# Patient Record
Sex: MALE | Race: WHITE | Employment: OTHER | URBAN - METROPOLITAN AREA
[De-identification: names, ages, dates, MRNs, and addresses within clinical notes are randomized per-mention and may not be internally consistent; named-entity substitution may affect disease eponyms.]

---

## 2017-01-25 ENCOUNTER — NURSE ONLY (OUTPATIENT)
Dept: CARDIOLOGY CLINIC | Age: 72
End: 2017-01-25

## 2017-01-25 DIAGNOSIS — Z95.810 CARDIAC DEFIBRILLATOR IN PLACE: ICD-10-CM

## 2017-01-25 DIAGNOSIS — Z95.810 AICD (AUTOMATIC CARDIOVERTER/DEFIBRILLATOR) PRESENT: ICD-10-CM

## 2017-01-25 DIAGNOSIS — I25.5 ISCHEMIC CARDIOMYOPATHY: ICD-10-CM

## 2017-01-25 PROCEDURE — 93295 DEV INTERROG REMOTE 1/2/MLT: CPT | Performed by: INTERNAL MEDICINE

## 2017-01-25 PROCEDURE — 93296 REM INTERROG EVL PM/IDS: CPT | Performed by: INTERNAL MEDICINE

## 2017-03-13 ENCOUNTER — OFFICE VISIT (OUTPATIENT)
Dept: CARDIOLOGY CLINIC | Age: 72
End: 2017-03-13

## 2017-03-13 VITALS
DIASTOLIC BLOOD PRESSURE: 60 MMHG | SYSTOLIC BLOOD PRESSURE: 136 MMHG | WEIGHT: 225 LBS | HEART RATE: 52 BPM | HEIGHT: 70 IN | BODY MASS INDEX: 32.21 KG/M2

## 2017-03-13 DIAGNOSIS — I25.10 ATHEROSCLEROSIS OF NATIVE CORONARY ARTERY OF NATIVE HEART WITHOUT ANGINA PECTORIS: ICD-10-CM

## 2017-03-13 DIAGNOSIS — I42.9 CARDIOMYOPATHY (HCC): ICD-10-CM

## 2017-03-13 DIAGNOSIS — Z95.810 AICD (AUTOMATIC CARDIOVERTER/DEFIBRILLATOR) PRESENT: Primary | ICD-10-CM

## 2017-03-13 PROCEDURE — 1123F ACP DISCUSS/DSCN MKR DOCD: CPT | Performed by: INTERNAL MEDICINE

## 2017-03-13 PROCEDURE — 1036F TOBACCO NON-USER: CPT | Performed by: INTERNAL MEDICINE

## 2017-03-13 PROCEDURE — 99213 OFFICE O/P EST LOW 20 MIN: CPT | Performed by: INTERNAL MEDICINE

## 2017-03-13 PROCEDURE — G8417 CALC BMI ABV UP PARAM F/U: HCPCS | Performed by: INTERNAL MEDICINE

## 2017-03-13 PROCEDURE — 4040F PNEUMOC VAC/ADMIN/RCVD: CPT | Performed by: INTERNAL MEDICINE

## 2017-03-13 PROCEDURE — G8484 FLU IMMUNIZE NO ADMIN: HCPCS | Performed by: INTERNAL MEDICINE

## 2017-03-13 PROCEDURE — G8427 DOCREV CUR MEDS BY ELIG CLIN: HCPCS | Performed by: INTERNAL MEDICINE

## 2017-03-13 PROCEDURE — 3017F COLORECTAL CA SCREEN DOC REV: CPT | Performed by: INTERNAL MEDICINE

## 2017-03-13 PROCEDURE — G8598 ASA/ANTIPLAT THER USED: HCPCS | Performed by: INTERNAL MEDICINE

## 2017-03-13 RX ORDER — LOPERAMIDE HYDROCHLORIDE 2 MG/1
2 CAPSULE ORAL 4 TIMES DAILY PRN
COMMUNITY

## 2017-03-13 RX ORDER — NITROGLYCERIN 0.4 MG/1
0.4 TABLET SUBLINGUAL EVERY 5 MIN PRN
COMMUNITY

## 2017-03-13 RX ORDER — PRAZOSIN HYDROCHLORIDE 2 MG/1
2 CAPSULE ORAL NIGHTLY
COMMUNITY
End: 2018-09-24 | Stop reason: ALTCHOICE

## 2017-03-13 RX ORDER — RANITIDINE 300 MG/1
300 TABLET ORAL NIGHTLY
COMMUNITY
End: 2019-03-21

## 2017-03-13 RX ORDER — ROPINIROLE 0.25 MG/1
0.25 TABLET, FILM COATED ORAL 3 TIMES DAILY
COMMUNITY
End: 2019-03-21

## 2017-05-03 ENCOUNTER — NURSE ONLY (OUTPATIENT)
Dept: CARDIOLOGY CLINIC | Age: 72
End: 2017-05-03

## 2017-05-03 DIAGNOSIS — Z95.810 CARDIAC DEFIBRILLATOR IN PLACE: ICD-10-CM

## 2017-05-03 DIAGNOSIS — Z95.810 AICD (AUTOMATIC CARDIOVERTER/DEFIBRILLATOR) PRESENT: Chronic | ICD-10-CM

## 2017-05-03 DIAGNOSIS — I25.5 ISCHEMIC CARDIOMYOPATHY: ICD-10-CM

## 2017-05-03 PROCEDURE — 93296 REM INTERROG EVL PM/IDS: CPT | Performed by: INTERNAL MEDICINE

## 2017-05-03 PROCEDURE — 93295 DEV INTERROG REMOTE 1/2/MLT: CPT | Performed by: INTERNAL MEDICINE

## 2017-08-08 PROCEDURE — 93296 REM INTERROG EVL PM/IDS: CPT | Performed by: INTERNAL MEDICINE

## 2017-08-08 PROCEDURE — 93295 DEV INTERROG REMOTE 1/2/MLT: CPT | Performed by: INTERNAL MEDICINE

## 2017-08-09 ENCOUNTER — NURSE ONLY (OUTPATIENT)
Dept: CARDIOLOGY CLINIC | Age: 72
End: 2017-08-09

## 2017-08-09 DIAGNOSIS — Z95.810 CARDIAC DEFIBRILLATOR IN PLACE: ICD-10-CM

## 2017-08-09 DIAGNOSIS — I25.5 ISCHEMIC CARDIOMYOPATHY: ICD-10-CM

## 2017-08-09 DIAGNOSIS — Z95.810 AICD (AUTOMATIC CARDIOVERTER/DEFIBRILLATOR) PRESENT: Chronic | ICD-10-CM

## 2017-08-23 ENCOUNTER — OFFICE VISIT (OUTPATIENT)
Dept: CARDIOLOGY CLINIC | Age: 72
End: 2017-08-23

## 2017-08-23 VITALS
OXYGEN SATURATION: 95 % | WEIGHT: 227 LBS | BODY MASS INDEX: 33.62 KG/M2 | HEART RATE: 64 BPM | DIASTOLIC BLOOD PRESSURE: 62 MMHG | SYSTOLIC BLOOD PRESSURE: 140 MMHG | HEIGHT: 69 IN

## 2017-08-23 DIAGNOSIS — I10 ESSENTIAL HYPERTENSION, BENIGN: Primary | Chronic | ICD-10-CM

## 2017-08-23 DIAGNOSIS — I42.9 PRIMARY CARDIOMYOPATHY (HCC): ICD-10-CM

## 2017-08-23 DIAGNOSIS — E78.5 OTHER AND UNSPECIFIED HYPERLIPIDEMIA: ICD-10-CM

## 2017-08-23 DIAGNOSIS — I25.10 ATHEROSCLEROSIS OF NATIVE CORONARY ARTERY OF NATIVE HEART WITHOUT ANGINA PECTORIS: Chronic | ICD-10-CM

## 2017-08-23 PROCEDURE — 1036F TOBACCO NON-USER: CPT | Performed by: NURSE PRACTITIONER

## 2017-08-23 PROCEDURE — 99214 OFFICE O/P EST MOD 30 MIN: CPT | Performed by: NURSE PRACTITIONER

## 2017-08-23 PROCEDURE — 4040F PNEUMOC VAC/ADMIN/RCVD: CPT | Performed by: NURSE PRACTITIONER

## 2017-08-23 PROCEDURE — 3017F COLORECTAL CA SCREEN DOC REV: CPT | Performed by: NURSE PRACTITIONER

## 2017-08-23 PROCEDURE — 1123F ACP DISCUSS/DSCN MKR DOCD: CPT | Performed by: NURSE PRACTITIONER

## 2017-08-23 PROCEDURE — G8417 CALC BMI ABV UP PARAM F/U: HCPCS | Performed by: NURSE PRACTITIONER

## 2017-08-23 PROCEDURE — G8598 ASA/ANTIPLAT THER USED: HCPCS | Performed by: NURSE PRACTITIONER

## 2017-08-23 PROCEDURE — G8427 DOCREV CUR MEDS BY ELIG CLIN: HCPCS | Performed by: NURSE PRACTITIONER

## 2017-11-29 ENCOUNTER — TELEPHONE (OUTPATIENT)
Dept: CARDIOLOGY CLINIC | Age: 72
End: 2017-11-29

## 2017-11-29 ENCOUNTER — NURSE ONLY (OUTPATIENT)
Dept: CARDIOLOGY CLINIC | Age: 72
End: 2017-11-29

## 2017-11-29 DIAGNOSIS — Z95.810 CARDIAC DEFIBRILLATOR IN PLACE: ICD-10-CM

## 2017-11-29 DIAGNOSIS — I25.5 ISCHEMIC CARDIOMYOPATHY: ICD-10-CM

## 2017-11-29 PROCEDURE — 93295 DEV INTERROG REMOTE 1/2/MLT: CPT | Performed by: INTERNAL MEDICINE

## 2017-11-29 PROCEDURE — 93296 REM INTERROG EVL PM/IDS: CPT | Performed by: INTERNAL MEDICINE

## 2017-12-04 ENCOUNTER — TELEPHONE (OUTPATIENT)
Dept: CARDIOLOGY CLINIC | Age: 72
End: 2017-12-04

## 2017-12-04 NOTE — TELEPHONE ENCOUNTER
Per message from Dr. Kerline Vargas > One episode of tachycardia on 13-Aug-2017, at 11:30, for 24 seconds, with average rate of 188 likely SVT. Therapies withheld due to wavelet suggesting SVT. Schedule follow up with EP when available. Marilyn Vincent that scheduling dept will call patient for appt with M in January 2018 to discuss tachycardia noted on ICD interrogation. Instructed patient to call office back with any questions regarding message.

## 2018-01-08 ENCOUNTER — OFFICE VISIT (OUTPATIENT)
Dept: CARDIOLOGY CLINIC | Age: 73
End: 2018-01-08

## 2018-01-08 ENCOUNTER — PROCEDURE VISIT (OUTPATIENT)
Dept: CARDIOLOGY CLINIC | Age: 73
End: 2018-01-08

## 2018-01-08 VITALS
OXYGEN SATURATION: 96 % | HEART RATE: 70 BPM | SYSTOLIC BLOOD PRESSURE: 130 MMHG | HEIGHT: 69 IN | WEIGHT: 231.12 LBS | DIASTOLIC BLOOD PRESSURE: 78 MMHG | BODY MASS INDEX: 34.23 KG/M2

## 2018-01-08 DIAGNOSIS — E78.2 MIXED HYPERLIPIDEMIA: ICD-10-CM

## 2018-01-08 DIAGNOSIS — I25.5 ISCHEMIC CARDIOMYOPATHY: ICD-10-CM

## 2018-01-08 DIAGNOSIS — I25.10 CORONARY ARTERY DISEASE INVOLVING NATIVE HEART WITHOUT ANGINA PECTORIS, UNSPECIFIED VESSEL OR LESION TYPE: Chronic | ICD-10-CM

## 2018-01-08 DIAGNOSIS — I25.5 ISCHEMIC CARDIOMYOPATHY: Primary | ICD-10-CM

## 2018-01-08 DIAGNOSIS — Z95.810 ICD (IMPLANTABLE CARDIOVERTER-DEFIBRILLATOR) IN PLACE: Primary | ICD-10-CM

## 2018-01-08 DIAGNOSIS — I10 ESSENTIAL HYPERTENSION, BENIGN: Chronic | ICD-10-CM

## 2018-01-08 DIAGNOSIS — E66.9 CLASS 1 OBESITY WITH BODY MASS INDEX (BMI) OF 34.0 TO 34.9 IN ADULT, UNSPECIFIED OBESITY TYPE, UNSPECIFIED WHETHER SERIOUS COMORBIDITY PRESENT: ICD-10-CM

## 2018-01-08 DIAGNOSIS — R00.0 TACHYCARDIA: ICD-10-CM

## 2018-01-08 DIAGNOSIS — Z95.810 AICD (AUTOMATIC CARDIOVERTER/DEFIBRILLATOR) PRESENT: Chronic | ICD-10-CM

## 2018-01-08 PROCEDURE — G8427 DOCREV CUR MEDS BY ELIG CLIN: HCPCS | Performed by: INTERNAL MEDICINE

## 2018-01-08 PROCEDURE — G8484 FLU IMMUNIZE NO ADMIN: HCPCS | Performed by: INTERNAL MEDICINE

## 2018-01-08 PROCEDURE — 3017F COLORECTAL CA SCREEN DOC REV: CPT | Performed by: INTERNAL MEDICINE

## 2018-01-08 PROCEDURE — 1036F TOBACCO NON-USER: CPT | Performed by: INTERNAL MEDICINE

## 2018-01-08 PROCEDURE — 4040F PNEUMOC VAC/ADMIN/RCVD: CPT | Performed by: INTERNAL MEDICINE

## 2018-01-08 PROCEDURE — 93282 PRGRMG EVAL IMPLANTABLE DFB: CPT | Performed by: INTERNAL MEDICINE

## 2018-01-08 PROCEDURE — 1123F ACP DISCUSS/DSCN MKR DOCD: CPT | Performed by: INTERNAL MEDICINE

## 2018-01-08 PROCEDURE — 99214 OFFICE O/P EST MOD 30 MIN: CPT | Performed by: INTERNAL MEDICINE

## 2018-01-08 PROCEDURE — G8417 CALC BMI ABV UP PARAM F/U: HCPCS | Performed by: INTERNAL MEDICINE

## 2018-01-08 PROCEDURE — G8598 ASA/ANTIPLAT THER USED: HCPCS | Performed by: INTERNAL MEDICINE

## 2018-01-08 RX ORDER — CARVEDILOL 25 MG/1
25 TABLET ORAL 2 TIMES DAILY WITH MEALS
Qty: 30 TABLET | Refills: 5 | Status: SHIPPED | OUTPATIENT
Start: 2018-01-08 | End: 2018-01-08 | Stop reason: SDUPTHER

## 2018-01-08 RX ORDER — CARVEDILOL 25 MG/1
25 TABLET ORAL 2 TIMES DAILY WITH MEALS
Qty: 30 TABLET | Refills: 5 | Status: ON HOLD | OUTPATIENT
Start: 2018-01-08 | End: 2020-03-03 | Stop reason: HOSPADM

## 2018-01-08 NOTE — PROGRESS NOTES
1000 UNITS TABS tablet Take 1,000 Units by mouth daily      loperamide (IMODIUM) 2 MG capsule Take 2 mg by mouth 4 times daily as needed for Diarrhea      nitroGLYCERIN (NITROSTAT) 0.4 MG SL tablet Place 0.4 mg under the tongue every 5 minutes as needed for Chest pain up to max of 3 total doses. If no relief after 1 dose, call 911.  ranitidine (ZANTAC) 300 MG tablet Take 300 mg by mouth nightly      rOPINIRole (REQUIP) 0.25 MG tablet Take 0.25 mg by mouth 3 times daily      prazosin (MINIPRESS) 2 MG capsule Take 2 mg by mouth nightly      amLODIPine (NORVASC) 5 MG tablet Take 1 tablet by mouth daily (Patient taking differently: Take 10 mg by mouth daily ) 30 tablet 3    MAGNESIUM PO Take 450 mg by mouth 2 times daily      Calcium Carb-Cholecalciferol (CALCIUM 1000 + D PO) Take 1,000 Units by mouth 2 times daily      hydrochlorothiazide (HYDRODIURIL) 25 MG tablet Take 0.5 tablets by mouth daily 30 tablet 3    omeprazole (PRILOSEC) 20 MG capsule Take 1 capsule by mouth 2 times daily 30 capsule 3    glipiZIDE (GLUCOTROL) 5 MG tablet Take 5 mg by mouth 2 times daily (before meals)      carvedilol (COREG) 12.5 MG tablet Take 12.5 mg by mouth 2 times daily (with meals).  cyclobenzaprine (FLEXERIL) 10 MG tablet Take 10 mg by mouth every 6 hours as needed for Muscle spasms.  atorvastatin (LIPITOR) 80 MG tablet Take 80 mg by mouth daily.  clopidogrel (PLAVIX) 75 MG tablet Take 75 mg by mouth daily. No current facility-administered medications for this visit. Social History:   reports that he quit smoking about 42 years ago. He has never used smokeless tobacco. He reports that he drinks alcohol. He reports that he does not use drugs. Family History:  family history includes Heart Disease in his father and mother.      Review of System:  · General: negative for fever, chills   · Ophthalmic ROS: negative for - eye pain or loss of vision  · ENT ROS: negative for - headaches, sore Essential hypertension, benign 08/02/2011     Priority: High    Coronary atherosclerosis of native coronary artery 08/02/2011     Priority: High    Primary cardiomyopathy ([facility] 08/02/2011     Priority: High    Ventricular tachycardia ([facility] 06/23/2015    AICD (automatic cardioverter/defibrillator) present 06/23/2015    Ischemic cardiomyopathy     Syncope     Coronary artery disease     Lightheaded 03/31/2015    Chest pain 08/23/2012    Diabetes mellitus ([facility] 08/02/2011        Plan:    - Tachycardia:    New problem. Brief episodes of tachycardia on August 27 for 10 seconds. Patient appears to be asymptomatic. Patient also has had history of brief episodes of nonsustained ventricular tachycardia   Increase carvedilol to 25 mg twice a day   Continue aggressive medical therapy and risk factor modification including weight loss and treatment of sleep apnea    -Cardiomyopathy   - Prior EF of 40-45%. - EF 35% per nuclear stress test 3/31/15   - EF 40% per MUGA 4/10/15.     - He is taking Coreg and is on Losartan. - ICD interrogated today by me and checked. Normal function. Arrhythmia noted as above. -Increased coreg to 25mg bid     - CAD   - Cath: 8/28/12 patent stents in LAD, CX and Marginal. RCA occulusion with collaterals, EF 40%. - 3/31/15 stress: small sized apical primarily fixed defect consistent with fibrosis. Diaphragm attenuation reduces the specificity of this finding. No ischemia. Dilated left ventricle with global hypokinesis and EF 35%. Frequent PVCs and slow non-sustained polymorphic VT    -HTN- Stable. -HLD- On Statin    HOSSEIN: Not treated. [facility] Encouraged to follow-up with sleep study and using his CPAP    - Recommend stop drinking alcohol.     - Obesity: Body mass index is 34.13 kg/m². Weight loss recommended. States he has gained 9-10lbs.   - The patient is counseled to follow a low salt diet to assure blood pressure remains controlled for

## 2018-01-08 NOTE — PROGRESS NOTES
Patient comes in for programming evaluation for his defibrillator. All sensing and pacing parameters are within normal range. No changes need to be made at this time. Please see interrogation for more detail. Patient will follow up in 3 months in office or remotely.

## 2018-04-10 ENCOUNTER — NURSE ONLY (OUTPATIENT)
Dept: CARDIOLOGY CLINIC | Age: 73
End: 2018-04-10

## 2018-04-10 DIAGNOSIS — Z95.810 CARDIAC DEFIBRILLATOR IN PLACE: ICD-10-CM

## 2018-04-10 DIAGNOSIS — I42.9 PRIMARY CARDIOMYOPATHY (HCC): ICD-10-CM

## 2018-04-11 PROCEDURE — 93295 DEV INTERROG REMOTE 1/2/MLT: CPT | Performed by: INTERNAL MEDICINE

## 2018-04-11 PROCEDURE — 93296 REM INTERROG EVL PM/IDS: CPT | Performed by: INTERNAL MEDICINE

## 2018-05-03 ENCOUNTER — OFFICE VISIT (OUTPATIENT)
Dept: CARDIOLOGY CLINIC | Age: 73
End: 2018-05-03

## 2018-05-03 VITALS
SYSTOLIC BLOOD PRESSURE: 112 MMHG | BODY MASS INDEX: 35 KG/M2 | OXYGEN SATURATION: 92 % | WEIGHT: 237 LBS | DIASTOLIC BLOOD PRESSURE: 72 MMHG | HEART RATE: 60 BPM

## 2018-05-03 DIAGNOSIS — I42.9 PRIMARY CARDIOMYOPATHY (HCC): Primary | ICD-10-CM

## 2018-05-03 DIAGNOSIS — I25.10 ATHEROSCLEROSIS OF NATIVE CORONARY ARTERY OF NATIVE HEART WITHOUT ANGINA PECTORIS: Chronic | ICD-10-CM

## 2018-05-03 DIAGNOSIS — I10 ESSENTIAL HYPERTENSION, BENIGN: Chronic | ICD-10-CM

## 2018-05-03 PROCEDURE — 1036F TOBACCO NON-USER: CPT | Performed by: NURSE PRACTITIONER

## 2018-05-03 PROCEDURE — G8417 CALC BMI ABV UP PARAM F/U: HCPCS | Performed by: NURSE PRACTITIONER

## 2018-05-03 PROCEDURE — 3017F COLORECTAL CA SCREEN DOC REV: CPT | Performed by: NURSE PRACTITIONER

## 2018-05-03 PROCEDURE — G8427 DOCREV CUR MEDS BY ELIG CLIN: HCPCS | Performed by: NURSE PRACTITIONER

## 2018-05-03 PROCEDURE — G8598 ASA/ANTIPLAT THER USED: HCPCS | Performed by: NURSE PRACTITIONER

## 2018-05-03 PROCEDURE — 99214 OFFICE O/P EST MOD 30 MIN: CPT | Performed by: NURSE PRACTITIONER

## 2018-05-03 PROCEDURE — 4040F PNEUMOC VAC/ADMIN/RCVD: CPT | Performed by: NURSE PRACTITIONER

## 2018-05-03 PROCEDURE — 1123F ACP DISCUSS/DSCN MKR DOCD: CPT | Performed by: NURSE PRACTITIONER

## 2018-05-18 ENCOUNTER — TELEPHONE (OUTPATIENT)
Dept: CARDIOLOGY CLINIC | Age: 73
End: 2018-05-18

## 2018-05-21 RX ORDER — HYDROCHLOROTHIAZIDE 25 MG/1
12.5 TABLET ORAL DAILY PRN
Qty: 30 TABLET | Refills: 3 | Status: SHIPPED | OUTPATIENT
Start: 2018-05-21 | End: 2018-05-21 | Stop reason: SDUPTHER

## 2018-05-21 RX ORDER — HYDROCHLOROTHIAZIDE 25 MG/1
12.5 TABLET ORAL DAILY PRN
Qty: 30 TABLET | Refills: 3 | Status: SHIPPED | OUTPATIENT
Start: 2018-05-21 | End: 2018-05-22 | Stop reason: SDUPTHER

## 2018-05-22 ENCOUNTER — TELEPHONE (OUTPATIENT)
Dept: CARDIOLOGY CLINIC | Age: 73
End: 2018-05-22

## 2018-05-22 RX ORDER — HYDROCHLOROTHIAZIDE 25 MG/1
12.5 TABLET ORAL DAILY PRN
Qty: 30 TABLET | Refills: 3 | Status: SHIPPED | OUTPATIENT
Start: 2018-05-22 | End: 2018-06-25 | Stop reason: SDUPTHER

## 2018-06-22 ENCOUNTER — TELEPHONE (OUTPATIENT)
Dept: CARDIOLOGY CLINIC | Age: 73
End: 2018-06-22

## 2018-06-25 ENCOUNTER — TELEPHONE (OUTPATIENT)
Dept: CARDIOLOGY CLINIC | Age: 73
End: 2018-06-25

## 2018-06-25 RX ORDER — HYDROCHLOROTHIAZIDE 25 MG/1
12.5 TABLET ORAL DAILY PRN
Qty: 30 TABLET | Refills: 3 | Status: SHIPPED | OUTPATIENT
Start: 2018-06-25 | End: 2019-03-21

## 2018-07-17 ENCOUNTER — NURSE ONLY (OUTPATIENT)
Dept: CARDIOLOGY CLINIC | Age: 73
End: 2018-07-17

## 2018-07-17 DIAGNOSIS — Z95.810 CARDIAC DEFIBRILLATOR IN PLACE: ICD-10-CM

## 2018-07-17 DIAGNOSIS — I25.5 ISCHEMIC CARDIOMYOPATHY: ICD-10-CM

## 2018-07-17 PROCEDURE — 93295 DEV INTERROG REMOTE 1/2/MLT: CPT | Performed by: INTERNAL MEDICINE

## 2018-07-17 PROCEDURE — 93296 REM INTERROG EVL PM/IDS: CPT | Performed by: INTERNAL MEDICINE

## 2018-07-26 ENCOUNTER — PROCEDURE VISIT (OUTPATIENT)
Dept: CARDIOLOGY CLINIC | Age: 73
End: 2018-07-26

## 2018-07-26 ENCOUNTER — OFFICE VISIT (OUTPATIENT)
Dept: CARDIOLOGY CLINIC | Age: 73
End: 2018-07-26

## 2018-07-26 VITALS
WEIGHT: 228 LBS | BODY MASS INDEX: 33.67 KG/M2 | OXYGEN SATURATION: 94 % | DIASTOLIC BLOOD PRESSURE: 62 MMHG | SYSTOLIC BLOOD PRESSURE: 120 MMHG | HEART RATE: 56 BPM

## 2018-07-26 DIAGNOSIS — I47.20 VENTRICULAR TACHYCARDIA: ICD-10-CM

## 2018-07-26 DIAGNOSIS — I42.9 PRIMARY CARDIOMYOPATHY (HCC): ICD-10-CM

## 2018-07-26 DIAGNOSIS — I10 ESSENTIAL HYPERTENSION, BENIGN: Primary | Chronic | ICD-10-CM

## 2018-07-26 DIAGNOSIS — I25.5 ISCHEMIC CARDIOMYOPATHY: ICD-10-CM

## 2018-07-26 DIAGNOSIS — Z95.810 ICD (IMPLANTABLE CARDIOVERTER-DEFIBRILLATOR) IN PLACE: ICD-10-CM

## 2018-07-26 DIAGNOSIS — I25.10 ATHEROSCLEROSIS OF NATIVE CORONARY ARTERY OF NATIVE HEART WITHOUT ANGINA PECTORIS: Chronic | ICD-10-CM

## 2018-07-26 DIAGNOSIS — E78.2 MIXED HYPERLIPIDEMIA: ICD-10-CM

## 2018-07-26 PROCEDURE — 1036F TOBACCO NON-USER: CPT | Performed by: NURSE PRACTITIONER

## 2018-07-26 PROCEDURE — G8598 ASA/ANTIPLAT THER USED: HCPCS | Performed by: NURSE PRACTITIONER

## 2018-07-26 PROCEDURE — G8427 DOCREV CUR MEDS BY ELIG CLIN: HCPCS | Performed by: NURSE PRACTITIONER

## 2018-07-26 PROCEDURE — 1101F PT FALLS ASSESS-DOCD LE1/YR: CPT | Performed by: NURSE PRACTITIONER

## 2018-07-26 PROCEDURE — G8417 CALC BMI ABV UP PARAM F/U: HCPCS | Performed by: NURSE PRACTITIONER

## 2018-07-26 PROCEDURE — 3017F COLORECTAL CA SCREEN DOC REV: CPT | Performed by: NURSE PRACTITIONER

## 2018-07-26 PROCEDURE — 99214 OFFICE O/P EST MOD 30 MIN: CPT | Performed by: NURSE PRACTITIONER

## 2018-07-26 PROCEDURE — 93282 PRGRMG EVAL IMPLANTABLE DFB: CPT | Performed by: INTERNAL MEDICINE

## 2018-07-26 PROCEDURE — 1123F ACP DISCUSS/DSCN MKR DOCD: CPT | Performed by: NURSE PRACTITIONER

## 2018-07-26 PROCEDURE — 4040F PNEUMOC VAC/ADMIN/RCVD: CPT | Performed by: NURSE PRACTITIONER

## 2018-07-26 NOTE — PROGRESS NOTES
Patient comes in for programming evaluation for his defibrillator. All sensing and pacing parameters are within normal range. Patient needed device checked because he has been dizzy lately. No arrhythmias seen in last 2 weeks. No changes need to be made at this time. Please see interrogation for more detail. Patient will follow up in 3 months in office or remotely.

## 2018-07-26 NOTE — PROGRESS NOTES
[address]   Cardiac Follow-up    Referring Provider:  Amado Conklin MD     CC: dizziness     History of Present Illness:   Mr. Andrzej Marquez is seen today as a routine  follow up visit. His cardiac history includes  Known CAD, cardiomyopathy, hypertension, hyperlipidemia, and diabetes. He has cut back on alcohol consumption and does not smoke. In 2006 he had a Taxus stent in his LAD. He also has been treated for dilated cardiomyopathy with EF 40-45%. 2011 stress echo was negative for ischemia, resting EF of 45%, with exercise, his EF went up to 55%. His last ECHO 7/16 EF 50-55%. 6/2015 patient receive a ICD implantation per Dr. Ruthie Horan. Last ICD pacer check was on 7/26/18. Carelink transmission shows normal sensing and pacing function. See interrogation for more details. At today's visit he complains of dizziness with positional changes from sitting to standing since he started taking HCTZ. At today's OV patient is negative for orthostatic hypotension. He only drinking 8 ounces of fluid per day. ICD was check at today's visit which showed normal sensing and pacing function. No arrhythmias seen in last 2 weeks. He denies chest pain, palpitations, SOB, dyspnea, or edema. Past Medical History:   has a past medical history of Cardiomyopathy ([address] Diabetes mellitus ([address] Hyperlipidemia; Hypertension; NSVT (nonsustained ventricular tachycardia) ([address] and Syncope. Surgical History:   has a past surgical history that includes Coronary angioplasty with stent; Cholecystectomy; laminectomy; Tonsillectomy and adenoidectomy; Cardiac defibrillator placement (6/23/15); and toenail excision (07/21/2017). Social History:   reports that he quit smoking about 43 years ago. He has never used smokeless tobacco. He reports that he drinks alcohol. He reports that he does not use drugs. Family History:  family history includes Heart Disease in his father and mother.    Current Outpatient Prescriptions   Medication scleral icterus. · ENT: No Headaches, hearing loss or vertigo. No mouth sores or sore throat. · Cardiovascular: Reviewed in HPI  · Respiratory: No cough or wheezing, no sputum production. No hematemesis. · Gastrointestinal: No abdominal pain, appetite loss, blood in stools. No change in bowel or bladder habits. · Genitourinary: No dysuria, trouble voiding, or hematuria. · Musculoskeletal:  No gait disturbance, weakness or joint complaints. · Integumentary: No rash or pruritis. · Neurological: No headache, diplopia, change in muscle strength, numbness or tingling. No change in gait, balance, coordination, mood, affect, memory, mentation, behavior. · Psychiatric: No anxiety, no depression. · Endocrine: No malaise, fatigue or temperature intolerance. No excessive thirst, fluid intake, or urination. No tremor. · Hematologic/Lymphatic: No abnormal bruising or bleeding, blood clots or swollen lymph nodes. · Allergic/Immunologic: No nasal congestion or hives. Physical Examination:    Vitals:    07/26/18 1442   BP: 120/62   Pulse: 56   SpO2: 94%         Constitutional and General Appearance:  Appears stated age, NAD  Respiratory:  · Normal excursion and expansion without use of accessory muscles  · Resp Auscultation: Normal breath sounds without dullness  Cardiovascular:  · The apical impulses not displaced  · Heart tones are crisp and normal  · Cervical veins are not engorged  · The carotid upstroke is normal in amplitude and contour without delay or bruit  · Peripheral pulses are symmetrical and full  · There is no clubbing, cyanosis of the extremities.   · No edema  · Femoral Arteries: 2+ and equal  · Pedal Pulses: 2+ and equal   Abdomen:  · No masses or tenderness  · Liver/Spleen: No Abnormalities Noted  Neurological/Psychiatric:  · Alert and oriented in all spheres  · Moves all extremities well  · Exhibits normal gait balance and coordination  · No abnormalities of mood, affect, memory, mentation, or Instructed the patient to only take HCTZ PRN- for swelling, drink 64 fluid ounces daily, continue to monitor B/P sitting and standing.    2. Follow up in six months     I appreciate the opportunity of cooperating in the care of this individual.    Viraj Hercules CNP

## 2018-07-28 NOTE — PATIENT INSTRUCTIONS
[address]   Cardiac Follow-up    Referring Provider:  Fatoumata Barbosa MD     No chief complaint on file. History of Present Illness:   Mr. Arnulfo Pierce is seen today as a routine  follow up visit. His cardiac history includes  Known CAD, cardiomyopathy, hypertension, hyperlipidemia, and diabetes. He has cut back on alcohol consumption and does not smoke. In 2006 he had a Taxus stent in his LAD. He also has been treated for dilated cardiomyopathy with EF 40-45%. 2011 stress echo was negative for ischemia, resting EF of 45%, with exercise, his EF went up to 55%. His last ECHO 7/16 EF 50-55%. 6/2015 patient receive a ICD implantation per Dr. Sirena Pathak. Last ICD pacer check was on 7/26/18. Carelink transmission shows normal sensing and pacing function. See interrogation for more details. At today's visit he complains of dizziness with positional changes from sitting to standing since he started taking HCTZ. At today's OV patient is negative for orthostatic hypotension. He only drinking 8 ounces of fluid per day. ICD was check at today's visit which showed normal sensing and pacing function. No arrhythmias seen in last 2 weeks. He denies chest pain, palpitations, SOB, dyspnea, or edema. Past Medical History:   has a past medical history of Cardiomyopathy ([address] Diabetes mellitus ([address] Hyperlipidemia; Hypertension; NSVT (nonsustained ventricular tachycardia) ([address] and Syncope. Surgical History:   has a past surgical history that includes Coronary angioplasty with stent; Cholecystectomy; laminectomy; Tonsillectomy and adenoidectomy; Cardiac defibrillator placement (6/23/15); and toenail excision (07/21/2017). Social History:   reports that he quit smoking about 43 years ago. He has never used smokeless tobacco. He reports that he drinks alcohol. He reports that he does not use drugs. Family History:  family history includes Heart Disease in his father and mother.    Current Outpatient changes or diplopia. No scleral icterus. · ENT: No Headaches, hearing loss or vertigo. No mouth sores or sore throat. · Cardiovascular: Reviewed in HPI  · Respiratory: No cough or wheezing, no sputum production. No hematemesis. · Gastrointestinal: No abdominal pain, appetite loss, blood in stools. No change in bowel or bladder habits. · Genitourinary: No dysuria, trouble voiding, or hematuria. · Musculoskeletal:  No gait disturbance, weakness or joint complaints. · Integumentary: No rash or pruritis. · Neurological: No headache, diplopia, change in muscle strength, numbness or tingling. No change in gait, balance, coordination, mood, affect, memory, mentation, behavior. · Psychiatric: No anxiety, no depression. · Endocrine: No malaise, fatigue or temperature intolerance. No excessive thirst, fluid intake, or urination. No tremor. · Hematologic/Lymphatic: No abnormal bruising or bleeding, blood clots or swollen lymph nodes. · Allergic/Immunologic: No nasal congestion or hives. Physical Examination:    Vitals:    07/26/18 1442   BP: 120/62   Pulse: 56   SpO2: 94%         Constitutional and General Appearance:  Appears stated age, NAD  Respiratory:  · Normal excursion and expansion without use of accessory muscles  · Resp Auscultation: Normal breath sounds without dullness  Cardiovascular:  · The apical impulses not displaced  · Heart tones are crisp and normal  · Cervical veins are not engorged  · The carotid upstroke is normal in amplitude and contour without delay or bruit  · Peripheral pulses are symmetrical and full  · There is no clubbing, cyanosis of the extremities.   · No edema  · Femoral Arteries: 2+ and equal  · Pedal Pulses: 2+ and equal   Abdomen:  · No masses or tenderness  · Liver/Spleen: No Abnormalities Noted  Neurological/Psychiatric:  · Alert and oriented in all spheres  · Moves all extremities well  · Exhibits normal gait balance and coordination  · No abnormalities of mood, affect, memory, mentation, or behavior are noted    Stress Echo: 8/9/2011 [facility], EF 45%, mild TR, RVSP 34.5 mmHg. MUGA 4/10/15 -- EF 40%. Myoview: 3/31/15 stress: small sized apical primarily fixed defect consistent with fibrosis. Diaphragm attenuation reduces the specificity of this finding. No ischemia. Dilated left ventricle with global hypokinesis and EF 35%. Frequent PVCs and slow non-sustained polymorphic VT    ECHO: 7/16  Conclusions      Summary   -Technically limited study due to body habitus.   -Arrhythmia noted during exam.   -Normal left ventricle size.   -There is mild concentric left ventricular hypertrophy.   -Left ventricular function is difficult to estimate due to arrhythmia but   appears to be 50-55%.  -There is reversal of E/A inflow velocities across the mitral valve   suggesting impaired left ventricular relaxation.   -The left atrium is dilated.   -There is trivial tricuspid regurgitation with RVSP estimated at 27 mmHg.   -Trivial mitral regurgitation is present.     Assessment/Plan:    1. Essential hypertension: Today's B/P- 120/62   stable  Continue current medications      2. Coronary atherosclerosis of native coronary artery: stable, denies any anginal symptoms   Had CP prior to stenting. 2004 cath> Cyphers to Cx/Nataly,  2007 cath> Cypher to LAD     2011 stress echo negative for ischemia. 8/28/12 [facility]> patent LAD, marginal and CFX stents. Chronic RCA occlusion. 3.   Hyperlipidemia:  on Lipitor 80 mg daily. Labs drawn per [facility]. 4.   Cardiomyopathy> 2011 stress echo was negative for ischemia, resting EF of 45%, with exercise his EF went up to 55%. 8/28/12 [facility]> EF 40%. Last ECHO: 7/16 EF 50-55%        Patient appears stable on physical exam        on Coreg 12.5 mg BID        Continue current medications    5.  Dizziness-       + positional changes from sitting to standing        Plan: only take HCTZ PRN- for swelling, drink 64 fluid ounces daily, continue to monitor B/P sitting and

## 2018-09-24 ENCOUNTER — OFFICE VISIT (OUTPATIENT)
Dept: CARDIOLOGY CLINIC | Age: 73
End: 2018-09-24
Payer: MEDICARE

## 2018-09-24 VITALS
HEART RATE: 53 BPM | DIASTOLIC BLOOD PRESSURE: 60 MMHG | BODY MASS INDEX: 32.85 KG/M2 | WEIGHT: 221.8 LBS | HEIGHT: 69 IN | OXYGEN SATURATION: 97 % | SYSTOLIC BLOOD PRESSURE: 94 MMHG

## 2018-09-24 DIAGNOSIS — R42 DIZZINESS: ICD-10-CM

## 2018-09-24 DIAGNOSIS — I25.5 ISCHEMIC CARDIOMYOPATHY: ICD-10-CM

## 2018-09-24 DIAGNOSIS — I10 ESSENTIAL HYPERTENSION, BENIGN: Chronic | ICD-10-CM

## 2018-09-24 DIAGNOSIS — I25.10 ATHEROSCLEROSIS OF NATIVE CORONARY ARTERY OF NATIVE HEART WITHOUT ANGINA PECTORIS: Primary | Chronic | ICD-10-CM

## 2018-09-24 DIAGNOSIS — E78.2 MIXED HYPERLIPIDEMIA: ICD-10-CM

## 2018-09-24 PROCEDURE — G8427 DOCREV CUR MEDS BY ELIG CLIN: HCPCS | Performed by: INTERNAL MEDICINE

## 2018-09-24 PROCEDURE — 1101F PT FALLS ASSESS-DOCD LE1/YR: CPT | Performed by: INTERNAL MEDICINE

## 2018-09-24 PROCEDURE — G8598 ASA/ANTIPLAT THER USED: HCPCS | Performed by: INTERNAL MEDICINE

## 2018-09-24 PROCEDURE — 1123F ACP DISCUSS/DSCN MKR DOCD: CPT | Performed by: INTERNAL MEDICINE

## 2018-09-24 PROCEDURE — 3017F COLORECTAL CA SCREEN DOC REV: CPT | Performed by: INTERNAL MEDICINE

## 2018-09-24 PROCEDURE — G8417 CALC BMI ABV UP PARAM F/U: HCPCS | Performed by: INTERNAL MEDICINE

## 2018-09-24 PROCEDURE — 99214 OFFICE O/P EST MOD 30 MIN: CPT | Performed by: INTERNAL MEDICINE

## 2018-09-24 PROCEDURE — 4040F PNEUMOC VAC/ADMIN/RCVD: CPT | Performed by: INTERNAL MEDICINE

## 2018-09-24 PROCEDURE — 1036F TOBACCO NON-USER: CPT | Performed by: INTERNAL MEDICINE

## 2018-09-26 ENCOUNTER — TELEPHONE (OUTPATIENT)
Dept: CARDIOLOGY CLINIC | Age: 73
End: 2018-09-26

## 2018-09-26 NOTE — TELEPHONE ENCOUNTER
Anticipate he is stable for procedure. Having echo and stress test next week.  OK to hold plavix 10 days before procedure

## 2018-09-26 NOTE — LETTER
[address] Cardiology - [address] [address]  Phone: [phone]  Fax: [phone]  :46    [id number]      Dear Monse Serra he is stable for procedure. Having echo and stress test next week. OK to hold plavix 10 days before procedure. If you have any questions or concerns, please don't hesitate to call.     Sincerely,        Shimon Batres MD

## 2018-09-26 NOTE — TELEPHONE ENCOUNTER
CARDIAC CLEARANCE     What type of procedure are you having? Endo liver biopsy    Which physician is performing your procedure? Dr Venkat Moura    When is your procedure scheduled for? Not     Where are you having this procedure? Are you taking Blood Thinners? plavix   If so what? (Name/dose/frequesncy)     Does the surgeon want you to stop your blood thinner? If so for how long?   Wants him off plavix 10 days prior but wants him on 81mg ASA those 10 days     Phone Number and Contact Name for Physicians office:    Fax number to send information:[phone]

## 2018-10-03 ENCOUNTER — HOSPITAL ENCOUNTER (OUTPATIENT)
Dept: NON INVASIVE DIAGNOSTICS | Age: 73
Discharge: HOME | End: 2018-10-03
Payer: MEDICARE

## 2018-10-03 ENCOUNTER — TELEPHONE (OUTPATIENT)
Dept: CARDIOLOGY CLINIC | Age: 73
End: 2018-10-03

## 2018-10-03 DIAGNOSIS — I25.10 ATHEROSCLEROSIS OF NATIVE CORONARY ARTERY OF NATIVE HEART WITHOUT ANGINA PECTORIS: Chronic | ICD-10-CM

## 2018-10-03 DIAGNOSIS — I25.5 ISCHEMIC CARDIOMYOPATHY: ICD-10-CM

## 2018-10-03 DIAGNOSIS — R42 DIZZINESS: ICD-10-CM

## 2018-10-03 LAB
LEFT VENTRICULAR EJECTION FRACTION HIGH VALUE: 45 %
LEFT VENTRICULAR EJECTION FRACTION MODE: NORMAL
LV EF: 44 %
LV EF: 45 %
LVEF MODALITY: NORMAL
LVEF MODALITY: NORMAL

## 2018-10-03 PROCEDURE — 93306 TTE W/DOPPLER COMPLETE: CPT

## 2018-10-03 PROCEDURE — 3430000000 HC RX DIAGNOSTIC RADIOPHARMACEUTICAL: Performed by: INTERNAL MEDICINE

## 2018-10-03 PROCEDURE — 78452 HT MUSCLE IMAGE SPECT MULT: CPT | Performed by: INTERNAL MEDICINE

## 2018-10-03 PROCEDURE — 6360000002 HC RX W HCPCS: Performed by: INTERNAL MEDICINE

## 2018-10-03 PROCEDURE — 93017 CV STRESS TEST TRACING ONLY: CPT | Performed by: INTERNAL MEDICINE

## 2018-10-03 PROCEDURE — A9502 TC99M TETROFOSMIN: HCPCS | Performed by: INTERNAL MEDICINE

## 2018-10-03 PROCEDURE — 93307 TTE W/O DOPPLER COMPLETE: CPT

## 2018-10-03 RX ADMIN — TETROFOSMIN 30 MILLICURIE: 0.23 INJECTION, POWDER, LYOPHILIZED, FOR SOLUTION INTRAVENOUS at 10:05

## 2018-10-03 RX ADMIN — REGADENOSON 0.4 MG: 0.08 INJECTION, SOLUTION INTRAVENOUS at 10:00

## 2018-10-03 RX ADMIN — TETROFOSMIN 10 MILLICURIE: 0.23 INJECTION, POWDER, LYOPHILIZED, FOR SOLUTION INTRAVENOUS at 08:10

## 2018-10-15 ENCOUNTER — TELEPHONE (OUTPATIENT)
Dept: CARDIOLOGY CLINIC | Age: 73
End: 2018-10-15

## 2018-10-15 DIAGNOSIS — C85.90 LYMPHOMA, UNSPECIFIED BODY REGION, UNSPECIFIED LYMPHOMA TYPE (HCC): Primary | ICD-10-CM

## 2018-10-15 DIAGNOSIS — I25.5 ISCHEMIC CARDIOMYOPATHY: ICD-10-CM

## 2018-10-23 ENCOUNTER — NURSE ONLY (OUTPATIENT)
Dept: CARDIOLOGY CLINIC | Age: 73
End: 2018-10-23
Payer: MEDICARE

## 2018-10-23 DIAGNOSIS — Z95.810 CARDIAC DEFIBRILLATOR IN PLACE: ICD-10-CM

## 2018-10-23 DIAGNOSIS — I42.9 PRIMARY CARDIOMYOPATHY (HCC): ICD-10-CM

## 2018-10-28 PROCEDURE — 93296 REM INTERROG EVL PM/IDS: CPT | Performed by: INTERNAL MEDICINE

## 2018-10-28 PROCEDURE — 93295 DEV INTERROG REMOTE 1/2/MLT: CPT | Performed by: INTERNAL MEDICINE

## 2019-01-29 ENCOUNTER — NURSE ONLY (OUTPATIENT)
Dept: CARDIOLOGY CLINIC | Age: 74
End: 2019-01-29
Payer: MEDICARE

## 2019-01-29 DIAGNOSIS — Z95.810 CARDIAC DEFIBRILLATOR IN PLACE: ICD-10-CM

## 2019-01-29 DIAGNOSIS — I25.5 ISCHEMIC CARDIOMYOPATHY: ICD-10-CM

## 2019-01-29 PROCEDURE — 93296 REM INTERROG EVL PM/IDS: CPT | Performed by: INTERNAL MEDICINE

## 2019-01-29 PROCEDURE — 93295 DEV INTERROG REMOTE 1/2/MLT: CPT | Performed by: INTERNAL MEDICINE

## 2019-02-01 PROBLEM — R00.0 TACHYCARDIA: Status: ACTIVE | Noted: 2019-02-01

## 2019-02-04 ENCOUNTER — PROCEDURE VISIT (OUTPATIENT)
Dept: CARDIOLOGY CLINIC | Age: 74
End: 2019-02-04
Payer: MEDICARE

## 2019-02-04 ENCOUNTER — OFFICE VISIT (OUTPATIENT)
Dept: CARDIOLOGY CLINIC | Age: 74
End: 2019-02-04
Payer: MEDICARE

## 2019-02-04 VITALS
DIASTOLIC BLOOD PRESSURE: 68 MMHG | HEART RATE: 76 BPM | WEIGHT: 199 LBS | BODY MASS INDEX: 29.47 KG/M2 | SYSTOLIC BLOOD PRESSURE: 110 MMHG | HEIGHT: 69 IN

## 2019-02-04 DIAGNOSIS — I42.9 PRIMARY CARDIOMYOPATHY (HCC): ICD-10-CM

## 2019-02-04 DIAGNOSIS — I25.10 CORONARY ARTERY DISEASE INVOLVING NATIVE HEART WITHOUT ANGINA PECTORIS, UNSPECIFIED VESSEL OR LESION TYPE: Chronic | ICD-10-CM

## 2019-02-04 DIAGNOSIS — I47.20 VENTRICULAR TACHYCARDIA: ICD-10-CM

## 2019-02-04 DIAGNOSIS — R55 SYNCOPE, UNSPECIFIED SYNCOPE TYPE: ICD-10-CM

## 2019-02-04 DIAGNOSIS — R00.0 TACHYCARDIA: Primary | ICD-10-CM

## 2019-02-04 DIAGNOSIS — Z95.810 AICD (AUTOMATIC CARDIOVERTER/DEFIBRILLATOR) PRESENT: Chronic | ICD-10-CM

## 2019-02-04 DIAGNOSIS — I25.10 ATHEROSCLEROSIS OF NATIVE CORONARY ARTERY OF NATIVE HEART WITHOUT ANGINA PECTORIS: Chronic | ICD-10-CM

## 2019-02-04 DIAGNOSIS — Z95.810 ICD (IMPLANTABLE CARDIOVERTER-DEFIBRILLATOR) IN PLACE: ICD-10-CM

## 2019-02-04 DIAGNOSIS — I25.5 ISCHEMIC CARDIOMYOPATHY: ICD-10-CM

## 2019-02-04 PROCEDURE — G8417 CALC BMI ABV UP PARAM F/U: HCPCS | Performed by: INTERNAL MEDICINE

## 2019-02-04 PROCEDURE — 1123F ACP DISCUSS/DSCN MKR DOCD: CPT | Performed by: INTERNAL MEDICINE

## 2019-02-04 PROCEDURE — 4040F PNEUMOC VAC/ADMIN/RCVD: CPT | Performed by: INTERNAL MEDICINE

## 2019-02-04 PROCEDURE — G8427 DOCREV CUR MEDS BY ELIG CLIN: HCPCS | Performed by: INTERNAL MEDICINE

## 2019-02-04 PROCEDURE — G8484 FLU IMMUNIZE NO ADMIN: HCPCS | Performed by: INTERNAL MEDICINE

## 2019-02-04 PROCEDURE — 1036F TOBACCO NON-USER: CPT | Performed by: INTERNAL MEDICINE

## 2019-02-04 PROCEDURE — G8598 ASA/ANTIPLAT THER USED: HCPCS | Performed by: INTERNAL MEDICINE

## 2019-02-04 PROCEDURE — 1101F PT FALLS ASSESS-DOCD LE1/YR: CPT | Performed by: INTERNAL MEDICINE

## 2019-02-04 PROCEDURE — 93282 PRGRMG EVAL IMPLANTABLE DFB: CPT | Performed by: INTERNAL MEDICINE

## 2019-02-04 PROCEDURE — 99214 OFFICE O/P EST MOD 30 MIN: CPT | Performed by: INTERNAL MEDICINE

## 2019-02-04 PROCEDURE — 3017F COLORECTAL CA SCREEN DOC REV: CPT | Performed by: INTERNAL MEDICINE

## 2019-02-04 RX ORDER — AMIODARONE HYDROCHLORIDE 200 MG/1
TABLET ORAL
Qty: 104 TABLET | Refills: 3 | Status: SHIPPED | OUTPATIENT
Start: 2019-02-04 | End: 2019-03-21 | Stop reason: ALTCHOICE

## 2019-03-04 ENCOUNTER — HOSPITAL ENCOUNTER (OUTPATIENT)
Dept: NON INVASIVE DIAGNOSTICS | Age: 74
Discharge: HOME | End: 2019-03-04
Payer: MEDICARE

## 2019-03-04 DIAGNOSIS — I25.5 ISCHEMIC CARDIOMYOPATHY: ICD-10-CM

## 2019-03-04 DIAGNOSIS — C85.90 LYMPHOMA, UNSPECIFIED BODY REGION, UNSPECIFIED LYMPHOMA TYPE (HCC): ICD-10-CM

## 2019-03-04 LAB
LEFT VENTRICULAR EJECTION FRACTION HIGH VALUE: 40 %
LEFT VENTRICULAR EJECTION FRACTION MODE: NORMAL
LV EF: 40 %
LVEF MODALITY: NORMAL

## 2019-03-04 PROCEDURE — C8929 TTE W OR WO FOL WCON,DOPPLER: HCPCS

## 2019-03-21 ENCOUNTER — OFFICE VISIT (OUTPATIENT)
Dept: CARDIOLOGY CLINIC | Age: 74
End: 2019-03-21
Payer: MEDICARE

## 2019-03-21 VITALS
BODY MASS INDEX: 29.79 KG/M2 | WEIGHT: 201.12 LBS | SYSTOLIC BLOOD PRESSURE: 118 MMHG | HEIGHT: 69 IN | HEART RATE: 60 BPM | DIASTOLIC BLOOD PRESSURE: 70 MMHG

## 2019-03-21 DIAGNOSIS — E78.2 MIXED HYPERLIPIDEMIA: ICD-10-CM

## 2019-03-21 DIAGNOSIS — I10 ESSENTIAL HYPERTENSION, BENIGN: Chronic | ICD-10-CM

## 2019-03-21 DIAGNOSIS — I42.9 PRIMARY CARDIOMYOPATHY (HCC): ICD-10-CM

## 2019-03-21 DIAGNOSIS — I25.10 ATHEROSCLEROSIS OF NATIVE CORONARY ARTERY OF NATIVE HEART WITHOUT ANGINA PECTORIS: Primary | Chronic | ICD-10-CM

## 2019-03-21 DIAGNOSIS — Z95.810 AICD (AUTOMATIC CARDIOVERTER/DEFIBRILLATOR) PRESENT: Chronic | ICD-10-CM

## 2019-03-21 PROCEDURE — G8484 FLU IMMUNIZE NO ADMIN: HCPCS | Performed by: INTERNAL MEDICINE

## 2019-03-21 PROCEDURE — 3017F COLORECTAL CA SCREEN DOC REV: CPT | Performed by: INTERNAL MEDICINE

## 2019-03-21 PROCEDURE — 1036F TOBACCO NON-USER: CPT | Performed by: INTERNAL MEDICINE

## 2019-03-21 PROCEDURE — 1123F ACP DISCUSS/DSCN MKR DOCD: CPT | Performed by: INTERNAL MEDICINE

## 2019-03-21 PROCEDURE — G8598 ASA/ANTIPLAT THER USED: HCPCS | Performed by: INTERNAL MEDICINE

## 2019-03-21 PROCEDURE — 99214 OFFICE O/P EST MOD 30 MIN: CPT | Performed by: INTERNAL MEDICINE

## 2019-03-21 PROCEDURE — G8427 DOCREV CUR MEDS BY ELIG CLIN: HCPCS | Performed by: INTERNAL MEDICINE

## 2019-03-21 PROCEDURE — 4040F PNEUMOC VAC/ADMIN/RCVD: CPT | Performed by: INTERNAL MEDICINE

## 2019-03-21 PROCEDURE — 1101F PT FALLS ASSESS-DOCD LE1/YR: CPT | Performed by: INTERNAL MEDICINE

## 2019-03-21 PROCEDURE — G8417 CALC BMI ABV UP PARAM F/U: HCPCS | Performed by: INTERNAL MEDICINE

## 2019-03-21 RX ORDER — TAMSULOSIN HYDROCHLORIDE 0.4 MG/1
0.4 CAPSULE ORAL DAILY
COMMUNITY
End: 2020-01-01

## 2019-03-21 RX ORDER — HYDROCHLOROTHIAZIDE 25 MG/1
12.5 TABLET ORAL DAILY
Qty: 45 TABLET | Refills: 3
Start: 2019-03-21 | End: 2020-01-08

## 2019-04-29 ENCOUNTER — NURSE ONLY (OUTPATIENT)
Dept: CARDIOLOGY CLINIC | Age: 74
End: 2019-04-29
Payer: MEDICARE

## 2019-04-29 ENCOUNTER — OFFICE VISIT (OUTPATIENT)
Dept: CARDIOLOGY CLINIC | Age: 74
End: 2019-04-29
Payer: MEDICARE

## 2019-04-29 VITALS
DIASTOLIC BLOOD PRESSURE: 60 MMHG | WEIGHT: 205 LBS | HEIGHT: 69 IN | SYSTOLIC BLOOD PRESSURE: 106 MMHG | BODY MASS INDEX: 30.36 KG/M2 | HEART RATE: 61 BPM

## 2019-04-29 DIAGNOSIS — I25.5 ISCHEMIC CARDIOMYOPATHY: ICD-10-CM

## 2019-04-29 DIAGNOSIS — Z95.810 ICD (IMPLANTABLE CARDIOVERTER-DEFIBRILLATOR) IN PLACE: ICD-10-CM

## 2019-04-29 DIAGNOSIS — R00.0 TACHYCARDIA: Primary | ICD-10-CM

## 2019-04-29 PROCEDURE — 1123F ACP DISCUSS/DSCN MKR DOCD: CPT | Performed by: NURSE PRACTITIONER

## 2019-04-29 PROCEDURE — G8598 ASA/ANTIPLAT THER USED: HCPCS | Performed by: NURSE PRACTITIONER

## 2019-04-29 PROCEDURE — 99212 OFFICE O/P EST SF 10 MIN: CPT | Performed by: NURSE PRACTITIONER

## 2019-04-29 PROCEDURE — G8427 DOCREV CUR MEDS BY ELIG CLIN: HCPCS | Performed by: NURSE PRACTITIONER

## 2019-04-29 PROCEDURE — 4040F PNEUMOC VAC/ADMIN/RCVD: CPT | Performed by: NURSE PRACTITIONER

## 2019-04-29 PROCEDURE — 93000 ELECTROCARDIOGRAM COMPLETE: CPT | Performed by: NURSE PRACTITIONER

## 2019-04-29 PROCEDURE — 1036F TOBACCO NON-USER: CPT | Performed by: NURSE PRACTITIONER

## 2019-04-29 PROCEDURE — G8417 CALC BMI ABV UP PARAM F/U: HCPCS | Performed by: NURSE PRACTITIONER

## 2019-04-29 PROCEDURE — 93282 PRGRMG EVAL IMPLANTABLE DFB: CPT | Performed by: INTERNAL MEDICINE

## 2019-04-29 PROCEDURE — 3017F COLORECTAL CA SCREEN DOC REV: CPT | Performed by: NURSE PRACTITIONER

## 2019-04-29 NOTE — PROGRESS NOTES
Patient comes in for programming evaluation for his defibrillator. All sensing and pacing parameters are within normal range. No VT since February when Amiodarone was started. No changes need to be made at this time. Please see interrogation for more detail. Patient will follow up in 3 months in office or remotely. Optivol elevated. Pt has Hodgkins.  He will see Pramod Mcarthur today our NP.

## 2019-04-29 NOTE — PATIENT INSTRUCTIONS
1) No medication changes, please double check at home with your wife if you have been off amiodarone and give us a call    2) Labs, CMP, BNP    3) Keep previously scheduled appt w.Dr. Linette Gudino    4) Follow up with Dr. Miriam Lindsey in 6 months

## 2019-04-29 NOTE — PROGRESS NOTES
[address]   Electrophysiology   Date: 4/29/2019  I had the privilege of visiting Mable Das in the office. CC: VT  HPI:     Previous note Dr. Teri Schreiber  2/4/19-Miles Canchola is a 68 y.o. with CAD s/p PCI, ischemic cardiomyopathy, HTN, HLD and DM. He is on chemo for non-Hodgkin's lymphoma (B-cell); he has a port-a-cath in right chest.     He has history of recurrent syncope in the past. MCOT with frequent episodes of polymorphic PVCs and also several episodes of polymorphic non-sustained VT. Because of ischemic cardiomyopathy, history of CAD and moderate LV dysfunction, and VT seen on MCOT. On 6/23/2015 single chamber ICD was placed. One episode of tachycardia on 13-Aug-2017 at 11:30 for 24 seconds with average rate of 188 likely SVT. Therapies withheld due to wavelet suggesting SVT. Today, he has been referred since he was found to have recurrent episodes of arrhythmia on his device. Paces out of it, no shocks. Feels flutters for about a minute or so. No chest pain. No syncope. No AICD shock. Interval History:   Here for 3 month EP follow up where he was started on amio. Saw Dr. Esperanza Day last month and amio vi'd. Today reports feeling great, the best he has in awhile. NSVT and Treated episodes in February. However optivol well above threshold. However clinical exam-euvolemic. No med changes today, will get cmp, BNP, EKG SR --replete lytes pending labs  Past Medical History:   Diagnosis Date    Cardiomyopathy ([address]     Diabetes mellitus ([address]     Hyperlipidemia     Hypertension     NSVT (nonsustained ventricular tachycardia) ([address]     Syncope         Past Surgical History:   Procedure Laterality Date    CARDIAC DEFIBRILLATOR PLACEMENT  6/23/15    single chamber AICD, EPS inducible VT    CHOLECYSTECTOMY      CORONARY ANGIOPLASTY WITH STENT PLACEMENT      LAMINECTOMY      TOENAIL EXCISION  07/21/2017    TONSILLECTOMY AND ADENOIDECTOMY         Allergies:   Allergies Allergen Reactions    Penicillins Swelling    Cortisone      Other reaction(s): Other (See Comments)  Hot flashes  Other reaction(s): Other (See Comments)  Hot flashes    Metformin      Other reaction(s): Other (See Comments)  Violent behavior  Other reaction(s): Other (See Comments)  Violent behavior    Metformin Hcl     Morphine        Social History:  Reviewed. reports that he quit smoking about 44 years ago. He has never used smokeless tobacco. He reports that he drinks alcohol. He reports that he does not use drugs. Family History:  Reviewed. family history includes Heart Disease in his father and mother. Review of System:  All other systems reviewed and are negative except for that noted above. Pertinent negatives are:     · General: negative for fever, chills   · Ophthalmic ROS: negative for - eye pain or loss of vision  · ENT ROS: negative for - headaches, sore throat   · Respiratory: negative for - cough, sputum  · Cardiovascular: Reviewed in HPI  · Gastrointestinal: negative for - abdominal pain, diarrhea, N/V  · Hematology: negative for - bleeding, blood clots, bruising or jaundice  · Genito-Urinary:  negative for - Dysuria or incontinence  · Musculoskeletal: negative for - Joint swelling, muscle pain  · Neurological: negative for - confusion, dizziness, headaches   · Psychiatric: No anxiety, no depression. · Dermatological: negative for - rash    Physical Examination:  Vitals:    04/29/19 1123   BP: 106/60   Pulse: 61        · Constitutional: Oriented. No distress. · Head: Normocephalic and atraumatic. · Mouth/Throat: Oropharynx is clear and moist.   · Eyes: Conjunctivae normal. EOM are normal.   · Neck: Neck supple. No rigidity. No JVD present. · Cardiovascular: Normal rate, regular rhythm, S1&S2. · Pulmonary/Chest: Bilateral respiratory sounds. No wheezes, No rhonchi. · Abdominal: Soft. Bowel sounds present. No distension, No tenderness. · Musculoskeletal: No tenderness. No edema    · Lymphadenopathy: Has no cervical adenopathy. · Neurological: Alert and oriented. Cranial nerve appears intact, No Gross deficit   · Skin: Skin is warm and dry. No rash noted. · Psychiatric: Has a normal behavior     Labs, diagnostic and imaging results reviewed. Stress Echo: 8/9/2011 [facility], EF 45%, mild TR, RVSP 34.5 mmHg. [facility] 4/10/15 -- EF 40%. Myoview: 3/31/15 stress: small sized apical primarily fixed defect consistent with fibrosis. Diaphragm attenuation reduces the specificity of this finding. No ischemia. Dilated left ventricle with global hypokinesis and EF 35%. Frequent PVCs and slow non-sustained polymorphic VT      Medication:  Current Outpatient Medications   Medication Sig Dispense Refill    tamsulosin (FLOMAX) 0.4 MG capsule Take 0.4 mg by mouth daily      hydrochlorothiazide (HYDRODIURIL) 25 MG tablet Take 0.5 tablets by mouth daily 45 tablet 3    carvedilol (COREG) 25 MG tablet Take 1 tablet by mouth 2 times daily (with meals) 30 tablet 5    aspirin 81 MG tablet Take 81 mg by mouth daily      vitamin D (CHOLECALCIFEROL) 1000 UNITS TABS tablet Take 1,000 Units by mouth daily      MAGNESIUM PO Take 450 mg by mouth 2 times daily      glipiZIDE (GLUCOTROL) 5 MG tablet Take 5 mg by mouth 2 times daily (before meals)      atorvastatin (LIPITOR) 80 MG tablet Take 80 mg by mouth daily.  clopidogrel (PLAVIX) 75 MG tablet Take 75 mg by mouth daily.  loperamide (IMODIUM) 2 MG capsule Take 2 mg by mouth 4 times daily as needed for Diarrhea      nitroGLYCERIN (NITROSTAT) 0.4 MG SL tablet Place 0.4 mg under the tongue every 5 minutes as needed for Chest pain up to max of 3 total doses. If no relief after 1 dose, call 911.  Calcium Carb-Cholecalciferol (CALCIUM 1000 + D PO) Take 1,000 Units by mouth 2 times daily       No current facility-administered medications for this visit.         Previous OV Assessment and plan:   - Tachycardia, VT

## 2019-08-06 ENCOUNTER — NURSE ONLY (OUTPATIENT)
Dept: CARDIOLOGY CLINIC | Age: 74
End: 2019-08-06
Payer: MEDICARE

## 2019-08-06 DIAGNOSIS — Z95.810 CARDIAC DEFIBRILLATOR IN PLACE: ICD-10-CM

## 2019-08-06 DIAGNOSIS — I25.5 ISCHEMIC CARDIOMYOPATHY: ICD-10-CM

## 2019-08-06 PROCEDURE — 93295 DEV INTERROG REMOTE 1/2/MLT: CPT | Performed by: INTERNAL MEDICINE

## 2019-08-06 PROCEDURE — 93296 REM INTERROG EVL PM/IDS: CPT | Performed by: INTERNAL MEDICINE

## 2019-08-06 NOTE — LETTER
[address] [facility] [phone]  [address] [facility],4Th Floor [phone]    Pacemaker/Defibrillator Clinic          08/06/19        [address] [place] [id number]        Dear [address] [place] [address]    This letter is to inform you that we received the transmission from your monitor at home that checks your pacemaker and/or defibrillator, or implanted heart monitor. Your device shows normal function. The next date your monitor will automatically transmit will be 11/12/19. Your device and monitor are wireless and most transmit cellularly, but please periodically check your monitor is still plugged in to the electrical outlet. If you still use the telephone land line to send please ensure the connection to the phone tessa is secure. This will help to ensure successful automatic transmissions in the future. Also, the monitor needs to be close to you while sleeping at night. Please be aware that the remote device transmission sites are periodically monitored only during regular business hours during which simultaneous in-office device clinics are being run. If your transmission requires attention, we will contact you as soon as possible. Thank you.             Asad

## 2019-08-29 ENCOUNTER — OFFICE VISIT (OUTPATIENT)
Dept: CARDIOLOGY CLINIC | Age: 74
End: 2019-08-29
Payer: MEDICARE

## 2019-08-29 VITALS
DIASTOLIC BLOOD PRESSURE: 78 MMHG | WEIGHT: 213.4 LBS | SYSTOLIC BLOOD PRESSURE: 130 MMHG | BODY MASS INDEX: 31.61 KG/M2 | HEART RATE: 80 BPM | HEIGHT: 69 IN

## 2019-08-29 DIAGNOSIS — I25.5 ISCHEMIC CARDIOMYOPATHY: ICD-10-CM

## 2019-08-29 DIAGNOSIS — E11.9 TYPE 2 DIABETES MELLITUS WITHOUT COMPLICATION, UNSPECIFIED WHETHER LONG TERM INSULIN USE (HCC): ICD-10-CM

## 2019-08-29 DIAGNOSIS — Z95.810 AICD (AUTOMATIC CARDIOVERTER/DEFIBRILLATOR) PRESENT: Chronic | ICD-10-CM

## 2019-08-29 DIAGNOSIS — I25.10 ATHEROSCLEROSIS OF NATIVE CORONARY ARTERY OF NATIVE HEART WITHOUT ANGINA PECTORIS: Primary | Chronic | ICD-10-CM

## 2019-08-29 DIAGNOSIS — I10 ESSENTIAL HYPERTENSION, BENIGN: Chronic | ICD-10-CM

## 2019-08-29 DIAGNOSIS — R00.0 TACHYCARDIA: ICD-10-CM

## 2019-08-29 PROCEDURE — G8417 CALC BMI ABV UP PARAM F/U: HCPCS | Performed by: INTERNAL MEDICINE

## 2019-08-29 PROCEDURE — 3017F COLORECTAL CA SCREEN DOC REV: CPT | Performed by: INTERNAL MEDICINE

## 2019-08-29 PROCEDURE — G8427 DOCREV CUR MEDS BY ELIG CLIN: HCPCS | Performed by: INTERNAL MEDICINE

## 2019-08-29 PROCEDURE — G8598 ASA/ANTIPLAT THER USED: HCPCS | Performed by: INTERNAL MEDICINE

## 2019-08-29 PROCEDURE — 1036F TOBACCO NON-USER: CPT | Performed by: INTERNAL MEDICINE

## 2019-08-29 PROCEDURE — 2022F DILAT RTA XM EVC RTNOPTHY: CPT | Performed by: INTERNAL MEDICINE

## 2019-08-29 PROCEDURE — 4040F PNEUMOC VAC/ADMIN/RCVD: CPT | Performed by: INTERNAL MEDICINE

## 2019-08-29 PROCEDURE — 1123F ACP DISCUSS/DSCN MKR DOCD: CPT | Performed by: INTERNAL MEDICINE

## 2019-08-29 PROCEDURE — 3046F HEMOGLOBIN A1C LEVEL >9.0%: CPT | Performed by: INTERNAL MEDICINE

## 2019-08-29 PROCEDURE — 99214 OFFICE O/P EST MOD 30 MIN: CPT | Performed by: INTERNAL MEDICINE

## 2019-11-12 ENCOUNTER — NURSE ONLY (OUTPATIENT)
Dept: CARDIOLOGY CLINIC | Age: 74
End: 2019-11-12
Payer: MEDICARE

## 2019-11-12 DIAGNOSIS — I25.5 ISCHEMIC CARDIOMYOPATHY: ICD-10-CM

## 2019-11-12 DIAGNOSIS — Z95.810 CARDIAC DEFIBRILLATOR IN PLACE: ICD-10-CM

## 2019-11-12 PROCEDURE — 93296 REM INTERROG EVL PM/IDS: CPT | Performed by: INTERNAL MEDICINE

## 2019-11-12 PROCEDURE — 93295 DEV INTERROG REMOTE 1/2/MLT: CPT | Performed by: INTERNAL MEDICINE

## 2019-11-14 ENCOUNTER — TELEPHONE (OUTPATIENT)
Dept: CARDIOLOGY CLINIC | Age: 74
End: 2019-11-14

## 2020-01-01 ENCOUNTER — ANESTHESIA EVENT (OUTPATIENT)
Dept: ENDOSCOPY | Age: 75
End: 2020-01-01
Payer: MEDICARE

## 2020-01-01 ENCOUNTER — NURSE ONLY (OUTPATIENT)
Dept: CARDIOLOGY CLINIC | Age: 75
End: 2020-01-01
Payer: MEDICARE

## 2020-01-01 ENCOUNTER — OFFICE VISIT (OUTPATIENT)
Dept: CARDIOLOGY CLINIC | Age: 75
End: 2020-01-01
Payer: MEDICARE

## 2020-01-01 ENCOUNTER — TELEPHONE (OUTPATIENT)
Dept: CARDIOLOGY CLINIC | Age: 75
End: 2020-01-01

## 2020-01-01 ENCOUNTER — CARE COORDINATION (OUTPATIENT)
Dept: CASE MANAGEMENT | Age: 75
End: 2020-01-01

## 2020-01-01 ENCOUNTER — HOSPITAL ENCOUNTER (OUTPATIENT)
Dept: ULTRASOUND IMAGING | Age: 75
Discharge: HOME | End: 2020-08-06
Payer: MEDICARE

## 2020-01-01 ENCOUNTER — HOSPITAL ENCOUNTER (OUTPATIENT)
Dept: NON INVASIVE DIAGNOSTICS | Age: 75
Discharge: HOME | End: 2020-12-07
Payer: MEDICARE

## 2020-01-01 ENCOUNTER — HOSPITAL ENCOUNTER (OUTPATIENT)
Age: 75
Discharge: HOME | End: 2020-10-27
Payer: MEDICARE

## 2020-01-01 ENCOUNTER — ANESTHESIA (OUTPATIENT)
Dept: ENDOSCOPY | Age: 75
End: 2020-01-01
Payer: MEDICARE

## 2020-01-01 ENCOUNTER — HOSPITAL ENCOUNTER (OUTPATIENT)
Age: 75
Setting detail: OUTPATIENT SURGERY
Discharge: HOME | End: 2020-08-04
Attending: INTERNAL MEDICINE | Admitting: INTERNAL MEDICINE
Payer: MEDICARE

## 2020-01-01 ENCOUNTER — HOSPITAL ENCOUNTER (OUTPATIENT)
Dept: INTERVENTIONAL RADIOLOGY/VASCULAR | Age: 75
Discharge: HOME | End: 2020-12-28
Payer: MEDICARE

## 2020-01-01 ENCOUNTER — APPOINTMENT (OUTPATIENT)
Dept: GENERAL RADIOLOGY | Age: 75
DRG: 308 | End: 2020-01-01
Payer: MEDICARE

## 2020-01-01 ENCOUNTER — HOSPITAL ENCOUNTER (OUTPATIENT)
Dept: ULTRASOUND IMAGING | Age: 75
Discharge: HOME | End: 2020-12-28
Payer: MEDICARE

## 2020-01-01 ENCOUNTER — HOSPITAL ENCOUNTER (OUTPATIENT)
Dept: ULTRASOUND IMAGING | Age: 75
Discharge: HOME | End: 2020-11-16
Payer: MEDICARE

## 2020-01-01 ENCOUNTER — HOSPITAL ENCOUNTER (INPATIENT)
Age: 75
LOS: 5 days | Discharge: HOME | DRG: 308 | End: 2020-10-22
Attending: EMERGENCY MEDICINE | Admitting: INTERNAL MEDICINE
Payer: MEDICARE

## 2020-01-01 ENCOUNTER — OFFICE VISIT (OUTPATIENT)
Dept: PRIMARY CARE CLINIC | Age: 75
End: 2020-01-01
Payer: MEDICARE

## 2020-01-01 ENCOUNTER — HOSPITAL ENCOUNTER (OUTPATIENT)
Dept: ULTRASOUND IMAGING | Age: 75
Discharge: HOME | End: 2020-12-07
Payer: MEDICARE

## 2020-01-01 VITALS
SYSTOLIC BLOOD PRESSURE: 94 MMHG | RESPIRATION RATE: 12 BRPM | HEIGHT: 69 IN | HEART RATE: 57 BPM | BODY MASS INDEX: 26.96 KG/M2 | DIASTOLIC BLOOD PRESSURE: 67 MMHG | WEIGHT: 182 LBS | OXYGEN SATURATION: 94 % | TEMPERATURE: 97 F

## 2020-01-01 VITALS — DIASTOLIC BLOOD PRESSURE: 69 MMHG | SYSTOLIC BLOOD PRESSURE: 93 MMHG | OXYGEN SATURATION: 97 %

## 2020-01-01 VITALS
OXYGEN SATURATION: 96 % | SYSTOLIC BLOOD PRESSURE: 113 MMHG | HEART RATE: 66 BPM | DIASTOLIC BLOOD PRESSURE: 77 MMHG | BODY MASS INDEX: 29.62 KG/M2 | HEIGHT: 69 IN | WEIGHT: 200 LBS

## 2020-01-01 VITALS
RESPIRATION RATE: 18 BRPM | TEMPERATURE: 98.6 F | WEIGHT: 193.56 LBS | HEART RATE: 87 BPM | BODY MASS INDEX: 27.71 KG/M2 | HEIGHT: 70 IN | SYSTOLIC BLOOD PRESSURE: 108 MMHG | OXYGEN SATURATION: 93 % | DIASTOLIC BLOOD PRESSURE: 62 MMHG

## 2020-01-01 VITALS
WEIGHT: 194.2 LBS | HEART RATE: 76 BPM | DIASTOLIC BLOOD PRESSURE: 72 MMHG | HEIGHT: 69 IN | SYSTOLIC BLOOD PRESSURE: 98 MMHG | BODY MASS INDEX: 28.76 KG/M2

## 2020-01-01 VITALS
OXYGEN SATURATION: 96 % | BODY MASS INDEX: 29.83 KG/M2 | WEIGHT: 202 LBS | HEART RATE: 85 BPM | RESPIRATION RATE: 16 BRPM | SYSTOLIC BLOOD PRESSURE: 104 MMHG | DIASTOLIC BLOOD PRESSURE: 73 MMHG | TEMPERATURE: 97.5 F

## 2020-01-01 VITALS
DIASTOLIC BLOOD PRESSURE: 71 MMHG | WEIGHT: 188 LBS | BODY MASS INDEX: 26.92 KG/M2 | SYSTOLIC BLOOD PRESSURE: 107 MMHG | HEART RATE: 80 BPM | HEIGHT: 70 IN

## 2020-01-01 VITALS
DIASTOLIC BLOOD PRESSURE: 70 MMHG | HEART RATE: 76 BPM | HEIGHT: 70 IN | SYSTOLIC BLOOD PRESSURE: 101 MMHG | TEMPERATURE: 98.1 F | BODY MASS INDEX: 28.5 KG/M2 | WEIGHT: 199.06 LBS | RESPIRATION RATE: 18 BRPM | OXYGEN SATURATION: 99 %

## 2020-01-01 VITALS
HEART RATE: 70 BPM | TEMPERATURE: 98.1 F | DIASTOLIC BLOOD PRESSURE: 70 MMHG | RESPIRATION RATE: 20 BRPM | WEIGHT: 184.9 LBS | HEIGHT: 70 IN | BODY MASS INDEX: 26.47 KG/M2 | SYSTOLIC BLOOD PRESSURE: 106 MMHG | OXYGEN SATURATION: 99 %

## 2020-01-01 VITALS
SYSTOLIC BLOOD PRESSURE: 100 MMHG | HEIGHT: 70 IN | HEART RATE: 64 BPM | DIASTOLIC BLOOD PRESSURE: 70 MMHG | BODY MASS INDEX: 26.69 KG/M2 | WEIGHT: 186.4 LBS

## 2020-01-01 VITALS
DIASTOLIC BLOOD PRESSURE: 78 MMHG | SYSTOLIC BLOOD PRESSURE: 106 MMHG | RESPIRATION RATE: 18 BRPM | TEMPERATURE: 98 F | HEART RATE: 74 BPM | OXYGEN SATURATION: 96 %

## 2020-01-01 DIAGNOSIS — Z11.59 ENCOUNTER FOR SCREENING FOR OTHER VIRAL DISEASES: ICD-10-CM

## 2020-01-01 DIAGNOSIS — K70.31 ALCOHOLIC CIRRHOSIS OF LIVER WITH ASCITES (HCC): Primary | ICD-10-CM

## 2020-01-01 LAB
A/G RATIO: 1.4 (ref 1.1–2.2)
ACANTHOCYTES: ABNORMAL
ACANTHOCYTES: ABNORMAL
ALBUMIN FLUID: 0.7 G/DL
ALBUMIN SERPL-MCNC: 2.9 G/DL (ref 3.4–5)
ALBUMIN SERPL-MCNC: 3.1 G/DL (ref 3.4–5)
ALBUMIN SERPL-MCNC: 3.1 G/DL (ref 3.4–5)
ALBUMIN SERPL-MCNC: 3.2 G/DL (ref 3.4–5)
ALBUMIN SERPL-MCNC: 3.2 G/DL (ref 3.4–5)
ALBUMIN SERPL-MCNC: 3.4 G/DL (ref 3.4–5)
ALBUMIN SERPL-MCNC: 3.6 G/DL (ref 3.4–5)
ALP BLD-CCNC: 137 U/L (ref 40–129)
ALP BLD-CCNC: 162 U/L (ref 40–129)
ALP BLD-CCNC: 166 U/L (ref 40–129)
ALT SERPL-CCNC: 2432 U/L (ref 10–40)
ALT SERPL-CCNC: 247 U/L (ref 10–40)
ALT SERPL-CCNC: 2691 U/L (ref 10–40)
AMORPHOUS: ABNORMAL /HPF
ANION GAP SERPL CALCULATED.3IONS-SCNC: 11 MMOL/L (ref 3–16)
ANION GAP SERPL CALCULATED.3IONS-SCNC: 14 MMOL/L (ref 3–16)
ANION GAP SERPL CALCULATED.3IONS-SCNC: 15 MMOL/L (ref 3–16)
ANION GAP SERPL CALCULATED.3IONS-SCNC: 16 MMOL/L (ref 3–16)
ANION GAP SERPL CALCULATED.3IONS-SCNC: 17 MMOL/L (ref 3–16)
ANION GAP SERPL CALCULATED.3IONS-SCNC: 18 MMOL/L (ref 3–16)
ANION GAP SERPL CALCULATED.3IONS-SCNC: 8 MMOL/L (ref 3–16)
ANISOCYTOSIS: ABNORMAL
ANISOCYTOSIS: ABNORMAL
APPEARANCE FLUID: CLEAR
APTT: 28.4 SEC (ref 24.2–36.2)
APTT: 30.3 SEC (ref 24.2–36.2)
APTT: 31.6 SEC (ref 24.2–36.2)
APTT: 33.2 SEC (ref 24.2–36.2)
APTT: 34.4 SEC (ref 24.2–36.2)
AST SERPL-CCNC: 2849 U/L (ref 15–37)
AST SERPL-CCNC: 29 U/L (ref 15–37)
AST SERPL-CCNC: 5126 U/L (ref 15–37)
BACTERIA: ABNORMAL /HPF
BASOPHILS ABSOLUTE: 0 K/UL (ref 0–0.2)
BASOPHILS ABSOLUTE: 0.1 K/UL (ref 0–0.2)
BASOPHILS RELATIVE PERCENT: 0.6 %
BASOPHILS RELATIVE PERCENT: 0.6 %
BASOPHILS RELATIVE PERCENT: 0.7 %
BASOPHILS RELATIVE PERCENT: 0.7 %
BASOPHILS RELATIVE PERCENT: 0.8 %
BASOPHILS RELATIVE PERCENT: 1 %
BILIRUB SERPL-MCNC: 3 MG/DL (ref 0–1)
BILIRUB SERPL-MCNC: 3.1 MG/DL (ref 0–1)
BILIRUB SERPL-MCNC: 3.6 MG/DL (ref 0–1)
BILIRUBIN DIRECT: 1.9 MG/DL (ref 0–0.3)
BILIRUBIN DIRECT: 2.2 MG/DL (ref 0–0.3)
BILIRUBIN URINE: ABNORMAL
BILIRUBIN, INDIRECT: 1.2 MG/DL (ref 0–1)
BILIRUBIN, INDIRECT: 1.4 MG/DL (ref 0–1)
BLOOD, URINE: ABNORMAL
BODY FLUID CULTURE, STERILE: NORMAL
BUN BLDV-MCNC: 12 MG/DL (ref 7–20)
BUN BLDV-MCNC: 32 MG/DL (ref 7–20)
BUN BLDV-MCNC: 34 MG/DL (ref 7–20)
BUN BLDV-MCNC: 35 MG/DL (ref 7–20)
BUN BLDV-MCNC: 40 MG/DL (ref 7–20)
BUN BLDV-MCNC: 43 MG/DL (ref 7–20)
BUN BLDV-MCNC: 43 MG/DL (ref 7–20)
BUN BLDV-MCNC: 44 MG/DL (ref 7–20)
BUN BLDV-MCNC: 46 MG/DL (ref 7–20)
BURR CELLS: ABNORMAL
CALCIUM SERPL-MCNC: 7.8 MG/DL (ref 8.3–10.6)
CALCIUM SERPL-MCNC: 7.8 MG/DL (ref 8.3–10.6)
CALCIUM SERPL-MCNC: 8 MG/DL (ref 8.3–10.6)
CALCIUM SERPL-MCNC: 8.1 MG/DL (ref 8.3–10.6)
CALCIUM SERPL-MCNC: 8.2 MG/DL (ref 8.3–10.6)
CALCIUM SERPL-MCNC: 8.3 MG/DL (ref 8.3–10.6)
CALCIUM SERPL-MCNC: 8.4 MG/DL (ref 8.3–10.6)
CALCIUM SERPL-MCNC: 8.9 MG/DL (ref 8.3–10.6)
CALCIUM SERPL-MCNC: 9.8 MG/DL (ref 8.3–10.6)
CELL COUNT FLUID TYPE: NORMAL
CHLORIDE BLD-SCNC: 102 MMOL/L (ref 99–110)
CHLORIDE BLD-SCNC: 96 MMOL/L (ref 99–110)
CHLORIDE BLD-SCNC: 96 MMOL/L (ref 99–110)
CHLORIDE BLD-SCNC: 97 MMOL/L (ref 99–110)
CHLORIDE BLD-SCNC: 97 MMOL/L (ref 99–110)
CHLORIDE BLD-SCNC: 98 MMOL/L (ref 99–110)
CHLORIDE BLD-SCNC: 99 MMOL/L (ref 99–110)
CLARITY: ABNORMAL
CLOT EVALUATION: NORMAL
CO2: 15 MMOL/L (ref 21–32)
CO2: 16 MMOL/L (ref 21–32)
CO2: 16 MMOL/L (ref 21–32)
CO2: 18 MMOL/L (ref 21–32)
CO2: 20 MMOL/L (ref 21–32)
CO2: 21 MMOL/L (ref 21–32)
CO2: 24 MMOL/L (ref 21–32)
CO2: 26 MMOL/L (ref 21–32)
CO2: 29 MMOL/L (ref 21–32)
COARSE CASTS, UA: ABNORMAL /LPF (ref 0–2)
COLOR FLUID: YELLOW
COLOR: ABNORMAL
COMMENT UA: ABNORMAL
CORTISOL TOTAL: 28.5 UG/DL
CREAT SERPL-MCNC: 1.1 MG/DL (ref 0.8–1.3)
CREAT SERPL-MCNC: 1.2 MG/DL (ref 0.8–1.3)
CREAT SERPL-MCNC: 1.7 MG/DL (ref 0.8–1.3)
CREAT SERPL-MCNC: 2 MG/DL (ref 0.8–1.3)
CREAT SERPL-MCNC: 2.4 MG/DL (ref 0.8–1.3)
CREAT SERPL-MCNC: 2.7 MG/DL (ref 0.8–1.3)
CREAT SERPL-MCNC: 2.7 MG/DL (ref 0.8–1.3)
CREAT SERPL-MCNC: 3.2 MG/DL (ref 0.8–1.3)
CREAT SERPL-MCNC: 3.5 MG/DL (ref 0.8–1.3)
CREATININE URINE: 231.7 MG/DL (ref 39–259)
CRENATED RBC'S: ABNORMAL
EKG ATRIAL RATE: 166 BPM
EKG ATRIAL RATE: 77 BPM
EKG ATRIAL RATE: 84 BPM
EKG DIAGNOSIS: NORMAL
EKG P AXIS: 25 DEGREES
EKG P-R INTERVAL: 276 MS
EKG Q-T INTERVAL: 282 MS
EKG Q-T INTERVAL: 382 MS
EKG Q-T INTERVAL: 468 MS
EKG QRS DURATION: 154 MS
EKG QRS DURATION: 88 MS
EKG QRS DURATION: 94 MS
EKG QTC CALCULATION (BAZETT): 451 MS
EKG QTC CALCULATION (BAZETT): 474 MS
EKG QTC CALCULATION (BAZETT): 529 MS
EKG R AXIS: -61 DEGREES
EKG R AXIS: -69 DEGREES
EKG R AXIS: 166 DEGREES
EKG T AXIS: 101 DEGREES
EKG T AXIS: 119 DEGREES
EKG T AXIS: 79 DEGREES
EKG VENTRICULAR RATE: 170 BPM
EKG VENTRICULAR RATE: 77 BPM
EKG VENTRICULAR RATE: 84 BPM
EOSINOPHILS ABSOLUTE: 0 K/UL (ref 0–0.6)
EOSINOPHILS ABSOLUTE: 0 K/UL (ref 0–0.6)
EOSINOPHILS ABSOLUTE: 0.1 K/UL (ref 0–0.6)
EOSINOPHILS RELATIVE PERCENT: 0 %
EOSINOPHILS RELATIVE PERCENT: 0.4 %
EOSINOPHILS RELATIVE PERCENT: 1 %
EOSINOPHILS RELATIVE PERCENT: 1.2 %
EOSINOPHILS RELATIVE PERCENT: 1.5 %
EOSINOPHILS RELATIVE PERCENT: 1.8 %
EPITHELIAL CELLS, UA: 8 /HPF (ref 0–5)
FLUID TYPE: NORMAL
GFR AFRICAN AMERICAN: 21
GFR AFRICAN AMERICAN: 23
GFR AFRICAN AMERICAN: 28
GFR AFRICAN AMERICAN: 28
GFR AFRICAN AMERICAN: 32
GFR AFRICAN AMERICAN: 40
GFR AFRICAN AMERICAN: 48
GFR AFRICAN AMERICAN: >60
GFR AFRICAN AMERICAN: >60
GFR NON-AFRICAN AMERICAN: 17
GFR NON-AFRICAN AMERICAN: 19
GFR NON-AFRICAN AMERICAN: 23
GFR NON-AFRICAN AMERICAN: 23
GFR NON-AFRICAN AMERICAN: 27
GFR NON-AFRICAN AMERICAN: 33
GFR NON-AFRICAN AMERICAN: 40
GFR NON-AFRICAN AMERICAN: 59
GFR NON-AFRICAN AMERICAN: >60
GLOBULIN: 2.4 G/DL
GLUCOSE BLD-MCNC: 120 MG/DL (ref 70–99)
GLUCOSE BLD-MCNC: 122 MG/DL (ref 70–99)
GLUCOSE BLD-MCNC: 127 MG/DL (ref 70–99)
GLUCOSE BLD-MCNC: 133 MG/DL (ref 70–99)
GLUCOSE BLD-MCNC: 136 MG/DL (ref 70–99)
GLUCOSE BLD-MCNC: 141 MG/DL (ref 70–99)
GLUCOSE BLD-MCNC: 149 MG/DL (ref 70–99)
GLUCOSE BLD-MCNC: 150 MG/DL (ref 70–99)
GLUCOSE BLD-MCNC: 168 MG/DL (ref 70–99)
GLUCOSE BLD-MCNC: 173 MG/DL (ref 70–99)
GLUCOSE BLD-MCNC: 178 MG/DL (ref 70–99)
GLUCOSE BLD-MCNC: 182 MG/DL (ref 70–99)
GLUCOSE BLD-MCNC: 188 MG/DL (ref 70–99)
GLUCOSE BLD-MCNC: 190 MG/DL (ref 70–99)
GLUCOSE BLD-MCNC: 215 MG/DL (ref 70–99)
GLUCOSE BLD-MCNC: 226 MG/DL (ref 70–99)
GLUCOSE BLD-MCNC: 243 MG/DL (ref 70–99)
GLUCOSE BLD-MCNC: 256 MG/DL (ref 70–99)
GLUCOSE BLD-MCNC: 272 MG/DL (ref 70–99)
GLUCOSE BLD-MCNC: 275 MG/DL (ref 70–99)
GLUCOSE BLD-MCNC: 283 MG/DL (ref 70–99)
GLUCOSE BLD-MCNC: 288 MG/DL (ref 70–99)
GLUCOSE BLD-MCNC: 310 MG/DL (ref 70–99)
GLUCOSE BLD-MCNC: 324 MG/DL (ref 70–99)
GLUCOSE BLD-MCNC: 344 MG/DL (ref 70–99)
GLUCOSE BLD-MCNC: 405 MG/DL (ref 70–99)
GLUCOSE BLD-MCNC: 78 MG/DL (ref 70–99)
GLUCOSE BLD-MCNC: 79 MG/DL (ref 70–99)
GLUCOSE BLD-MCNC: 81 MG/DL (ref 70–99)
GLUCOSE BLD-MCNC: 82 MG/DL (ref 70–99)
GLUCOSE BLD-MCNC: 85 MG/DL (ref 70–99)
GLUCOSE BLD-MCNC: 89 MG/DL (ref 70–99)
GLUCOSE BLD-MCNC: 91 MG/DL (ref 70–99)
GLUCOSE BLD-MCNC: 92 MG/DL (ref 70–99)
GLUCOSE BLD-MCNC: 95 MG/DL (ref 70–99)
GLUCOSE BLD-MCNC: 99 MG/DL (ref 70–99)
GLUCOSE URINE: 100 MG/DL
GRAM STAIN RESULT: NORMAL
HCT VFR BLD CALC: 37.5 % (ref 40.5–52.5)
HCT VFR BLD CALC: 38.8 % (ref 40.5–52.5)
HCT VFR BLD CALC: 39.1 % (ref 40.5–52.5)
HCT VFR BLD CALC: 40.3 % (ref 40.5–52.5)
HCT VFR BLD CALC: 43.1 % (ref 40.5–52.5)
HCT VFR BLD CALC: 45.5 % (ref 40.5–52.5)
HEMOGLOBIN: 12.7 G/DL (ref 13.5–17.5)
HEMOGLOBIN: 13 G/DL (ref 13.5–17.5)
HEMOGLOBIN: 13.1 G/DL (ref 13.5–17.5)
HEMOGLOBIN: 13.6 G/DL (ref 13.5–17.5)
HEMOGLOBIN: 14.1 G/DL (ref 13.5–17.5)
HEMOGLOBIN: 14.9 G/DL (ref 13.5–17.5)
HYALINE CASTS: ABNORMAL /LPF (ref 0–2)
INR BLD: 0.97 (ref 0.86–1.14)
INR BLD: 1.03 (ref 0.86–1.14)
INR BLD: 1.16 (ref 0.86–1.14)
KETONES, URINE: 15 MG/DL
LEUKOCYTE ESTERASE, URINE: ABNORMAL
LV EF: 20 %
LVEF MODALITY: NORMAL
LYMPHOCYTES ABSOLUTE: 0.5 K/UL (ref 1–5.1)
LYMPHOCYTES ABSOLUTE: 0.7 K/UL (ref 1–5.1)
LYMPHOCYTES ABSOLUTE: 0.9 K/UL (ref 1–5.1)
LYMPHOCYTES RELATIVE PERCENT: 12.1 %
LYMPHOCYTES RELATIVE PERCENT: 6.2 %
LYMPHOCYTES RELATIVE PERCENT: 6.2 %
LYMPHOCYTES RELATIVE PERCENT: 8.3 %
LYMPHOCYTES RELATIVE PERCENT: 8.8 %
LYMPHOCYTES RELATIVE PERCENT: 9 %
LYMPHOCYTES, BODY FLUID: 5 %
MACROPHAGE FLUID: 67 %
MAGNESIUM: 1.5 MG/DL (ref 1.8–2.4)
MAGNESIUM: 1.8 MG/DL (ref 1.8–2.4)
MAGNESIUM: 1.8 MG/DL (ref 1.8–2.4)
MAGNESIUM: 1.9 MG/DL (ref 1.8–2.4)
MAGNESIUM: 1.9 MG/DL (ref 1.8–2.4)
MAGNESIUM: 2 MG/DL (ref 1.8–2.4)
MAGNESIUM: 2.1 MG/DL (ref 1.8–2.4)
MCH RBC QN AUTO: 31 PG (ref 26–34)
MCH RBC QN AUTO: 31 PG (ref 26–34)
MCH RBC QN AUTO: 31.1 PG (ref 26–34)
MCH RBC QN AUTO: 31.2 PG (ref 26–34)
MCH RBC QN AUTO: 31.3 PG (ref 26–34)
MCH RBC QN AUTO: 31.3 PG (ref 26–34)
MCHC RBC AUTO-ENTMCNC: 32.7 G/DL (ref 31–36)
MCHC RBC AUTO-ENTMCNC: 32.7 G/DL (ref 31–36)
MCHC RBC AUTO-ENTMCNC: 33.3 G/DL (ref 31–36)
MCHC RBC AUTO-ENTMCNC: 33.7 G/DL (ref 31–36)
MCHC RBC AUTO-ENTMCNC: 33.8 G/DL (ref 31–36)
MCHC RBC AUTO-ENTMCNC: 33.9 G/DL (ref 31–36)
MCV RBC AUTO: 91.9 FL (ref 80–100)
MCV RBC AUTO: 92 FL (ref 80–100)
MCV RBC AUTO: 92.4 FL (ref 80–100)
MCV RBC AUTO: 93.6 FL (ref 80–100)
MCV RBC AUTO: 94.8 FL (ref 80–100)
MCV RBC AUTO: 95.5 FL (ref 80–100)
MICROSCOPIC EXAMINATION: YES
MONOCYTES ABSOLUTE: 0.3 K/UL (ref 0–1.3)
MONOCYTES ABSOLUTE: 0.4 K/UL (ref 0–1.3)
MONOCYTES ABSOLUTE: 0.5 K/UL (ref 0–1.3)
MONOCYTES ABSOLUTE: 0.6 K/UL (ref 0–1.3)
MONOCYTES ABSOLUTE: 0.7 K/UL (ref 0–1.3)
MONOCYTES ABSOLUTE: 0.8 K/UL (ref 0–1.3)
MONOCYTES RELATIVE PERCENT: 10.4 %
MONOCYTES RELATIVE PERCENT: 11.4 %
MONOCYTES RELATIVE PERCENT: 4.9 %
MONOCYTES RELATIVE PERCENT: 6.1 %
MONOCYTES RELATIVE PERCENT: 6.9 %
MONOCYTES RELATIVE PERCENT: 7 %
NEUTROPHIL, FLUID: 28 %
NEUTROPHILS ABSOLUTE: 4.6 K/UL (ref 1.7–7.7)
NEUTROPHILS ABSOLUTE: 4.7 K/UL (ref 1.7–7.7)
NEUTROPHILS ABSOLUTE: 4.7 K/UL (ref 1.7–7.7)
NEUTROPHILS ABSOLUTE: 5.9 K/UL (ref 1.7–7.7)
NEUTROPHILS ABSOLUTE: 7.5 K/UL (ref 1.7–7.7)
NEUTROPHILS ABSOLUTE: 9.4 K/UL (ref 1.7–7.7)
NEUTROPHILS RELATIVE PERCENT: 78 %
NEUTROPHILS RELATIVE PERCENT: 78.1 %
NEUTROPHILS RELATIVE PERCENT: 78.9 %
NEUTROPHILS RELATIVE PERCENT: 83.2 %
NEUTROPHILS RELATIVE PERCENT: 86.3 %
NEUTROPHILS RELATIVE PERCENT: 87.9 %
NITRITE, URINE: POSITIVE
NUCLEATED CELLS FLUID: 205 /CUMM
NUMBER OF CELLS COUNTED FLUID: 100
OSMOLALITY URINE: 343 MOSM/KG (ref 390–1070)
OVALOCYTES: ABNORMAL
PDW BLD-RTO: 16.8 % (ref 12.4–15.4)
PDW BLD-RTO: 16.9 % (ref 12.4–15.4)
PDW BLD-RTO: 17.2 % (ref 12.4–15.4)
PERFORMED ON: ABNORMAL
PERFORMED ON: NORMAL
PH UA: 5 (ref 5–8)
PHOSPHORUS: 2.3 MG/DL (ref 2.5–4.9)
PHOSPHORUS: 2.4 MG/DL (ref 2.5–4.9)
PHOSPHORUS: 2.7 MG/DL (ref 2.5–4.9)
PHOSPHORUS: 3 MG/DL (ref 2.5–4.9)
PHOSPHORUS: 3.2 MG/DL (ref 2.5–4.9)
PHOSPHORUS: 3.4 MG/DL (ref 2.5–4.9)
PHOSPHORUS: 3.7 MG/DL (ref 2.5–4.9)
PHOSPHORUS: 3.8 MG/DL (ref 2.5–4.9)
PHOSPHORUS: 3.9 MG/DL (ref 2.5–4.9)
PLATELET # BLD: 52 K/UL (ref 135–450)
PLATELET # BLD: 54 K/UL (ref 135–450)
PLATELET # BLD: 58 K/UL (ref 135–450)
PLATELET # BLD: 62 K/UL (ref 135–450)
PLATELET # BLD: 71 K/UL (ref 135–450)
PLATELET # BLD: 93 K/UL (ref 135–450)
PLATELET SLIDE REVIEW: ABNORMAL
PLATELET SLIDE REVIEW: ABNORMAL
PMV BLD AUTO: 10 FL (ref 5–10.5)
PMV BLD AUTO: 10.5 FL (ref 5–10.5)
PMV BLD AUTO: 11 FL (ref 5–10.5)
PMV BLD AUTO: 11.1 FL (ref 5–10.5)
PMV BLD AUTO: 11.2 FL (ref 5–10.5)
PMV BLD AUTO: 11.6 FL (ref 5–10.5)
POIKILOCYTES: ABNORMAL
POTASSIUM REFLEX MAGNESIUM: 3.4 MMOL/L (ref 3.5–5.1)
POTASSIUM REFLEX MAGNESIUM: 3.7 MMOL/L (ref 3.5–5.1)
POTASSIUM REFLEX MAGNESIUM: 4 MMOL/L (ref 3.5–5.1)
POTASSIUM REFLEX MAGNESIUM: 4.9 MMOL/L (ref 3.5–5.1)
POTASSIUM REFLEX MAGNESIUM: 5 MMOL/L (ref 3.5–5.1)
POTASSIUM SERPL-SCNC: 3.4 MMOL/L (ref 3.5–5.1)
POTASSIUM SERPL-SCNC: 3.6 MMOL/L (ref 3.5–5.1)
POTASSIUM SERPL-SCNC: 4 MMOL/L (ref 3.5–5.1)
POTASSIUM SERPL-SCNC: 4.2 MMOL/L (ref 3.5–5.1)
POTASSIUM SERPL-SCNC: 4.4 MMOL/L (ref 3.5–5.1)
POTASSIUM SERPL-SCNC: 5.9 MMOL/L (ref 3.5–5.1)
PRO-BNP: 8959 PG/ML (ref 0–449)
PRO-BNP: ABNORMAL PG/ML (ref 0–449)
PROTEIN UA: >=300 MG/DL
PROTHROMBIN TIME: 11.2 SEC (ref 10–13.2)
PROTHROMBIN TIME: 12 SEC (ref 10–13.2)
PROTHROMBIN TIME: 13.5 SEC (ref 10–13.2)
RBC # BLD: 4.08 M/UL (ref 4.2–5.9)
RBC # BLD: 4.18 M/UL (ref 4.2–5.9)
RBC # BLD: 4.22 M/UL (ref 4.2–5.9)
RBC # BLD: 4.36 M/UL (ref 4.2–5.9)
RBC # BLD: 4.55 M/UL (ref 4.2–5.9)
RBC # BLD: 4.76 M/UL (ref 4.2–5.9)
RBC FLUID: <1000 /CUMM
RBC UA: >100 /HPF (ref 0–4)
RENAL EPITHELIAL, UA: ABNORMAL /HPF (ref 0–1)
REPORT: NORMAL
SARS-COV-2: NOT DETECTED
SLIDE REVIEW: ABNORMAL
SODIUM BLD-SCNC: 130 MMOL/L (ref 136–145)
SODIUM BLD-SCNC: 131 MMOL/L (ref 136–145)
SODIUM BLD-SCNC: 131 MMOL/L (ref 136–145)
SODIUM BLD-SCNC: 133 MMOL/L (ref 136–145)
SODIUM BLD-SCNC: 134 MMOL/L (ref 136–145)
SODIUM BLD-SCNC: 135 MMOL/L (ref 136–145)
SODIUM BLD-SCNC: 137 MMOL/L (ref 136–145)
SODIUM URINE: 28 MMOL/L
SPECIFIC GRAVITY UA: 1.03 (ref 1–1.03)
T4 FREE: 1.2 NG/DL (ref 0.9–1.8)
THIS TEST SENT TO: NORMAL
TOTAL PROTEIN: 5.2 G/DL (ref 6.4–8.2)
TOTAL PROTEIN: 5.2 G/DL (ref 6.4–8.2)
TOTAL PROTEIN: 5.8 G/DL (ref 6.4–8.2)
TROPONIN: 0.01 NG/ML
TROPONIN: 0.38 NG/ML
TROPONIN: 0.42 NG/ML
TROPONIN: 0.57 NG/ML
TROPONIN: 0.68 NG/ML
TROPONIN: 1.05 NG/ML
TROPONIN: 1.16 NG/ML
TSH REFLEX: 9.6 UIU/ML (ref 0.27–4.2)
URIC ACID, SERUM: 8.2 MG/DL (ref 3.5–7.2)
URINE TYPE: ABNORMAL
UROBILINOGEN, URINE: 1 E.U./DL
WBC # BLD: 10.9 K/UL (ref 4–11)
WBC # BLD: 5.7 K/UL (ref 4–11)
WBC # BLD: 5.9 K/UL (ref 4–11)
WBC # BLD: 6 K/UL (ref 4–11)
WBC # BLD: 7.6 K/UL (ref 4–11)
WBC # BLD: 8.5 K/UL (ref 4–11)
WBC UA: 15 /HPF (ref 0–5)

## 2020-01-01 PROCEDURE — 2500000003 HC RX 250 WO HCPCS: Performed by: INTERNAL MEDICINE

## 2020-01-01 PROCEDURE — 94760 N-INVAS EAR/PLS OXIMETRY 1: CPT

## 2020-01-01 PROCEDURE — 80069 RENAL FUNCTION PANEL: CPT

## 2020-01-01 PROCEDURE — 2580000003 HC RX 258: Performed by: NURSE PRACTITIONER

## 2020-01-01 PROCEDURE — 36415 COLL VENOUS BLD VENIPUNCTURE: CPT

## 2020-01-01 PROCEDURE — APPNB15 APP NON BILLABLE TIME 0-15 MINS: Performed by: NURSE PRACTITIONER

## 2020-01-01 PROCEDURE — 6370000000 HC RX 637 (ALT 250 FOR IP): Performed by: INTERNAL MEDICINE

## 2020-01-01 PROCEDURE — 87205 SMEAR GRAM STAIN: CPT

## 2020-01-01 PROCEDURE — 2580000003 HC RX 258: Performed by: INTERNAL MEDICINE

## 2020-01-01 PROCEDURE — 2000000000 HC ICU R&B

## 2020-01-01 PROCEDURE — 6370000000 HC RX 637 (ALT 250 FOR IP): Performed by: NURSE PRACTITIONER

## 2020-01-01 PROCEDURE — 85730 THROMBOPLASTIN TIME PARTIAL: CPT

## 2020-01-01 PROCEDURE — 3017F COLORECTAL CA SCREEN DOC REV: CPT | Performed by: INTERNAL MEDICINE

## 2020-01-01 PROCEDURE — 2022F DILAT RTA XM EVC RTNOPTHY: CPT | Performed by: INTERNAL MEDICINE

## 2020-01-01 PROCEDURE — 2500000003 HC RX 250 WO HCPCS: Performed by: NURSE ANESTHETIST, CERTIFIED REGISTERED

## 2020-01-01 PROCEDURE — 84439 ASSAY OF FREE THYROXINE: CPT

## 2020-01-01 PROCEDURE — 84300 ASSAY OF URINE SODIUM: CPT

## 2020-01-01 PROCEDURE — 6360000002 HC RX W HCPCS: Performed by: INTERNAL MEDICINE

## 2020-01-01 PROCEDURE — 49083 ABD PARACENTESIS W/IMAGING: CPT

## 2020-01-01 PROCEDURE — 2500000003 HC RX 250 WO HCPCS

## 2020-01-01 PROCEDURE — 2060000000 HC ICU INTERMEDIATE R&B

## 2020-01-01 PROCEDURE — 83735 ASSAY OF MAGNESIUM: CPT

## 2020-01-01 PROCEDURE — 7100000010 HC PHASE II RECOVERY - FIRST 15 MIN

## 2020-01-01 PROCEDURE — 99214 OFFICE O/P EST MOD 30 MIN: CPT | Performed by: INTERNAL MEDICINE

## 2020-01-01 PROCEDURE — 71045 X-RAY EXAM CHEST 1 VIEW: CPT

## 2020-01-01 PROCEDURE — 83935 ASSAY OF URINE OSMOLALITY: CPT

## 2020-01-01 PROCEDURE — G8484 FLU IMMUNIZE NO ADMIN: HCPCS | Performed by: INTERNAL MEDICINE

## 2020-01-01 PROCEDURE — P9047 ALBUMIN (HUMAN), 25%, 50ML: HCPCS | Performed by: RADIOLOGY

## 2020-01-01 PROCEDURE — 36592 COLLECT BLOOD FROM PICC: CPT

## 2020-01-01 PROCEDURE — 99233 SBSQ HOSP IP/OBS HIGH 50: CPT | Performed by: INTERNAL MEDICINE

## 2020-01-01 PROCEDURE — G8417 CALC BMI ABV UP PARAM F/U: HCPCS | Performed by: NURSE PRACTITIONER

## 2020-01-01 PROCEDURE — 2700000000 HC OXYGEN THERAPY PER DAY

## 2020-01-01 PROCEDURE — 7100000011 HC PHASE II RECOVERY - ADDTL 15 MIN

## 2020-01-01 PROCEDURE — 97116 GAIT TRAINING THERAPY: CPT

## 2020-01-01 PROCEDURE — 99291 CRITICAL CARE FIRST HOUR: CPT | Performed by: INTERNAL MEDICINE

## 2020-01-01 PROCEDURE — G8417 CALC BMI ABV UP PARAM F/U: HCPCS | Performed by: INTERNAL MEDICINE

## 2020-01-01 PROCEDURE — 85610 PROTHROMBIN TIME: CPT

## 2020-01-01 PROCEDURE — 84443 ASSAY THYROID STIM HORMONE: CPT

## 2020-01-01 PROCEDURE — 1036F TOBACCO NON-USER: CPT | Performed by: INTERNAL MEDICINE

## 2020-01-01 PROCEDURE — 1123F ACP DISCUSS/DSCN MKR DOCD: CPT | Performed by: INTERNAL MEDICINE

## 2020-01-01 PROCEDURE — 80076 HEPATIC FUNCTION PANEL: CPT

## 2020-01-01 PROCEDURE — 84484 ASSAY OF TROPONIN QUANT: CPT

## 2020-01-01 PROCEDURE — 6360000002 HC RX W HCPCS: Performed by: RADIOLOGY

## 2020-01-01 PROCEDURE — 93010 ELECTROCARDIOGRAM REPORT: CPT | Performed by: INTERNAL MEDICINE

## 2020-01-01 PROCEDURE — 83880 ASSAY OF NATRIURETIC PEPTIDE: CPT

## 2020-01-01 PROCEDURE — 93005 ELECTROCARDIOGRAM TRACING: CPT | Performed by: INTERNAL MEDICINE

## 2020-01-01 PROCEDURE — 93000 ELECTROCARDIOGRAM COMPLETE: CPT | Performed by: INTERNAL MEDICINE

## 2020-01-01 PROCEDURE — 97166 OT EVAL MOD COMPLEX 45 MIN: CPT

## 2020-01-01 PROCEDURE — C1729 CATH, DRAINAGE: HCPCS

## 2020-01-01 PROCEDURE — 84550 ASSAY OF BLOOD/URIC ACID: CPT

## 2020-01-01 PROCEDURE — G8428 CUR MEDS NOT DOCUMENT: HCPCS | Performed by: NURSE PRACTITIONER

## 2020-01-01 PROCEDURE — 84100 ASSAY OF PHOSPHORUS: CPT

## 2020-01-01 PROCEDURE — 3700000000 HC ANESTHESIA ATTENDED CARE: Performed by: INTERNAL MEDICINE

## 2020-01-01 PROCEDURE — 89051 BODY FLUID CELL COUNT: CPT

## 2020-01-01 PROCEDURE — 3046F HEMOGLOBIN A1C LEVEL >9.0%: CPT | Performed by: INTERNAL MEDICINE

## 2020-01-01 PROCEDURE — 2500000003 HC RX 250 WO HCPCS: Performed by: RADIOLOGY

## 2020-01-01 PROCEDURE — 94669 MECHANICAL CHEST WALL OSCILL: CPT

## 2020-01-01 PROCEDURE — 85025 COMPLETE CBC W/AUTO DIFF WBC: CPT

## 2020-01-01 PROCEDURE — 96365 THER/PROPH/DIAG IV INF INIT: CPT

## 2020-01-01 PROCEDURE — 97530 THERAPEUTIC ACTIVITIES: CPT | Performed by: PHYSICAL THERAPIST

## 2020-01-01 PROCEDURE — 7100000010 HC PHASE II RECOVERY - FIRST 15 MIN: Performed by: INTERNAL MEDICINE

## 2020-01-01 PROCEDURE — 6360000002 HC RX W HCPCS

## 2020-01-01 PROCEDURE — 97530 THERAPEUTIC ACTIVITIES: CPT

## 2020-01-01 PROCEDURE — 94761 N-INVAS EAR/PLS OXIMETRY MLT: CPT

## 2020-01-01 PROCEDURE — 5A2204Z RESTORATION OF CARDIAC RHYTHM, SINGLE: ICD-10-PCS | Performed by: EMERGENCY MEDICINE

## 2020-01-01 PROCEDURE — 99223 1ST HOSP IP/OBS HIGH 75: CPT | Performed by: INTERNAL MEDICINE

## 2020-01-01 PROCEDURE — 93306 TTE W/DOPPLER COMPLETE: CPT

## 2020-01-01 PROCEDURE — 7100000011 HC PHASE II RECOVERY - ADDTL 15 MIN: Performed by: INTERNAL MEDICINE

## 2020-01-01 PROCEDURE — 3609017100 HC EGD: Performed by: INTERNAL MEDICINE

## 2020-01-01 PROCEDURE — 7100000000 HC PACU RECOVERY - FIRST 15 MIN: Performed by: INTERNAL MEDICINE

## 2020-01-01 PROCEDURE — 82533 TOTAL CORTISOL: CPT

## 2020-01-01 PROCEDURE — 99214 OFFICE O/P EST MOD 30 MIN: CPT | Performed by: NURSE PRACTITIONER

## 2020-01-01 PROCEDURE — 87070 CULTURE OTHR SPECIMN AEROBIC: CPT

## 2020-01-01 PROCEDURE — 4040F PNEUMOC VAC/ADMIN/RCVD: CPT | Performed by: INTERNAL MEDICINE

## 2020-01-01 PROCEDURE — 6360000002 HC RX W HCPCS: Performed by: NURSE PRACTITIONER

## 2020-01-01 PROCEDURE — 93282 PRGRMG EVAL IMPLANTABLE DFB: CPT | Performed by: INTERNAL MEDICINE

## 2020-01-01 PROCEDURE — 1111F DSCHRG MED/CURRENT MED MERGE: CPT

## 2020-01-01 PROCEDURE — 97162 PT EVAL MOD COMPLEX 30 MIN: CPT | Performed by: PHYSICAL THERAPIST

## 2020-01-01 PROCEDURE — G8427 DOCREV CUR MEDS BY ELIG CLIN: HCPCS | Performed by: INTERNAL MEDICINE

## 2020-01-01 PROCEDURE — 2709999900 HC NON-CHARGEABLE SUPPLY: Performed by: INTERNAL MEDICINE

## 2020-01-01 PROCEDURE — 87015 SPECIMEN INFECT AGNT CONCNTJ: CPT

## 2020-01-01 PROCEDURE — 6360000002 HC RX W HCPCS: Performed by: NURSE ANESTHETIST, CERTIFIED REGISTERED

## 2020-01-01 PROCEDURE — 3700000001 HC ADD 15 MINUTES (ANESTHESIA): Performed by: INTERNAL MEDICINE

## 2020-01-01 PROCEDURE — 99232 SBSQ HOSP IP/OBS MODERATE 35: CPT | Performed by: INTERNAL MEDICINE

## 2020-01-01 PROCEDURE — 99211 OFF/OP EST MAY X REQ PHY/QHP: CPT | Performed by: NURSE PRACTITIONER

## 2020-01-01 PROCEDURE — 7100000001 HC PACU RECOVERY - ADDTL 15 MIN: Performed by: INTERNAL MEDICINE

## 2020-01-01 PROCEDURE — 99285 EMERGENCY DEPT VISIT HI MDM: CPT

## 2020-01-01 PROCEDURE — 99222 1ST HOSP IP/OBS MODERATE 55: CPT | Performed by: INTERNAL MEDICINE

## 2020-01-01 PROCEDURE — 82042 OTHER SOURCE ALBUMIN QUAN EA: CPT

## 2020-01-01 PROCEDURE — 93005 ELECTROCARDIOGRAM TRACING: CPT | Performed by: EMERGENCY MEDICINE

## 2020-01-01 PROCEDURE — 82570 ASSAY OF URINE CREATININE: CPT

## 2020-01-01 PROCEDURE — 80048 BASIC METABOLIC PNL TOTAL CA: CPT

## 2020-01-01 PROCEDURE — 81001 URINALYSIS AUTO W/SCOPE: CPT

## 2020-01-01 PROCEDURE — 96375 TX/PRO/DX INJ NEW DRUG ADDON: CPT

## 2020-01-01 PROCEDURE — 93296 REM INTERROG EVL PM/IDS: CPT | Performed by: INTERNAL MEDICINE

## 2020-01-01 PROCEDURE — 97535 SELF CARE MNGMENT TRAINING: CPT

## 2020-01-01 PROCEDURE — 2580000003 HC RX 258: Performed by: EMERGENCY MEDICINE

## 2020-01-01 PROCEDURE — 76705 ECHO EXAM OF ABDOMEN: CPT

## 2020-01-01 PROCEDURE — 6360000002 HC RX W HCPCS: Performed by: EMERGENCY MEDICINE

## 2020-01-01 PROCEDURE — 80053 COMPREHEN METABOLIC PANEL: CPT

## 2020-01-01 PROCEDURE — 93295 DEV INTERROG REMOTE 1/2/MLT: CPT | Performed by: INTERNAL MEDICINE

## 2020-01-01 RX ORDER — HYDROMORPHONE HCL 110MG/55ML
0.5 PATIENT CONTROLLED ANALGESIA SYRINGE INTRAVENOUS EVERY 5 MIN PRN
Status: DISCONTINUED | OUTPATIENT
Start: 2020-01-01 | End: 2020-01-01 | Stop reason: HOSPADM

## 2020-01-01 RX ORDER — SODIUM CHLORIDE 9 MG/ML
INJECTION, SOLUTION INTRAVENOUS CONTINUOUS
Status: DISCONTINUED | OUTPATIENT
Start: 2020-01-01 | End: 2020-01-01 | Stop reason: HOSPADM

## 2020-01-01 RX ORDER — SODIUM CHLORIDE 0.9 % (FLUSH) 0.9 %
10 SYRINGE (ML) INJECTION PRN
Status: DISCONTINUED | OUTPATIENT
Start: 2020-01-01 | End: 2020-01-01 | Stop reason: HOSPADM

## 2020-01-01 RX ORDER — CLOPIDOGREL BISULFATE 75 MG/1
75 TABLET ORAL DAILY
Status: DISCONTINUED | OUTPATIENT
Start: 2020-01-01 | End: 2020-01-01 | Stop reason: HOSPADM

## 2020-01-01 RX ORDER — LIDOCAINE HYDROCHLORIDE 20 MG/ML
INJECTION, SOLUTION INFILTRATION; PERINEURAL PRN
Status: DISCONTINUED | OUTPATIENT
Start: 2020-01-01 | End: 2020-01-01 | Stop reason: SDUPTHER

## 2020-01-01 RX ORDER — MIDODRINE HYDROCHLORIDE 5 MG/1
5 TABLET ORAL
Qty: 90 TABLET | Refills: 3 | Status: ON HOLD | OUTPATIENT
Start: 2020-01-01 | End: 2021-01-01 | Stop reason: HOSPADM

## 2020-01-01 RX ORDER — SPIRONOLACTONE 25 MG/1
25 TABLET ORAL DAILY
Qty: 90 TABLET | Refills: 1 | Status: SHIPPED | OUTPATIENT
Start: 2020-01-01 | End: 2020-01-01

## 2020-01-01 RX ORDER — DEXTROSE MONOHYDRATE 50 MG/ML
100 INJECTION, SOLUTION INTRAVENOUS PRN
Status: DISCONTINUED | OUTPATIENT
Start: 2020-01-01 | End: 2020-01-01 | Stop reason: HOSPADM

## 2020-01-01 RX ORDER — HYDROMORPHONE HCL 110MG/55ML
0.25 PATIENT CONTROLLED ANALGESIA SYRINGE INTRAVENOUS EVERY 5 MIN PRN
Status: DISCONTINUED | OUTPATIENT
Start: 2020-01-01 | End: 2020-01-01 | Stop reason: HOSPADM

## 2020-01-01 RX ORDER — ALBUMIN (HUMAN) 12.5 G/50ML
50 SOLUTION INTRAVENOUS ONCE
Status: DISCONTINUED | OUTPATIENT
Start: 2020-01-01 | End: 2020-01-01

## 2020-01-01 RX ORDER — ACETAMINOPHEN 325 MG/1
650 TABLET ORAL EVERY 4 HOURS PRN
Status: DISCONTINUED | OUTPATIENT
Start: 2020-01-01 | End: 2020-01-01

## 2020-01-01 RX ORDER — 0.9 % SODIUM CHLORIDE 0.9 %
1000 INTRAVENOUS SOLUTION INTRAVENOUS ONCE
Status: COMPLETED | OUTPATIENT
Start: 2020-01-01 | End: 2020-01-01

## 2020-01-01 RX ORDER — FUROSEMIDE 40 MG/1
40 TABLET ORAL 2 TIMES DAILY
Status: ON HOLD | COMMUNITY
Start: 2020-01-01 | End: 2021-01-01 | Stop reason: HOSPADM

## 2020-01-01 RX ORDER — INSULIN GLARGINE 100 [IU]/ML
5 INJECTION, SOLUTION SUBCUTANEOUS
Status: DISCONTINUED | OUTPATIENT
Start: 2020-01-01 | End: 2020-01-01

## 2020-01-01 RX ORDER — LIDOCAINE HYDROCHLORIDE 10 MG/ML
5 INJECTION, SOLUTION EPIDURAL; INFILTRATION; INTRACAUDAL; PERINEURAL ONCE
Status: COMPLETED | OUTPATIENT
Start: 2020-01-01 | End: 2020-01-01

## 2020-01-01 RX ORDER — ALBUMIN (HUMAN) 12.5 G/50ML
37.5 SOLUTION INTRAVENOUS ONCE
Status: COMPLETED | OUTPATIENT
Start: 2020-01-01 | End: 2020-01-01

## 2020-01-01 RX ORDER — POTASSIUM CHLORIDE 750 MG/1
20 TABLET, FILM COATED, EXTENDED RELEASE ORAL ONCE
Status: COMPLETED | OUTPATIENT
Start: 2020-01-01 | End: 2020-01-01

## 2020-01-01 RX ORDER — MIDAZOLAM HYDROCHLORIDE 1 MG/ML
INJECTION INTRAMUSCULAR; INTRAVENOUS PRN
Status: DISCONTINUED | OUTPATIENT
Start: 2020-01-01 | End: 2020-01-01 | Stop reason: SDUPTHER

## 2020-01-01 RX ORDER — INSULIN GLARGINE 100 [IU]/ML
10 INJECTION, SOLUTION SUBCUTANEOUS
Status: DISCONTINUED | OUTPATIENT
Start: 2020-01-01 | End: 2020-01-01

## 2020-01-01 RX ORDER — TAMSULOSIN HYDROCHLORIDE 0.4 MG/1
0.4 CAPSULE ORAL NIGHTLY
COMMUNITY

## 2020-01-01 RX ORDER — LIDOCAINE HYDROCHLORIDE 10 MG/ML
5 INJECTION, SOLUTION EPIDURAL; INFILTRATION; INTRACAUDAL; PERINEURAL ONCE
Status: DISCONTINUED | OUTPATIENT
Start: 2020-01-01 | End: 2020-01-01 | Stop reason: HOSPADM

## 2020-01-01 RX ORDER — MIDODRINE HYDROCHLORIDE 5 MG/1
5 TABLET ORAL
Status: DISCONTINUED | OUTPATIENT
Start: 2020-01-01 | End: 2020-01-01 | Stop reason: HOSPADM

## 2020-01-01 RX ORDER — CARVEDILOL 3.12 MG/1
3.12 TABLET ORAL 2 TIMES DAILY
Qty: 180 TABLET | Refills: 3 | Status: SHIPPED | OUTPATIENT
Start: 2020-01-01

## 2020-01-01 RX ORDER — DEXTROSE MONOHYDRATE 25 G/50ML
12.5 INJECTION, SOLUTION INTRAVENOUS PRN
Status: DISCONTINUED | OUTPATIENT
Start: 2020-01-01 | End: 2020-01-01 | Stop reason: HOSPADM

## 2020-01-01 RX ORDER — FUROSEMIDE 10 MG/ML
INJECTION INTRAMUSCULAR; INTRAVENOUS
Status: COMPLETED
Start: 2020-01-01 | End: 2020-01-01

## 2020-01-01 RX ORDER — ACETAMINOPHEN 325 MG/1
650 TABLET ORAL EVERY 4 HOURS PRN
Status: DISCONTINUED | OUTPATIENT
Start: 2020-01-01 | End: 2020-01-01 | Stop reason: HOSPADM

## 2020-01-01 RX ORDER — CARVEDILOL 6.25 MG/1
12.5 TABLET ORAL NIGHTLY
Status: DISCONTINUED | OUTPATIENT
Start: 2020-01-01 | End: 2020-01-01

## 2020-01-01 RX ORDER — ONDANSETRON 2 MG/ML
4 INJECTION INTRAMUSCULAR; INTRAVENOUS
Status: DISCONTINUED | OUTPATIENT
Start: 2020-01-01 | End: 2020-01-01 | Stop reason: HOSPADM

## 2020-01-01 RX ORDER — ASPIRIN 81 MG/1
81 TABLET, CHEWABLE ORAL DAILY
Status: DISCONTINUED | OUTPATIENT
Start: 2020-01-01 | End: 2020-01-01 | Stop reason: HOSPADM

## 2020-01-01 RX ORDER — HEPARIN SODIUM 5000 [USP'U]/ML
5000 INJECTION, SOLUTION INTRAVENOUS; SUBCUTANEOUS EVERY 8 HOURS SCHEDULED
Status: DISCONTINUED | OUTPATIENT
Start: 2020-01-01 | End: 2020-01-01 | Stop reason: HOSPADM

## 2020-01-01 RX ORDER — SPIRONOLACTONE 25 MG/1
25 TABLET ORAL DAILY
Qty: 90 TABLET | Refills: 1 | Status: ON HOLD | OUTPATIENT
Start: 2020-01-01 | End: 2020-01-01

## 2020-01-01 RX ORDER — BACITRACIN ZINC AND POLYMYXIN B SULFATE 500; 1000 [USP'U]/G; [USP'U]/G
OINTMENT TOPICAL ONCE
Status: COMPLETED | OUTPATIENT
Start: 2020-01-01 | End: 2020-01-01

## 2020-01-01 RX ORDER — AMIODARONE HYDROCHLORIDE 200 MG/1
200 TABLET ORAL 2 TIMES DAILY
Status: DISCONTINUED | OUTPATIENT
Start: 2020-01-01 | End: 2020-01-01

## 2020-01-01 RX ORDER — 0.9 % SODIUM CHLORIDE 0.9 %
500 INTRAVENOUS SOLUTION INTRAVENOUS ONCE
Status: COMPLETED | OUTPATIENT
Start: 2020-01-01 | End: 2020-01-01

## 2020-01-01 RX ORDER — LIDOCAINE HYDROCHLORIDE 10 MG/ML
1 INJECTION, SOLUTION EPIDURAL; INFILTRATION; INTRACAUDAL; PERINEURAL
Status: DISCONTINUED | OUTPATIENT
Start: 2020-01-01 | End: 2020-01-01 | Stop reason: HOSPADM

## 2020-01-01 RX ORDER — FENTANYL CITRATE 50 UG/ML
100 INJECTION, SOLUTION INTRAMUSCULAR; INTRAVENOUS ONCE
Status: COMPLETED | OUTPATIENT
Start: 2020-01-01 | End: 2020-01-01

## 2020-01-01 RX ORDER — MAGNESIUM SULFATE IN WATER 40 MG/ML
2 INJECTION, SOLUTION INTRAVENOUS ONCE
Status: COMPLETED | OUTPATIENT
Start: 2020-01-01 | End: 2020-01-01

## 2020-01-01 RX ORDER — ACETAMINOPHEN 650 MG/1
650 SUPPOSITORY RECTAL EVERY 4 HOURS PRN
Status: DISCONTINUED | OUTPATIENT
Start: 2020-01-01 | End: 2020-01-01

## 2020-01-01 RX ORDER — FUROSEMIDE 10 MG/ML
40 INJECTION INTRAMUSCULAR; INTRAVENOUS ONCE
Status: COMPLETED | OUTPATIENT
Start: 2020-01-01 | End: 2020-01-01

## 2020-01-01 RX ORDER — NICOTINE POLACRILEX 4 MG
15 LOZENGE BUCCAL PRN
Status: DISCONTINUED | OUTPATIENT
Start: 2020-01-01 | End: 2020-01-01 | Stop reason: HOSPADM

## 2020-01-01 RX ORDER — 0.9 % SODIUM CHLORIDE 0.9 %
500 INTRAVENOUS SOLUTION INTRAVENOUS ONCE
Status: DISCONTINUED | OUTPATIENT
Start: 2020-01-01 | End: 2020-01-01

## 2020-01-01 RX ORDER — ONDANSETRON 2 MG/ML
4 INJECTION INTRAMUSCULAR; INTRAVENOUS EVERY 4 HOURS PRN
Status: DISCONTINUED | OUTPATIENT
Start: 2020-01-01 | End: 2020-01-01 | Stop reason: HOSPADM

## 2020-01-01 RX ORDER — POLYETHYLENE GLYCOL 3350 17 G/17G
17 POWDER, FOR SOLUTION ORAL DAILY PRN
Status: DISCONTINUED | OUTPATIENT
Start: 2020-01-01 | End: 2020-01-01 | Stop reason: HOSPADM

## 2020-01-01 RX ORDER — SODIUM CHLORIDE 0.9 % (FLUSH) 0.9 %
10 SYRINGE (ML) INJECTION EVERY 12 HOURS SCHEDULED
Status: DISCONTINUED | OUTPATIENT
Start: 2020-01-01 | End: 2020-01-01 | Stop reason: HOSPADM

## 2020-01-01 RX ORDER — ETOMIDATE 2 MG/ML
INJECTION INTRAVENOUS PRN
Status: DISCONTINUED | OUTPATIENT
Start: 2020-01-01 | End: 2020-01-01 | Stop reason: SDUPTHER

## 2020-01-01 RX ORDER — SODIUM BICARBONATE 650 MG/1
1300 TABLET ORAL 3 TIMES DAILY
Status: DISCONTINUED | OUTPATIENT
Start: 2020-01-01 | End: 2020-01-01

## 2020-01-01 RX ORDER — MIDODRINE HYDROCHLORIDE 5 MG/1
5 TABLET ORAL
Qty: 90 TABLET | Refills: 3 | Status: SHIPPED | OUTPATIENT
Start: 2020-01-01 | End: 2020-01-01

## 2020-01-01 RX ORDER — NAPROXEN 500 MG/1
500 TABLET ORAL 2 TIMES DAILY PRN
Status: ON HOLD | COMMUNITY
End: 2020-01-01 | Stop reason: HOSPADM

## 2020-01-01 RX ORDER — PROPOFOL 10 MG/ML
INJECTION, EMULSION INTRAVENOUS PRN
Status: DISCONTINUED | OUTPATIENT
Start: 2020-01-01 | End: 2020-01-01 | Stop reason: SDUPTHER

## 2020-01-01 RX ORDER — ATORVASTATIN CALCIUM 40 MG/1
80 TABLET, FILM COATED ORAL DAILY
Status: DISCONTINUED | OUTPATIENT
Start: 2020-01-01 | End: 2020-01-01

## 2020-01-01 RX ORDER — CLOPIDOGREL BISULFATE 75 MG/1
75 TABLET ORAL DAILY
Qty: 90 TABLET | Refills: 1
Start: 2020-01-01

## 2020-01-01 RX ORDER — FENTANYL CITRATE 50 UG/ML
INJECTION, SOLUTION INTRAMUSCULAR; INTRAVENOUS
Status: COMPLETED
Start: 2020-01-01 | End: 2020-01-01

## 2020-01-01 RX ADMIN — FUROSEMIDE 5 MG/HR: 10 INJECTION, SOLUTION INTRAMUSCULAR; INTRAVENOUS at 08:33

## 2020-01-01 RX ADMIN — SODIUM BICARBONATE 1300 MG: 650 TABLET ORAL at 13:29

## 2020-01-01 RX ADMIN — LIDOCAINE HYDROCHLORIDE 5 ML: 10 INJECTION, SOLUTION EPIDURAL; INFILTRATION; INTRACAUDAL; PERINEURAL at 13:48

## 2020-01-01 RX ADMIN — ASPIRIN 81 MG: 81 TABLET, CHEWABLE ORAL at 08:33

## 2020-01-01 RX ADMIN — DEXTROSE MONOHYDRATE 150 MG: 50 INJECTION, SOLUTION INTRAVENOUS at 04:55

## 2020-01-01 RX ADMIN — INSULIN LISPRO 9 UNITS: 100 INJECTION, SOLUTION INTRAVENOUS; SUBCUTANEOUS at 12:14

## 2020-01-01 RX ADMIN — HEPARIN SODIUM 5000 UNITS: 5000 INJECTION INTRAVENOUS; SUBCUTANEOUS at 08:34

## 2020-01-01 RX ADMIN — INSULIN LISPRO 3 UNITS: 100 INJECTION, SOLUTION INTRAVENOUS; SUBCUTANEOUS at 08:49

## 2020-01-01 RX ADMIN — MIDODRINE HYDROCHLORIDE 5 MG: 5 TABLET ORAL at 12:14

## 2020-01-01 RX ADMIN — ALBUMIN (HUMAN) 37.5 G: 0.25 INJECTION, SOLUTION INTRAVENOUS at 16:16

## 2020-01-01 RX ADMIN — CLOPIDOGREL BISULFATE 75 MG: 75 TABLET ORAL at 08:49

## 2020-01-01 RX ADMIN — MIDODRINE HYDROCHLORIDE 5 MG: 5 TABLET ORAL at 08:23

## 2020-01-01 RX ADMIN — FUROSEMIDE 40 MG: 10 INJECTION, SOLUTION INTRAMUSCULAR; INTRAVENOUS at 09:28

## 2020-01-01 RX ADMIN — Medication 10 ML: at 08:43

## 2020-01-01 RX ADMIN — HEPARIN SODIUM 5000 UNITS: 5000 INJECTION INTRAVENOUS; SUBCUTANEOUS at 14:53

## 2020-01-01 RX ADMIN — MIDODRINE HYDROCHLORIDE 5 MG: 5 TABLET ORAL at 09:06

## 2020-01-01 RX ADMIN — Medication 10 ML: at 07:59

## 2020-01-01 RX ADMIN — SODIUM BICARBONATE 1300 MG: 650 TABLET ORAL at 15:00

## 2020-01-01 RX ADMIN — MIDODRINE HYDROCHLORIDE 5 MG: 5 TABLET ORAL at 11:50

## 2020-01-01 RX ADMIN — Medication 10 ML: at 19:57

## 2020-01-01 RX ADMIN — AMIODARONE HYDROCHLORIDE 0.5 MG/MIN: 50 INJECTION, SOLUTION INTRAVENOUS at 10:40

## 2020-01-01 RX ADMIN — HEPARIN SODIUM 5000 UNITS: 5000 INJECTION INTRAVENOUS; SUBCUTANEOUS at 15:01

## 2020-01-01 RX ADMIN — Medication 10 ML: at 19:48

## 2020-01-01 RX ADMIN — SODIUM BICARBONATE 1300 MG: 650 TABLET ORAL at 13:09

## 2020-01-01 RX ADMIN — SODIUM CHLORIDE 1000 ML: 9 INJECTION, SOLUTION INTRAVENOUS at 10:22

## 2020-01-01 RX ADMIN — ATORVASTATIN CALCIUM 80 MG: 40 TABLET, FILM COATED ORAL at 09:27

## 2020-01-01 RX ADMIN — MIDODRINE HYDROCHLORIDE 5 MG: 5 TABLET ORAL at 11:07

## 2020-01-01 RX ADMIN — HEPARIN SODIUM 5000 UNITS: 5000 INJECTION INTRAVENOUS; SUBCUTANEOUS at 20:44

## 2020-01-01 RX ADMIN — INSULIN LISPRO 3 UNITS: 100 INJECTION, SOLUTION INTRAVENOUS; SUBCUTANEOUS at 08:51

## 2020-01-01 RX ADMIN — INSULIN LISPRO 3 UNITS: 100 INJECTION, SOLUTION INTRAVENOUS; SUBCUTANEOUS at 17:09

## 2020-01-01 RX ADMIN — Medication 16 MG: at 13:24

## 2020-01-01 RX ADMIN — ASPIRIN 81 MG: 81 TABLET, CHEWABLE ORAL at 09:28

## 2020-01-01 RX ADMIN — MIDAZOLAM 1 MG: 1 INJECTION INTRAMUSCULAR; INTRAVENOUS at 12:12

## 2020-01-01 RX ADMIN — EPINEPHRINE 1 MCG/MIN: 1 INJECTION INTRAMUSCULAR; INTRAVENOUS; SUBCUTANEOUS at 05:44

## 2020-01-01 RX ADMIN — LIDOCAINE HYDROCHLORIDE 5 ML: 10 INJECTION, SOLUTION EPIDURAL; INFILTRATION; INTRACAUDAL; PERINEURAL at 12:35

## 2020-01-01 RX ADMIN — PROPOFOL 40 MG: 10 INJECTION, EMULSION INTRAVENOUS at 12:19

## 2020-01-01 RX ADMIN — INSULIN LISPRO 2 UNITS: 100 INJECTION, SOLUTION INTRAVENOUS; SUBCUTANEOUS at 20:28

## 2020-01-01 RX ADMIN — SODIUM BICARBONATE 1300 MG: 650 TABLET ORAL at 08:33

## 2020-01-01 RX ADMIN — Medication 10 ML: at 21:00

## 2020-01-01 RX ADMIN — MIDODRINE HYDROCHLORIDE 5 MG: 5 TABLET ORAL at 15:00

## 2020-01-01 RX ADMIN — ASPIRIN 81 MG: 81 TABLET, CHEWABLE ORAL at 08:50

## 2020-01-01 RX ADMIN — HEPARIN SODIUM 5000 UNITS: 5000 INJECTION INTRAVENOUS; SUBCUTANEOUS at 06:08

## 2020-01-01 RX ADMIN — INSULIN LISPRO 6 UNITS: 100 INJECTION, SOLUTION INTRAVENOUS; SUBCUTANEOUS at 16:03

## 2020-01-01 RX ADMIN — SODIUM BICARBONATE 1300 MG: 650 TABLET ORAL at 08:23

## 2020-01-01 RX ADMIN — INSULIN LISPRO 3 UNITS: 100 INJECTION, SOLUTION INTRAVENOUS; SUBCUTANEOUS at 11:50

## 2020-01-01 RX ADMIN — AMIODARONE HYDROCHLORIDE 0.5 MG/MIN: 50 INJECTION, SOLUTION INTRAVENOUS at 13:27

## 2020-01-01 RX ADMIN — Medication 10 ML: at 09:28

## 2020-01-01 RX ADMIN — SODIUM BICARBONATE 1300 MG: 650 TABLET ORAL at 08:49

## 2020-01-01 RX ADMIN — INSULIN LISPRO 3 UNITS: 100 INJECTION, SOLUTION INTRAVENOUS; SUBCUTANEOUS at 20:34

## 2020-01-01 RX ADMIN — HEPARIN SODIUM 5000 UNITS: 5000 INJECTION INTRAVENOUS; SUBCUTANEOUS at 20:27

## 2020-01-01 RX ADMIN — Medication 10 ML: at 08:51

## 2020-01-01 RX ADMIN — FENTANYL CITRATE 100 MCG: 50 INJECTION INTRAMUSCULAR; INTRAVENOUS at 04:25

## 2020-01-01 RX ADMIN — INSULIN GLARGINE 10 UNITS: 100 INJECTION, SOLUTION SUBCUTANEOUS at 08:35

## 2020-01-01 RX ADMIN — LIDOCAINE HYDROCHLORIDE 60 MG: 20 INJECTION, SOLUTION INFILTRATION; PERINEURAL at 12:12

## 2020-01-01 RX ADMIN — AMIODARONE HYDROCHLORIDE 200 MG: 200 TABLET ORAL at 08:33

## 2020-01-01 RX ADMIN — CLOPIDOGREL BISULFATE 75 MG: 75 TABLET ORAL at 08:50

## 2020-01-01 RX ADMIN — POTASSIUM CHLORIDE 20 MEQ: 750 TABLET, FILM COATED, EXTENDED RELEASE ORAL at 09:05

## 2020-01-01 RX ADMIN — HEPARIN SODIUM 5000 UNITS: 5000 INJECTION INTRAVENOUS; SUBCUTANEOUS at 22:30

## 2020-01-01 RX ADMIN — Medication 10 ML: at 20:28

## 2020-01-01 RX ADMIN — AMIODARONE HYDROCHLORIDE 200 MG: 200 TABLET ORAL at 09:28

## 2020-01-01 RX ADMIN — INSULIN LISPRO 9 UNITS: 100 INJECTION, SOLUTION INTRAVENOUS; SUBCUTANEOUS at 11:44

## 2020-01-01 RX ADMIN — Medication 10 ML: at 20:34

## 2020-01-01 RX ADMIN — SODIUM CHLORIDE 500 ML: 9 INJECTION, SOLUTION INTRAVENOUS at 04:55

## 2020-01-01 RX ADMIN — CLOPIDOGREL BISULFATE 75 MG: 75 TABLET ORAL at 08:33

## 2020-01-01 RX ADMIN — ASPIRIN 81 MG: 81 TABLET, CHEWABLE ORAL at 08:35

## 2020-01-01 RX ADMIN — FUROSEMIDE 40 MG: 10 INJECTION, SOLUTION INTRAMUSCULAR; INTRAVENOUS at 09:30

## 2020-01-01 RX ADMIN — SODIUM BICARBONATE 1300 MG: 650 TABLET ORAL at 19:56

## 2020-01-01 RX ADMIN — CLOPIDOGREL BISULFATE 75 MG: 75 TABLET ORAL at 08:35

## 2020-01-01 RX ADMIN — Medication 10 ML: at 10:55

## 2020-01-01 RX ADMIN — MIDODRINE HYDROCHLORIDE 5 MG: 5 TABLET ORAL at 11:44

## 2020-01-01 RX ADMIN — PROPOFOL 40 MG: 10 INJECTION, EMULSION INTRAVENOUS at 12:12

## 2020-01-01 RX ADMIN — AMIODARONE HYDROCHLORIDE 200 MG: 200 TABLET ORAL at 20:44

## 2020-01-01 RX ADMIN — SODIUM BICARBONATE 50 MEQ: 84 INJECTION INTRAVENOUS at 15:00

## 2020-01-01 RX ADMIN — MIDODRINE HYDROCHLORIDE 5 MG: 5 TABLET ORAL at 15:51

## 2020-01-01 RX ADMIN — ETOMIDATE 4 MG: 20 INJECTION, SOLUTION INTRAVENOUS at 12:12

## 2020-01-01 RX ADMIN — SODIUM CHLORIDE: 9 INJECTION, SOLUTION INTRAVENOUS at 09:41

## 2020-01-01 RX ADMIN — ETOMIDATE 2 MG: 20 INJECTION, SOLUTION INTRAVENOUS at 12:13

## 2020-01-01 RX ADMIN — INSULIN LISPRO 12 UNITS: 100 INJECTION, SOLUTION INTRAVENOUS; SUBCUTANEOUS at 16:59

## 2020-01-01 RX ADMIN — MAGNESIUM SULFATE 2 G: 2 INJECTION INTRAVENOUS at 08:49

## 2020-01-01 RX ADMIN — SODIUM BICARBONATE 1300 MG: 650 TABLET ORAL at 20:27

## 2020-01-01 RX ADMIN — SODIUM BICARBONATE 1300 MG: 650 TABLET ORAL at 20:44

## 2020-01-01 RX ADMIN — BACITRACIN ZINC AND POLYMYXIN B SULFATE: at 14:10

## 2020-01-01 RX ADMIN — ASPIRIN 81 MG: 81 TABLET, CHEWABLE ORAL at 08:23

## 2020-01-01 RX ADMIN — ASPIRIN 81 MG: 81 TABLET, CHEWABLE ORAL at 08:49

## 2020-01-01 RX ADMIN — AMIODARONE HYDROCHLORIDE 1 MG/MIN: 50 INJECTION, SOLUTION INTRAVENOUS at 05:09

## 2020-01-01 RX ADMIN — MIDODRINE HYDROCHLORIDE 5 MG: 5 TABLET ORAL at 16:03

## 2020-01-01 RX ADMIN — INSULIN LISPRO 3 UNITS: 100 INJECTION, SOLUTION INTRAVENOUS; SUBCUTANEOUS at 11:06

## 2020-01-01 RX ADMIN — POTASSIUM PHOSPHATE, MONOBASIC AND POTASSIUM PHOSPHATE, DIBASIC 10 MMOL: 224; 236 INJECTION, SOLUTION, CONCENTRATE INTRAVENOUS at 08:54

## 2020-01-01 RX ADMIN — INSULIN LISPRO 3 UNITS: 100 INJECTION, SOLUTION INTRAVENOUS; SUBCUTANEOUS at 20:06

## 2020-01-01 RX ADMIN — HEPARIN SODIUM 5000 UNITS: 5000 INJECTION INTRAVENOUS; SUBCUTANEOUS at 06:43

## 2020-01-01 RX ADMIN — Medication 10 ML: at 08:24

## 2020-01-01 RX ADMIN — ETOMIDATE 2 MG: 20 INJECTION, SOLUTION INTRAVENOUS at 12:15

## 2020-01-01 RX ADMIN — FENTANYL CITRATE 100 MCG: 50 INJECTION, SOLUTION INTRAMUSCULAR; INTRAVENOUS at 04:25

## 2020-01-01 RX ADMIN — AMIODARONE HYDROCHLORIDE 200 MG: 200 TABLET ORAL at 20:27

## 2020-01-01 RX ADMIN — MIDODRINE HYDROCHLORIDE 5 MG: 5 TABLET ORAL at 18:11

## 2020-01-01 RX ADMIN — MIDODRINE HYDROCHLORIDE 5 MG: 5 TABLET ORAL at 08:51

## 2020-01-01 RX ADMIN — MAGNESIUM SULFATE HEPTAHYDRATE 2 G: 40 INJECTION, SOLUTION INTRAVENOUS at 09:27

## 2020-01-01 RX ADMIN — INSULIN LISPRO 12 UNITS: 100 INJECTION, SOLUTION INTRAVENOUS; SUBCUTANEOUS at 12:28

## 2020-01-01 RX ADMIN — HEPARIN SODIUM 5000 UNITS: 5000 INJECTION INTRAVENOUS; SUBCUTANEOUS at 13:29

## 2020-01-01 RX ADMIN — CLOPIDOGREL BISULFATE 75 MG: 75 TABLET ORAL at 09:27

## 2020-01-01 RX ADMIN — AMIODARONE HYDROCHLORIDE 200 MG: 200 TABLET ORAL at 08:23

## 2020-01-01 RX ADMIN — LIDOCAINE HYDROCHLORIDE 5 ML: 10 INJECTION, SOLUTION EPIDURAL; INFILTRATION; INTRACAUDAL; PERINEURAL at 14:22

## 2020-01-01 RX ADMIN — ALBUMIN (HUMAN) 37.5 ML: 0.25 INJECTION, SOLUTION INTRAVENOUS at 12:44

## 2020-01-01 RX ADMIN — SODIUM CHLORIDE 500 ML: 9 INJECTION, SOLUTION INTRAVENOUS at 05:09

## 2020-01-01 RX ADMIN — Medication 8 MCG/MIN: at 04:10

## 2020-01-01 RX ADMIN — ATORVASTATIN CALCIUM 80 MG: 40 TABLET, FILM COATED ORAL at 08:35

## 2020-01-01 RX ADMIN — CLOPIDOGREL BISULFATE 75 MG: 75 TABLET ORAL at 08:23

## 2020-01-01 RX ADMIN — BACITRACIN ZINC AND POLYMYXIN B SULFATE: at 14:50

## 2020-01-01 RX ADMIN — FUROSEMIDE 5 MG/HR: 10 INJECTION, SOLUTION INTRAMUSCULAR; INTRAVENOUS at 15:00

## 2020-01-01 RX ADMIN — MIDODRINE HYDROCHLORIDE 5 MG: 5 TABLET ORAL at 16:59

## 2020-01-01 RX ADMIN — INSULIN LISPRO 9 UNITS: 100 INJECTION, SOLUTION INTRAVENOUS; SUBCUTANEOUS at 08:34

## 2020-01-01 RX ADMIN — MIDODRINE HYDROCHLORIDE 5 MG: 5 TABLET ORAL at 08:33

## 2020-01-01 RX ADMIN — PROPOFOL 20 MG: 10 INJECTION, EMULSION INTRAVENOUS at 12:15

## 2020-01-01 ASSESSMENT — ENCOUNTER SYMPTOMS
RECTAL PAIN: 0
BACK PAIN: 0
DIARRHEA: 0
EYE REDNESS: 0
COLOR CHANGE: 0
CONSTIPATION: 0
CHOKING: 0
STRIDOR: 0
CHEST TIGHTNESS: 0
COUGH: 0
VOMITING: 0
ANAL BLEEDING: 0
BLOOD IN STOOL: 0
WHEEZING: 0
ABDOMINAL PAIN: 0
EYE PAIN: 0
PHOTOPHOBIA: 0
APNEA: 0
EYE DISCHARGE: 0
NAUSEA: 0
ABDOMINAL DISTENTION: 0
SHORTNESS OF BREATH: 0
EYE ITCHING: 0

## 2020-01-01 ASSESSMENT — PAIN SCALES - GENERAL
PAINLEVEL_OUTOF10: 0

## 2020-01-01 ASSESSMENT — PAIN - FUNCTIONAL ASSESSMENT
PAIN_FUNCTIONAL_ASSESSMENT: 0-10

## 2020-01-01 ASSESSMENT — PULMONARY FUNCTION TESTS
PIF_VALUE: 0

## 2020-01-07 ENCOUNTER — TELEPHONE (OUTPATIENT)
Dept: CARDIOLOGY CLINIC | Age: 75
End: 2020-01-07

## 2020-01-08 ENCOUNTER — OFFICE VISIT (OUTPATIENT)
Dept: CARDIOLOGY CLINIC | Age: 75
End: 2020-01-08
Payer: MEDICARE

## 2020-01-08 VITALS
HEIGHT: 69 IN | WEIGHT: 201.9 LBS | HEART RATE: 64 BPM | SYSTOLIC BLOOD PRESSURE: 110 MMHG | BODY MASS INDEX: 29.9 KG/M2 | DIASTOLIC BLOOD PRESSURE: 60 MMHG

## 2020-01-08 PROCEDURE — G8484 FLU IMMUNIZE NO ADMIN: HCPCS | Performed by: INTERNAL MEDICINE

## 2020-01-08 PROCEDURE — 99214 OFFICE O/P EST MOD 30 MIN: CPT | Performed by: INTERNAL MEDICINE

## 2020-01-08 PROCEDURE — G8427 DOCREV CUR MEDS BY ELIG CLIN: HCPCS | Performed by: INTERNAL MEDICINE

## 2020-01-08 PROCEDURE — 3017F COLORECTAL CA SCREEN DOC REV: CPT | Performed by: INTERNAL MEDICINE

## 2020-01-08 PROCEDURE — 1036F TOBACCO NON-USER: CPT | Performed by: INTERNAL MEDICINE

## 2020-01-08 PROCEDURE — G8417 CALC BMI ABV UP PARAM F/U: HCPCS | Performed by: INTERNAL MEDICINE

## 2020-01-08 PROCEDURE — 4040F PNEUMOC VAC/ADMIN/RCVD: CPT | Performed by: INTERNAL MEDICINE

## 2020-01-08 PROCEDURE — 1123F ACP DISCUSS/DSCN MKR DOCD: CPT | Performed by: INTERNAL MEDICINE

## 2020-01-08 RX ORDER — FAMOTIDINE 20 MG/1
20 TABLET, FILM COATED ORAL 2 TIMES DAILY
COMMUNITY

## 2020-01-08 RX ORDER — SPIRONOLACTONE 25 MG/1
12.5 TABLET ORAL DAILY
Qty: 90 TABLET | Refills: 1 | Status: ON HOLD | OUTPATIENT
Start: 2020-01-08 | End: 2020-03-03 | Stop reason: HOSPADM

## 2020-01-08 RX ORDER — FUROSEMIDE 20 MG/1
20 TABLET ORAL 2 TIMES DAILY
Qty: 180 TABLET | Refills: 1 | Status: ON HOLD | OUTPATIENT
Start: 2020-01-08 | End: 2020-03-03 | Stop reason: HOSPADM

## 2020-01-08 NOTE — PATIENT INSTRUCTIONS
Renal labs in 2 weeks after starting medication changes. Stop hydrochlorothiazide (HYDRODIURIL) 25 MG tablet. Start Lasix 20mg twice daily. Start Spirolactone 12.5 mg daily.

## 2020-01-08 NOTE — PROGRESS NOTES
visual changes or diplopia. No scleral icterus. · ENT: No Headaches, hearing loss or vertigo. No mouth sores or sore throat. · Cardiovascular: Reviewed in HPI  · Respiratory: No cough or wheezing, no sputum production. No hematemesis. · Gastrointestinal: No abdominal pain, appetite loss, blood in stools. No change in bowel or bladder habits. · Genitourinary: No dysuria, trouble voiding, or hematuria. · Musculoskeletal:  No gait disturbance, weakness or joint complaints. · Integumentary: No rash or pruritis. · Neurological: No headache, diplopia, change in muscle strength, numbness or tingling. No change in gait, balance, coordination, mood, affect, memory, mentation, behavior. · Psychiatric: No anxiety, no depression. · Endocrine: No malaise, fatigue or temperature intolerance. No excessive thirst, fluid intake, or urination. No tremor. · Hematologic/Lymphatic: No abnormal bruising or bleeding, blood clots or swollen lymph nodes. · Allergic/Immunologic: No nasal congestion or hives. Physical Examination:       Blood pressure 110/60, pulse 64, height 5' 9\" (1.753 m), weight 201 lb 14.4 oz (91.6 kg). Constitutional and General Appearance:  Appears stated age, NAD  Respiratory:  · Normal excursion and expansion without use of accessory muscles  · Resp Auscultation: Normal breath sounds without dullness  Cardiovascular:  · The apical impulses not displaced  · Heart tones are crisp and normal  · Cervical veins are not engorged  · The carotid upstroke is normal in amplitude and contour without delay or bruit  · Peripheral pulses are symmetrical and full  · There is no clubbing, cyanosis of the extremities.   · abdominal swelling   · Femoral Arteries: 2+ and equal  · Pedal Pulses: 2+ and equal   Abdomen:  · No masses or tenderness  · Liver/Spleen: No Abnormalities Noted  Neurological/Psychiatric:  · Alert and oriented in all spheres  · Moves all extremities well  · Exhibits normal gait balance and coordination  · No abnormalities of mood, affect, memory, mentation, or behavior are noted    Stress Echo: 8/9/2011 [facility], EF 45%, mild TR, RVSP 34.5 mmHg. [facility] 4/10/15: EF 40%. 3/31/15 Nuclear stress: small sized apical primarily fixed defect consistent with fibrosis. Diaphragm attenuation reduces the specificity of this finding. No ischemia. Dilated left ventricle with global hypokinesis and EF 35%. Frequent PVCs and slow non-sustained polymorphic VT    ECHO 3/4/19:  Left ventricle - normal in size, reduced function with EF of 40%, global hypokinesis, more severe hypokinesis of inferior wall. Mitral valve - trivial regurgitation   Aortic valve - sclerotic   Tricuspid valve - trivial regurgitation with PASP of 32mmHg   Pacer / ICD wire is visualized in the right ventricle and right atrium. Assessment:   1. Coronary atherosclerosis of native coronary artery: Stable. No anginal symptoms. Had CP prior to stenting. 2004 cath> Cyphers to Cx/Nataly,  2007 cath> Cypher to LAD     2011 stress echo> negative for ischemia. 8/28/12 [facility]> patent LAD, marginal and CFX stents. Chronic RCA occlusion. 2.   Cardiomyopathy: Stable. No signs of fluid by exam.  Change HCTZ from prn to 12.5mg daily. Stress echo 2011> Negative for ischemia, resting EF of 45%, with exercise his EF went up to 55%. [facility] 8/28/2012> EF 40%. ECHO 7/14/16>  Technically limited study due to body habitus. Arrhythmia noted during exam. Normal left ventricle size. There is mild concentric left ventricular hypertrophy. Left ventricular function is difficult to estimate due to arrhythmia but appears to be 50-55%. There is reversal of E/A inflow velocities across the mitral valve suggesting impaired left ventricular relaxation. The left atrium is dilated. There is trivial tricuspid regurgitation with RVSP estimated at 27 mmHg. Trivial mitral regurgitation is present.   ECHO 3/4/19> EF of 40%, global hypokinesis, more severe hypokinesis of inferior wall. 3. AICD: Receives routine device checks every three months. 4. Tachycardia, VT (follows with Dr. Guillermo Breaux)              Recurrent episodes of tachycardia detected by his AICD. Patient has had ATP for what appears to be ventricular tachycardia. Devices single-chamber. He also has had episodes that appears to be SVT with similar QRS morphology. Now on amiodarone but I recommend he stop it now. 5. Essential hypertension: Stable. Blood pressure 110/60, pulse 64, height 5' 9\" (1.753 m), weight 201 lb 14.4 oz (91.6 kg). 6.   Hyperlipidemia: Usually drawn thru the [facility]. 7. Diabetes mellitus: Followed by PCP. 8. 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 Plan:   Casandra Raygoza has a stable cardiac status. Cardiac test and lab results personally reviewed by me during this office visit and discussed. Stop hydrochlorothiazide (HYDRODIURIL) 25 MG tablet. Start Lasix 20mg twice daily. Start Spirolactone 12.5 mg daily. Renal Panel in 2 weeks. Echo asap- will schedule in [place]. Echo done 1/9 with severe 4 chamber enlargement and severe LV dysfunction. Will contact to discuss. May need admission    Continue risk factor modifications. Call for any change in symptoms- recommend to proceed with scheduling paracentesis. Return for regular follow up in 2 months. I appreciate the opportunity of cooperating in the care of this individual.    Lotus Saint, MD., [facility]    Patient's problem list, medications, allergies, past medical, surgical, social and family histories were reviewed and updated as appropriate. Scribe's attestation: This note was scribed in the presence of Dr. Fernanda Guerra MD, by Rashad Long RN. The scribe's documentation has been prepared under my direction and personally reviewed by me in its entirety.  I confirm that the note above accurately reflects all work, treatment,

## 2020-01-09 ENCOUNTER — PROCEDURE VISIT (OUTPATIENT)
Dept: CARDIOLOGY CLINIC | Age: 75
End: 2020-01-09
Payer: MEDICARE

## 2020-01-09 LAB
LV EF: 20 %
LVEF MODALITY: NORMAL

## 2020-01-09 PROCEDURE — 93306 TTE W/DOPPLER COMPLETE: CPT | Performed by: INTERNAL MEDICINE

## 2020-01-14 ENCOUNTER — TELEPHONE (OUTPATIENT)
Dept: CARDIOLOGY CLINIC | Age: 75
End: 2020-01-14

## 2020-01-14 NOTE — TELEPHONE ENCOUNTER
Call received from the patient who states he \"feels a lot better today, better than I have in a long time\". He stated that \"they took 3.5 liters off me on Friday, this is the best I've felt in 2 months\". He stated that he will call if his symptoms change.

## 2020-02-27 ENCOUNTER — TELEPHONE (OUTPATIENT)
Dept: CARDIOLOGY CLINIC | Age: 75
End: 2020-02-27

## 2020-02-27 NOTE — TELEPHONE ENCOUNTER
Pt hasn't had any transmissions for over a month due to going on vacation for that time. He is going to try to send a manual transmission today. He advised he is going to call [employer] for assistance and to also make sure the base is still workable. Please call with any questions. Thank you.

## 2020-02-27 NOTE — TELEPHONE ENCOUNTER
Spoke to the patient,  States his abdomen is \"rock hard\" c/o pain in his groin and swelling in his legs. States his legs feel numb when sitting. He states that these have been worsening symptoms over the last few weeks. He last had a paracentesis in January. Instructed the patient to contact his IR to discuss the need for another paracentesis. He is to see [facility] tomorrow.

## 2020-02-28 ENCOUNTER — APPOINTMENT (OUTPATIENT)
Dept: GENERAL RADIOLOGY | Age: 75
DRG: 433 | End: 2020-02-28
Payer: MEDICARE

## 2020-02-28 ENCOUNTER — APPOINTMENT (OUTPATIENT)
Dept: CT IMAGING | Age: 75
DRG: 433 | End: 2020-02-28
Payer: MEDICARE

## 2020-02-28 ENCOUNTER — TELEPHONE (OUTPATIENT)
Dept: CARDIOLOGY CLINIC | Age: 75
End: 2020-02-28

## 2020-02-28 ENCOUNTER — HOSPITAL ENCOUNTER (INPATIENT)
Age: 75
LOS: 4 days | Discharge: HOME | DRG: 433 | End: 2020-03-03
Attending: EMERGENCY MEDICINE | Admitting: INTERNAL MEDICINE
Payer: MEDICARE

## 2020-02-28 PROBLEM — R60.1 ANASARCA: Status: ACTIVE | Noted: 2020-02-28

## 2020-02-28 LAB
A/G RATIO: 1.3 (ref 1.1–2.2)
ALBUMIN SERPL-MCNC: 3.9 G/DL (ref 3.4–5)
ALP BLD-CCNC: 148 U/L (ref 40–129)
ALT SERPL-CCNC: 12 U/L (ref 10–40)
ANION GAP SERPL CALCULATED.3IONS-SCNC: 14 MMOL/L (ref 3–16)
AST SERPL-CCNC: 17 U/L (ref 15–37)
BASOPHILS ABSOLUTE: 0.1 K/UL (ref 0–0.2)
BASOPHILS RELATIVE PERCENT: 1.7 %
BILIRUB SERPL-MCNC: 1.9 MG/DL (ref 0–1)
BILIRUBIN URINE: NEGATIVE
BLOOD, URINE: NEGATIVE
BUN BLDV-MCNC: 16 MG/DL (ref 7–20)
CALCIUM SERPL-MCNC: 9.3 MG/DL (ref 8.3–10.6)
CHLORIDE BLD-SCNC: 103 MMOL/L (ref 99–110)
CLARITY: CLEAR
CO2: 24 MMOL/L (ref 21–32)
COLOR: YELLOW
CREAT SERPL-MCNC: 1 MG/DL (ref 0.8–1.3)
EOSINOPHILS ABSOLUTE: 0.1 K/UL (ref 0–0.6)
EOSINOPHILS RELATIVE PERCENT: 1.9 %
GFR AFRICAN AMERICAN: >60
GFR NON-AFRICAN AMERICAN: >60
GLOBULIN: 3 G/DL
GLUCOSE BLD-MCNC: 128 MG/DL (ref 70–99)
GLUCOSE BLD-MCNC: 89 MG/DL (ref 70–99)
GLUCOSE URINE: NEGATIVE MG/DL
HCT VFR BLD CALC: 37.7 % (ref 40.5–52.5)
HEMOGLOBIN: 12.5 G/DL (ref 13.5–17.5)
INR BLD: 1.09 (ref 0.86–1.14)
KETONES, URINE: NEGATIVE MG/DL
LEUKOCYTE ESTERASE, URINE: NEGATIVE
LIPASE: 12 U/L (ref 13–60)
LYMPHOCYTES ABSOLUTE: 0.4 K/UL (ref 1–5.1)
LYMPHOCYTES RELATIVE PERCENT: 9.5 %
MCH RBC QN AUTO: 31.5 PG (ref 26–34)
MCHC RBC AUTO-ENTMCNC: 33.2 G/DL (ref 31–36)
MCV RBC AUTO: 94.8 FL (ref 80–100)
MICROSCOPIC EXAMINATION: NORMAL
MONOCYTES ABSOLUTE: 0.4 K/UL (ref 0–1.3)
MONOCYTES RELATIVE PERCENT: 9.1 %
NEUTROPHILS ABSOLUTE: 3.5 K/UL (ref 1.7–7.7)
NEUTROPHILS RELATIVE PERCENT: 77.8 %
NITRITE, URINE: NEGATIVE
PDW BLD-RTO: 19.8 % (ref 12.4–15.4)
PERFORMED ON: ABNORMAL
PH UA: 6 (ref 5–8)
PLATELET # BLD: 123 K/UL (ref 135–450)
PMV BLD AUTO: 9.5 FL (ref 5–10.5)
POTASSIUM SERPL-SCNC: 4 MMOL/L (ref 3.5–5.1)
PRO-BNP: ABNORMAL PG/ML (ref 0–124)
PROTEIN UA: NEGATIVE MG/DL
PROTHROMBIN TIME: 12.7 SEC (ref 10–13.2)
RBC # BLD: 3.98 M/UL (ref 4.2–5.9)
SODIUM BLD-SCNC: 141 MMOL/L (ref 136–145)
SPECIFIC GRAVITY UA: 1.01 (ref 1–1.03)
TOTAL PROTEIN: 6.9 G/DL (ref 6.4–8.2)
TROPONIN: <0.01 NG/ML
URINE REFLEX TO CULTURE: NORMAL
URINE TYPE: NORMAL
UROBILINOGEN, URINE: 1 E.U./DL
WBC # BLD: 4.5 K/UL (ref 4–11)

## 2020-02-28 PROCEDURE — 81003 URINALYSIS AUTO W/O SCOPE: CPT

## 2020-02-28 PROCEDURE — 83690 ASSAY OF LIPASE: CPT

## 2020-02-28 PROCEDURE — 85610 PROTHROMBIN TIME: CPT

## 2020-02-28 PROCEDURE — 84484 ASSAY OF TROPONIN QUANT: CPT

## 2020-02-28 PROCEDURE — 99285 EMERGENCY DEPT VISIT HI MDM: CPT

## 2020-02-28 PROCEDURE — 93005 ELECTROCARDIOGRAM TRACING: CPT | Performed by: PHYSICIAN ASSISTANT

## 2020-02-28 PROCEDURE — 89051 BODY FLUID CELL COUNT: CPT

## 2020-02-28 PROCEDURE — 80053 COMPREHEN METABOLIC PANEL: CPT

## 2020-02-28 PROCEDURE — 6360000002 HC RX W HCPCS: Performed by: INTERNAL MEDICINE

## 2020-02-28 PROCEDURE — 85025 COMPLETE CBC W/AUTO DIFF WBC: CPT

## 2020-02-28 PROCEDURE — 74176 CT ABD & PELVIS W/O CONTRAST: CPT

## 2020-02-28 PROCEDURE — 2580000003 HC RX 258: Performed by: INTERNAL MEDICINE

## 2020-02-28 PROCEDURE — 1200000000 HC SEMI PRIVATE

## 2020-02-28 PROCEDURE — 83880 ASSAY OF NATRIURETIC PEPTIDE: CPT

## 2020-02-28 PROCEDURE — 71045 X-RAY EXAM CHEST 1 VIEW: CPT

## 2020-02-28 RX ORDER — SODIUM CHLORIDE 0.9 % (FLUSH) 0.9 %
10 SYRINGE (ML) INJECTION EVERY 12 HOURS SCHEDULED
Status: DISCONTINUED | OUTPATIENT
Start: 2020-02-28 | End: 2020-03-03 | Stop reason: HOSPADM

## 2020-02-28 RX ORDER — LOPERAMIDE HYDROCHLORIDE 2 MG/1
2 CAPSULE ORAL 4 TIMES DAILY PRN
Status: DISCONTINUED | OUTPATIENT
Start: 2020-02-28 | End: 2020-03-03 | Stop reason: HOSPADM

## 2020-02-28 RX ORDER — POLYETHYLENE GLYCOL 3350 17 G/17G
17 POWDER, FOR SOLUTION ORAL DAILY PRN
Status: DISCONTINUED | OUTPATIENT
Start: 2020-02-28 | End: 2020-03-03 | Stop reason: HOSPADM

## 2020-02-28 RX ORDER — NITROGLYCERIN 0.4 MG/1
0.4 TABLET SUBLINGUAL EVERY 5 MIN PRN
Status: DISCONTINUED | OUTPATIENT
Start: 2020-02-28 | End: 2020-03-03 | Stop reason: HOSPADM

## 2020-02-28 RX ORDER — FUROSEMIDE 10 MG/ML
40 INJECTION INTRAMUSCULAR; INTRAVENOUS 2 TIMES DAILY
Status: DISCONTINUED | OUTPATIENT
Start: 2020-02-28 | End: 2020-03-01

## 2020-02-28 RX ORDER — PROMETHAZINE HYDROCHLORIDE 25 MG/1
12.5 TABLET ORAL EVERY 6 HOURS PRN
Status: DISCONTINUED | OUTPATIENT
Start: 2020-02-28 | End: 2020-03-03 | Stop reason: HOSPADM

## 2020-02-28 RX ORDER — FAMOTIDINE 20 MG/1
20 TABLET, FILM COATED ORAL DAILY
Status: DISCONTINUED | OUTPATIENT
Start: 2020-02-29 | End: 2020-03-03 | Stop reason: HOSPADM

## 2020-02-28 RX ORDER — TAMSULOSIN HYDROCHLORIDE 0.4 MG/1
0.4 CAPSULE ORAL DAILY
Status: DISCONTINUED | OUTPATIENT
Start: 2020-02-29 | End: 2020-03-03 | Stop reason: HOSPADM

## 2020-02-28 RX ORDER — CLOPIDOGREL BISULFATE 75 MG/1
75 TABLET ORAL DAILY
Status: DISCONTINUED | OUTPATIENT
Start: 2020-02-29 | End: 2020-03-03 | Stop reason: HOSPADM

## 2020-02-28 RX ORDER — SPIRONOLACTONE 25 MG/1
12.5 TABLET ORAL DAILY
Status: DISCONTINUED | OUTPATIENT
Start: 2020-02-29 | End: 2020-02-29

## 2020-02-28 RX ORDER — INSULIN LISPRO 100 [IU]/ML
0-3 INJECTION, SOLUTION INTRAVENOUS; SUBCUTANEOUS NIGHTLY
Status: DISCONTINUED | OUTPATIENT
Start: 2020-02-28 | End: 2020-03-03 | Stop reason: HOSPADM

## 2020-02-28 RX ORDER — NICOTINE POLACRILEX 4 MG
15 LOZENGE BUCCAL PRN
Status: DISCONTINUED | OUTPATIENT
Start: 2020-02-28 | End: 2020-03-03 | Stop reason: HOSPADM

## 2020-02-28 RX ORDER — DEXTROSE MONOHYDRATE 50 MG/ML
100 INJECTION, SOLUTION INTRAVENOUS PRN
Status: DISCONTINUED | OUTPATIENT
Start: 2020-02-28 | End: 2020-03-03 | Stop reason: HOSPADM

## 2020-02-28 RX ORDER — SODIUM CHLORIDE 0.9 % (FLUSH) 0.9 %
10 SYRINGE (ML) INJECTION PRN
Status: DISCONTINUED | OUTPATIENT
Start: 2020-02-28 | End: 2020-03-03 | Stop reason: HOSPADM

## 2020-02-28 RX ORDER — DEXTROSE MONOHYDRATE 25 G/50ML
12.5 INJECTION, SOLUTION INTRAVENOUS PRN
Status: DISCONTINUED | OUTPATIENT
Start: 2020-02-28 | End: 2020-03-03 | Stop reason: HOSPADM

## 2020-02-28 RX ORDER — VITAMIN B COMPLEX
1000 TABLET ORAL DAILY
Status: DISCONTINUED | OUTPATIENT
Start: 2020-02-29 | End: 2020-03-03 | Stop reason: HOSPADM

## 2020-02-28 RX ORDER — INSULIN LISPRO 100 [IU]/ML
0-6 INJECTION, SOLUTION INTRAVENOUS; SUBCUTANEOUS
Status: DISCONTINUED | OUTPATIENT
Start: 2020-02-29 | End: 2020-03-03 | Stop reason: HOSPADM

## 2020-02-28 RX ORDER — ACETAMINOPHEN 325 MG/1
650 TABLET ORAL EVERY 6 HOURS PRN
Status: DISCONTINUED | OUTPATIENT
Start: 2020-02-28 | End: 2020-03-03 | Stop reason: HOSPADM

## 2020-02-28 RX ORDER — ASPIRIN 81 MG/1
81 TABLET, CHEWABLE ORAL DAILY
Status: DISCONTINUED | OUTPATIENT
Start: 2020-02-29 | End: 2020-03-03 | Stop reason: HOSPADM

## 2020-02-28 RX ORDER — CARVEDILOL 25 MG/1
25 TABLET ORAL DAILY
Status: DISCONTINUED | OUTPATIENT
Start: 2020-02-29 | End: 2020-02-29

## 2020-02-28 RX ORDER — ACETAMINOPHEN 650 MG/1
650 SUPPOSITORY RECTAL EVERY 6 HOURS PRN
Status: DISCONTINUED | OUTPATIENT
Start: 2020-02-28 | End: 2020-03-03 | Stop reason: HOSPADM

## 2020-02-28 RX ORDER — ONDANSETRON 2 MG/ML
4 INJECTION INTRAMUSCULAR; INTRAVENOUS EVERY 6 HOURS PRN
Status: DISCONTINUED | OUTPATIENT
Start: 2020-02-28 | End: 2020-03-03 | Stop reason: HOSPADM

## 2020-02-28 RX ADMIN — Medication 10 ML: at 22:44

## 2020-02-28 RX ADMIN — FUROSEMIDE 40 MG: 10 INJECTION, SOLUTION INTRAMUSCULAR; INTRAVENOUS at 22:44

## 2020-02-28 ASSESSMENT — PAIN DESCRIPTION - PAIN TYPE: TYPE: ACUTE PAIN

## 2020-02-28 ASSESSMENT — PAIN DESCRIPTION - LOCATION: LOCATION: GROIN

## 2020-02-28 ASSESSMENT — PAIN SCALES - GENERAL
PAINLEVEL_OUTOF10: 0
PAINLEVEL_OUTOF10: 0
PAINLEVEL_OUTOF10: 5
PAINLEVEL_OUTOF10: 0

## 2020-02-28 ASSESSMENT — ENCOUNTER SYMPTOMS
DIARRHEA: 0
ABDOMINAL PAIN: 1
NAUSEA: 0
WHEEZING: 0
VOMITING: 0
RHINORRHEA: 0
SHORTNESS OF BREATH: 1
ABDOMINAL DISTENTION: 1
CONSTIPATION: 1
COUGH: 0

## 2020-02-28 ASSESSMENT — PAIN DESCRIPTION - FREQUENCY: FREQUENCY: CONTINUOUS

## 2020-02-28 ASSESSMENT — PAIN DESCRIPTION - ORIENTATION: ORIENTATION: MID

## 2020-02-28 NOTE — PROGRESS NOTES
Pt back in room from CT, hooked up to BP cuff, pulse ox, and heart monitor. Monitor taken off of standby mode, call light at bedside. ANG vann @1727hrs.

## 2020-02-28 NOTE — ED PROVIDER NOTES
complaints or concerns at this time. Nursing Notes were all reviewed and agreed with or any disagreements were addressed in the HPI. REVIEW OF SYSTEMS    (2-9 systems for level 4, 10 or more for level 5)     Review of Systems   Constitutional: Negative for appetite change, chills and fever. HENT: Negative for congestion and rhinorrhea. Respiratory: Positive for shortness of breath. Negative for cough and wheezing. Cardiovascular: Negative for chest pain. Gastrointestinal: Positive for abdominal distention, abdominal pain and constipation. Negative for diarrhea, nausea and vomiting. Genitourinary: Negative for difficulty urinating, dysuria and hematuria. Musculoskeletal: Negative for neck pain and neck stiffness. Skin: Negative for rash. Neurological: Negative for headaches. Positives and Pertinent negatives as per HPI. Except as noted above in the ROS, all other systems were reviewed and negative. PAST MEDICAL HISTORY     Past Medical History:   Diagnosis Date    [facility] [address]     Diabetes mellitus ([facility] [address]     Hyperlipidemia     Hypertension     NSVT (nonsustained ventricular tachycardia) ([facility] [address]     Syncope          SURGICAL HISTORY     Past Surgical History:   Procedure Laterality Date    CARDIAC DEFIBRILLATOR PLACEMENT  6/23/15    single chamber AICD, EPS inducible VT    CHOLECYSTECTOMY      CORONARY ANGIOPLASTY WITH STENT PLACEMENT      LAMINECTOMY      TOENAIL EXCISION  07/21/2017    TONSILLECTOMY AND ADENOIDECTOMY           CURRENTMEDICATIONS       Previous Medications    ASPIRIN 81 MG TABLET    Take 81 mg by mouth daily    ATORVASTATIN (LIPITOR) 80 MG TABLET    Take 80 mg by mouth daily. CALCIUM CARB-CHOLECALCIFEROL (CALCIUM 1000 + D PO)    Take 1,000 Units by mouth 2 times daily    CARVEDILOL (COREG) 25 MG TABLET    Take 1 tablet by mouth 2 times daily (with meals)    CLOPIDOGREL (PLAVIX) 75 MG TABLET    Take 75 mg by mouth daily.       FAMOTIDINE (PEPCID) 20 MG TABLET    Take 20 mg by mouth daily    FUROSEMIDE (LASIX) 20 MG TABLET    Take 1 tablet by mouth 2 times daily    GLIPIZIDE (GLUCOTROL) 5 MG TABLET    Take 5 mg by mouth 2 times daily (before meals)    LOPERAMIDE (IMODIUM) 2 MG CAPSULE    Take 2 mg by mouth 4 times daily as needed for Diarrhea    MAGNESIUM PO    Take 450 mg by mouth 2 times daily    NITROGLYCERIN (NITROSTAT) 0.4 MG SL TABLET    Place 0.4 mg under the tongue every 5 minutes as needed for Chest pain up to max of 3 total doses. If no relief after 1 dose, call 911. SPIRONOLACTONE (ALDACTONE) 25 MG TABLET    Take 0.5 tablets by mouth daily    TAMSULOSIN (FLOMAX) 0.4 MG CAPSULE    Take 0.4 mg by mouth daily    VITAMIN D (CHOLECALCIFEROL) 1000 UNITS TABS TABLET    Take 1,000 Units by mouth daily         ALLERGIES     Penicillins; Cortisone; Metformin; Metformin hcl; and Morphine    FAMILYHISTORY       Family History   Problem Relation Age of Onset    Heart Disease Mother     Heart Disease Father           SOCIAL HISTORY       Social History     Tobacco Use    Smoking status: Former Smoker     Last attempt to quit: 1975     Years since quittin.1    Smokeless tobacco: Never Used   Substance Use Topics    Alcohol use: Yes     Comment: occ    Drug use: No       SCREENINGS             PHYSICAL EXAM    (up to 7 for level 4, 8 or more for level 5)     ED Triage Vitals [20 1352]   BP Temp Temp Source Pulse Resp SpO2 Height Weight   99/70 97.5 °F (36.4 °C) Oral 63 14 96 % 5' 9\" (1.753 m) 218 lb 4 oz (99 kg)       Physical Exam  Vitals signs and nursing note reviewed. Constitutional:       Appearance: He is well-developed. He is not diaphoretic. HENT:      Head: Normocephalic and atraumatic. Right Ear: External ear normal.      Left Ear: External ear normal.      Nose: Nose normal.   Eyes:      General:         Right eye: No discharge. Left eye: No discharge.    Neck:      Musculoskeletal: Normal range of motion and neck supple. Cardiovascular:      Rate and Rhythm: Normal rate and regular rhythm. Heart sounds: Normal heart sounds. Pulmonary:      Effort: Pulmonary effort is normal. No respiratory distress. Breath sounds: Normal breath sounds. No rhonchi or rales. Abdominal:      General: There is distension. Palpations: There is no mass. Tenderness: There is no abdominal tenderness. There is no guarding or rebound. Musculoskeletal: Normal range of motion. Skin:     General: Skin is warm and dry. Neurological:      Mental Status: He is alert and oriented to person, place, and time. Psychiatric:         Behavior: Behavior normal.         DIAGNOSTIC RESULTS   LABS:    Labs Reviewed   CBC WITH AUTO DIFFERENTIAL - Abnormal; Notable for the following components:       Result Value    RBC 3.98 (*)     Hemoglobin 12.5 (*)     Hematocrit 37.7 (*)     RDW 19.8 (*)     Platelets 370 (*)     Lymphocytes Absolute 0.4 (*)     All other components within normal limits    Narrative:     Performed at:  [facility] [address]  [place], [place]   Phone [phone]   COMPREHENSIVE METABOLIC PANEL - Abnormal; Notable for the following components:     Total Bilirubin 1.9 (*)     Alkaline Phosphatase 148 (*)     All other components within normal limits    Narrative:     Performed at:  [facility] [address], [place]   Phone [phone]   LIPASE - Abnormal; Notable for the following components:    Lipase 12.0 (*)     All other components within normal limits    Narrative:     Performed at:  [facility] [address], [place]   Phone [phone]   BRAIN NATRIURETIC PEPTIDE - Abnormal; Notable for the following components:    Pro-BNP 20,675 (*)     All other components within normal limits    Narrative:     Performed at:  [facility] Height:           Patient was given the following medications:  Medications - No data to display    Patient presents for evaluation of abdominal distention. On exam, he is resting comfortably in bed no acute distress and nontoxic. He is borderline hypotensive but vitals otherwise stable and he is afebrile. Lungs are clear to auscultation bilaterally with no obvious wheezing rales or rhonchi though exam is limited due to patient body habitus. He does have market abdominal distention with fluid wave concerning for ascites. Swelling extends down into his groin area but does not involve scrotum and testicles. He does have 2+ pitting edema to bilateral lower extremities as well. No erythema or warmth. He is neurovascular intact distally. Please see attending note for EKG interpretation. CBC and CMP are unremarkable aside from mild elevation of alk phos and bili 1.9. Coags are within normal limits. Lipase is 12. It is negative. BNP is elevated at 20,675. Portable chest x-ray shows mild cardiomegaly. CT the abdomen pelvis shows a cirrhotic liver morphology and large volume ascites with diffuse anasarca. Diverticulosis without CT evidence of diverticulitis. I do believe patient warrants admission for further evaluation management of large volume ascites with paracentesis and further characterization of fluid for etiology, cardiac versus hepatic. I have clinical suspicion for fluid overload so despite borderline hypotension he was not given fluid resuscitation in the ED. Hospitalist will resume care of the patient at this time. He is stable for admission      FINAL IMPRESSION      1. Anasarca    2. Ascites due to alcoholic cirrhosis ([address]    3. Hypotension, unspecified hypotension type          DISPOSITION/PLAN   DISPOSITION Decision To Admit 02/28/2020 06:30:25 PM      PATIENT REFERREDTO:  No follow-up provider specified.     DISCHARGE MEDICATIONS:  New Prescriptions    No medications on file DISCONTINUED MEDICATIONS:  Discontinued Medications    No medications on file              (Please note that portions of this note were completed with a voice recognition program.  Efforts were made to edit the dictations but occasionally words are mis-transcribed.)    Gayatri Perez PA-C (electronically signed)           [place], [place]  02/28/20 8218

## 2020-02-28 NOTE — ED NOTES
Bed: 21  Expected date:   Expected time:   Means of arrival:   Comments:  maryjo Krishnamurthy RN  02/28/20 8465

## 2020-02-29 PROBLEM — R18.8 ASCITES: Status: ACTIVE | Noted: 2020-02-29

## 2020-02-29 LAB
A/G RATIO: 1.4 (ref 1.1–2.2)
ALBUMIN SERPL-MCNC: 3.1 G/DL (ref 3.4–5)
ALP BLD-CCNC: 110 U/L (ref 40–129)
ALT SERPL-CCNC: 8 U/L (ref 10–40)
ANION GAP SERPL CALCULATED.3IONS-SCNC: 12 MMOL/L (ref 3–16)
AST SERPL-CCNC: 12 U/L (ref 15–37)
BILIRUB SERPL-MCNC: 1.2 MG/DL (ref 0–1)
BUN BLDV-MCNC: 16 MG/DL (ref 7–20)
CALCIUM SERPL-MCNC: 8.5 MG/DL (ref 8.3–10.6)
CHLORIDE BLD-SCNC: 103 MMOL/L (ref 99–110)
CO2: 25 MMOL/L (ref 21–32)
CREAT SERPL-MCNC: 1.1 MG/DL (ref 0.8–1.3)
EKG ATRIAL RATE: 74 BPM
EKG DIAGNOSIS: NORMAL
EKG P AXIS: 46 DEGREES
EKG P-R INTERVAL: 218 MS
EKG Q-T INTERVAL: 420 MS
EKG QRS DURATION: 96 MS
EKG QTC CALCULATION (BAZETT): 453 MS
EKG R AXIS: -87 DEGREES
EKG T AXIS: 101 DEGREES
EKG VENTRICULAR RATE: 70 BPM
GFR AFRICAN AMERICAN: >60
GFR NON-AFRICAN AMERICAN: >60
GLOBULIN: 2.2 G/DL
GLUCOSE BLD-MCNC: 123 MG/DL (ref 70–99)
GLUCOSE BLD-MCNC: 149 MG/DL (ref 70–99)
GLUCOSE BLD-MCNC: 87 MG/DL (ref 70–99)
GLUCOSE BLD-MCNC: 90 MG/DL (ref 70–99)
GLUCOSE BLD-MCNC: 98 MG/DL (ref 70–99)
HCT VFR BLD CALC: 34.8 % (ref 40.5–52.5)
HEMOGLOBIN: 11.6 G/DL (ref 13.5–17.5)
MAGNESIUM: 1.4 MG/DL (ref 1.8–2.4)
MCH RBC QN AUTO: 31.6 PG (ref 26–34)
MCHC RBC AUTO-ENTMCNC: 33.3 G/DL (ref 31–36)
MCV RBC AUTO: 94.8 FL (ref 80–100)
PDW BLD-RTO: 19.4 % (ref 12.4–15.4)
PERFORMED ON: ABNORMAL
PERFORMED ON: ABNORMAL
PERFORMED ON: NORMAL
PERFORMED ON: NORMAL
PLATELET # BLD: 113 K/UL (ref 135–450)
PMV BLD AUTO: 9.4 FL (ref 5–10.5)
POTASSIUM REFLEX MAGNESIUM: 3.4 MMOL/L (ref 3.5–5.1)
RBC # BLD: 3.67 M/UL (ref 4.2–5.9)
SODIUM BLD-SCNC: 140 MMOL/L (ref 136–145)
TOTAL PROTEIN: 5.3 G/DL (ref 6.4–8.2)
WBC # BLD: 3.7 K/UL (ref 4–11)

## 2020-02-29 PROCEDURE — 6360000002 HC RX W HCPCS: Performed by: INTERNAL MEDICINE

## 2020-02-29 PROCEDURE — 80053 COMPREHEN METABOLIC PANEL: CPT

## 2020-02-29 PROCEDURE — 99223 1ST HOSP IP/OBS HIGH 75: CPT | Performed by: INTERNAL MEDICINE

## 2020-02-29 PROCEDURE — 93010 ELECTROCARDIOGRAM REPORT: CPT | Performed by: INTERNAL MEDICINE

## 2020-02-29 PROCEDURE — 6370000000 HC RX 637 (ALT 250 FOR IP): Performed by: INTERNAL MEDICINE

## 2020-02-29 PROCEDURE — 85027 COMPLETE CBC AUTOMATED: CPT

## 2020-02-29 PROCEDURE — G0378 HOSPITAL OBSERVATION PER HR: HCPCS

## 2020-02-29 PROCEDURE — 1200000000 HC SEMI PRIVATE

## 2020-02-29 PROCEDURE — 83735 ASSAY OF MAGNESIUM: CPT

## 2020-02-29 PROCEDURE — 2580000003 HC RX 258: Performed by: INTERNAL MEDICINE

## 2020-02-29 PROCEDURE — 94760 N-INVAS EAR/PLS OXIMETRY 1: CPT

## 2020-02-29 RX ORDER — CARVEDILOL 6.25 MG/1
12.5 TABLET ORAL DAILY
Status: DISCONTINUED | OUTPATIENT
Start: 2020-03-01 | End: 2020-03-03

## 2020-02-29 RX ORDER — OXYCODONE HYDROCHLORIDE AND ACETAMINOPHEN 5; 325 MG/1; MG/1
1 TABLET ORAL EVERY 6 HOURS PRN
Status: DISCONTINUED | OUTPATIENT
Start: 2020-02-29 | End: 2020-03-01

## 2020-02-29 RX ORDER — SPIRONOLACTONE 25 MG/1
25 TABLET ORAL DAILY
Status: DISCONTINUED | OUTPATIENT
Start: 2020-03-01 | End: 2020-02-29

## 2020-02-29 RX ORDER — SPIRONOLACTONE 25 MG/1
25 TABLET ORAL ONCE
Status: COMPLETED | OUTPATIENT
Start: 2020-02-29 | End: 2020-02-29

## 2020-02-29 RX ORDER — SPIRONOLACTONE 25 MG/1
50 TABLET ORAL DAILY
Status: DISCONTINUED | OUTPATIENT
Start: 2020-03-01 | End: 2020-03-03 | Stop reason: HOSPADM

## 2020-02-29 RX ORDER — POTASSIUM CHLORIDE 20 MEQ/1
20 TABLET, EXTENDED RELEASE ORAL 2 TIMES DAILY WITH MEALS
Status: DISCONTINUED | OUTPATIENT
Start: 2020-02-29 | End: 2020-03-03

## 2020-02-29 RX ADMIN — POLYETHYLENE GLYCOL 3350 17 G: 17 POWDER, FOR SOLUTION ORAL at 16:00

## 2020-02-29 RX ADMIN — ASPIRIN 81 MG 81 MG: 81 TABLET ORAL at 08:33

## 2020-02-29 RX ADMIN — POTASSIUM CHLORIDE 20 MEQ: 1500 TABLET, EXTENDED RELEASE ORAL at 10:58

## 2020-02-29 RX ADMIN — Medication 10 ML: at 20:11

## 2020-02-29 RX ADMIN — POTASSIUM CHLORIDE 20 MEQ: 1500 TABLET, EXTENDED RELEASE ORAL at 16:00

## 2020-02-29 RX ADMIN — SPIRONOLACTONE 25 MG: 25 TABLET ORAL at 16:01

## 2020-02-29 RX ADMIN — Medication 10 ML: at 08:35

## 2020-02-29 RX ADMIN — FAMOTIDINE 20 MG: 20 TABLET ORAL at 08:33

## 2020-02-29 RX ADMIN — INSULIN LISPRO 1 UNITS: 100 INJECTION, SOLUTION INTRAVENOUS; SUBCUTANEOUS at 21:15

## 2020-02-29 RX ADMIN — VITAMIN D, TAB 1000IU (100/BT) 1000 UNITS: 25 TAB at 08:32

## 2020-02-29 RX ADMIN — FUROSEMIDE 40 MG: 10 INJECTION, SOLUTION INTRAMUSCULAR; INTRAVENOUS at 19:10

## 2020-02-29 RX ADMIN — CLOPIDOGREL 75 MG: 75 TABLET, FILM COATED ORAL at 13:43

## 2020-02-29 RX ADMIN — FUROSEMIDE 40 MG: 10 INJECTION, SOLUTION INTRAMUSCULAR; INTRAVENOUS at 08:31

## 2020-02-29 RX ADMIN — OXYCODONE HYDROCHLORIDE AND ACETAMINOPHEN 1 TABLET: 5; 325 TABLET ORAL at 13:40

## 2020-02-29 RX ADMIN — TAMSULOSIN HYDROCHLORIDE 0.4 MG: 0.4 CAPSULE ORAL at 08:33

## 2020-02-29 RX ADMIN — SPIRONOLACTONE 12.5 MG: 25 TABLET ORAL at 08:32

## 2020-02-29 ASSESSMENT — PAIN DESCRIPTION - PAIN TYPE
TYPE: CHRONIC PAIN

## 2020-02-29 ASSESSMENT — PAIN DESCRIPTION - LOCATION
LOCATION: GROIN

## 2020-02-29 ASSESSMENT — PAIN SCALES - WONG BAKER
WONGBAKER_NUMERICALRESPONSE: 0

## 2020-02-29 ASSESSMENT — PAIN SCALES - GENERAL
PAINLEVEL_OUTOF10: 0
PAINLEVEL_OUTOF10: 5
PAINLEVEL_OUTOF10: 4
PAINLEVEL_OUTOF10: 0
PAINLEVEL_OUTOF10: 0
PAINLEVEL_OUTOF10: 6
PAINLEVEL_OUTOF10: 6
PAINLEVEL_OUTOF10: 5
PAINLEVEL_OUTOF10: 0
PAINLEVEL_OUTOF10: 4
PAINLEVEL_OUTOF10: 0
PAINLEVEL_OUTOF10: 5

## 2020-02-29 ASSESSMENT — PAIN DESCRIPTION - DESCRIPTORS
DESCRIPTORS: ACHING

## 2020-02-29 NOTE — CARE COORDINATION
Discharge Planning Assessment  Discharge Planning Assessment   discharge planner met with patient to discuss reason for admission, current living situation, and potential needs at the time of discharge    Demographics/Insurance verified Yes/No: Medicare    Current type of dwelling: House/2 story and basement/6 steps to enter. Patient from ECF/ confirmed with:N/A    Living arrangements: Lives with spouse. Level of function/Support: Patient is independent with ADLs and IADLs. PCP: Patient does not have a PCP.  gave patient a PCP List.    Last Visit to PCP:    DME: None    Active with any community resources/agencies/skilled home care: None    Medication compliance issues:Patient manages his own medications. Financial issues that could impact healthcare:None      Tentative discharge plan:  Patient anticipates discharging to home with no needs. Discussed with patient and/or family that on the day of discharge home tentative time of discharge will be between 10 AM and noon. Patient agreeable with this plan. Transportation at the time of discharge: Patient's spouse will provide transportation to home.     Electronically signed by CARLIE Pritchard on 2/29/2020 at 2:43 PM

## 2020-02-29 NOTE — PROGRESS NOTES
4 Eyes Skin Assessment     The patient is being assess for  Admission    I agree that 2 RN's have performed a thorough Head to Toe Skin Assessment on the patient. ALL assessment sites listed below have been assessed. Areas assessed by both nurses:   [x]   Head, Face, and Ears   [x]   Shoulders, Back, and Chest  [x]   Arms, Elbows, and Hands   [x]   Coccyx, Sacrum, and IschIum  [x]   Legs, Feet, and Heels        Does the Patient have Skin Breakdown?   No         Anand Prevention initiated:  No   Wound Care Orders initiated:  No      [facility] nurse consulted for Pressure Injury (Stage 3,4, Unstageable, DTI, NWPT, and Complex wounds), New and Established Ostomies:  NA      Nurse 1 eSignature: Electronically signed by Catarino Solares RN on 2/29/20 at 3:40 AM    **SHARE this note so that the co-signing nurse is able to place an eSignature**    Nurse 2 eSignature: Electronically signed by Lobito Norman RN on 2/29/20 at 8:59 AM

## 2020-02-29 NOTE — H&P
facility-administered medications on file prior to encounter. Current Outpatient Medications on File Prior to Encounter   Medication Sig Dispense Refill    spironolactone (ALDACTONE) 25 MG tablet Take 0.5 tablets by mouth daily 90 tablet 1    furosemide (LASIX) 20 MG tablet Take 1 tablet by mouth 2 times daily 180 tablet 1    vitamin D (CHOLECALCIFEROL) 1000 UNITS TABS tablet Take 1,000 Units by mouth daily      loperamide (IMODIUM) 2 MG capsule Take 2 mg by mouth 4 times daily as needed for Diarrhea      nitroGLYCERIN (NITROSTAT) 0.4 MG SL tablet Place 0.4 mg under the tongue every 5 minutes as needed for Chest pain up to max of 3 total doses. If no relief after 1 dose, call 911.  MAGNESIUM PO Take 450 mg by mouth 2 times daily      Calcium Carb-Cholecalciferol (CALCIUM 1000 + D PO) Take 1,000 Units by mouth 2 times daily      glipiZIDE (GLUCOTROL) 5 MG tablet Take 5 mg by mouth 2 times daily (before meals)      atorvastatin (LIPITOR) 80 MG tablet Take 80 mg by mouth daily.  clopidogrel (PLAVIX) 75 MG tablet Take 75 mg by mouth daily.  famotidine (PEPCID) 20 MG tablet Take 20 mg by mouth daily      tamsulosin (FLOMAX) 0.4 MG capsule Take 0.4 mg by mouth daily      carvedilol (COREG) 25 MG tablet Take 1 tablet by mouth 2 times daily (with meals) (Patient taking differently: Take 25 mg by mouth daily ) 30 tablet 5    aspirin 81 MG tablet Take 81 mg by mouth daily         Allergies: Allergies   Allergen Reactions    Penicillins Swelling    Cortisone      Other reaction(s): Other (See Comments)  Hot flashes  Other reaction(s): Other (See Comments)  Hot flashes    Metformin      Other reaction(s): Other (See Comments)  Violent behavior  Other reaction(s): Other (See Comments)  Violent behavior    Metformin Hcl     Morphine         Social History:     reports that he quit smoking about 45 years ago. He has never used smokeless tobacco. He reports current alcohol use.  He reports that he does not use drugs. Family History:  family history includes Heart Disease in his father and mother. ,     Physical Exam:  /70   Pulse 67   Temp 97.5 °F (36.4 °C) (Oral)   Resp 16   Ht 5' 9\" (1.753 m)   Wt 218 lb 4 oz (99 kg)   SpO2 99%   BMI 32.23 kg/m²     General appearance:  Appears comfortable. Well nourished  Eyes: Sclera clear, pupils equal  ENT: Moist mucus membranes, no thrush. Trachea midline. Cardiovascular: Regular rhythm, normal S1, S2. No murmur, gallop, rub. No edema in lower extremities  Respiratory: Clear to auscultation bilaterally, no wheeze, good inspiratory effort  Gastrointestinal: Ascites   Musculoskeletal: No cyanosis in digits, neck supple  Neurology: Cranial nerves grossly intact. Alert and oriented in time, place and person. No speech or motor deficits  Psychiatry: Appropriate affect. Not agitated  Skin: Warm, dry, normal turgor, no rash  Brisk capillary refill, peripheral pulses palpable   Labs:  CBC:   Lab Results   Component Value Date    WBC 4.5 02/28/2020    RBC 3.98 02/28/2020    HGB 12.5 02/28/2020    HCT 37.7 02/28/2020    MCV 94.8 02/28/2020    MCH 31.5 02/28/2020    MCHC 33.2 02/28/2020    RDW 19.8 02/28/2020     02/28/2020    MPV 9.5 02/28/2020     BMP:    Lab Results   Component Value Date     02/28/2020    K 4.0 02/28/2020     02/28/2020    CO2 24 02/28/2020    BUN 16 02/28/2020    CREATININE 1.0 02/28/2020    CALCIUM 9.3 02/28/2020    GFRAA >60 02/28/2020    GFRAA >60 02/13/2010    LABGLOM >60 02/28/2020    GLUCOSE 89 02/28/2020     CT ABDOMEN PELVIS WO CONTRAST   Final Result   Cirrhotic liver morphology. Large volume ascites. Diffuse anasarca. Extensive diverticulosis of the large bowel, but without CT evidence of   diverticulitis. Large duodenal diverticulum, but without CT evidence of duodenal   diverticulitis. XR CHEST PORTABLE   Final Result   1. No acute cardiopulmonary disease. 2. Cardiomegaly.

## 2020-02-29 NOTE — PROGRESS NOTES
Tele added. Aldactone increased. Labs added for morning draw. Percocet ordered PRN for pain q6. Possible paracentesis Monday. Cardiac diet added.  Potassium Po ordered for potassium 3.4

## 2020-02-29 NOTE — PROGRESS NOTES
thuan hospitalist:    \"Interventional radiology not here today, only for stat orders. Do you want to keep patient NPO?

## 2020-02-29 NOTE — PROGRESS NOTES
Shift assessment done, see flow sheets. medications administrated, see MAR. Patient A/Ox4 independent, denies needs at this time. The care plan and education reviewed and mutually agree upon with the patient.  Will continue to monitor

## 2020-02-29 NOTE — PROGRESS NOTES
thuan hospitalist:    \"Patient is complaining of groin pain 5 (1-10). There is Tylenol orders for mild pain (1-3). Can you please add pain medication? Also, patient is NPO and asking to eat. Is patient NPO due to paracentesis or another reason? Per Radiology, patient does not need to be NPO for paracentesis.  Please, advise\"

## 2020-02-29 NOTE — CARE COORDINATION
CM received call from R re: downgrade from inpatient to observation status. CM  Went into room and explained [address] letter. Patient/family refused to sign JOHNSON letter until after seen by MD.    Case management will continue to follow progress and update discharge plan as needed.     Archie Gitelman, BSN, .[phone]

## 2020-02-29 NOTE — CONSULTS
Pancytopenia  7. Low albumin    Plan:  1. Continue IV Lasix  2. Decrease carvedilol to 12.5 mg po bid to allow higher heart rate  3. Increase spironolactone 50 mg po qd for heart failure and ascites  4. Paracentesis when able  5.  GI consult:  Obviously he has severe cirrhosis and any alcohol intake is likely affecting him and leading to ascites  6. Was not on ACEI or ARB on admission. Need to try to restart after diuresis  Likely pancytopenia due to splenomegaly  Consider hematology consultation  7. He has 2 serious life threatening problems, cirrhosis and severe LV dysfunction. Palliative care consultation might need to be considered. 8.  CHF education reinforced. ~salt restriction  ~fluid restriction  ~medication compliance  ~daily weights and notify of any significant weight gain/loss  ~establish with CHF nurse  ~outpatient follow-up with our CHF team      I appreciate the opportunity of cooperating in the care of this patient.     Charlie Rodriguez M.D., [facility] [place]

## 2020-02-29 NOTE — ED PROVIDER NOTES
clopidogrel (PLAVIX) 75 MG tablet Take 75 mg by mouth daily.  famotidine (PEPCID) 20 MG tablet Take 20 mg by mouth daily      tamsulosin (FLOMAX) 0.4 MG capsule Take 0.4 mg by mouth daily      carvedilol (COREG) 25 MG tablet Take 1 tablet by mouth 2 times daily (with meals) (Patient taking differently: Take 25 mg by mouth daily ) 30 tablet 5    aspirin 81 MG tablet Take 81 mg by mouth daily       PHYSICAL EXAM  Vitals: /63   Pulse 73   Temp 97.5 °F (36.4 °C) (Oral)   Resp 16   Ht 5' 9\" (1.753 m)   Wt 218 lb 4 oz (99 kg)   SpO2 98%   BMI 32.23 kg/m²   Constitutional:  69 y.o. male alert  HENT:  Atraumatic, oral mucosa moist  Neck:  No visible JVD, supple  Chest/Lungs:  Respiratory effort normal, decreased basilar BS, regular  Abdomen:  Distended, hard but nontender  Back:  No gross deformity  Extremities:  Normal tone and perfusion, edema present    Medical Decision Making and Plan: Briefly, this is an 69 y.o.male who presented with abdominal swelling. History of ascites and paracentesis in January with relief. He was sent here from the cardiology office. Clinically will need repeat paracentesis. Will consult with hospitalist for admission. For further details of [address] [place] [address] Emergency Department encounter, please see documentation by advanced practice provider AMADO Mejia. Labs Reviewed   CBC WITH AUTO DIFFERENTIAL - Abnormal; Notable for the following components:       Result Value    RBC 3.98 (*)     Hemoglobin 12.5 (*)     Hematocrit 37.7 (*)     RDW 19.8 (*)     Platelets 829 (*)     Lymphocytes Absolute 0.4 (*)     All other components within normal limits    Narrative:     Performed at:  [facility] [address], [place], [address]   Phone [phone]   COMPREHENSIVE METABOLIC PANEL - Abnormal; Notable for the following components:     Total Bilirubin 1.9 (*)     Alkaline Phosphatase 148 (*)     All other components within normal limits    Narrative:     Performed at:  [facility] [address] [place], [email or web address]   Phone [phone]   LIPASE - Abnormal; Notable for the following components:    Lipase 12.0 (*)     All other components within normal limits    Narrative:     Performed at:  [facility] [address] [place], [address]   Phone 21  - Abnormal; Notable for the following components:    Pro-BNP 20,675 (*)     All other components within normal limits    Narrative:     Performed at:  [facility] [address] [place], [address]   Phone [phone]   POCT GLUCOSE - Abnormal; Notable for the following components:    POC Glucose 128 (*)     All other components within normal limits    Narrative:     Performed at:  [facility] [address] [place], [email or web address]   Phone [phone]   CULTURE, BODY FLUID   PROTIME-INR    Narrative:     Performed at:  [facility] [address] [place], [email or web address]   Phone [phone]   URINE RT REFLEX TO CULTURE    Narrative:     Performed at:  [facility] [address], [email or web address]   Phone [phone]   TROPONIN    Narrative:     Performed at:  [facility] [address], [email or web address]   Phone [phone]   COMPREHENSIVE METABOLIC PANEL W/ REFLEX TO MG FOR LOW K   CBC   BODY FLUID CELL COUNT WITH DIFFERENTIAL   CYTOLOGY, NON-GYN   LACTATE DEHYDROGENASE, BODY FLUID   PROTEIN, BODY FLUID   POCT GLUCOSE   POCT GLUCOSE   POCT GLUCOSE     RADIOLOGY:     Plain x-rays were viewed by me:   CT ABDOMEN PELVIS WO CONTRAST   Final Result   Cirrhotic liver morphology. Large volume ascites. Diffuse anasarca.       Extensive diverticulosis of the large bowel, but without CT evidence of diverticulitis. Large duodenal diverticulum, but without CT evidence of duodenal   diverticulitis. XR CHEST PORTABLE   Final Result   1. No acute cardiopulmonary disease. 2. Cardiomegaly. EKG:  Read by me in the absence of a cardiologist shows: Sinus rhythm, rate 70, first-degree AV block, PVC, left axis, low voltages, poor R wave progression, nonspecific ST-T wave abnormality minimal change when compared to prior study    BP Readings from Last 3 Encounters:   02/28/20 116/79   02/28/20 106/70   01/08/20 110/60     FINAL IMPRESSION:    1. Anasarca    2. Ascites due to alcoholic cirrhosis ([facility] [address]    3.  Hypotension, unspecified hypotension type       DISPOSITION Admitted 02/28/2020 07:45:08 PM          Jean Masters MD  02/28/20 9424

## 2020-02-29 NOTE — PLAN OF CARE
Problem: Falls - Risk of:  Goal: Will remain free from falls  Description  Will remain free from falls  Outcome: Ongoing  Goal: Absence of physical injury  Description  Absence of physical injury  Outcome: Ongoing     Problem:  Activity:  Goal: Risk for activity intolerance will decrease  Description  Risk for activity intolerance will decrease  Outcome: Ongoing     Problem: Fluid Volume:  Goal: Maintenance of adequate hydration will improve  Description  Maintenance of adequate hydration will improve  Outcome: Ongoing     Problem: Health Behavior:  Goal: Ability to state signs and symptoms to report to health care provider will improve  Description  Ability to state signs and symptoms to report to health care provider will improve  Outcome: Ongoing     Problem: Physical Regulation:  Goal: Complications related to the disease process, condition or treatment will be avoided or minimized  Description  Complications related to the disease process, condition or treatment will be avoided or minimized  Outcome: Ongoing  Goal: Ability to maintain clinical measurements within normal limits will improve  Description  Ability to maintain clinical measurements within normal limits will improve  Outcome: Ongoing     Problem: Discharge Planning:  Goal: Discharged to appropriate level of care  Description  Discharged to appropriate level of care  Outcome: Ongoing     Problem: Safety:  Goal: Free from accidental physical injury  Description  Free from accidental physical injury  Outcome: Ongoing     Problem: Daily Care:  Goal: Daily care needs are met  Description  Daily care needs are met  Outcome: Ongoing     Problem: Pain:  Goal: Pain level will decrease  Description  Pain level will decrease  Outcome: Ongoing  Goal: Control of acute pain  Description  Control of acute pain  Outcome: Ongoing  Goal: Control of chronic pain  Description  Control of chronic pain  Outcome: Ongoing

## 2020-03-01 LAB
ANION GAP SERPL CALCULATED.3IONS-SCNC: 12 MMOL/L (ref 3–16)
BUN BLDV-MCNC: 17 MG/DL (ref 7–20)
CALCIUM SERPL-MCNC: 8.7 MG/DL (ref 8.3–10.6)
CHLORIDE BLD-SCNC: 103 MMOL/L (ref 99–110)
CO2: 27 MMOL/L (ref 21–32)
CREAT SERPL-MCNC: 1.2 MG/DL (ref 0.8–1.3)
GFR AFRICAN AMERICAN: >60
GFR NON-AFRICAN AMERICAN: 59
GLUCOSE BLD-MCNC: 115 MG/DL (ref 70–99)
GLUCOSE BLD-MCNC: 131 MG/DL (ref 70–99)
GLUCOSE BLD-MCNC: 168 MG/DL (ref 70–99)
GLUCOSE BLD-MCNC: 182 MG/DL (ref 70–99)
GLUCOSE BLD-MCNC: 208 MG/DL (ref 70–99)
PERFORMED ON: ABNORMAL
POTASSIUM SERPL-SCNC: 4 MMOL/L (ref 3.5–5.1)
PRO-BNP: ABNORMAL PG/ML (ref 0–124)
SODIUM BLD-SCNC: 142 MMOL/L (ref 136–145)

## 2020-03-01 PROCEDURE — 36415 COLL VENOUS BLD VENIPUNCTURE: CPT

## 2020-03-01 PROCEDURE — 6370000000 HC RX 637 (ALT 250 FOR IP): Performed by: INTERNAL MEDICINE

## 2020-03-01 PROCEDURE — 2580000003 HC RX 258: Performed by: INTERNAL MEDICINE

## 2020-03-01 PROCEDURE — 99233 SBSQ HOSP IP/OBS HIGH 50: CPT | Performed by: INTERNAL MEDICINE

## 2020-03-01 PROCEDURE — 6360000002 HC RX W HCPCS: Performed by: INTERNAL MEDICINE

## 2020-03-01 PROCEDURE — G0378 HOSPITAL OBSERVATION PER HR: HCPCS

## 2020-03-01 PROCEDURE — 80048 BASIC METABOLIC PNL TOTAL CA: CPT

## 2020-03-01 PROCEDURE — 83880 ASSAY OF NATRIURETIC PEPTIDE: CPT

## 2020-03-01 PROCEDURE — 1200000000 HC SEMI PRIVATE

## 2020-03-01 RX ORDER — OXYCODONE HYDROCHLORIDE AND ACETAMINOPHEN 5; 325 MG/1; MG/1
1 TABLET ORAL EVERY 6 HOURS PRN
Status: DISCONTINUED | OUTPATIENT
Start: 2020-03-01 | End: 2020-03-03 | Stop reason: HOSPADM

## 2020-03-01 RX ORDER — FUROSEMIDE 40 MG/1
40 TABLET ORAL 2 TIMES DAILY
Status: DISCONTINUED | OUTPATIENT
Start: 2020-03-02 | End: 2020-03-03 | Stop reason: HOSPADM

## 2020-03-01 RX ADMIN — TAMSULOSIN HYDROCHLORIDE 0.4 MG: 0.4 CAPSULE ORAL at 08:03

## 2020-03-01 RX ADMIN — POTASSIUM CHLORIDE 20 MEQ: 1500 TABLET, EXTENDED RELEASE ORAL at 17:45

## 2020-03-01 RX ADMIN — Medication 10 ML: at 21:42

## 2020-03-01 RX ADMIN — FAMOTIDINE 20 MG: 20 TABLET ORAL at 08:03

## 2020-03-01 RX ADMIN — VITAMIN D, TAB 1000IU (100/BT) 1000 UNITS: 25 TAB at 08:03

## 2020-03-01 RX ADMIN — ASPIRIN 81 MG 81 MG: 81 TABLET ORAL at 08:03

## 2020-03-01 RX ADMIN — ACETAMINOPHEN 650 MG: 325 TABLET, FILM COATED ORAL at 17:45

## 2020-03-01 RX ADMIN — CLOPIDOGREL 75 MG: 75 TABLET, FILM COATED ORAL at 08:03

## 2020-03-01 RX ADMIN — FUROSEMIDE 40 MG: 10 INJECTION, SOLUTION INTRAMUSCULAR; INTRAVENOUS at 08:02

## 2020-03-01 RX ADMIN — POTASSIUM CHLORIDE 20 MEQ: 1500 TABLET, EXTENDED RELEASE ORAL at 08:03

## 2020-03-01 RX ADMIN — OXYCODONE HYDROCHLORIDE AND ACETAMINOPHEN 1 TABLET: 5; 325 TABLET ORAL at 08:04

## 2020-03-01 RX ADMIN — SPIRONOLACTONE 50 MG: 25 TABLET ORAL at 08:03

## 2020-03-01 RX ADMIN — INSULIN LISPRO 2 UNITS: 100 INJECTION, SOLUTION INTRAVENOUS; SUBCUTANEOUS at 11:56

## 2020-03-01 RX ADMIN — Medication 10 ML: at 08:04

## 2020-03-01 RX ADMIN — INSULIN LISPRO 1 UNITS: 100 INJECTION, SOLUTION INTRAVENOUS; SUBCUTANEOUS at 17:44

## 2020-03-01 RX ADMIN — INSULIN LISPRO 1 UNITS: 100 INJECTION, SOLUTION INTRAVENOUS; SUBCUTANEOUS at 21:42

## 2020-03-01 RX ADMIN — CARVEDILOL 12.5 MG: 6.25 TABLET, FILM COATED ORAL at 08:03

## 2020-03-01 ASSESSMENT — PAIN SCALES - GENERAL
PAINLEVEL_OUTOF10: 8
PAINLEVEL_OUTOF10: 5
PAINLEVEL_OUTOF10: 6
PAINLEVEL_OUTOF10: 0
PAINLEVEL_OUTOF10: 6
PAINLEVEL_OUTOF10: 6

## 2020-03-01 ASSESSMENT — PAIN DESCRIPTION - PAIN TYPE
TYPE: CHRONIC PAIN

## 2020-03-01 ASSESSMENT — PAIN DESCRIPTION - LOCATION
LOCATION: GROIN

## 2020-03-01 ASSESSMENT — PAIN DESCRIPTION - DESCRIPTORS
DESCRIPTORS: ACHING

## 2020-03-01 ASSESSMENT — PAIN DESCRIPTION - ORIENTATION
ORIENTATION: MID
ORIENTATION: MID

## 2020-03-01 NOTE — PROGRESS NOTES
PM assessment complete and documented. Meds given per MAR. Voiding per urinal. No needs expressed. Will continue to monitor.

## 2020-03-01 NOTE — PROGRESS NOTES
Labs drawn via port. Flushes easy after. Continues to void per urinal. No needs expressed. Will continue to monitor.

## 2020-03-01 NOTE — PROGRESS NOTES
Will review CT with radiology to be sure no inguinal hernias. PLAN   :  1) Large volume US-guided paracentesis: send fluid for albumin, cell ct, culture, protein. 2) Increased spironolactone to 25 mg daily. 3) HAV vaccine and Hep B vaccination. 4) Plan EGD later this year for variceal screening. 5) US/AFP q 6 months for hepatocellular Ca screening. 6) Review CT with radiology to eval for inguinal hernia presence. 7) I am happy to see in f/u as outpatient for cirrhosis follow up - I gave patient my card.     Roopa Trujillo MD  [address]  3/1/2020

## 2020-03-01 NOTE — PLAN OF CARE
Problem: Falls - Risk of:  Goal: Will remain free from falls  Description  Will remain free from falls  Outcome: Ongoing  Goal: Absence of physical injury  Description  Absence of physical injury  Outcome: Ongoing     Problem: Activity:  Goal: Risk for activity intolerance will decrease  Description  Risk for activity intolerance will decrease  Outcome: Ongoing     Problem: Fluid Volume:  Goal: Maintenance of adequate hydration will improve  Description  Maintenance of adequate hydration will improve  Outcome: Ongoing     Problem: Health Behavior:  Goal: Ability to state signs and symptoms to report to health care provider will improve  Description  Ability to state signs and symptoms to report to health care provider will improve  Outcome: Ongoing     Problem: Physical Regulation:  Goal: Complications related to the disease process, condition or treatment will be avoided or minimized  Description  Complications related to the disease process, condition or treatment will be avoided or minimized  Outcome: Ongoing  Goal: Ability to maintain clinical measurements within normal limits will improve  Description  Ability to maintain clinical measurements within normal limits will improve  Outcome: Ongoing     Problem: Discharge Planning:  Goal: Discharged to appropriate level of care  Description  Discharged to appropriate level of care  Outcome: Ongoing     Problem: Safety:  Goal: Free from accidental physical injury  Description  Free from accidental physical injury  Outcome: Ongoing     Problem: Daily Care:  Goal: Daily care needs are met  Description  Daily care needs are met  Outcome: Ongoing     Problem: Pain:  Goal: Pain level will decrease  Description  Pain level will decrease  3/1/2020 1005 by Ludy Jaime RN  Outcome: Ongoing  3/1/2020 0627 by Marvin Kussmaul, RN  Outcome: Ongoing  Note:   Pt states pain is tolerable and not too bad. Aware percocet is available as needed. Will continue to monitor.   Goal: Control of acute pain  Description  Control of acute pain  Outcome: Ongoing  Goal: Control of chronic pain  Description  Control of chronic pain  Outcome: Ongoing

## 2020-03-01 NOTE — PROGRESS NOTES
Assessment & Plan:     Decompensated  cirrhosis of liver with ascites increasing cirrhosis multifactorial from cardiac cirrhosis as well as alcoholism intervention radiology consulted for paracentesis however this cannot be done today. GI consulted continue Lasix, aldactone. Lasix  changed to p.o. dose of Aldactone increase    Ischemic cardiomyopathy with stent in the past EF 15 to 20% no evidence of failure continue current medications    Diabetes  mellitus controlled on sliding scale.      History of lymphoma completed course of treatment in remission    Hypokalemia supplement resolved    Patient has chronic congestive heart failure with systolic dysfunction low EF no evidence of acute exacerbation      Diet: DIET CARDIAC; Carb Control: 3 carb choices (45 gms)/meal; Low Sodium (2 GM)  Diet NPO, After Midnight Exceptions are: Sips with Meds  Code:Full Code  DVT PPX scd  Disposition most likely Monday after paracentesis      Helena Roman MD   3/1/2020 2:02 PM

## 2020-03-01 NOTE — PLAN OF CARE
Problem: Pain:  Description  Pain management should include both nonpharmacologic and pharmacologic interventions. Goal: Pain level will decrease  Description  Pain level will decrease  Outcome: Ongoing  Note:   Pt states pain is tolerable and not too bad. Aware percocet is available as needed. Will continue to monitor.

## 2020-03-01 NOTE — PROGRESS NOTES
to do a paracentesis today unless it is urgent.       ROS:  He is feeling better today but blood pressure has dropped. Has diuresed 5 liters negative, weight down 18 pounds. Feels better but his abdomen is still very tight. Understands that he has to stop drinking alcohol.     Medications/Labs all Reviewed    Lab Results   Component Value Date    WBC 3.7 (L) 02/29/2020    HGB 11.6 (L) 02/29/2020    HCT 34.8 (L) 02/29/2020    MCV 94.8 02/29/2020     (L) 02/29/2020     Lab Results   Component Value Date    CREATININE 1.2 03/01/2020    BUN 17 03/01/2020     03/01/2020    K 4.0 03/01/2020     03/01/2020    CO2 27 03/01/2020     Lab Results   Component Value Date    INR 1.09 02/28/2020    PROTIME 12.7 02/28/2020        Physical Examination:    /68   Pulse 71   Temp 97.5 °F (36.4 °C) (Temporal)   Resp 18   Ht 5' 9\" (1.753 m)   Wt 201 lb 11.5 oz (91.5 kg)   SpO2 97%   BMI 29.79 kg/m²      Chronically ill appearing  HEENT:  NC/AT  Respiratory:  · Resp Assessment: Normal respiratory effort  · Resp Auscultation: Clear to auscultation bilaterally   Cardiovascular:  · Auscultation: regular rate and rhythm, normal S1S2, no murmur, rub or gallop  · Palpation:  Nl PMI  · JVP:  normal  · Extremities: No Edema  Abdomen:  Tense abdomen with ascites  · Soft, non-tender  · Normal bowel sounds  Extremities:  ·  No Cyanosis or Clubbing  Neurological/Psychiatric:  · Oriented to time, place, and person  · Non-anxious  Skin Warm and dry    Lab Results   Component Value Date     03/01/2020     02/29/2020     02/28/2020    K 4.0 03/01/2020    K 3.4 02/29/2020    K 4.0 02/28/2020    K 4.9 01/19/2016    BUN 17 03/01/2020    BUN 16 02/29/2020    BUN 16 02/28/2020    CREATININE 1.2 03/01/2020    CREATININE 1.1 02/29/2020    CREATININE 1.0 02/28/2020    GLUCOSE 131 03/01/2020    GLUCOSE 90 02/29/2020     Lab Results   Component Value Date    PROBNP 12,172 (H) 03/01/2020    PROBNP 66,341 (H) 02/28/2020     Lab Results   Component Value Date    ALT 8 (L) 02/29/2020    ALT 12 02/28/2020    AST 12 (L) 02/29/2020    AST 17 02/28/2020     Lab Results   Component Value Date    HGB 11.6 02/29/2020    HGB 12.5 02/28/2020    HCT 34.8 02/29/2020    HCT 37.7 02/28/2020     02/29/2020     02/28/2020     Lab Results   Component Value Date    TRIG 255 01/15/2010    HDL 35 01/15/2010    LDLCALC 147 01/15/2010     Labs were reviewed including labs from other hospital systems through [facility]. Cardiac testing was reviewed including echos, nuclear scans, cardiac catheterization, including from other hospital systems through [facility]. Assessment:    1. Anasarca    2. Ascites due to alcoholic cirrhosis ([facility] [address]    3. Hypotension, unspecified hypotension type     4. LV dysfunction, likely due to treatment of cancer with daunorubicin which can be cardiotoxic  5. Alcoholism with severe ascites  6. Pancytopenia  7. Low albumin     Plan:  1.  stop IV lasix, change to po tomorrow  2. Decrease carvedilol to 12.5 mg po bid to allow higher heart rate  3. Increase spironolactone 50 mg po qd for heart failure and ascites  4. Paracentesis when able  5.  GI is involved. 6.  Was not on ACEI or ARB on admission. Need to try to restart after diuresis  Likely pancytopenia due to splenomegaly  Consider hematology consultation  7. He has 2 serious life threatening problems, cirrhosis and severe LV dysfunction. Palliative care consultation might need to be considered. 8.  CHF education reinforced.   ~salt restriction  ~fluid restriction  ~medication compliance  ~daily weights and notify of any significant weight gain/loss  ~establish with CHF nurse  ~outpatient follow-up with our CHF team  NYHA Class: 3-4    Jeannie Christianson MD, 3/1/2020 11:38 AM

## 2020-03-01 NOTE — CONSULTS
tablet 1    vitamin D (CHOLECALCIFEROL) 1000 UNITS TABS tablet Take 1,000 Units by mouth daily      loperamide (IMODIUM) 2 MG capsule Take 2 mg by mouth 4 times daily as needed for Diarrhea      nitroGLYCERIN (NITROSTAT) 0.4 MG SL tablet Place 0.4 mg under the tongue every 5 minutes as needed for Chest pain up to max of 3 total doses. If no relief after 1 dose, call 911.  MAGNESIUM PO Take 450 mg by mouth 2 times daily      Calcium Carb-Cholecalciferol (CALCIUM 1000 + D PO) Take 1,000 Units by mouth 2 times daily      glipiZIDE (GLUCOTROL) 5 MG tablet Take 5 mg by mouth 2 times daily (before meals)      atorvastatin (LIPITOR) 80 MG tablet Take 80 mg by mouth daily.  clopidogrel (PLAVIX) 75 MG tablet Take 75 mg by mouth daily.  famotidine (PEPCID) 20 MG tablet Take 20 mg by mouth daily      tamsulosin (FLOMAX) 0.4 MG capsule Take 0.4 mg by mouth daily      carvedilol (COREG) 25 MG tablet Take 1 tablet by mouth 2 times daily (with meals) (Patient taking differently: Take 25 mg by mouth daily ) 30 tablet 5    aspirin 81 MG tablet Take 81 mg by mouth daily         Patient denies NSAID use. Allergies  Allergies   Allergen Reactions    Penicillins Swelling    Cortisone      Other reaction(s): Other (See Comments)  Hot flashes  Other reaction(s): Other (See Comments)  Hot flashes    Metformin      Other reaction(s): Other (See Comments)  Violent behavior  Other reaction(s):  Other (See Comments)  Violent behavior    Metformin Hcl     Morphine Other (See Comments)     Agitation/violence  Tolerates Percocet (oxycodone/APAP)      Social   Social History     Tobacco Use    Smoking status: Former Smoker     Last attempt to quit: 1975     Years since quittin.1    Smokeless tobacco: Never Used   Substance Use Topics    Alcohol use: Yes     Comment: occ        Family History   Problem Relation Age of Onset    Heart Disease Mother     Heart Disease Father       No family history of colon cancer, Crohn's disease, or ulcerative colitis. Review of Systems  Pertinent items are noted in HPI. Physical Exam  Blood pressure 106/67, pulse 71, temperature 97.7 °F (36.5 °C), temperature source Temporal, resp. rate 16, height 5' 9\" (1.753 m), weight 203 lb 14.8 oz (92.5 kg), SpO2 93 %. General appearance: Elderly, alert, cooperative, no distress, appears stated age  Eyes: Anicteric  Head: Normocephalic, without obvious abnormality  Lungs: clear to auscultation bilaterally, Normal Effort  Heart: regular rate and rhythm, normal S1 and S2, no murmurs or rubs  Abdomen: distended, but soft, non-tender. Bowel sounds normal. No masses,  no organomegaly. Extremities: atraumatic, no cyanosis or edema  Skin: warm and dry, no jaundice  Neuro: Grossly intact, A&OX3, no asterixis. Data Review:    Recent Labs     02/28/20  1632 02/29/20  0600   WBC 4.5 3.7*   HGB 12.5* 11.6*   HCT 37.7* 34.8*   MCV 94.8 94.8   * 113*     Recent Labs     02/28/20  1424 02/29/20  0600    140   K 4.0 3.4*    103   CO2 24 25   BUN 16 16   CREATININE 1.0 1.1     Recent Labs     02/28/20  1424 02/29/20  0600   AST 17 12*   ALT 12 8*   BILITOT 1.9* 1.2*   ALKPHOS 148* 110     Recent Labs     02/28/20  1424   LIPASE 12.0*     Recent Labs     02/28/20  1424   PROTIME 12.7   INR 1.09     No results for input(s): PTT in the last 72 hours. No results for input(s): OCCULTBLD in the last 72 hours. Imaging Studies:                            CT-scan of abdomen and pelvis 2/28/2020:   FINDINGS:   Lower Chest: Mild dependent atelectasis.  Lungs otherwise clear.  Cardiac   chambers are enlarged.  Small pericardial effusion.  Mild bilateral   gynecomastia.  Extra thoracic soft tissues otherwise unremarkable.       Organs: Evaluation of the solid abdominal viscera is limited without   intravenous contrast.       There is a cirrhotic morphology of the liver.  Spleen is unremarkable.    Gallbladder is surgically absent.  Normal adrenals.  Pancreas unremarkable. No acute or suspicious renal abnormalities.       GI/Bowel: There is extensive diverticulosis of the large bowel, but without   convincing CT evidence of diverticulitis.  Large bowel is otherwise   unremarkable.  The appendix is normal.       Distal esophagus and stomach are unremarkable. Alvah Dress is a large duodenal   diverticulum, but without evidence of diverticulitis.  The duodenal sweep and   the remainder of the small bowel are otherwise unremarkable.       Pelvis: Large volume ascites.  Urinary bladder unremarkable.  Bilateral   inguinal hernias are present, large on the right and moderate to large on the   left, containing ascites bilaterally.       Peritoneum/Retroperitoneum: Large volume ascites is noted.  No free air. Abdominal aorta normal in caliber.  No lymphadenopathy.       Bones/Soft Tissues: Diffuse anasarca with subcutaneous edema.  No osteolytic   or osteoblastic bone lesions are identified.  Pars defect are present L4   leading to grade 1 anterolisthesis of L4 on L5.           Impression   Cirrhotic liver morphology.  Large volume ascites.  Diffuse anasarca.       Extensive diverticulosis of the large bowel, but without CT evidence of   diverticulitis.       Large duodenal diverticulum, but without CT evidence of duodenal   diverticulitis. Assessment:     Active Problems:    Anasarca    Ascites  Resolved Problems:    * No resolved hospital problems. *    Cirrhosis due to alcohol with ascites - he is treated with diuretics and has increasing abd girth, dyspnea on exertion c/w symptomatic ascites. He has been driniking some alcohol more recently, but \"stopped again\" after finding out he has cirrhosis in January. He should have repeat therapeutic paracentesis. Will also increase his spironolactone dose to 25 mg daily.   He should have Hep A and B vaccinations, annual EGD for varices screening, and liver US with serum AFP q 6

## 2020-03-01 NOTE — PROGRESS NOTES
Shift assessment done, see flow sheets. Medications administrated, see MAR. Patient A/Ox4 denies needs at this time, pain medication given. The care plan and education reviewed and mutually agree upon with the patient.  Will continue to monitor

## 2020-03-02 ENCOUNTER — APPOINTMENT (OUTPATIENT)
Dept: INTERVENTIONAL RADIOLOGY/VASCULAR | Age: 75
DRG: 433 | End: 2020-03-02
Payer: MEDICARE

## 2020-03-02 PROBLEM — C83.39 DIFFUSE LARGE B-CELL LYMPHOMA OF SOLID ORGAN EXCLUDING SPLEEN (HCC): Chronic | Status: ACTIVE | Noted: 2020-03-02

## 2020-03-02 LAB
ALBUMIN FLUID: 1.4 G/DL
ANION GAP SERPL CALCULATED.3IONS-SCNC: 12 MMOL/L (ref 3–16)
APPEARANCE FLUID: CLEAR
BUN BLDV-MCNC: 20 MG/DL (ref 7–20)
CALCIUM SERPL-MCNC: 8.4 MG/DL (ref 8.3–10.6)
CELL COUNT FLUID TYPE: NORMAL
CHLORIDE BLD-SCNC: 98 MMOL/L (ref 99–110)
CLOT EVALUATION: NORMAL
CO2: 26 MMOL/L (ref 21–32)
COLOR FLUID: YELLOW
CREAT SERPL-MCNC: 1.4 MG/DL (ref 0.8–1.3)
EOSINOPHIL FLUID: 3 %
GFR AFRICAN AMERICAN: 60
GFR NON-AFRICAN AMERICAN: 50
GLUCOSE BLD-MCNC: 129 MG/DL (ref 70–99)
GLUCOSE BLD-MCNC: 132 MG/DL (ref 70–99)
GLUCOSE BLD-MCNC: 154 MG/DL (ref 70–99)
GLUCOSE BLD-MCNC: 171 MG/DL (ref 70–99)
GLUCOSE BLD-MCNC: 241 MG/DL (ref 70–99)
LYMPHOCYTES, BODY FLUID: 40 %
MACROPHAGE FLUID: 39 %
MONOCYTE, FLUID: 10 %
NEUTROPHIL, FLUID: 8 %
NUCLEATED CELLS FLUID: 362 /CUMM
NUMBER OF CELLS COUNTED FLUID: 100
PERFORMED ON: ABNORMAL
POTASSIUM SERPL-SCNC: 4.7 MMOL/L (ref 3.5–5.1)
PROTEIN FLUID: 2.2 G/DL
RBC FLUID: <1000 /CUMM
SODIUM BLD-SCNC: 136 MMOL/L (ref 136–145)

## 2020-03-02 PROCEDURE — 49083 ABD PARACENTESIS W/IMAGING: CPT

## 2020-03-02 PROCEDURE — 83615 LACTATE (LD) (LDH) ENZYME: CPT

## 2020-03-02 PROCEDURE — 87070 CULTURE OTHR SPECIMN AEROBIC: CPT

## 2020-03-02 PROCEDURE — 6370000000 HC RX 637 (ALT 250 FOR IP): Performed by: INTERNAL MEDICINE

## 2020-03-02 PROCEDURE — 82042 OTHER SOURCE ALBUMIN QUAN EA: CPT

## 2020-03-02 PROCEDURE — 80048 BASIC METABOLIC PNL TOTAL CA: CPT

## 2020-03-02 PROCEDURE — 2500000003 HC RX 250 WO HCPCS: Performed by: INTERNAL MEDICINE

## 2020-03-02 PROCEDURE — 88112 CYTOPATH CELL ENHANCE TECH: CPT

## 2020-03-02 PROCEDURE — C1729 CATH, DRAINAGE: HCPCS

## 2020-03-02 PROCEDURE — 1200000000 HC SEMI PRIVATE

## 2020-03-02 PROCEDURE — 84157 ASSAY OF PROTEIN OTHER: CPT

## 2020-03-02 PROCEDURE — 2580000003 HC RX 258: Performed by: INTERNAL MEDICINE

## 2020-03-02 PROCEDURE — 87205 SMEAR GRAM STAIN: CPT

## 2020-03-02 PROCEDURE — 99233 SBSQ HOSP IP/OBS HIGH 50: CPT | Performed by: INTERNAL MEDICINE

## 2020-03-02 PROCEDURE — 6360000002 HC RX W HCPCS: Performed by: PHYSICIAN ASSISTANT

## 2020-03-02 PROCEDURE — 6370000000 HC RX 637 (ALT 250 FOR IP): Performed by: PHYSICIAN ASSISTANT

## 2020-03-02 PROCEDURE — 87015 SPECIMEN INFECT AGNT CONCNTJ: CPT

## 2020-03-02 PROCEDURE — P9047 ALBUMIN (HUMAN), 25%, 50ML: HCPCS | Performed by: PHYSICIAN ASSISTANT

## 2020-03-02 PROCEDURE — 0W9G3ZZ DRAINAGE OF PERITONEAL CAVITY, PERCUTANEOUS APPROACH: ICD-10-PCS | Performed by: RADIOLOGY

## 2020-03-02 PROCEDURE — 88305 TISSUE EXAM BY PATHOLOGIST: CPT

## 2020-03-02 RX ORDER — LIDOCAINE HYDROCHLORIDE 10 MG/ML
5 INJECTION, SOLUTION EPIDURAL; INFILTRATION; INTRACAUDAL; PERINEURAL ONCE
Status: COMPLETED | OUTPATIENT
Start: 2020-03-02 | End: 2020-03-02

## 2020-03-02 RX ORDER — ALBUMIN (HUMAN) 12.5 G/50ML
35 SOLUTION INTRAVENOUS ONCE
Status: COMPLETED | OUTPATIENT
Start: 2020-03-02 | End: 2020-03-02

## 2020-03-02 RX ADMIN — ALBUMIN (HUMAN) 35 G: 0.25 INJECTION, SOLUTION INTRAVENOUS at 13:36

## 2020-03-02 RX ADMIN — CARVEDILOL 12.5 MG: 6.25 TABLET, FILM COATED ORAL at 10:51

## 2020-03-02 RX ADMIN — VITAMIN D, TAB 1000IU (100/BT) 1000 UNITS: 25 TAB at 09:32

## 2020-03-02 RX ADMIN — TAMSULOSIN HYDROCHLORIDE 0.4 MG: 0.4 CAPSULE ORAL at 09:32

## 2020-03-02 RX ADMIN — INSULIN LISPRO 1 UNITS: 100 INJECTION, SOLUTION INTRAVENOUS; SUBCUTANEOUS at 20:36

## 2020-03-02 RX ADMIN — FAMOTIDINE 20 MG: 20 TABLET ORAL at 09:32

## 2020-03-02 RX ADMIN — POTASSIUM CHLORIDE 20 MEQ: 1500 TABLET, EXTENDED RELEASE ORAL at 18:07

## 2020-03-02 RX ADMIN — Medication 10 ML: at 09:32

## 2020-03-02 RX ADMIN — LIDOCAINE HYDROCHLORIDE 5 ML: 10 INJECTION, SOLUTION EPIDURAL; INFILTRATION; INTRACAUDAL; PERINEURAL at 12:03

## 2020-03-02 RX ADMIN — POTASSIUM CHLORIDE 20 MEQ: 1500 TABLET, EXTENDED RELEASE ORAL at 09:32

## 2020-03-02 RX ADMIN — Medication 10 ML: at 20:37

## 2020-03-02 RX ADMIN — INSULIN LISPRO 2 UNITS: 100 INJECTION, SOLUTION INTRAVENOUS; SUBCUTANEOUS at 18:07

## 2020-03-02 RX ADMIN — OXYCODONE HYDROCHLORIDE AND ACETAMINOPHEN 1 TABLET: 5; 325 TABLET ORAL at 10:51

## 2020-03-02 RX ADMIN — INSULIN LISPRO 1 UNITS: 100 INJECTION, SOLUTION INTRAVENOUS; SUBCUTANEOUS at 12:57

## 2020-03-02 ASSESSMENT — PAIN SCALES - GENERAL
PAINLEVEL_OUTOF10: 6
PAINLEVEL_OUTOF10: 0
PAINLEVEL_OUTOF10: 6
PAINLEVEL_OUTOF10: 4
PAINLEVEL_OUTOF10: 0

## 2020-03-02 ASSESSMENT — PAIN DESCRIPTION - PAIN TYPE
TYPE: CHRONIC PAIN
TYPE: CHRONIC PAIN

## 2020-03-02 ASSESSMENT — PAIN DESCRIPTION - LOCATION
LOCATION: GROIN
LOCATION: GROIN

## 2020-03-02 ASSESSMENT — PAIN DESCRIPTION - DESCRIPTORS: DESCRIPTORS: ACHING

## 2020-03-02 NOTE — PROGRESS NOTES
Patients BP low, also took manually and low. AMADO Newberry notified via perfect serve. Will continue to monitor.

## 2020-03-02 NOTE — PROGRESS NOTES
AM assessment complete. Oriented x4. Reports 6/10 chronic groin pain. Respirations regular and unlabored. Denies dyspnea. Breath sounds clear. NSR on tele with frequent PVCs. Abdomen distended. Port to right chest. Good blood return. NPO since MN for paracentesis. Blood thinners held. Waiting to hear from cardiology before giving coreg and diuretics. Remaining AM medications given as ordered. Patient encouraged to use call light with any needs. Patient states understanding, call light in reach.

## 2020-03-02 NOTE — FLOWSHEET NOTE
03/02/20 0810   Provider Notification   Reason for Communication Evaluate   Provider Name [address]   Provider Notification Physician   Method of Communication Secure Message   Response Waiting for response   Notification Time [phone]     Cr up to 1.4 and BP running low. Can you review his labs? He is getting Lasix, aldactone, and coreg. Thanks!

## 2020-03-02 NOTE — PROGRESS NOTES
[facility]  Gastroenterology Progress Note  Jhoana Mcdaniel is a 69 y.o. male patient. 1. Anasarca    2. Ascites due to alcoholic cirrhosis ([address]    3. Hypotension, unspecified hypotension type        SUBJECTIVE:  No abdominal pain. A little less groin pain after paracentesis. Physical    VITALS:  BP 99/69   Pulse 72   Temp 97.6 °F (36.4 °C) (Temporal)   Resp 14   Ht 5' 9\" (1.753 m)   Wt 203 lb (92.1 kg)   SpO2 95%   BMI 29.98 kg/m²   TEMPERATURE:  Current - Temp: 97.6 °F (36.4 °C); Max - Temp  Av.7 °F (36.5 °C)  Min: 97 °F (36.1 °C)  Max: 98.4 °F (36.9 °C)    Abdomen soft, NT, no HSM, Bowel sounds normal.       Data      Recent Labs     20  1632 20  0600   WBC 4.5 3.7*   HGB 12.5* 11.6*   HCT 37.7* 34.8*   MCV 94.8 94.8   * 113*     Recent Labs     20  0600 20  0430 20  0430    142 136   K 3.4* 4.0 4.7    103 98*   CO2 25 27 26   BUN 16 17 20   CREATININE 1.1 1.2 1.4*     Recent Labs     20  1424 20  0600   AST 17 12*   ALT 12 8*   BILITOT 1.9* 1.2*   ALKPHOS 148* 110     Recent Labs     20  1424   LIPASE 12.0*             ASSESSMENT :  Cirrhosis due to alcohol with ascites - he is treated with diuretics and has increasing abd girth, dyspnea on exertion c/w symptomatic ascites. He has been driniking some alcohol more recently, but \"stopped again\" after finding out he has cirrhosis in January. He should have repeat therapeutic paracentesis. Will also increase his spironolactone dose to 25 mg daily. He should have Hep A and B vaccinations, annual EGD for varices screening, and liver US with serum AFP q 6 months for [address] screening. This can be done as outpatient. S/p paracentesis with 5.6 L fluid removed. Bilateral Groin pain - likely from ascites tracking into inguinal area. pain improved somewhat after paracentesis. CT did show bilat inguinal hernias containing ascites.        PLAN   :  1) f/u ascitic fluid albumin, cell ct, culture, protein. Will give 35 grams albumin. 2) diuretics currently on hold as cr increased today. Future diuresis per cardiology as able. 3) HAV vaccine and Hep B vaccination. 4) Plan EGD later this year for variceal screening. 5) US/AFP q 6 months for hepatocellular Ca screening. 6) Ok for d/c from [employer] standpoint. f/u with Dr Jefry Christianson as outpatient for cirrhosis follow up       Discussed with Dr. Krystle Taylor, PA-C  [address]  I have personally performed a face to face diagnostic evaluation on this patient. I have interviewed and examined the patient and I agree with the findings and recommended plan of care. In summary, my findings and plan are the following: As above, feels better after paracentesis. He is diuresing, but Creat slowly rising. OK to discharge in AM if Creatinine stable and should have BMP next week. He will f/u with me for cirrhosis/ascites as outpt with above plans recommended.     Roopa Trujillo MD  [address]  3/2/2020

## 2020-03-02 NOTE — BRIEF OP NOTE
Brief Postoperative Note    Gómez Castro  YOB: 1945  [id number]    Pre-operative Diagnosis: ascites    Post-operative Diagnosis: Same    Procedure: paracentesis    Anesthesia: Local    Surgeons: Eleanor Mcnair MD    Estimated Blood Loss: Less than 5 mL    Complications: None    Specimens: Was Obtained: ascitic fluid    Findings: Successful US-guided paracentesis.     Electronically signed by Eleanor Mcnair MD on 3/2/2020 at 11:42 AM

## 2020-03-02 NOTE — PROGRESS NOTES
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 *       Assessment & Plan:     Decompensated  cirrhosis of liver with ascites increasing cirrhosis multifactorial from cardiac cirrhosis as well as alcoholism intervention radiology consulted for paracentesis paracentesis done today fluid sent for analysis 5.5 L of fluid removed  Ischemic cardiomyopathy with stent in the past EF 15 to 20% no evidence of failure continue current medications     35 g of albumin given    Diabetes  mellitus controlled on sliding scale. History of lymphoma completed course of treatment in remission    Hypokalemia supplement resolved    Patient has chronic congestive heart failure with systolic dysfunction low EF no evidence of acute exacerbation    Creatinine increased to 1.4 Lasix and Aldactone on hold  Most likely has  YENNIFER from diuresis.  CMP in am     Diet: DIET CARDIAC; Low Sodium (2 GM)  Code:Full Code  DVT PPX scd  Disposition in am       Charmayne Pilar, MD   3/2/2020 2:58 PM

## 2020-03-02 NOTE — PROGRESS NOTES
Shift assessment completed, see flowsheets. Medications administered, see MAR. Plan of care discussed with patient. Pt denies further needs at this time. Will continue to monitor.   Sparkle Perez RN

## 2020-03-02 NOTE — PLAN OF CARE
Problem: Discharge Planning:  Goal: Discharged to appropriate level of care  Description  Discharged to appropriate level of care  3/2/2020 1134 by Lazaro Zaldivar RN  Outcome: Ongoing     Problem: Pain:  Goal: Pain level will decrease  Description  Pain level will decrease  3/2/2020 1134 by Lazaro Zaldivar RN  Outcome: Ongoing  Note:   Denies abdominal pain. Still having some groin pain. PRN percocet given as ordered.       Problem: Serum Glucose Level - Abnormal:  Goal: Ability to maintain appropriate glucose levels has stabilized  Description  Ability to maintain appropriate glucose levels has stabilized  3/2/2020 1134 by Lazaro Zaldivar RN  Outcome: Ongoing

## 2020-03-02 NOTE — PROGRESS NOTES
Cardiology RN spoke with Dr. Hellen Marshall. Said to hold aldactone and michelet Barton to give coreg.

## 2020-03-02 NOTE — PLAN OF CARE
Problem: Falls - Risk of:  Goal: Will remain free from falls  Description  Will remain free from falls  Outcome: Ongoing  Goal: Absence of physical injury  Description  Absence of physical injury  Outcome: Ongoing   Safety precautions in place. Problem: Activity:  Goal: Risk for activity intolerance will decrease  Description  Risk for activity intolerance will decrease  Outcome: Ongoing     Problem: Fluid Volume:  Goal: Maintenance of adequate hydration will improve  Description  Maintenance of adequate hydration will improve  Outcome: Ongoing     Problem: Health Behavior:  Goal: Ability to state signs and symptoms to report to health care provider will improve  Description  Ability to state signs and symptoms to report to health care provider will improve  Outcome: Ongoing     Problem: Physical Regulation:  Goal: Complications related to the disease process, condition or treatment will be avoided or minimized  Description  Complications related to the disease process, condition or treatment will be avoided or minimized  Outcome: Ongoing  Goal: Ability to maintain clinical measurements within normal limits will improve  Description  Ability to maintain clinical measurements within normal limits will improve  Outcome: Ongoing     Problem: Discharge Planning:  Goal: Discharged to appropriate level of care  Description  Discharged to appropriate level of care  Outcome: Ongoing   Discharge planning in progress. Problem: Safety:  Goal: Free from accidental physical injury  Description  Free from accidental physical injury  Outcome: Ongoing     Problem: Daily Care:  Goal: Daily care needs are met  Description  Daily care needs are met  Outcome: Ongoing     Problem: Pain:  Description  Pain management should include both nonpharmacologic and pharmacologic interventions.   Goal: Pain level will decrease  Description  Pain level will decrease  Outcome: Ongoing  Goal: Control of acute pain  Description  Control of

## 2020-03-03 VITALS
HEIGHT: 69 IN | RESPIRATION RATE: 16 BRPM | BODY MASS INDEX: 28.29 KG/M2 | SYSTOLIC BLOOD PRESSURE: 109 MMHG | OXYGEN SATURATION: 96 % | HEART RATE: 65 BPM | TEMPERATURE: 97.3 F | WEIGHT: 191 LBS | DIASTOLIC BLOOD PRESSURE: 69 MMHG

## 2020-03-03 LAB
ANION GAP SERPL CALCULATED.3IONS-SCNC: 12 MMOL/L (ref 3–16)
BUN BLDV-MCNC: 24 MG/DL (ref 7–20)
CALCIUM SERPL-MCNC: 8.5 MG/DL (ref 8.3–10.6)
CHLORIDE BLD-SCNC: 97 MMOL/L (ref 99–110)
CO2: 25 MMOL/L (ref 21–32)
CREAT SERPL-MCNC: 1.4 MG/DL (ref 0.8–1.3)
GFR AFRICAN AMERICAN: 60
GFR NON-AFRICAN AMERICAN: 50
GLUCOSE BLD-MCNC: 123 MG/DL (ref 70–99)
GLUCOSE BLD-MCNC: 131 MG/DL (ref 70–99)
GLUCOSE BLD-MCNC: 221 MG/DL (ref 70–99)
GLUCOSE BLD-MCNC: 258 MG/DL (ref 70–99)
PERFORMED ON: ABNORMAL
POTASSIUM SERPL-SCNC: 5 MMOL/L (ref 3.5–5.1)
SODIUM BLD-SCNC: 134 MMOL/L (ref 136–145)

## 2020-03-03 PROCEDURE — 99232 SBSQ HOSP IP/OBS MODERATE 35: CPT | Performed by: INTERNAL MEDICINE

## 2020-03-03 PROCEDURE — 80048 BASIC METABOLIC PNL TOTAL CA: CPT

## 2020-03-03 PROCEDURE — 6370000000 HC RX 637 (ALT 250 FOR IP): Performed by: INTERNAL MEDICINE

## 2020-03-03 PROCEDURE — 2580000003 HC RX 258: Performed by: INTERNAL MEDICINE

## 2020-03-03 RX ORDER — SPIRONOLACTONE 50 MG/1
50 TABLET, FILM COATED ORAL DAILY
Qty: 30 TABLET | Refills: 3 | Status: SHIPPED | OUTPATIENT
Start: 2020-03-04 | End: 2020-06-12 | Stop reason: DRUGHIGH

## 2020-03-03 RX ORDER — CARVEDILOL 12.5 MG/1
12.5 TABLET ORAL
Qty: 60 TABLET | Refills: 3 | Status: SHIPPED | OUTPATIENT
Start: 2020-03-03 | End: 2020-06-12 | Stop reason: DRUGHIGH

## 2020-03-03 RX ORDER — POLYETHYLENE GLYCOL 3350 17 G/17G
17 POWDER, FOR SOLUTION ORAL DAILY PRN
Qty: 527 G | Refills: 1 | Status: SHIPPED | OUTPATIENT
Start: 2020-03-03 | End: 2020-04-02

## 2020-03-03 RX ORDER — CARVEDILOL 6.25 MG/1
12.5 TABLET ORAL
Status: DISCONTINUED | OUTPATIENT
Start: 2020-03-03 | End: 2020-03-03 | Stop reason: HOSPADM

## 2020-03-03 RX ORDER — FUROSEMIDE 40 MG/1
40 TABLET ORAL 2 TIMES DAILY
Qty: 60 TABLET | Refills: 3 | Status: SHIPPED | OUTPATIENT
Start: 2020-03-03 | End: 2020-06-12 | Stop reason: DRUGHIGH

## 2020-03-03 RX ADMIN — INSULIN LISPRO 2 UNITS: 100 INJECTION, SOLUTION INTRAVENOUS; SUBCUTANEOUS at 12:21

## 2020-03-03 RX ADMIN — ASPIRIN 81 MG 81 MG: 81 TABLET ORAL at 11:16

## 2020-03-03 RX ADMIN — TAMSULOSIN HYDROCHLORIDE 0.4 MG: 0.4 CAPSULE ORAL at 11:17

## 2020-03-03 RX ADMIN — FAMOTIDINE 20 MG: 20 TABLET ORAL at 11:17

## 2020-03-03 RX ADMIN — Medication 10 ML: at 11:17

## 2020-03-03 RX ADMIN — VITAMIN D, TAB 1000IU (100/BT) 1000 UNITS: 25 TAB at 11:17

## 2020-03-03 RX ADMIN — CLOPIDOGREL 75 MG: 75 TABLET, FILM COATED ORAL at 11:17

## 2020-03-03 NOTE — PROGRESS NOTES
Assessment complete. See flow sheet. Pain evaluation complete. No complaints of pain at this time. Discussed POC for this shift. Patient encouraged to use call light with any needs. Patient states understanding, call light in reach, will continue to monitor.

## 2020-03-03 NOTE — DISCHARGE SUMMARY
[facility]    Patient Demographics    Janae Hernández  Date of Birth. 1945  MRN. [id number]     Primary care provider. [address]  (Tel: None)    Admit date: 2/28/2020    Discharge date (blank if same as Note Date): Note Date: 3/3/2020     Reason for Hospitalization. Chief Complaint   Patient presents with    Groin Swelling     c/o abd and groin swelling, worsening since last 10 days          Significant Findings. 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 *       Problems and results from this hospitalization that need follow up. 1. None     Significant test results and incidental findings. 1.   IR US GUIDED PARACENTESIS   Final Result   Successful ultrasound guided paracentesis. CT ABDOMEN PELVIS WO CONTRAST   Final Result   Cirrhotic liver morphology. Large volume ascites. Diffuse anasarca. Extensive diverticulosis of the large bowel, but without CT evidence of   diverticulitis. Large duodenal diverticulum, but without CT evidence of duodenal   diverticulitis. XR CHEST PORTABLE   Final Result   1. No acute cardiopulmonary disease. 2. Cardiomegaly. Invasive procedures and treatments. 1. None     Problem-based Hospital Course. Decompensated  cirrhosis of liver with ascites increasing cirrhosis multifactorial from cardiac cirrhosis as well as alcoholism intervention radiology consulted for paracentesis paracentesis done today fluid sent for analysis 5.5 L of fluid removed no evidence of SBP    Ischemic cardiomyopathy with stent in the past EF 15 to 20% no evidence of failure continue current medications  current cardiac medications including Lasix Aldactone.   Coreg patient not on ACE inhibitor     35 g of albumin given     Diabetes  mellitus controlled on sliding scale.      History of lymphoma completed course of treatment in remission     Hypokalemia supplement resolved     Patient has chronic congestive heart failure with systolic dysfunction low EF no evidence of acute exacerbation     Creatinine at 1.4 mild increase over the baseline BMP in a week    Outpatient follow-up with GI may need frequent paracentesis    Consults. IP CONSULT TO HOSPITALIST  IP CONSULT TO RADIOLOGY  IP CONSULT TO GI  IP CONSULT TO CARDIOLOGY    Physical examination on discharge day. /69   Pulse 65   Temp 97.3 °F (36.3 °C) (Temporal)   Resp 16   Ht 5' 9\" (1.753 m)   Wt 191 lb (86.6 kg)   SpO2 96%   BMI 28.21 kg/m²   General appearance. Alert. Looks comfortable. HEENT. Sclera clear. Moist mucus membranes. Cardiovascular. Regular rate and rhythm, normal S1, S2. No murmur. Respiratory. Not using accessory muscles. Clear to auscultation bilaterally, no wheeze. Gastrointestinal. aScites   Neurology. Facial symmetry. No speech deficits. Moving all extremities equally. Extremities. No edema in lower extremities. Skin. Warm, dry, normal turgor        Condition at time of discharge stable    Medication instructions provided to patient at discharge.      Medication List      START taking these medications    polyethylene glycol packet  Commonly known as:  GLYCOLAX  Take 17 g by mouth daily as needed for Constipation        CHANGE how you take these medications    carvedilol 12.5 MG tablet  Commonly known as:  COREG  Take 1 tablet by mouth Daily with supper  What changed:    · medication strength  · how much to take  · when to take this     furosemide 40 MG tablet  Commonly known as:  LASIX  Take 1 tablet by mouth 2 times daily  What changed:    · medication strength  · how much to take  · when to take this     spironolactone 50 MG tablet  Commonly known as:  ALDACTONE  Take 1 tablet by mouth

## 2020-03-03 NOTE — PROGRESS NOTES
in a week. 3) HAV vaccine and Hep B vaccination. 4) Plan EGD later this year for variceal screening. 5) US/AFP q 6 months for hepatocellular Ca screening. 6) Ok for d/c from [employer] standpoint.  f/u with Dr Aman Aguilera as outpatient for cirrhosis follow up       Discussed with Dr. Salina Gilliland, [address] [facility] [address]

## 2020-03-03 NOTE — PROGRESS NOTES
Shift assessment completed, see flowsheets. Medications administered, see MAR. Plan of care discussed with patient. Patient resting in bed. Call light and bedside table within reach. Pt denies further needs at this time. Will continue to monitor.   Kinga Fisher RN

## 2020-03-03 NOTE — PROGRESS NOTES
aldactone. CMP - severe with EF <20%. He has not been able to tolerate many meds due to hypotension. At home he was on coreg 25 mg daily in the am; I will change him to 12.5 mg at night only. He will monitor his BP before each dose and hold if BP<100. He has F/u with Dr Jose Miller on 3/12 and will have blood work before.        Dawood Vasquez MD, 3/3/2020 12:33 PM

## 2020-03-03 NOTE — PLAN OF CARE
Problem: Falls - Risk of:  Goal: Will remain free from falls  Description  Will remain free from falls  Outcome: Ongoing     Problem: Activity:  Goal: Risk for activity intolerance will decrease  Description  Risk for activity intolerance will decrease  Outcome: Ongoing     Problem: Fluid Volume:  Goal: Maintenance of adequate hydration will improve  Description  Maintenance of adequate hydration will improve  Outcome: Ongoing     Problem: Safety:  Goal: Free from accidental physical injury  Description  Free from accidental physical injury  Outcome: Ongoing     Problem: Serum Glucose Level - Abnormal:  Goal: Ability to maintain appropriate glucose levels has stabilized  Description  Ability to maintain appropriate glucose levels has stabilized  Outcome: Ongoing   The care plan and education has been reviewed and mutually agreed upon with the patient.

## 2020-03-03 NOTE — FLOWSHEET NOTE
03/03/20 1529   Provider Notification   Reason for Communication Evaluate   Provider Name Cardiology   Provider Notification Nurse   Method of Communication Secure Message   Response Waiting for response   Notification Time 0930     \"Blood pressure has been borderline, they held diuretics yesterday and gave the beta blocker. He has a paracentesis (5.6L off and received albumin). Just wanted to check with cardiology before giving anything. BP this AM 96/56 in right arm and 101/67 in left arm. HR in 80's. Thanks. \"

## 2020-03-04 ENCOUNTER — CARE COORDINATION (OUTPATIENT)
Dept: CASE MANAGEMENT | Age: 75
End: 2020-03-04

## 2020-03-04 LAB
FLUID TYPE: NORMAL
LD, FLUID: 76 U/L

## 2020-03-04 NOTE — CARE COORDINATION
[facility] [facility] Transitions Initial Follow Up Call    Call within 2 business days of discharge: Yes    Patient: Jhoana Mcdaniel Patient :    MRN: [id number]  Reason for Admission: ascites  Discharge Date: 3/3/20 RARS: Readmission Risk Score: 17      Last Discharge [facility]       Complaint Diagnosis Description Type Department Provider    20 Groin Swelling Anasarca . .. ED to Hosp-Admission (Discharged) (ADMITTED) Estrellita Rajan MD; [facility]. ..           Initial 24 hr call attempted, contact info left on vm       Follow Up  Future Appointments   Date Time Provider Jamal Mendoza   3/12/2020  9:30 AM Barbie Conroy MD [facility] Cardio TATY Mireles RN

## 2020-03-05 ENCOUNTER — CARE COORDINATION (OUTPATIENT)
Dept: CASE MANAGEMENT | Age: 75
End: 2020-03-05

## 2020-03-05 LAB
BODY FLUID CULTURE, STERILE: NORMAL
GRAM STAIN RESULT: NORMAL

## 2020-03-11 ENCOUNTER — HOSPITAL ENCOUNTER (OUTPATIENT)
Age: 75
Discharge: HOME | End: 2020-03-11
Payer: MEDICARE

## 2020-03-11 LAB
ANION GAP SERPL CALCULATED.3IONS-SCNC: 12 MMOL/L (ref 3–16)
BUN BLDV-MCNC: 15 MG/DL (ref 7–20)
CALCIUM SERPL-MCNC: 9.3 MG/DL (ref 8.3–10.6)
CHLORIDE BLD-SCNC: 96 MMOL/L (ref 99–110)
CO2: 26 MMOL/L (ref 21–32)
CREAT SERPL-MCNC: 1.2 MG/DL (ref 0.8–1.3)
GFR AFRICAN AMERICAN: >60
GFR NON-AFRICAN AMERICAN: 59
GLUCOSE BLD-MCNC: 188 MG/DL (ref 70–99)
POTASSIUM SERPL-SCNC: 4.3 MMOL/L (ref 3.5–5.1)
SODIUM BLD-SCNC: 134 MMOL/L (ref 136–145)

## 2020-03-11 PROCEDURE — 80048 BASIC METABOLIC PNL TOTAL CA: CPT

## 2020-03-11 PROCEDURE — 36415 COLL VENOUS BLD VENIPUNCTURE: CPT

## 2020-03-12 ENCOUNTER — OFFICE VISIT (OUTPATIENT)
Dept: CARDIOLOGY CLINIC | Age: 75
End: 2020-03-12
Payer: MEDICARE

## 2020-03-12 VITALS
HEIGHT: 69 IN | BODY MASS INDEX: 27.65 KG/M2 | WEIGHT: 186.7 LBS | SYSTOLIC BLOOD PRESSURE: 100 MMHG | DIASTOLIC BLOOD PRESSURE: 70 MMHG

## 2020-03-12 PROCEDURE — 1123F ACP DISCUSS/DSCN MKR DOCD: CPT | Performed by: INTERNAL MEDICINE

## 2020-03-12 PROCEDURE — 1111F DSCHRG MED/CURRENT MED MERGE: CPT | Performed by: INTERNAL MEDICINE

## 2020-03-12 PROCEDURE — 99214 OFFICE O/P EST MOD 30 MIN: CPT | Performed by: INTERNAL MEDICINE

## 2020-03-12 PROCEDURE — G8484 FLU IMMUNIZE NO ADMIN: HCPCS | Performed by: INTERNAL MEDICINE

## 2020-03-12 PROCEDURE — 3046F HEMOGLOBIN A1C LEVEL >9.0%: CPT | Performed by: INTERNAL MEDICINE

## 2020-03-12 PROCEDURE — 2022F DILAT RTA XM EVC RTNOPTHY: CPT | Performed by: INTERNAL MEDICINE

## 2020-03-12 PROCEDURE — G8417 CALC BMI ABV UP PARAM F/U: HCPCS | Performed by: INTERNAL MEDICINE

## 2020-03-12 PROCEDURE — 3017F COLORECTAL CA SCREEN DOC REV: CPT | Performed by: INTERNAL MEDICINE

## 2020-03-12 PROCEDURE — 4040F PNEUMOC VAC/ADMIN/RCVD: CPT | Performed by: INTERNAL MEDICINE

## 2020-03-12 PROCEDURE — 1036F TOBACCO NON-USER: CPT | Performed by: INTERNAL MEDICINE

## 2020-03-12 PROCEDURE — G8427 DOCREV CUR MEDS BY ELIG CLIN: HCPCS | Performed by: INTERNAL MEDICINE

## 2020-03-23 ENCOUNTER — NURSE ONLY (OUTPATIENT)
Dept: CARDIOLOGY CLINIC | Age: 75
End: 2020-03-23
Payer: MEDICARE

## 2020-03-23 PROCEDURE — 93296 REM INTERROG EVL PM/IDS: CPT | Performed by: INTERNAL MEDICINE

## 2020-03-23 PROCEDURE — 93295 DEV INTERROG REMOTE 1/2/MLT: CPT | Performed by: INTERNAL MEDICINE

## 2020-03-23 NOTE — PROGRESS NOTES
Carelink transmission shows normal sensing and pacing function. Noted NSVT. See interrogation for more details. Optivol is within normal range.

## 2020-06-10 ENCOUNTER — HOSPITAL ENCOUNTER (OUTPATIENT)
Age: 75
Discharge: HOME | End: 2020-06-10
Payer: MEDICARE

## 2020-06-10 LAB
ALBUMIN SERPL-MCNC: 4 G/DL (ref 3.4–5)
ANION GAP SERPL CALCULATED.3IONS-SCNC: 11 MMOL/L (ref 3–16)
BASOPHILS ABSOLUTE: 0.1 K/UL (ref 0–0.2)
BASOPHILS RELATIVE PERCENT: 1.2 %
BUN BLDV-MCNC: 30 MG/DL (ref 7–20)
CALCIUM SERPL-MCNC: 9.5 MG/DL (ref 8.3–10.6)
CHLORIDE BLD-SCNC: 101 MMOL/L (ref 99–110)
CO2: 23 MMOL/L (ref 21–32)
CREAT SERPL-MCNC: 1.5 MG/DL (ref 0.8–1.3)
EOSINOPHILS ABSOLUTE: 0.1 K/UL (ref 0–0.6)
EOSINOPHILS RELATIVE PERCENT: 2.5 %
GFR AFRICAN AMERICAN: 55
GFR NON-AFRICAN AMERICAN: 46
GLUCOSE BLD-MCNC: 222 MG/DL (ref 70–99)
HCT VFR BLD CALC: 40.6 % (ref 40.5–52.5)
HEMOGLOBIN: 13.4 G/DL (ref 13.5–17.5)
LYMPHOCYTES ABSOLUTE: 0.5 K/UL (ref 1–5.1)
LYMPHOCYTES RELATIVE PERCENT: 10.4 %
MCH RBC QN AUTO: 32.4 PG (ref 26–34)
MCHC RBC AUTO-ENTMCNC: 33 G/DL (ref 31–36)
MCV RBC AUTO: 98 FL (ref 80–100)
MONOCYTES ABSOLUTE: 0.6 K/UL (ref 0–1.3)
MONOCYTES RELATIVE PERCENT: 13.6 %
NEUTROPHILS ABSOLUTE: 3.2 K/UL (ref 1.7–7.7)
NEUTROPHILS RELATIVE PERCENT: 72.3 %
PDW BLD-RTO: 15 % (ref 12.4–15.4)
PHOSPHORUS: 3.9 MG/DL (ref 2.5–4.9)
PLATELET # BLD: 89 K/UL (ref 135–450)
PLATELET SLIDE REVIEW: ABNORMAL
PMV BLD AUTO: 10.8 FL (ref 5–10.5)
POTASSIUM SERPL-SCNC: 4.7 MMOL/L (ref 3.5–5.1)
RBC # BLD: 4.14 M/UL (ref 4.2–5.9)
SODIUM BLD-SCNC: 135 MMOL/L (ref 136–145)
WBC # BLD: 4.4 K/UL (ref 4–11)

## 2020-06-10 PROCEDURE — 85025 COMPLETE CBC W/AUTO DIFF WBC: CPT

## 2020-06-10 PROCEDURE — 36415 COLL VENOUS BLD VENIPUNCTURE: CPT

## 2020-06-10 PROCEDURE — 80069 RENAL FUNCTION PANEL: CPT

## 2020-06-12 ENCOUNTER — TELEPHONE (OUTPATIENT)
Dept: CARDIOLOGY CLINIC | Age: 75
End: 2020-06-12

## 2020-06-12 ENCOUNTER — OFFICE VISIT (OUTPATIENT)
Dept: CARDIOLOGY CLINIC | Age: 75
End: 2020-06-12
Payer: MEDICARE

## 2020-06-12 VITALS
HEART RATE: 57 BPM | DIASTOLIC BLOOD PRESSURE: 60 MMHG | HEIGHT: 69 IN | SYSTOLIC BLOOD PRESSURE: 92 MMHG | WEIGHT: 188 LBS | BODY MASS INDEX: 27.85 KG/M2 | OXYGEN SATURATION: 92 %

## 2020-06-12 PROCEDURE — 1036F TOBACCO NON-USER: CPT | Performed by: NURSE PRACTITIONER

## 2020-06-12 PROCEDURE — G8417 CALC BMI ABV UP PARAM F/U: HCPCS | Performed by: NURSE PRACTITIONER

## 2020-06-12 PROCEDURE — 4040F PNEUMOC VAC/ADMIN/RCVD: CPT | Performed by: NURSE PRACTITIONER

## 2020-06-12 PROCEDURE — 99214 OFFICE O/P EST MOD 30 MIN: CPT | Performed by: NURSE PRACTITIONER

## 2020-06-12 PROCEDURE — G8427 DOCREV CUR MEDS BY ELIG CLIN: HCPCS | Performed by: NURSE PRACTITIONER

## 2020-06-12 PROCEDURE — 1123F ACP DISCUSS/DSCN MKR DOCD: CPT | Performed by: NURSE PRACTITIONER

## 2020-06-12 PROCEDURE — 3017F COLORECTAL CA SCREEN DOC REV: CPT | Performed by: NURSE PRACTITIONER

## 2020-06-12 RX ORDER — FUROSEMIDE 20 MG/1
40 TABLET ORAL 2 TIMES DAILY
COMMUNITY
End: 2020-01-01 | Stop reason: DRUGHIGH

## 2020-06-12 RX ORDER — CARVEDILOL 12.5 MG/1
6.25 TABLET ORAL 2 TIMES DAILY WITH MEALS
Status: ON HOLD | COMMUNITY
End: 2020-01-01 | Stop reason: HOSPADM

## 2020-06-12 RX ORDER — SPIRONOLACTONE 25 MG/1
25 TABLET ORAL 2 TIMES DAILY
COMMUNITY
End: 2020-01-01 | Stop reason: SDUPTHER

## 2020-06-12 NOTE — TELEPHONE ENCOUNTER
Please review office note / medication secotion - I corrected 3 medications in the chart - lasix, coreg and spironolactone  - I marked them as taked differently so you are able to see the changes     Lasix is 40mg twice daily  spironlacton 50mg daily   coreg 12.5mg nightly as needed      Please call pt back and advise if any changes now that we have the correct dosages on file

## 2020-06-12 NOTE — TELEPHONE ENCOUNTER
Called pt back but went straight to voicemail and is full - unable to leave message that we were returning their call to update medication list --- will have to be called back

## 2020-06-12 NOTE — PATIENT INSTRUCTIONS
Call back with the doses / frequencies of you spironolactone , furosemide and carvedilol    Keep appt with Dr. Emma Keller in Aug

## 2020-06-12 NOTE — PROGRESS NOTES
MG capsule Take 0.4 mg by mouth daily      aspirin 81 MG tablet Take 81 mg by mouth daily      vitamin D (CHOLECALCIFEROL) 1000 UNITS TABS tablet Take 1,000 Units by mouth daily      glipiZIDE (GLUCOTROL) 5 MG tablet Take 5 mg by mouth 2 times daily (before meals)      atorvastatin (LIPITOR) 80 MG tablet Take 80 mg by mouth daily.  clopidogrel (PLAVIX) 75 MG tablet Take 75 mg by mouth daily.  loperamide (IMODIUM) 2 MG capsule Take 2 mg by mouth 4 times daily as needed for Diarrhea      nitroGLYCERIN (NITROSTAT) 0.4 MG SL tablet Place 0.4 mg under the tongue every 5 minutes as needed for Chest pain up to max of 3 total doses. If no relief after 1 dose, call 911. No current facility-administered medications for this visit. REVIEW OF SYSTEMS:    CONSTITUTIONAL: No major weight gain or loss, fatigue, weakness, night sweats or fever. HEENT: No new vision difficulties or ringing in the ears. RESPIRATORY: No new SOB, PND, orthopnea or cough. CARDIOVASCULAR: See HPI  GI: No nausea, vomiting, diarrhea, constipation, abdominal pain or changes in bowel habits. : No urinary frequency, urgency, incontinence hematuria or dysuria; nocturia 6 x / night . SKIN: No cyanosis or skin lesions. MUSCULOSKELETAL: No new muscle or joint pain. NEUROLOGICAL: No syncope or TIA-like symptoms.   PSYCHIATRIC: No anxiety, pain, insomnia or depression    Objective:   PHYSICAL EXAM:        Vitals:    06/12/20 0929 06/12/20 1009   BP: 90/60 92/60   Site: Left Upper Arm    Position: Sitting    Cuff Size: Large Adult    Pulse: 57    SpO2: 92%    Weight: 188 lb (85.3 kg)    Height: 5' 9\" (1.753 m)        VITALS:  BP 90/60 (Site: Left Upper Arm, Position: Sitting, Cuff Size: Large Adult)   Pulse 57   Ht 5' 9\" (1.753 m)   Wt 188 lb (85.3 kg)   SpO2 92%   BMI 27.76 kg/m²   CONSTITUTIONAL: Cooperative, no apparent distress, and appears well nourished / developed  NEUROLOGIC:  Awake and orientated to person, place <20%.   E/e'=14. Mild mitral regurgitation. Biatrial enlargement. Aortic valve appears sclerotic but opens adequately. No stenosis or   insufficiency. The right ventricle is enlarged and decreased in function. Moderate tricuspid regurgitation. PASP 37mmHg. Trivial pulmonic regurgitation. There is a trivial anterior pericardial effusion noted. There is fluid around the liver. IVC size is normal (<2.1 cm) but collapses < 50% with respiration consistent   with elevated RA pressure (8 mmHg). Last Stress Test: Oct '18:  Summary       No EKG evidence for ischemia with lexiscan       Reduced LV systolic function with EF of 44% (ectopy may affect accuracy)       There is normal isotope uptake at stress and rest. There is no evidence of    myocardial ischemia or scar.           Assessment:      Diagnosis Orders   1. Ischemic cardiomyopathy   ~EF < 20% on echo from Jan  ~hx of s/p ICD '15 (followed by EP)  ~tolerating coreg once daily at 12.5 mg  ~does not appear decompensated : renal function indicates dry. Optival interrogation : malfunction with accessing information   ~unclear on lasix and spironolactone doses   ~wt stable ; neg to exam for decompensation     2. Coronary artery disease involving native heart without angina pectoris, unspecified vessel or lesion type   ~stable : offers no c/o angina  ~DAPT / BB / statin     3. Mixed hyperlipidemia   ~no recent profile available for review  ~ trig 125 HDL 44 LDL 80 on profile from March '19  ~follow[facility] by the [facility]        I had the opportunity to review the clinical symptoms and presentation of Elina Bobo. Plan:     1. His wife will call back with the dose / frequencies of carvedilol, furosemide and spironolactone as differs from last discharge note  2. Keep appt as scheduled in Aug    Overall the patient is stable from CV standpoint    I have addresed the patient's cardiac risk factors and adjusted pharmacologic treatment as needed.  In

## 2020-06-12 NOTE — TELEPHONE ENCOUNTER
Pt wife calling, pt was just in to see NPT and was told her call when they got home about his medications.  Pls call to advise Thank you

## 2020-06-24 ENCOUNTER — NURSE ONLY (OUTPATIENT)
Dept: CARDIOLOGY CLINIC | Age: 75
End: 2020-06-24
Payer: MEDICARE

## 2020-06-24 PROCEDURE — 93296 REM INTERROG EVL PM/IDS: CPT | Performed by: INTERNAL MEDICINE

## 2020-06-24 PROCEDURE — 93295 DEV INTERROG REMOTE 1/2/MLT: CPT | Performed by: INTERNAL MEDICINE

## 2020-06-24 NOTE — LETTER
[facility] [phone]  [address] [facility],[address] [phone]    Pacemaker/Defibrillator Clinic          06/24/20        [address] [place] [id number]        Dear [address] [place] [address]    This letter is to inform you that we received the transmission from your monitor at home that checks your implanted heart device. The next date your monitor will automatically transmit will be 9-28-20. If your report needs attention we will notify you. Your device and monitor are wireless and most transmit cellularly, but please periodically check your monitor is still plugged in to the electrical outlet. If you still use the telephone land line to send please ensure the connection to the phone tessa is secure. This will help to ensure successful automatic transmissions in the future. Also, the monitor needs to be close to you while sleeping at night. Please be aware that the remote device transmission sites are periodically monitored only during regular business hours during which simultaneous in-office device clinics are being run. If your transmission requires attention, we will contact you as soon as possible. Thank you.             Santo Mukherjee

## 2020-07-08 ENCOUNTER — TELEPHONE (OUTPATIENT)
Dept: CARDIOLOGY CLINIC | Age: 75
End: 2020-07-08

## 2020-07-08 NOTE — TELEPHONE ENCOUNTER
Pt is having a port removal on 7/20 and asking for plavix to be held 5 days prior, and resume date. They are faxing paper work or fax a letter to  [phone]. Any questions please call.

## 2020-07-27 NOTE — PROGRESS NOTES
Name_______________________________________Printed:____________________  Date and time of surgery___8/4/2020  1200_____________________Arrival Time:___1030  [facility]_____________   1. The instructions given regarding when and if a patient needs to stop oral intake prior to surgery varies. Follow the specific instructions you were given                  XXX___Nothing to eat or to drink after Midnight the night before.                             ____Endoscopy patient follow your DRS instructions-generally you will be doing a part of the prep after Midnight                   ____Carbo loading or ERAS instructions will be given to select patients-if you have been given those instructions -please do the following                           The evening before your surgery after dinner before midnight drink 40 ounces of gatorade. If you are diabetic use sugar free. The morning of surgery drink 40 ounces of water. This needs to be finished 3 hours prior to your surgery start time. 2. Take the following pills with a small sip of water on the morning of surgery_____________coreg______________________________________                  Do not take blood pressure medications ending in pril or sartan the agus prior to surgery or the morning of surgery_   3. Aspirin, Ibuprofen, Advil, Naproxen, Vitamin E and other Anti-inflammatory products and supplements should be stopped for 5 -7days before surgery or as directed by your physician. 4. Check with your Doctor regarding stopping Plavix, Coumadin,Eliquis, Lovenox,Effient,Pradaxa,Xarelto, Fragmin or other blood thinners and follow their instructions. 5. Do not smoke, and do not drink any alcoholic beverages 24 hours prior to surgery. This includes NA Beer. Refrain from the usage of any recreational drugs. 6. You may brush your teeth and gargle the morning of surgery. DO NOT SWALLOW WATER   7.  You MUST make arrangements for a responsible adult to stay on site while you are here and procedure. Pain management is NO VISITOR policyThe patients ride is expected to remain in the car with a cell phone for communication. If the ride is leaving the hospital grounds please make sure they are back in time for pickup. Have the patient inform the staff on arrival what their rides plans are while the patient is in the facility. At the MAIN there is one visitor allowed. Please note that the visitor policy is subject to change.

## 2020-07-29 NOTE — PATIENT INSTRUCTIONS
You have received a viral test for COVID-19. Below is education on quarantine per the CDC guidelines. For any symptoms, seek care from your PCP, call [phone] to establish care with a doctor, or go directly to an urgent care or the emergency room. Test results will take 2-7 days and will be sent to you in your [employer] account. If you test positive, you will be contacted via phone. If you test negative, the ONLY communication will be through [address] GO TO [employer] [facility] AND SIGN UP FOR [employer]  (LOWER LEFT OF THE HOME PAGE)  No test is 100%. If you have symptoms, you should follow the guidance of quarantine as previously stated. You can still be contagious if you have symptoms. Your [facility] will reach out to you if you have a positive result. They will provide you with a return to work date and note. If you were tested for a pre-op, then you should remain in quarantine until your procedure. How do I know if I need to be in quarantine? If you live in a community where COVID-19 is or might be spreading (currently, that is virtually everywhere in the [place])  Be alert for symptoms. Watch for fever, cough, shortness of breath, or other symptoms of COVID-19.  Take your temperature if symptoms develop.  Practice social distancing. Maintain 6 feet of distance from others and stay out of crowded places.  Follow CDC guidance if symptoms develop. If you feel healthy but:   Recently had close contact with a person with COVID-19 you need to Quarantine:   Stay home until 14 days after your last exposure.  Check your temperature twice a day and watch for symptoms of COVID-19.  If possible, stay away from people who are at higher-risk for getting very sick from COVID-19.   Stay Home and Monitor Your Health if you:   Have been diagnosed with COVID-19, or   Are waiting for test results, or   Have cough, fever, or shortness of breath, or symptoms of COVID-19      When You Can

## 2020-07-29 NOTE — PROGRESS NOTES
Nat Hinds received a viral test for COVID-19. They were educated on isolation and quarantine as appropriate. For any symptoms, they were directed to seek care from their PCP, given contact information to establish with a doctor, directed to an urgent care or the emergency room.

## 2020-08-04 NOTE — ANESTHESIA PRE PROCEDURE
Department of Anesthesiology  Preprocedure Note       Name:  Venita Hinojosa   Age:  77 y.o.  :  1945                                          MRN:  [id number]         Date:  2020      Surgeon: Brenda Haines):  Ruth Coelho MD    Procedure: Procedure(s):  EGD WITH BANDING POSSIBLE VARICES    Medications prior to admission:   Prior to Admission medications    Medication Sig Start Date End Date Taking? Authorizing Provider   MAGNESIUM OXIDE 400 PO Take 450 mg by mouth daily   Yes Historical Provider, MD   carvedilol (COREG) 25 MG tablet Take 12.5 mg by mouth nightly Holds pm dose for SBP < 100    Yes Historical Provider, MD   furosemide (LASIX) 20 MG tablet Take 40 mg by mouth 2 times daily    Yes Historical Provider, MD   spironolactone (ALDACTONE) 25 MG tablet Take 25 mg by mouth 2 times daily 50mg totall daily   Yes Historical Provider, MD   famotidine (PEPCID) 20 MG tablet Take 20 mg by mouth daily   Yes Historical Provider, MD   tamsulosin (FLOMAX) 0.4 MG capsule Take 0.4 mg by mouth daily   Yes Historical Provider, MD   vitamin D (CHOLECALCIFEROL) 1000 UNITS TABS tablet Take 1,000 Units by mouth daily   Yes Historical Provider, MD   glipiZIDE (GLUCOTROL) 5 MG tablet Take 5 mg by mouth 2 times daily (before meals)   Yes Historical Provider, MD   atorvastatin (LIPITOR) 80 MG tablet Take 80 mg by mouth daily. Yes Historical Provider, MD   Calcium Carb-Cholecalciferol (CALCIUM 1000 + D PO) Take 1,000 mg by mouth 2 times daily    Historical Provider, MD   aspirin 81 MG tablet Take 81 mg by mouth daily    Historical Provider, MD   loperamide (IMODIUM) 2 MG capsule Take 2 mg by mouth 4 times daily as needed for Diarrhea    Historical Provider, MD   nitroGLYCERIN (NITROSTAT) 0.4 MG SL tablet Place 0.4 mg under the tongue every 5 minutes as needed for Chest pain up to max of 3 total doses. If no relief after 1 dose, call 911.     Historical Provider, MD   clopidogrel (PLAVIX) 75 MG tablet Take 75 mg by mouth daily.      Historical Provider, MD       Current medications:    Current Facility-Administered Medications   Medication Dose Route Frequency Provider Last Rate Last Dose    0.9 % sodium chloride infusion   Intravenous Continuous Tran [place]oria  mL/hr at 08/04/20 0941      ondansetron (ZOFRAN) injection 4 mg  4 mg Intravenous Once PRN Trena Patton MD        HYDROmorphone (DILAUDID) injection 0.25 mg  0.25 mg Intravenous Q5 Min PRN Trena Patton MD        HYDROmorphone (DILAUDID) injection 0.5 mg  0.5 mg Intravenous Q5 Min PRN Trena Patton MD           Allergies: Allergies   Allergen Reactions    Penicillins Swelling    Cortisone      Other reaction(s): Other (See Comments)  Hot flashes  Other reaction(s): Other (See Comments)  Hot flashes    Metformin      Other reaction(s): Other (See Comments)  Violent behavior  Other reaction(s):  Other (See Comments)  Violent behavior    Metformin Hcl     Morphine Other (See Comments)     Agitation/violence  Tolerates Percocet (oxycodone/APAP)       Problem List:    Patient Active Problem List   Diagnosis Code    Diabetes mellitus ([address] E11.9    Mixed hyperlipidemia E78.2    Essential hypertension, benign I10    Coronary atherosclerosis of native coronary artery I25.10    Primary cardiomyopathy ([address] I42.8    Chest pain R07.9    Lightheaded R42    Ischemic cardiomyopathy I25.5    Syncope R55    Coronary artery disease I25.10    Ventricular tachycardia (HCC) I47.2    AICD (automatic cardioverter/defibrillator) present Z95.810    Dizziness R42    Tachycardia R00.0    Anasarca R60.1    Ascites R18.8    Diffuse large B-cell lymphoma of solid organ excluding spleen (HCC) C83.39       Past Medical History:        Diagnosis Date    Cardiomyopathy ([address]     Cirrhosis ([address]     ETOH abuse    Diabetes mellitus ([address]     History of abdominal paracentesis 03/2020    Hyperlipidemia     Hypertension     Lymphoma ([address]     currently in remission    NSVT (nonsustained ventricular tachycardia) (HCC)     Sleep apnea     unable to tolerate CPAP    Syncope        Past Surgical History:        Procedure Laterality Date    CARDIAC DEFIBRILLATOR PLACEMENT  6/23/15    single chamber AICD, EPS inducible VT    CHOLECYSTECTOMY      CORONARY ANGIOPLASTY WITH STENT PLACEMENT      LAMINECTOMY      TOENAIL EXCISION  2017    TONSILLECTOMY AND ADENOIDECTOMY         Social History:    Social History     Tobacco Use    Smoking status: Former Smoker     Last attempt to quit: 1975     Years since quittin.5    Smokeless tobacco: Never Used   Substance Use Topics    Alcohol use: Yes     Comment: occ                                Counseling given: Not Answered      Vital Signs (Current):   Vitals:    20 0926 20 0929 20 0938   BP:   88/63   Pulse:   70   Resp:   16   Temp:   97.4 °F (36.3 °C)   TempSrc:   Temporal   SpO2:   99%   Weight: 188 lb (85.3 kg) 182 lb (82.6 kg)    Height: 5' 9\" (1.753 m) 5' 9\" (1.753 m)                                               BP Readings from Last 3 Encounters:   20 88/63   20 92/60   20 100/70       NPO Status: Time of last liquid consumption:                         Time of last solid consumption:                         Date of last liquid consumption: 20                        Date of last solid food consumption: 20    BMI:   Wt Readings from Last 3 Encounters:   20 182 lb (82.6 kg)   20 188 lb (85.3 kg)   20 186 lb 11.2 oz (84.7 kg)     Body mass index is 26.88 kg/m².     CBC:   Lab Results   Component Value Date    WBC 4.4 06/10/2020    RBC 4.14 06/10/2020    HGB 13.4 06/10/2020    HCT 40.6 06/10/2020    MCV 98.0 06/10/2020    RDW 15.0 06/10/2020    PLT 89 06/10/2020       CMP:   Lab Results   Component Value Date     06/10/2020    K 4.7 06/10/2020    K 3.4 2020     06/10/2020    CO2 23 06/10/2020    BUN 30 06/10/2020    CREATININE 1.5 06/10/2020    GFRAA 55 06/10/2020    GFRAA >60 02/13/2010    AGRATIO 1.4 02/29/2020    LABGLOM 46 06/10/2020    GLUCOSE 222 06/10/2020    PROT 5.3 02/29/2020    PROT 7.7 02/12/2010    CALCIUM 9.5 06/10/2020    BILITOT 1.2 02/29/2020    ALKPHOS 110 02/29/2020    AST 12 02/29/2020    ALT 8 02/29/2020       POC Tests:   Recent Labs     08/04/20  0951   POCGLU 275*       Coags:   Lab Results   Component Value Date    PROTIME 11.2 08/04/2020    PROTIME 10.4 01/13/2010    INR 0.97 08/04/2020    APTT 28.4 08/04/2020       HCG (If Applicable): No results found for: PREGTESTUR, PREGSERUM, HCG, HCGQUANT     ABGs: No results found for: PHART, PO2ART, HDR5UOW, NMU7LAN, BEART, Y0DQUIHZ     Type & Screen (If Applicable):  No results found for: LABABO, LABRH    Drug/Infectious Status (If Applicable):  No results found for: HIV, HEPCAB    COVID-19 Screening (If Applicable):   Lab Results   Component Value Date    COVID19 Not Detected 07/29/2020         Anesthesia Evaluation  Patient summary reviewed no history of anesthetic complications:   Airway: Mallampati: II  TM distance: >3 FB   Neck ROM: full  Mouth opening: > = 3 FB Dental: normal exam         Pulmonary: breath sounds clear to auscultation  (+) sleep apnea: on noncompliant,                             Cardiovascular:    (+) hypertension:, pacemaker (AICD has never discharged per pt): AICD, CAD:, CABG/stent (stent. no recent CP):, hyperlipidemia        Rhythm: regular  Rate: normal           Beta Blocker:  Dose within 24 Hrs      ROS comment: 1/2020   Conclusions      Summary   Dilated left ventricle. Normal wall thickness. Severely decreased left ventricular systolic function with an estimated EF   <20%. E/e'=14. Mild mitral regurgitation. Biatrial enlargement. Aortic valve appears sclerotic but opens adequately. No stenosis or   insufficiency. The right ventricle is enlarged and decreased in function.    Moderate tricuspid regurgitation. PASP 37mmHg. Trivial pulmonic regurgitation. There is a trivial anterior pericardial effusion noted. There is fluid around the liver. IVC size is normal (<2.1 cm) but collapses < 50% with respiration consistent   with elevated RA pressure (8 mmHg). Climbs one flight of stairs daily without problem     Neuro/Psych:      (-) seizures, TIA and CVA            ROS comment: Denies ETOH since March GI/Hepatic/Renal:   (+) liver disease:,           Endo/Other:    (+) Diabetes, blood dyscrasia: anticoagulation therapy:., malignancy/cancer (b cell lymphoma). Abdominal:           Vascular:                                        Anesthesia Plan      TIVA     ASA 4     (Patient verbalizes understanding that there is the possibility of recall with TIVA. Patient verbalizes his wishes to proceed with planned anesthetic.)  Induction: intravenous. Anesthetic plan and risks discussed with patient. Plan discussed with CRNA.                   Yazmin Ruth MD   8/4/2020

## 2020-08-04 NOTE — ANESTHESIA POSTPROCEDURE EVALUATION
Department of Anesthesiology  Postprocedure Note    Patient: Mik Kerr  MRN: [id number]  YOB: 1945  Date of evaluation: 8/4/2020  Time:  1:01 PM     Procedure Summary     Date:  08/04/20 Room / Location:  [address]    Anesthesia Start:  3305 Anesthesia Stop:  9435    Procedure:  EGD (N/A Abdomen) Diagnosis:  (CIRRHOSIS K74.60)    Surgeon:  Eric Reynoso MD Responsible Provider:  Trena Patton MD    Anesthesia Type:  TIVA ASA Status:  4          Anesthesia Type: TIVA    Karthikeyan Phase I: Karthikeyan Score: 5    Karthikeyan Phase II:      Last vitals: Reviewed and per EMR flowsheets.        Anesthesia Post Evaluation    Patient location during evaluation: PACU  Patient participation: complete - patient participated  Level of consciousness: awake  Airway patency: patent  Nausea & Vomiting: no vomiting  Complications: no  Cardiovascular status: hemodynamically stable  Respiratory status: acceptable  Hydration status: euvolemic

## 2020-08-04 NOTE — H&P
mg by mouth 2 times daily      aspirin 81 MG tablet Take 81 mg by mouth daily      loperamide (IMODIUM) 2 MG capsule Take 2 mg by mouth 4 times daily as needed for Diarrhea      nitroGLYCERIN (NITROSTAT) 0.4 MG SL tablet Place 0.4 mg under the tongue every 5 minutes as needed for Chest pain up to max of 3 total doses. If no relief after 1 dose, call 911.  clopidogrel (PLAVIX) 75 MG tablet Take 75 mg by mouth daily. Allergies:  Penicillins; Cortisone; Metformin; Metformin hcl; and Morphine        Social History:   Social History     Tobacco Use    Smoking status: Former Smoker     Last attempt to quit: 1975     Years since quittin.5    Smokeless tobacco: Never Used   Substance Use Topics    Alcohol use: Yes     Comment: occ     Family History:   Family History   Problem Relation Age of Onset    Heart Disease Mother     Heart Disease Father        PHYSICAL EXAM:      BP 88/63   Pulse 70   Temp 97.4 °F (36.3 °C) (Temporal)   Resp 16   Ht 5' 9\" (1.753 m)   Wt 182 lb (82.6 kg)   SpO2 99%   BMI 26.88 kg/m²  I        Heart:  RRR,  Normal S1S2    Lungs:  CTA Bilat, normal effort    Abdomen:  ND NT      ASA Grade:  ASA 4 - Patient with severe systemic disease that is a constant threat to life    Mallampati Class:  Class I: Soft palate, uvula, fauces, pillars visible  __________  Class II: Soft palate, uvula, fauces visible  ____X_____   Class III: Soft palate, base of uvula visible  __________  Class IV: Hard palate only visible   __________      ASSESSMENT AND PLAN:    1. Patient is a 77 y.o. male here for EGD with anesthesia  2. Procedure options, risks and benefits reviewed with patient. Patient expresses understanding.      Jillian Deutsch MD  [address]  2020

## 2020-08-28 NOTE — PROGRESS NOTES
[facility]   Cardiac Follow-up    Referring Provider: [facility] [place]     Chief Complaint   Patient presents with    Hypertension      History of Present Illness:   Mr. Shaunna Cheatham is a 77 y.o. male. His cardiac history includes  known cardiomyopathy, hypertension, hyperlipidemia, and diabetes. He has cut back on alcohol consumption and does not smoke. In 2006 he had a Taxus stent in his LAD. He also has been treated for dilated cardiomyopathy with EF 40-45%. 2011 stress echo was negative for ischemia, resting EF of 45%, with exercise his EF went up to 55%. 6/2015 He underwent ICD implantation per Dr. Casimiro Colón 6/2015. diagnosed with a liver tumor in 2018. Samara Ish MFF 2/28-3/3/20   Decompensated  cirrhosis of liver with ascites increasing cirrhosis multifactorial from cardiac cirrhosis as well as alcoholism intervention radiology consulted for paracentesis paracentesis done today fluid sent for analysis 5.5 L of fluid removed no evidence of SBP   Ischemic cardiomyopathy with stent in the past EF 15 to 20% no evidence of failure continue current medications  current cardiac medications including Lasix Aldactone. Coreg patient not on ACE inhibitor   35 g of albumin given   Diabetes  mellitus controlled on sliding scale.    History of lymphoma completed course of treatment in remission   Hypokalemia supplement resolved   Patient has chronic congestive heart failure with systolic dysfunction low EF no evidence of acute exacerbation   Creatinine at 1.4 mild increase over the baseline BMP in a week   Outpatient follow-up with GI may need frequent paracentesis    Today, he reports he has been feeling well other than difficulty sleeping. Requesting to cut back on his diuretics. Minimal abdominal swelling. Has not required a paracentesis since March. Denies leg swelling. He has mild SOB but no worse. He denies exertional chest pain, PND, palpitations, light-headedness. Does not smoke.  His wife is with him for the visit. Past Medical History:   has a past medical history of Cardiomyopathy ([facility] Cirrhosis ([facility] Diabetes mellitus ([facility] History of abdominal paracentesis, Hyperlipidemia, Hypertension, Lymphoma ([facility] NS[facility]onsustained [facility] tachycardia) [facility] Sleep apnea, and Syncope. Surgical History:   has a past surgical history that includes Coronary angioplasty with stent; Cholecystectomy; laminectomy; Tonsillectomy and adenoidectomy; Cardiac defibrillator placement (6/23/15); toenail excision (07/21/2017); and Upper gastrointestinal endoscopy (N/A, 8/4/2020). Social History:   reports that he quit smoking about 45 years ago. He has never used smokeless tobacco. He reports current alcohol use. He reports that he does not use drugs. Family History:  family history includes Heart Disease in his father and mother. Current Outpatient Medications   Medication Sig Dispense Refill    MAGNESIUM OXIDE 400 PO Take 450 mg by mouth daily      carvedilol (COREG) 25 MG tablet Take 12.5 mg by mouth nightly Holds pm dose for SBP < 100       furosemide (LASIX) 20 MG tablet Take 40 mg by mouth 2 times daily       Calcium Carb-Cholecalciferol (CALCIUM 1000 + D PO) Take 1,000 mg by mouth 2 times daily      spironolactone (ALDACTONE) 25 MG tablet Take 25 mg by mouth 2 times daily 50mg totall daily      famotidine (PEPCID) 20 MG tablet Take 20 mg by mouth daily      aspirin 81 MG tablet Take 81 mg by mouth daily      vitamin D (CHOLECALCIFEROL) 1000 UNITS TABS tablet Take 1,000 Units by mouth daily      loperamide (IMODIUM) 2 MG capsule Take 2 mg by mouth 4 times daily as needed for Diarrhea      nitroGLYCERIN (NITROSTAT) 0.4 MG SL tablet Place 0.4 mg under the tongue every 5 minutes as needed for Chest pain up to max of 3 total doses. If no relief after 1 dose, call 911.       glipiZIDE (GLUCOTROL) 5 MG tablet Take 5 mg by mouth 2 times daily (before meals)      atorvastatin (LIPITOR) 80 MG tablet Take 80 mg by mouth daily.  clopidogrel (PLAVIX) 75 MG tablet Take 75 mg by mouth daily. No current facility-administered medications for this visit. Allergies:  Penicillins; Cortisone; Metformin; Metformin hcl; and Morphine     Review of Systems:   · Constitutional: there has been no unanticipated weight loss. There's been no change in energy level, sleep pattern, or activity level. · Eyes: No visual changes or diplopia. No scleral icterus. · ENT: No Headaches, hearing loss or vertigo. No mouth sores or sore throat. · Cardiovascular: Reviewed in HPI  · Respiratory: No cough or wheezing, no sputum production. No hematemesis. · Gastrointestinal: No abdominal pain, appetite loss, blood in stools. No change in bowel or bladder habits. · Genitourinary: No dysuria, trouble voiding, or hematuria. · Musculoskeletal:  No gait disturbance, weakness or joint complaints. · Integumentary: No rash or pruritis. · Neurological: No headache, diplopia, change in muscle strength, numbness or tingling. No change in gait, balance, coordination, mood, affect, memory, mentation, behavior. · Psychiatric: No anxiety, no depression. · Endocrine: No malaise, fatigue or temperature intolerance. No excessive thirst, fluid intake, or urination. No tremor. · Hematologic/Lymphatic: No abnormal bruising or bleeding, blood clots or swollen lymph nodes. · Allergic/Immunologic: No nasal congestion or hives. Physical Examination:       Blood pressure 100/70, pulse 64, height 5' 9.5\" (1.765 m), weight 186 lb 6.4 oz (84.6 kg).     Constitutional and General Appearance:  Appears stated age, NAD  Respiratory:  · Normal excursion and expansion without use of accessory muscles  · Resp Auscultation: Normal breath sounds without dullness  Cardiovascular:  · The apical impulses not displaced  · Heart tones are crisp and normal  · Cervical veins are not engorged  · The carotid upstroke is normal in amplitude and contour without delay or bruit  · Peripheral pulses are symmetrical and full  · There is no clubbing, cyanosis of the extremities. · abdominal swelling minimal   · Femoral Arteries: 2+ and equal  · Pedal Pulses: 2+ and equal   Abdomen:  · No masses or tenderness  · Liver/Spleen: No Abnormalities Noted  Neurological/Psychiatric:  · Alert and oriented in all spheres  · Moves all extremities well  · Exhibits normal gait balance and coordination  · No abnormalities of mood, affect, memory, mentation, or behavior are noted    Stress Echo: 8/9/2011 [facility], EF 45%, mild TR, RVSP 34.5 mmHg. [facility] 4/10/15: EF 40%. 3/31/15 Nuclear stress: small sized apical primarily fixed defect consistent with fibrosis. Diaphragm attenuation reduces the specificity of this finding. No ischemia. Dilated left ventricle with global hypokinesis and EF 35%. Frequent PVCs and slow non-sustained polymorphic VT    ECHO 3/4/19:  Left ventricle - normal in size, reduced function with EF of 40%, global hypokinesis, more severe hypokinesis of inferior wall. Mitral valve - trivial regurgitation   Aortic valve - sclerotic   Tricuspid valve - trivial regurgitation with PASP of 32mmHg   Pacer / ICD wire is visualized in the right ventricle and right atrium. Assessment:   1. Coronary atherosclerosis of native coronary artery: Stable. No anginal symptoms. Had CP prior to stenting. 2004 cath> Cyphers to Cx/Nataly,  2007 cath> Cypher to LAD     2011 stress echo> negative for ischemia. 8/28/12 [facility]> patent LAD, marginal and CFX stents. Chronic RCA occlusion. 2.   Cardiomyopathy: Stable. Continue current meds. Stress echo 2011> Negative for ischemia, resting EF of 45%, with exercise his EF went up to 55%. [facility] 8/28/2012> EF 40%. ECHO 7/14/16>  Technically limited study due to body habitus. Arrhythmia noted during exam. Normal left ventricle size. There is mild concentric left ventricular hypertrophy.  Left ventricular function is difficult to estimate due to arrhythmia but appears to be 50-55%. There is reversal of E/A inflow velocities across the mitral valve suggesting impaired left ventricular relaxation. The left atrium is dilated. There is trivial tricuspid regurgitation with RVSP estimated at 27 mmHg. Trivial mitral regurgitation is present. ECHO 3/4/19> EF of 40%, global hypokinesis, more severe hypokinesis of inferior wall. Echo 1/9/20> EF 15 to 20%  Lasix ,Aldactone, Coreg patient not on ACE inhibitor    3. AICD: Receives routine device checks every three months. 4. Essential hypertension: Stable. Blood pressure 100/70, pulse 64, height 5' 9.5\" (1.765 m), weight 186 lb 6.4 oz (84.6 kg). 5.   Hyperlipidemia: Usually drawn thru the [facility]. 6. Diabetes mellitus: Followed by PCP. 7. Large B cell lymphoma presenting as mass in liver- followed by oncology- requiring paracentesis- last in March- recently had an ultrasound that did not require another paracentesis at this time         Plan:   Nat Hinds has a stable cardiac status. Cardiac test and lab results personally reviewed by me during this office visit and discussed. Change furosemide (LASIX) 40 MG tablet daily   Change spironolactone (ALDACTONE) 25 MG tablet daily   Continue risk factor modifications. Call for any change in symptoms. Return for regular follow up in 4 months with Echo. I appreciate the opportunity of cooperating in the care of this individual.    Claude Haines MD., [facility] [place]      Patient's problem list, medications, allergies, past medical, surgical, social and family histories were reviewed and updated as appropriate. Scribe's attestation: This note was scribed in the presence of Dr. Rochelle Torres MD, by Coby Whittington RN. The scribe's documentation has been prepared under my direction and personally reviewed by me in its entirety.  I confirm that the note above accurately reflects all work, treatment, procedures, and medical decision making performed by

## 2020-08-28 NOTE — PATIENT INSTRUCTIONS
Change furosemide (LASIX) 40 MG tablet daily     Change spironolactone (ALDACTONE) 25 MG tablet daily

## 2020-09-28 NOTE — LETTER
[facility] [phone]  [address] [facility],4Th Floor [phone]    Pacemaker/Defibrillator Clinic          09/28/20        [address] [place] [id number]        Dear [address] [place] [address]    This letter is to inform you that we received the transmission from your monitor at home that checks your implanted heart device. The next date your monitor will automatically transmit will be 12-29-20. If your report needs attention we will notify you. Your device and monitor are wireless and most transmit cellularly, but please periodically check your monitor is still plugged in to the electrical outlet. If you still use the telephone land line to send please ensure the connection to the phone tessa is secure. This will help to ensure successful automatic transmissions in the future. Also, the monitor needs to be close to you while sleeping at night. Please be aware that the remote device transmission sites are periodically monitored only during regular business hours during which simultaneous in-office device clinics are being run. If your transmission requires attention, we will contact you as soon as possible. Thank you.             Santo Mukherjee

## 2020-10-13 NOTE — TELEPHONE ENCOUNTER
Spoke with patient's wife states that her  BP has been running low this week. States that he is kind of tired but no other symptoms. Patient's wife wants to know if he should be seen or if his medication should be lowered.

## 2020-10-14 NOTE — TELEPHONE ENCOUNTER
Called and spoke to the patient about the message below he verbalized understanding.  Please advise on the Office visit, Thank you

## 2020-10-17 PROBLEM — J96.01 ACUTE RESPIRATORY FAILURE WITH HYPOXIA (HCC): Status: ACTIVE | Noted: 2020-01-01

## 2020-10-17 PROBLEM — N17.9 ACUTE RENAL FAILURE SUPERIMPOSED ON STAGE 3 CHRONIC KIDNEY DISEASE (HCC): Status: ACTIVE | Noted: 2020-01-01

## 2020-10-17 PROBLEM — N18.30 ACUTE RENAL FAILURE SUPERIMPOSED ON STAGE 3 CHRONIC KIDNEY DISEASE (HCC): Status: ACTIVE | Noted: 2020-01-01

## 2020-10-17 PROBLEM — I95.9 HYPOTENSION: Status: ACTIVE | Noted: 2020-01-01

## 2020-10-17 NOTE — ED NOTES
[facility] received a verbal order from MD Danielle Ellis to order 500 ml bolus.       Jessie Eller RN  10/17/20 7818

## 2020-10-17 NOTE — ED PROVIDER NOTES
[address] [facility]      Pt Name: Leonila Bay  MRN: [id number]  [place] [birth year]  Date of evaluation: 10/17/2020  Provider: Liudmila Layne MD    CHIEF COMPLAINT       Chief Complaint   Patient presents with    Chest Pain     layed down for bed and started having chest pain 45min. Pt has a pacemaker       HISTORY OF PRESENT ILLNESS    Leonila Bay is a 77 y.o. male who presents to the emergency department with chest pain and palpitations. Hx VTACH has AICD. Called EMS when he awoke with 7/10 chest pressure this morning. Has happened before 2/2 VTACH. Nothing makes symptoms better but laying flat makes symptoms worse. This has happened before. No other associated symptoms other than previously mentioned. Nursing Notes were reviewed. Including nursing noted for FM, Surgical History, Past Medical History, Social History, vitals, and allergies; agree with all. REVIEW OF SYSTEMS       Review of Systems   Constitutional: Negative for activity change, appetite change, chills, diaphoresis, fatigue, fever and unexpected weight change. HENT: Negative for congestion, dental problem, drooling, ear discharge and ear pain. Eyes: Negative for photophobia, pain, discharge, redness, itching and visual disturbance. Respiratory: Negative for apnea, cough, choking, chest tightness, shortness of breath, wheezing and stridor. Cardiovascular: Positive for chest pain and palpitations. Negative for leg swelling. Gastrointestinal: Negative for abdominal distention, abdominal pain, anal bleeding, blood in stool, constipation, diarrhea, nausea, rectal pain and vomiting. Endocrine: Negative for cold intolerance and heat intolerance. Genitourinary: Negative for decreased urine volume and urgency. Musculoskeletal: Negative for arthralgias and back pain. Skin: Negative for color change and pallor. Neurological: Negative for dizziness and facial asymmetry. Hematological: Negative for adenopathy. Does not bruise/bleed easily. Psychiatric/Behavioral: Negative for agitation, behavioral problems, confusion and decreased concentration. Except as noted above the remainder of the review of systems was reviewed and negative. PAST MEDICAL HISTORY     Past Medical History:   Diagnosis Date    Cardiomy[facility] ([facility]     Cirrhosis ([facility]     ETOH abuse    [facility]     History of abdominal paracentesis 03/2020    Hyperlipidemia     Hypertension     Lymphoma ([facility]     currently in remission    NSVT (nonsustained ventricular tachycardia) (HCC)     Sleep apnea     unable to tolerate CPAP    Syncope        SURGICAL HISTORY       Past Surgical History:   Procedure Laterality Date    CARDIAC DEFIBRILLATOR PLACEMENT  6/23/15    single chamber AICD, EPS inducible VT    CHOLECYSTECTOMY      CORONARY ANGIOPLASTY WITH STENT PLACEMENT      LAMINECTOMY      TOENAIL EXCISION  07/21/2017    TONSILLECTOMY AND ADENOIDECTOMY      UPPER GASTROINTESTINAL ENDOSCOPY N/A 8/4/2020    EGD performed by Savannah Quan MD at [address] [place] [address]       Previous Medications    ASPIRIN 81 MG TABLET    Take 81 mg by mouth daily    ATORVASTATIN (LIPITOR) 80 MG TABLET    Take 80 mg by mouth daily. CALCIUM CARB-CHOLECALCIFEROL (CALCIUM 1000 + D PO)    Take 1,000 mg by mouth 2 times daily    CARVEDILOL (COREG) 25 MG TABLET    Take 12.5 mg by mouth nightly Holds pm dose for SBP < 100     CLOPIDOGREL (PLAVIX) 75 MG TABLET    Take 75 mg by mouth daily.       FAMOTIDINE (PEPCID) 20 MG TABLET    Take 20 mg by mouth daily    FUROSEMIDE (LASIX) 40 MG TABLET    Take 1 tablet by mouth daily    GLIPIZIDE (GLUCOTROL) 5 MG TABLET    Take 5 mg by mouth 2 times daily (before meals)    LOPERAMIDE (IMODIUM) 2 MG CAPSULE    Take 2 mg by mouth 4 times daily as needed for Diarrhea    MAGNESIUM OXIDE 400 PO    Take 450 mg by mouth daily    NITROGLYCERIN (NITROSTAT) 0.4 MG SL TABLET    Place 0.4 mg under the tongue every 5 minutes as needed for Chest pain up to max of 3 total doses. If no relief after 1 dose, call 911.     SPIRONOLACTONE (ALDACTONE) 25 MG TABLET    Take 1 tablet by mouth daily    VITAMIN D (CHOLECALCIFEROL) 1000 UNITS TABS TABLET    Take 1,000 Units by mouth daily       ALLERGIES     Penicillins; Cortisone; Metformin; Metformin hcl; and Morphine    FAMILY HISTORY        Family History   Problem Relation Age of Onset    Heart Disease Mother     Heart Disease Father        SOCIAL HISTORY       Social History     Socioeconomic History    Marital status:      Spouse name: Not on file    Number of children: Not on file    Years of education: Not on file    Highest education level: Not on file   Occupational History    Not on file   Social Needs    Financial resource strain: Not on file    Food insecurity     Worry: Not on file     Inability: Not on file    Transportation needs     Medical: Not on file     Non-medical: Not on file   Tobacco Use    Smoking status: Former Smoker     Last attempt to quit: 1975     Years since quittin.7    Smokeless tobacco: Never Used   Substance and Sexual Activity    Alcohol use: Yes     Comment: occ    Drug use: No    Sexual activity: Not on file   Lifestyle    Physical activity     Days per week: Not on file     Minutes per session: Not on file    Stress: Not on file   Relationships    Social connections     Talks on phone: Not on file     Gets together: Not on file     Attends [employer] service: Not on file     Active member of club or organization: Not on file     Attends meetings of clubs or organizations: Not on file     Relationship status: Not on file    Intimate partner violence     Fear of current or ex partner: Not on file     Emotionally abused: Not on file     Physically abused: Not on file     Forced sexual activity: Not on file   Other Topics Concern    Not on file   Social History Narrative    Not on file       PHYSICAL EXAM       Vitals:    10/17/20 0445 10/17/20 0450 10/17/20 0500 10/17/20 0515   BP:  84/74  81/66   Pulse: 71 67 78 60   Resp: 19 19 25 28   Temp:       TempSrc:       SpO2: 100% 98% 96% 98%   Weight:       Height:         Physical Exam  Vitals signs and nursing note reviewed. Constitutional:       Appearance: He is well-developed. He is ill-appearing and toxic-appearing. He is not diaphoretic. HENT:      Head: Normocephalic and atraumatic. Eyes:      General:         Right eye: No discharge. Left eye: No discharge. Pupils: Pupils are equal, round, and reactive to light. Neck:      Musculoskeletal: Normal range of motion. Thyroid: No thyromegaly. Trachea: No tracheal deviation. Cardiovascular:      Rate and Rhythm: Regular rhythm. Tachycardia present. Heart sounds: No murmur. Pulmonary:      Breath sounds: No wheezing or rales. Chest:      Chest wall: No tenderness. Abdominal:      General: There is no distension. Palpations: Abdomen is soft. There is no mass. Tenderness: There is no abdominal tenderness. There is no guarding or rebound. Musculoskeletal: Normal range of motion. General: No tenderness or deformity. Skin:     General: Skin is warm. Coloration: Skin is not pale. Findings: No erythema or rash. Neurological:      Mental Status: He is alert. Cranial Nerves: No cranial nerve deficit. Motor: No abnormal muscle tone.       Coordination: Coordination normal.           DIAGNOSTIC RESULTS     EKG: All EKG's are interpreted by the Emergency Department Physician who either signs or Co-signs this chart in the absence of acardiologist.    EKG shows monomorphic VTACH rate 170s after conversion EKG shows nonspecific EKG changes with PVCs no STEMI    RADIOLOGY:   Non-plain film images such as CT, Ultrasoundand MRI are read by the radiologist. Plain radiographic images are visualized and

## 2020-10-17 NOTE — ED NOTES
MD Taylor at bedside explaining central line procedure. Pt and wife verbalized understanding.       Crissy Shirley RN  10/17/20 5019

## 2020-10-17 NOTE — PROGRESS NOTES
Late entries:     0700: Handoff/report completed with [facility], night shift RN. Patient in bed, awake and alert. Complains of no chest pain or any pain at the moment. BP is low, Epi titrated per protocol. Other VSS, patient on 3 L NC.     0745: Shift assessment completed, see flow sheets. 0845: Call from patients wife. Updated on current patient status. All questions answered. Wife to call room phone and speak with patient. 5437: Rounds completed with critical care team, see new orders. Dr. Bashir Marc assessed patient at bed side, aware of current medications and current clinical status. 1127: Page to cardiology regarding increased troponin. 1134: Katerin Sawyer CNP updated on critical troponin of 0.57.     1139: Call from Dr. Ashly Ku. Updated on patient reason for admission and current clinical status. Informed of the low urine output, the low BP, current medications, and past medical history. All questions answered. Dr. Vannesa Rich to be at bedside as soon as possible. 1213: Call from patient wife. Updated again on current status. All questions answered. 1215: Reassessment completed, see flow sheets. No major changes at this time. Patient remains hypotensive. Patient temperature 97.4, and complains of being cold. Patient placed on a bear hugger for warmth. 1258: EKG completed at patient bedside. ANG Vazquez to assist.     2604: Dr. Vannesa Rich at patient bedside. See new orders. 1459: Wife at patient bedside. Patient denies any needs. VSS.     1633: Reassessment completed, see flow sheets. Patient BP hypertensive at this time. See MAR. Other VS stable. Wife remains at the bedside. 1642: Message sent to Dr. Inder Moses regarding patients continued low urine output. Updated on current medications and that patient has only had 17 mL urine output for the shift so far. Also updated on hypertensive BP. No new orders at this time.      1655: Cardiology paged regarding hypertension, critical troponin, and low urine output. 1717: Call from Dr. Vanna Ohara. Fully updated. No new orders at this time. 1721: Wife, Adriana Hummel, called and updated at this time. All questions answered. 1800: Patient in bed eyes closed, VSS on levophed and amiodarone. Remains on 3 L NC.     1900: Handoff/report completed with Fred Hooks night shift RN. Patient in bed, VSS. Gtt handoff compelted. Patient denies any needs.      Electronically signed by Skylar Lovett RN on 10/17/2020 at 7:27 PM

## 2020-10-17 NOTE — CONSULTS
[address] [place] [address] 16                                  CONSULTATION    PATIENT NAME: Lan Ziegler                       :        1945  MED REC NO:   [id number]                          ROOM:       2109  ACCOUNT NO:   [de-identified]                           ADMIT DATE: 10/17/2020  PROVIDER:     Alison Bernal MD    CARDIOLOGY CONSULTATION    CONSULT DATE:  10/17/2020    REASON FOR CONSULTATION:  Ventricular tachycardia. HISTORY OF PRESENT ILLNESS:  The patient is a 81-year-old  male  with a history of ischemic cardiomyopathy status post intracoronary  stent placements in the LAD and left circumflex artery and a  of the  right coronary artery, hypertension, hyperlipidemia, diabetes,  cirrhosis, status post AICD, who was admitted through the emergency room  for complaints of chest pain and shortness of breath and palpitations. In the ER, he was found to be in ventricular tachycardia and was  cardioverted. His defibrillator was checked in 2020 and was  functioning properly. His EF last year checked was 15% to 20%. BUN 35,  creatinine 2.7, potassium 5.9.  H and H is 14.9 and 25.5. EKG shows  sinus bradycardia, inferior infarct, and anterior infarct which is old. No acute ischemic changes. His initial troponin was 0.01, subsequent  one is 0.057. Currently, he is not having any chest pain. Post  cardioversion, he was hypotensive, given boluses of IV fluids and also  started on IV amiodarone drip and epi drip. Currently, the blood  pressure is stable. ALLERGIES:  PENICILLIN, CORTISONE, METFORMIN, MORPHINE. PAST MEDICAL HISTORY:  Ischemic cardiomyopathy, hypertension,  hyperlipidemia, systolic heart failure, diabetes, status post LAD stent  in , status post left circumflex artery stent in . The patient  has a  of the right coronary artery.   He is status post AICD, liver  cirrhosis, obstructive sleep apnea. SOCIAL HISTORY:  He does not smoke but he does drink. He is a [place]. He states he stopped drinking in 03/2020. CURRENT MEDICATIONS:  He is on Coreg 12.5 mg p.o. b.i.d., Lasix 40 mg  p.o. daily, Aldactone 25 mg daily, aspirin 81 mg, Lipitor 80 mg, Plavix  75, glipizide 5 mg p.o. daily. FAMILY HISTORY:  Positive for CAD. REVIEW OF SYSTEMS:  GASTROINTESTINAL:  No melena or hematochezia. GENITOURINARY:  No frequency or dysuria. RESPIRATORY:  Shortness of breath on exertion. NEUROLOGIC:  He has dizziness. No syncopal episode. CARDIOVASCULAR:  Chest pain and palpitation. All other systems were reviewed and were negative. PHYSICAL EXAMINATION:  GENERAL:  He is alert, oriented. VITAL SIGNS:  Blood pressure latest one was 108/88, pulse of 55, 94%  sats. HEENT:  Pupils are equal in size, reactive to light. Extraocular  muscles intact. NECK:  Supple. There is no jugular venous distention. LUNGS:  Clear to auscultation. CARDIOVASCULAR:  Irregular. Systolic 2/6. ABDOMEN:  Soft. Bowel sounds are positive. EXTREMITIES:  No pedal edema. Pulses are equal bilaterally. NEUROLOGICAL:  Cranial nerves are intact. No focal deficits. IMPRESSION:  1. Sustained ventricular tachycardia status post cardioversion. 2.  Ischemic cardiomyopathy. 3.  Systolic heart failure. 4.  Liver cirrhosis. 5.  Hypotension, likely due to arrhythmia. RECOMMENDATION:  1. Repeat echocardiogram.  2.  IV amiodarone drip. 3.  Once blood pressure improves, we will restart the beta-blocker. He  is not on an ACE inhibitor, Entresto, or ARB secondary to renal issues. 4.  Check magnesium level. 5.  I will switch epinephrine drip to Levophed and titrate it down. 6.  Would hold any further fluid boluses.         Jessie Santos MD    D: 10/17/2020 13:59:50       T: 10/17/2020 14:48:23     [email or web address]  Job#: [id number]     Doc#: [id number]    CC:

## 2020-10-17 NOTE — PROGRESS NOTES
Progress Note  Admit Date: 10/17/2020      PCP: [facility]     CC: F/U for VT    Days in hospital:  0    SUBJECTIVE / Interval History:  Patient feels fine. No complaints right now        Allergies  Penicillins; Cortisone; Metformin; Metformin hcl; and Morphine    Medications    Scheduled Meds:   sodium chloride  500 mL Intravenous Once    clopidogrel  75 mg Oral Daily    carvedilol  12.5 mg Oral Nightly    atorvastatin  80 mg Oral Daily    aspirin  81 mg Oral Daily    sodium chloride flush  10 mL Intravenous 2 times per day    enoxaparin  40 mg Subcutaneous Daily    insulin lispro  0-18 Units Subcutaneous TID     insulin lispro  0-9 Units Subcutaneous Nightly     Continuous Infusions:   amiodarone 1 mg/min (10/17/20 0507)    Followed by   [facility] amiodarone      dextrose      EPINEPHrine infusion 3 mcg/min (10/17/20 5790)       PRN Meds:  sodium chloride flush, acetaminophen **OR** acetaminophen, polyethylene glycol, ondansetron, glucose, dextrose, glucagon (rDNA), dextrose    Vitals    BP (!) 69/44   Pulse 54   Temp 97.5 °F (36.4 °C) (Axillary)   Resp 23   Ht 5' 9.5\" (1.765 m)   Wt 196 lb 10.4 oz (89.2 kg)   SpO2 100%   BMI 28.62 kg/m²     Exam:    Gen: No distress. Eyes: PERRL. No sclera icterus. No conjunctival injection. ENT: No discharge. Pharynx clear. External appearance of ears and nose normal.  Neck: Trachea midline. No obvious mass. Resp: No accessory muscle use. No crackles. No wheezes. No rhonchi. No dullness on percussion. CV: Regular rate. Regular rhythm. No murmur or rub. No edema. GI: Non-tender. Non-distended. No hernia. Skin: Warm, dry, normal texture and turgor. No nodule on exposed extremities. Lymph: No cervical LAD. No supraclavicular LAD. M/S: No cyanosis. No clubbing. No joint deformity. Neuro: Moves all four extremities. CN 2-12 tested, no defect noted. Psych: Oriented x 3. No anxiety. Awake. Alert. Intact judgement and insight.     Data LABS  CBC:   Recent Labs     10/17/20  0424   WBC 7.6   HGB 14.9   HCT 45.5   MCV 95.5   PLT 93*     BMP:   Recent Labs     10/17/20  0424   *   K 4.9   CL 99   CO2 18*   BUN 32*   CREATININE 2.4*   GLUCOSE 344*     POC GLUCOSE:    Recent Labs     10/17/20  0725   POCGLU 288*     LIVER PROFILE: No results for input(s): AST, ALT, LIPASE, AMYLASE, LABALBU, BILIDIR, BILITOT, ALKPHOS in the last 72 hours. PT/INR: No results for input(s): PROTIME, INR in the last 72 hours. APTT: No results for input(s): APTT in the last 72 hours. UA:No results for input(s): NITRITE, COLORU, PHUR, LABCAST, WBCUA, RBCUA, MUCUS, TRICHOMONAS, YEAST, BACTERIA, CLARITYU, SPECGRAV, LEUKOCYTESUR, UROBILINOGEN, BILIRUBINUR, BLOODU, GLUCOSEU, KETUA, AMORPHOUS in the last 72 hours. Microbiology:  Wound Culture: No results for input(s): WNDABS, ORG in the last 72 hours. Invalid input(s):  LABGRAM  Nasal Culture: No results for input(s): ORG, MRSAPCR in the last 72 hours. Blood Culture: No results for input(s): BC, BLOODCULT2 in the last 72 hours. Fungal Culture:   No results for input(s): FUNGSM in the last 72 hours. No results for input(s): FUNCXBLD in the last 72 hours. CSF Culture:  No results for input(s): COLORCSF, APPEARCSF, CFTUBE, CLOTCSF, WBCCSF, RBCCSF, NEUTCSF, NUMCELLSCSF, LYMPHSCSF, MONOCSF, GLUCCSF, VOLCSF in the last 72 hours. Respiratory Culture:  No results for input(s): Alferd Kand in the last 72 hours. AFB:No results for input(s): AFBSMEAR in the last 72 hours. Urine Culture  No results for input(s): LABURIN in the last 72 hours. RADIOLOGY:    XR CHEST PORTABLE   Final Result   Stable cardiomegaly in obscured left hemidiaphragm, equivocal for small left   effusion. ECHO  Dilated left ventricle. Normal wall thickness. Severely decreased left ventricular systolic function with an estimated EF   <20%. E/e'=14. Mild mitral regurgitation. Biatrial enlargement.    Aortic valve appears sclerotic but opens adequately. No stenosis or   insufficiency. The right ventricle is enlarged and decreased in function. Moderate tricuspid regurgitation. PASP 37mmHg. Trivial pulmonic regurgitation. There is a trivial anterior pericardial effusion noted. There is fluid around the liver. IVC size is normal (<2.1 cm) but collapses < 50% with respiration consistent   with elevated RA pressure (8 mmHg). 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 *    Patient is a 61-year-old male with a past medical history of diabetes, coronary artery disease, cardiomyopathy status post AICD placement, CKD and B-cell lymphoma who presented to the ER with chest pain and palpitations. He was found to have ventricular tachycardia and underwent electrocardioversion. Cardiology was consulted and patient was placed on amiodarone drip. Post cardioversion patient blood pressure was low and central line placed and patient was started on epinephrine. Ventricular tachycardia  -Cardioverted in the ER. Started on amnio drip    Hypotension/ cardiogenic shock   -Epinephrine    CHF-ischemic cardiomyopathy  -Last EF < 20 %  -She has baseline weight is 186 pounds.   On admission weight is 196 pounds    Coronary artery disease  -S/p stent in the LAD  -Continue Plavix and statin    Acute hypoxic respiratory failure  -Patient is hypoxic to the low 60s on admission  -Chest x-ray shows cardiomegaly and small pleural effusion    History of decompensated liver cirrhosis  -Diagnosis of cardiac as well as alcoholic cirrhosis    YENNIFER on CKD  Creatinine on admission 2.4, baseline creatinine 1.4    Diffuse B-cell lymphoma-in remission  -The past had mass in the liver which is followed outpatient by oncology, ischemic    Diabetes type 2  -Sugars uncontrolled. At home takes glipizide.  -Patient is on amiodarone which is in dextrose and sugars run high. Add low-dose Lantus    Alcohol abuse  -Quit drinking since more than 6 months      DVT Prophylaxis: heparin  Diet: DIET CARB CONTROL;  Code Status: Full Code        Discharge plan - ct care    The patient and / or the family were informed of the results of any tests, a time was given to answer questions, a plan was proposed and they agreed with plan. Discussed with consulting physicians, nursing and social work     The note was completed using EMR. Every effort was made to ensure accuracy; however, inadvertent computerized transcription errors may be present.        Kishore Foster MD

## 2020-10-17 NOTE — ED NOTES
This RN and RN Sabas at bedside, pt is AOX4 joking with wife about receiving \"shock\". Pt stated \"I thought me shoe shoot off\".        Luis Alston, ANG  10/17/20 4122

## 2020-10-17 NOTE — ED NOTES
Report called to [address] [place] [address]. Handoff of pts pain score and plan of care.       Laura Edouard RN  10/17/20 4814

## 2020-10-17 NOTE — H&P
Hospital Medicine  History and Physical    PCP: [facility]  Patient Name: Janette Cuevas    Date of Service: Pt seen/examined on 10/17/2020 and admitted to Inpatient with expected LOS greater than two midnights due to medical therapy    CHIEF COMPLAINT:  Pt c/o chest pain and palpitations  HISTORY OF PRESENT ILLNESS: Pt is an 77y.o. year-old male with a history of pretension, hyperlipidemia, diabetes mellitus, coronary artery disease with a primary cardiomyopathy and is status post AICD placement, chronic renal failure and diffuse B-cell lymphoma. He presents to the emergency room for evaluation following a 45-minute history of chest pain and palpitations. He describes moderately severe, pressure-like pain in the center of his chest. In the emergency room he was found to have ventricular tachycardia and underwent electrical cardioversion. Cardiology was consulted and he was started on an Amiodarone drip. The chest pain began before his ventricular tachycardia. In addition to this, he was also found to be hypoxic and to have worsening renal failure. He states that he felt fine before the episode this morning. His blood pressure remained low after cardioversion, and a central line was placed and he was started on an epinephrine drip. He is being admitted for further evaluation and treatment. Associated signs and symptoms do not include diaphoresis, edema, orthopnea, paroxysmal nocturnal dyspnea, fever or chills.       Past Medical History:        Diagnosis Date    Cardiomyopathy ([address]     [facility] ([facility]     ETOH abuse    Diabetes mellitus ([facility]     History of abdominal paracentesis 03/2020    Hyperlipidemia     Hypertension     Lymphoma ([facility]     currently in remission    NSVT (nonsustained ventricular tachycardia) (HCC)     Sleep apnea     unable to tolerate CPAP    Syncope        Past Surgical History:        Procedure Laterality Date    CARDIAC DEFIBRILLATOR PLACEMENT  6/23/15    single chamber AICD, EPS inducible VT    CHOLECYSTECTOMY      CORONARY ANGIOPLASTY WITH STENT PLACEMENT      LAMINECTOMY      TOENAIL EXCISION  07/21/2017    TONSILLECTOMY AND ADENOIDECTOMY      UPPER GASTROINTESTINAL ENDOSCOPY N/A 8/4/2020    EGD performed by Fadia Rodarte MD at [facility] ENDOSCOPY       Allergies:  Penicillins; Cortisone; Metformin; Metformin hcl; and Morphine    Medications Prior to Admission:    Prior to Admission medications    Medication Sig Start Date End Date Taking? Authorizing Provider   furosemide (LASIX) 40 MG tablet Take 1 tablet by mouth daily 8/28/20  Yes Eleazar Carrero MD   MAGNESIUM OXIDE 400 PO Take 450 mg by mouth daily   Yes Historical Provider, MD   carvedilol (COREG) 25 MG tablet Take 12.5 mg by mouth nightly Holds pm dose for SBP < 100    Yes Historical Provider, MD   Calcium Carb-Cholecalciferol (CALCIUM 1000 + D PO) Take 1,000 mg by mouth 2 times daily   Yes Historical Provider, MD   famotidine (PEPCID) 20 MG tablet Take 20 mg by mouth daily   Yes Historical Provider, MD   aspirin 81 MG tablet Take 81 mg by mouth daily   Yes Historical Provider, MD   vitamin D (CHOLECALCIFEROL) 1000 UNITS TABS tablet Take 1,000 Units by mouth daily   Yes Historical Provider, MD   nitroGLYCERIN (NITROSTAT) 0.4 MG SL tablet Place 0.4 mg under the tongue every 5 minutes as needed for Chest pain up to max of 3 total doses. If no relief after 1 dose, call 911. Yes Historical Provider, MD   glipiZIDE (GLUCOTROL) 5 MG tablet Take 5 mg by mouth 2 times daily (before meals)   Yes Historical Provider, MD   atorvastatin (LIPITOR) 80 MG tablet Take 80 mg by mouth daily. Yes Historical Provider, MD   clopidogrel (PLAVIX) 75 MG tablet Take 75 mg by mouth daily.      Yes Historical Provider, MD   spironolactone (ALDACTONE) 25 MG tablet Take 1 tablet by mouth daily 8/28/20   Eleazar Carrero MD   loperamide (IMODIUM) 2 MG capsule Take 2 mg by mouth 4 times daily as needed for Diarrhea    Historical Provider, MD       Family History:       Problem Relation Age of Onset    Heart Disease Mother     Heart Disease Father      Social History:   TOBACCO:   reports that he quit smoking about 45 years ago. He has never used smokeless tobacco.  ETOH:   reports current alcohol use. OCCUPATION:      REVIEW OF SYSTEMS:  A full review of systems was performed and is negative except for that which appears in the HPI    Physical Exam:    Vitals: BP 82/65   Pulse 57   Temp 98 °F (36.7 °C) (Oral)   Resp 10   Ht 5' 9.5\" (1.765 m)   Wt 205 lb 11 oz (93.3 kg)   SpO2 96%   BMI 29.94 kg/m²   General appearance: WD/WN 77y.o. year-old  male who is alert, appears stated age and is cooperative  HEENT: Head: Normocephalic, no lesions, without obvious abnormality. Eye: Normal external eye, conjunctiva, lids cornea, PEERL. Ears: Normal external ears. Non-tender. Nose: Normal external nose, mucus membranes and septum. Pharynx: Dental Hygiene adequate. Normal buccal mucosa. Normal pharynx. Neck: no adenopathy, no carotid bruit, no JVD, supple, symmetrical, trachea midline and thyroid not enlarged, symmetric, no tenderness/mass/nodules  Lungs: clear to auscultation bilaterally and no use of accessory muscles. Heart: bradycardic, S1, S2 normal, no murmur, click, rub or gallop and normal apical impulse  Abdomen: soft, non-tender; bowel sounds normal; no masses, no organomegaly  Extremities: extremities atraumatic, no cyanosis or edema and Homans sign is negative, no sign of DVT. Capillary Refill: Acceptable < 3 seconds   Peripheral Pulses: +3 easily felt, not easily obliterated with pressures   Skin: Skin color, texture, turgor normal. No rashes or lesions on exposed skin  Neurologic: Neurovascularly intact without any focal sensory/motor deficits. Cranial nerves: II-XII intact, grossly non-focal. Gait was not tested.   Psychiatric: Alert and oriented, thought content appropriate, normal insight      CBC:   Recent Labs     10/17/20  0424   WBC 7.6   HGB 14.9   PLT 93*     BMP:    Recent Labs     10/17/20  0424   *   K 4.9   CL 99   CO2 18*   BUN 32*   CREATININE 2.4*   GLUCOSE 344*     Troponin:   Recent Labs     10/17/20  0424   TROPONINI 0.01     PT/INR:  No results found for: PTINR  U/A:    Lab Results   Component Value Date    LEUKOCYTESUR Negative 02/28/2020    SPECGRAV 1.011 02/28/2020    UROBILINOGEN 1.0 02/28/2020    BILIRUBINUR Negative 02/28/2020    BILIRUBINUR NEGATIVE 02/12/2010    BLOODU Negative 02/28/2020    GLUCOSEU Negative 02/28/2020    GLUCOSEU NEGATIVE 02/12/2010    PROTEINU Negative 02/28/2020         RAD:   I have independently reviewed and interpreted the imaging studies below and based my recommendations to the patient on those findings. Xr Chest Portable    Result Date: 10/17/2020  EXAMINATION: ONE XRAY VIEW OF THE CHEST 10/17/2020 4:22 am COMPARISON: Chest radiographs 02/28/2020, 06/24/2015 HISTORY: ORDERING SYSTEM PROVIDED HISTORY: SOB TECHNOLOGIST PROVIDED HISTORY: Reason for exam:->SOB Reason for Exam: Chest Pain (laid down for bed and started having chest pain 45min. Pt has a pacemaker) Acuity: Acute Type of Exam: Initial FINDINGS: Stable enlargement of the cardiac silhouette. Intact single lead AICD left chest generator. Obscured left hemidiaphragm and costophrenic sulcus. Clear right lung. No pneumothorax. No acute osseous abnormality. Stable cardiomegaly in obscured left hemidiaphragm, equivocal for small left effusion.          EKG:   Read by ER in the absence of a Cardiologist shows monomorphic VTACH rate 170s after conversion EKG shows nonspecific EKG changes with PVCs no STEMI      Assessment:   Principal Problem:    Ventricular tachycardia (HCC)  Active Problems:    Mixed hyperlipidemia    Essential hypertension, benign    Primary cardiomyopathy ([facility]    DMII (diabetes mellitus, type 2) ([facility]    Coronary artery disease    AICD (automatic cardioverter/defibrillator) present    Diffuse large B-cell lymphoma of solid organ excluding spleen (HCC)    Hypotension    Acute respiratory failure with hypoxia (HCC)    Acute renal failure superimposed on stage 3 chronic kidney disease ([address]  Resolved Problems:    * No resolved hospital problems. *      Plan:       Ventricular tachycardia ([address] - with an initial rate of 170s. His AICD apparently did not fire. Underwent electrical cardioversion in the emergency room. Cardiology was consulted and started him on Amiodarone. Will be monitored closely in the ICU.  - AICD (automatic cardioverter/defibrillator) present    Hypotension - persisted after electrical cardioversion. A central line was placed and he was started on an epinephrine drip. The etiology of his persistent hypotension is unclear at this time, Amiodarone? Cardiology and critical care will be consulted. Will monitor him on telemetry and follow serial cardiac enzymes. Primary cardiomyopathy ([facility] - an echocardiogram on 01/09/2020 shows an EF of < 20%. Hold Lasix and spironolactone initially due to his hypotension. Monitor I&O's and daily weights. CAD (coronary artery disease) - Hold Coreg due to hypotension, continue statin, Plavix and Aspirin. Monitor on Telemetry. Acute respiratory failure with hypoxia ([address] -patient with an initial oxygen saturation of 62% on room air. Likely secondary to ventricular tachycardia. Provide oxygen as needed to maintain an oxygen saturation of 92% or greater. Acute renal failure superimposed on stage 3 chronic kidney disease ([address] -doubly associated with ventricular tachycardia.   We will monitor his renal function    Diffuse large B-cell lymphoma of solid organ excluding spleen ([address] - Continue outpatient management      Diabetes mellitus II - SSI and carb control diet    Essential (primary) hypertension -currently hypotensive, hold Lasix and Spironolactone and monitor blood pressure    Hyperlipidemia - No current evidence of Rhabdomyolysis or other adverse effects. Continue statin therapy while in the hospital        Due to the high probability of clinically significant life threating deterioration of the patient's condition that required my urgent intervention, a total critical care time 55 minutes was used. This includes but not limited to examining patient, collaborating with other physicians, monitoring vital signs, telemetry, continuous pulse oximety, and clinical response to IV medications; documentation time, review and interpretation of laboratory and radiological data, review of nursing notes and old record review. This time excludes any time that may have been spent performing procedures for life threatening organ failure. DVT Prophylaxis: Lovenox  Diet: DIET CARB CONTROL;  Code Status: Full Code  (Advanced care planning has been discussed with patient and/or responsible family member and is reflected in the code status.  Further orders associated with this have been entered if appropriate)    Disposition: Anticipate that patient will remain in the hospital for 2 to 3+ days depending on further evaluation and clinical course    Please note that over 50 minutes was spent in evaluating the patient, review of records and results, discussion with staff/family, etc.    Valarie Hernandez MD

## 2020-10-17 NOTE — ED NOTES
Pt presents to the ER AOX4 via EMS from home with c/o chest pain that was consistent over a few hours. Pt states the when he went to lay down and the pain was intense for 45 mins. Pt has a pacemaker but states \"it did not fire\". Pts wife gave him 324 mg of ASA at home. On arrival pt pain was gone. MD Brandon at bedside.       Chinedu Dumont RN  10/17/20 2857

## 2020-10-17 NOTE — PROGRESS NOTES
Late entries:     0710: Handoff/report completed with [address] [place] [address], night shift RN. Patient in bed, VSS on 8 L high flow nasal cannula. Bedside skin assessment completed, see other note.

## 2020-10-17 NOTE — PROGRESS NOTES
4 Eyes Skin Assessment     NAME:  Wilson Knapp  YOB: 1945  MEDICAL RECORD NUMBER:  [id number]    The patient is being assess for  Admission    I agree that 2 RN's have performed a thorough Head to Toe Skin Assessment on the patient. ALL assessment sites listed below have been assessed. Areas assessed by both nurses: Yen/Mansi    Head, Face, Ears, Shoulders, Back, Chest, Arms, Elbows, Hands, Sacrum. Buttock, Coccyx, Ischium and Legs. Feet and Heels        Does the Patient have a Wound?  No noted wound(s)       Anand Prevention initiated:  NA   Wound Care Orders initiated:  NA    Pressure Injury (Stage 3,4, Unstageable, DTI, NWPT, and Complex wounds) if present place consult order under [de-identified] No    New and Established Ostomies if present place consult order under : No      Nurse 1 eSignature: Electronically signed by Kate Leos RN on 10/17/20 at 7:04 AM EDT    **SHARE this note so that the co-signing nurse is able to place an eSignature**    Nurse 2 eSignature: Electronically signed by Yenifer Pickard RN on 10/17/20 at 3:44 PM EDT

## 2020-10-17 NOTE — PROGRESS NOTES
4 Eyes Skin Assessment      NAME:  Janette Cuevas  YOB: 1945  MEDICAL RECORD NUMBER:  [id number]     The patient is being assess for  Shift Handoff     I agree that 2 RN's have performed a thorough Head to Toe Skin Assessment on the patient. ALL assessment sites listed below have been assessed.       Areas assessed by both nurses: Yen/Teetee     Head, Face, Ears, Shoulders, Back, Chest, Arms, Elbows, Hands, Sacrum. Buttock, Coccyx, Ischium and Legs. Feet and Heels                              Does the Patient have a Wound?  No noted wound(s)       Anand Prevention initiated:  NA   Wound Care Orders initiated:  NA     Pressure Injury (Stage 3,4, Unstageable, DTI, NWPT, and Complex wounds) if present place consult order under [de-identified] No     New and Established Ostomies if present place consult order under : No       Nurse 1 eSignature: Electronically signed by Silvestre Coles RN on 10/17/20 at 7:04 AM EDT     **SHARE this note so that the co-signing nurse is able to place an eSignature**     Nurse 2 eSignature: Electronically signed by Anton Oviedo RN on 10/17/20 at 7:33 AM EDT

## 2020-10-17 NOTE — PROGRESS NOTES
Medication Reconciliation    List of medications patient is currently taking is complete. Source of information: 1. Conversation with patient's wife over the phone                                      2. EPIC records      Allergies  Penicillins; Cortisone; Metformin; Metformin hcl; Morphine; and Lisinopril     Notes regarding home medications:   1. Patient's furosemide was changed to every other day on 10/12/20 due to hypotension. 2. Patient takes glipizide 7.5 gm BID for diabetes (updated dose). Wife states he does not check his blood sugar at home. Historically was on Lantus 20 units nightly up until March 2020. 3. Flomax 0.4 mg daily and naproxen 500 mg BID PRN added to list.   4. Wife states that his medications that affect his blood pressure change frequently. Was on spironolactone and losartan in the past, but have since been d/c'd.

## 2020-10-17 NOTE — ED NOTES
RN and MD Jair Zamora at bedside for right central line placement. Pt tolerated procedure well.       Lusi Alston RN  10/17/20 5028

## 2020-10-17 NOTE — CONSULTS
PATIENT IS SEEN AT THE REQUEST OF DR. Galloway for Critical care    CONSULTING PHYSICIAN: Nilesh    HISTORY OF PRESENT ILLNESS:  This is a 77 y.o. male who presented to the ED on 10/17 with a CC of chest pain. Per ED notes he woke up with chest pressure which has happened before. Found to be in V. Tach in the ED and defibrillated. Said he started to chest chest racing and heaviness right before he went to bed. Waited a little bit then came in as the racing worsened. He has never had this happen before. AICD is about 8years old. It did not fire, nor has it ever fired.,  Has been feeling tired lately. Last medication change was lasix to every other day      Established Pulmonologist:  None    PAST MEDICAL HISTORY:  Past Medical History:   Diagnosis Date    [facility]     Cirrhosis ([facility]     ETOH abuse    Diabetes mellitus ([facility]     History of abdominal paracentesis 03/2020    Hyperlipidemia     Hypertension     Lymphoma ([facility] [facility]     currently in remission    NSVT (nonsustained ventricular tachycardia) (HCC)     Sleep apnea     unable to tolerate CPAP    Syncope        PAST SURGICAL HISTORY:  Past Surgical History:   Procedure Laterality Date    CARDIAC DEFIBRILLATOR PLACEMENT  6/23/15    single chamber AICD, EPS inducible VT    CHOLECYSTECTOMY      CORONARY ANGIOPLASTY WITH STENT PLACEMENT      LAMINECTOMY      TOENAIL EXCISION  07/21/2017    TONSILLECTOMY AND ADENOIDECTOMY      UPPER GASTROINTESTINAL ENDOSCOPY N/A 8/4/2020    EGD performed by Anna High MD at [facility]:  family history includes Heart Disease in his father and mother. SOCIAL HISTORY:   reports that he quit smoking about 45 years ago.  He has never used smokeless tobacco.  Retired      Scheduled Meds:   sodium chloride  500 mL Intravenous Once    clopidogrel  75 mg Oral Daily    carvedilol  12.5 mg Oral Nightly    atorvastatin  80 mg Oral Daily    aspirin  81 mg Oral Daily    sodium chloride flush  10 mL Intravenous 2 times per day    enoxaparin  40 mg Subcutaneous Daily    insulin lispro  0-12 Units Subcutaneous TID WC    insulin lispro  0-6 Units Subcutaneous Nightly       Continuous Infusions:   amiodarone 1 mg/min (10/17/20 4721)    Followed by   [facility] amiodarone      dextrose      EPINEPHrine infusion 1 mcg/min (10/17/20 5335)       PRN Meds:  sodium chloride flush, acetaminophen **OR** acetaminophen, polyethylene glycol, ondansetron, glucose, dextrose, glucagon (rDNA), dextrose    ALLERGIES:  Patient is allergic to penicillins; cortisone; metformin; metformin hcl; and morphine. REVIEW OF SYSTEMS:  Constitutional: Negative for fever or chills, feeling more tired   HENT: Negative for sore throat  Eyes: Negative for redness   Respiratory: Negative for dyspnea, cough  Cardiovascular: Heart racing, chest pressure  Gastrointestinal: Negative for vomiting, diarrhea   Genitourinary: Negative for hematuria, negative for dysuria  Musculoskeletal: Negative for arthralgias   Skin: Negative for rash  Neurological: Negative for syncope  Hematological: Negative for adenopathy  Extremities:  Negative for swelling    PHYSICAL EXAM:  Blood pressure 101/60, pulse 56, temperature 97.4 °F (36.3 °C), temperature source Axillary, resp. rate 21, height 5' 9.5\" (1.765 m), weight 196 lb 10.4 oz (89.2 kg), SpO2 97 %.'  Gen: No distress. Chronically ill  Eyes: PERRL. No sclera icterus. No conjunctival injection. ENT: No discharge. Pharynx clear. Neck: Trachea midline. No obvious mass. Resp: No accessory muscle use. No crackles. No wheezes. No rhonchi. CV: Regular rate. Regular rhythm. No murmur or rub. GI: Non-tender. Non-distended. No hernia. BS present. Skin: Warm and dry. No nodule on exposed extremities. Lymph: No cervical LAD. No supraclavicular LAD. M/S: No cyanosis. No joint deformity. Neuro: Awake. Alert. Moves all four extremities.    EXT:   no edema, no clubbing    LABS:  CBC:   Recent Labs     10/17/20  0424   WBC 7.6   HGB 14.9   HCT 45.5   MCV 95.5   PLT 93*     BMP:   Recent Labs     10/17/20  0424   *   K 4.9   CL 99   CO2 18*   BUN 32*   CREATININE 2.4*     LIVER PROFILE: No results for input(s): AST, ALT, LIPASE, BILIDIR, BILITOT, ALKPHOS in the last 72 hours. Invalid input(s): AMYLASE,  ALB  PT/INR: No results for input(s): PROTIME, INR in the last 72 hours. APTT: No results for input(s): APTT in the last 72 hours. UA:No results for input(s): NITRITE, COLORU, PHUR, LABCAST, WBCUA, RBCUA, MUCUS, TRICHOMONAS, YEAST, BACTERIA, CLARITYU, SPECGRAV, LEUKOCYTESUR, UROBILINOGEN, BILIRUBINUR, BLOODU, GLUCOSEU, AMORPHOUS in the last 72 hours. Invalid input(s): KETONESU  No results for input(s): PHART, JZM1XYD, PO2ART in the last 72 hours. Cultures:  None     PFTs:  None       ECHO: 1/2020  Dilated left ventricle. Normal wall thickness. Severely decreased left ventricular systolic function with an estimated EF   <20%. E/e'=14. Mild mitral regurgitation. Biatrial enlargement. Aortic valve appears sclerotic but opens adequately. No stenosis or   insufficiency. The right ventricle is enlarged and decreased in function. Moderate tricuspid regurgitation. PASP 37mmHg. Trivial pulmonic regurgitation. There is a trivial anterior pericardial effusion noted. There is fluid around the liver. IVC size is normal (<2.1 cm) but collapses < 50% with respiration consistent   with elevated RA pressure (8 mmHg). ABG:  None    Chest X-ray:  Chest imaging was reviewed by me and showed AICD in place. Cardiomegaly. LLL airspace disease vs effusion      I reviewed all the above labs and studies pertaining to this visit. ASSESSMENT:  · ? Ventricular Tachycardia. ACID did not fire. Unclear if rhythm was SVT with aberrancy.     · Hypotension, not clearly shock  · Severe Cardiomyopathy  · Large B Cell Lymphoma  · YENNIFER with CKD  · Cirrhosis       PLAN:  · 1 liters bolus due to low urine output, YENNIFER (on top of CKD) and need for vasopressors  · Epinephrine for MAP of 60  · Amiodarone gtt  · Heparin for DVT prophylaxis   · Glycemic control  · Will ask RN to call to get ACID interrogated   · Cardiology consult     Thanks    Adair Gentile DO  [facility] Pulmonary

## 2020-10-18 NOTE — PROGRESS NOTES
10/18/2020       ASSESSMENT:  Principal Problem:    V-tach [facility])  Active Problems:    Mixed hyperlipidemia    Essential hypertension, benign    Cardiomyopathy ([facility] [address]    DMII (diabetes mellitus, type 2) ([facility] [address]    Coronary artery disease    AICD (automatic cardioverter/defibrillator) present    Diffuse large B-cell lymphoma of solid organ excluding spleen (HCC)    Hypotension    Acute respiratory failure with hypoxia (HCC)    Acute renal failure superimposed on stage 3 chronic kidney disease (HCC)    YENNIFER (acute kidney injury) ([facility]  Resolved Problems:    * No resolved hospital problems. *      PLAN:  1. YENNIFER most likely ATN from hypotension . 2. Will do rojas . Add lasix gtt . No acute indication of CRRT . 3. Acidosis will 1 amp bicarb . Ad oral bicarb. 4. Cirrhosis of liver from ETOH/cardiac   5. Hypotension add oral proamatine   6. V tach per cards   7. Plan dw ICU/Admitting and Cards .  Total time spent CC 35 mins     Ольга Fofana MD. [id number]

## 2020-10-18 NOTE — PROGRESS NOTES
0725-handoff completed, pt resting, levo conts HR stable,   0920-up to [facility], large soft formed BM, back to bed, tolerated well,   0942-rounded with critical care, updated on condition, aware of renal c/s, and order for lasix this am, pt remains alert, HR stable, weaning levo as bp tolerates,   1110-PMR interogated by RN using unit [employer], faxed result on chart,  1420-DR peck here, updated on periods of low HR, will cont current po amio dose and reeval in am,   1644-fresh water given to pt, offered other food and drink several times today, pt declined, repositioned, watching [employer] game,   1850-pt up to [facility], gait steady, remains on small dose levo, good u/o on lasix gtt,   1906-handoff completed. Electronically signed by Cuauhtemoc Newman RN on 10/18/2020 at 7:08 PM

## 2020-10-18 NOTE — PROGRESS NOTES
Pulmonary Progress Note    CC:  Follow up V.tach, shock    Subjective:  Remains on pressors (levophed)  Cr worse  Oliguric   Afebrile   Says he is feeling better    ROS  No SOB  Some coughing      Intake/Output Summary (Last 24 hours) at 10/18/2020 0722  Last data filed at 10/18/2020 0532  Gross per 24 hour   Intake 1873.45 ml   Output 84 ml   Net 1789.45 ml         PHYSICAL EXAM:  Blood pressure 112/68, pulse 64, temperature 97.5 °F (36.4 °C), temperature source Axillary, resp. rate 26, height 5' 9.5\" (1.765 m), weight 204 lb 2.3 oz (92.6 kg), SpO2 98 %.'  Gen: No distress. Eyes: PERRL. No sclera icterus. No conjunctival injection. ENT: No discharge. Pharynx clear. External appearance of ears and nose normal.  Neck: Trachea midline. No obvious mass. Resp: Coughing during exam  CV: irregular No murmur or rub. GI: Distended    Skin: Warm, dry, normal texture and turgor. No nodule on exposed extremities. Lymph: No cervical LAD. No supraclavicular LAD. M/S: No cyanosis. No clubbing. No joint deformity. Neuro: Moves all four extremities. CN 2-12 tested, no defect noted.   Ext:   no edema    Medications:    Scheduled Meds:   amiodarone  200 mg Oral BID    sodium chloride  500 mL Intravenous Once    clopidogrel  75 mg Oral Daily    atorvastatin  80 mg Oral Daily    aspirin  81 mg Oral Daily    sodium chloride flush  10 mL Intravenous 2 times per day    insulin lispro  0-18 Units Subcutaneous TID     insulin lispro  0-9 Units Subcutaneous Nightly    heparin (porcine)  5,000 Units Subcutaneous 3 times per day    insulin glargine  10 Units Subcutaneous Daily with breakfast       Continuous Infusions:   dextrose      EPINEPHrine infusion Stopped (10/17/20 1618)    norepinephrine 4 mcg/min (10/18/20 0649)       PRN Meds:  sodium chloride flush, acetaminophen **OR** acetaminophen, polyethylene glycol, ondansetron, glucose, dextrose, glucagon (rDNA), dextrose    Labs:  CBC:   Recent Labs 10/17/20  0424 10/18/20  0446   WBC 7.6 10.9   HGB 14.9 14.1   HCT 45.5 43.1   MCV 95.5 94.8   PLT 93* 52*     BMP:   Recent Labs     10/17/20  0424 10/17/20  0811 10/18/20  0446   * 130* 131*   K 4.9 5.9* 5.0   CL 99 99 98*   CO2 18* 16* 16*   PHOS 3.2 3.4  --    BUN 32* 35* 43*   CREATININE 2.4* 2.7* 3.5*     LIVER PROFILE: No results for input(s): AST, ALT, LIPASE, BILIDIR, BILITOT, ALKPHOS in the last 72 hours. Invalid input(s): AMYLASE,  ALB  PT/INR: No results for input(s): PROTIME, INR in the last 72 hours. APTT: No results for input(s): APTT in the last 72 hours. UA:  Recent Labs     10/18/20  0037   COLORU BROWN*   PHUR 5.0   WBCUA 15*   RBCUA >100*   BACTERIA 3+*   CLARITYU TURBID*   SPECGRAV 1.026   LEUKOCYTESUR MODERATE*   UROBILINOGEN 1.0   BILIRUBINUR LARGE*   BLOODU LARGE*   GLUCOSEU 100*   AMORPHOUS 3+     No results for input(s): PH, PCO2, PO2 in the last 72 hours. Films:  Chest imaging reports were reviewed and imaging was reviewed by me and showed cardiomegaly with LLL effusion vs atelectasis     ABG:  None    Cultures:  None    I reviewed the labs and images listed above    ASSESSMENT:  · ? Ventricular Tachycardia. ACID did not fire. · Hypotension, not clearly shock  · Severe Cardiomyopathy  · Large B Cell Lymphoma  · YENNIFER with CKD. Getting worse   · Cirrhosis         PLAN:  · Levophed MAP of 60.   Requirements are less  · Heart rate control per Cardiology   · Heparin for DVT prophylaxis   · Glycemic control  · Needs ACID interrogated as it did not fire   · Nephro following for renal dysfunction          DO [facility]

## 2020-10-18 NOTE — PLAN OF CARE
Problem: Falls - Risk of:  Goal: Will remain free from falls  Description: Will remain free from falls  Outcome: Met This Shift  Goal: Absence of physical injury  Description: Absence of physical injury  Outcome: Met This Shift     Problem: Skin Integrity:  Goal: Will show no infection signs and symptoms  Description: Will show no infection signs and symptoms  Outcome: Met This Shift  Goal: Absence of new skin breakdown  Description: Absence of new skin breakdown  Outcome: Met This Shift     Problem: Cardiac Output - Decreased:  Goal: Cardiac output within specified parameters  Description: Cardiac output within specified parameters  Outcome: Ongoing  Goal: Hemodynamic stability will improve  Description: Hemodynamic stability will improve  Outcome: Ongoing     Problem: Fluid Volume - Imbalance:  Goal: Absence of imbalanced fluid volume signs and symptoms  Description: Absence of imbalanced fluid volume signs and symptoms  Outcome: Ongoing     Problem: Tissue Perfusion, Altered:  Goal: Circulatory function within specified parameters  Description: Circulatory function within specified parameters  Outcome: Ongoing     Problem: Tissue Perfusion - Cardiopulmonary, Altered:  Goal: Hemodynamic stability will improve  Description: Hemodynamic stability will improve  Outcome: Ongoing  Goal: Absence of angina  Description: Absence of angina  Outcome: Ongoing  Goal: Hemodynamic stability will improve  Description: Hemodynamic stability will improve  Outcome: Ongoing  Goal: Circulation will improve to fullest extent possible  Description: Circulation will improve to fullest extent possible  Outcome: Ongoing

## 2020-10-18 NOTE — PROGRESS NOTES
4 Eyes Skin Assessment     NAME:  Daniel Luu  YOB: 1945  MEDICAL RECORD NUMBER:  [id number]    The patient is being assess for  Shift Handoff    I agree that 2 RN's have performed a thorough Head to Toe Skin Assessment on the patient. ALL assessment sites listed below have been assessed. Areas assessed by both nurses:    Head, Face, Ears, Shoulders, Back, Chest, Arms, Elbows, Hands, Sacrum. Buttock, Coccyx, Ischium and Legs. Feet and Heels        Does the Patient have a Wound? No noted wound(s)       Anand Prevention initiated:  Yes   Wound Care Orders initiated:  No    Pressure Injury (Stage 3,4, Unstageable, DTI, NWPT, and Complex wounds) if present place consult order under [de-identified] No    New and Established Ostomies if present place consult order under : No      Nurse 1 eSignature: Electronically signed by Abdirahman Castorena RN on 10/18/20 at 7:28 AM EDT    **SHARE this note so that the co-signing nurse is able to place an eSignature**    Nurse 2 eSignature: . Electronically signed by Kirit Griggs RN on 10/18/2020 at 7:09 PM

## 2020-10-18 NOTE — PROGRESS NOTES
Progress Note  Admit Date: 10/17/2020      PCP: [facility]     CC: F/U for VT    Days in hospital:  1    SUBJECTIVE / Interval History:  Patient states he feels okay. Does not complain of shortness of breath  Needing 5 L of oxygen    Upper very poor. 84 ML      Allergies  Penicillins; Cortisone; Metformin; Metformin hcl; Morphine; and Lisinopril    Medications    Scheduled Meds:   amiodarone  200 mg Oral BID    sodium chloride  500 mL Intravenous Once    clopidogrel  75 mg Oral Daily    atorvastatin  80 mg Oral Daily    aspirin  81 mg Oral Daily    sodium chloride flush  10 mL Intravenous 2 times per day    insulin lispro  0-18 Units Subcutaneous TID WC    insulin lispro  0-9 Units Subcutaneous Nightly    heparin (porcine)  5,000 Units Subcutaneous 3 times per day    insulin glargine  10 Units Subcutaneous Daily with breakfast     Continuous Infusions:   dextrose      EPINEPHrine infusion Stopped (10/17/20 1618)    norepinephrine 4 mcg/min (10/18/20 0649)       PRN Meds:  sodium chloride flush, acetaminophen **OR** acetaminophen, polyethylene glycol, ondansetron, glucose, dextrose, glucagon (rDNA), dextrose    Vitals    /68   Pulse 64   Temp 97.5 °F (36.4 °C) (Axillary)   Resp 26   Ht 5' 9.5\" (1.765 m)   Wt 204 lb 2.3 oz (92.6 kg)   SpO2 98%   BMI 29.71 kg/m²     Exam:    Gen: No distress. Eyes: PERRL. No sclera icterus. No conjunctival injection. ENT: No discharge. Pharynx clear. External appearance of ears and nose normal.  Neck: Trachea midline. No obvious mass. Resp: No accessory muscle use. No crackles. No wheezes. No rhonchi. No dullness on percussion. CV: Regular rate. Regular rhythm. No murmur or rub. No edema. GI: Non-tender. Non-distended. No hernia. Skin: Warm, dry, normal texture and turgor. No nodule on exposed extremities. Lymph: No cervical LAD. No supraclavicular LAD. M/S: No cyanosis. No clubbing. No joint deformity. Neuro:  Moves all four extremities. CN 2-12 tested, no defect noted. Psych: Oriented x 3. No anxiety. Awake. Alert. Data    LABS  CBC:   Recent Labs     10/17/20  0424 10/18/20  0446   WBC 7.6 10.9   HGB 14.9 14.1   HCT 45.5 43.1   MCV 95.5 94.8   PLT 93* 52*     BMP:   Recent Labs     10/17/20  0424 10/17/20  0811 10/18/20  0446   * 130* 131*   K 4.9 5.9* 5.0   CL 99 99 98*   CO2 18* 16* 16*   PHOS 3.2 3.4  --    BUN 32* 35* 43*   CREATININE 2.4* 2.7* 3.5*   GLUCOSE 344* 405* 89     POC GLUCOSE:    Recent Labs     10/17/20  1636 10/17/20  1945 10/18/20  0035 10/18/20  0446 10/18/20  0728   POCGLU 182* 99 92 85 82     LIVER PROFILE:   Recent Labs     10/17/20  0811   LABALBU 3.6     PT/INR: No results for input(s): PROTIME, INR in the last 72 hours. APTT: No results for input(s): APTT in the last 72 hours. UA:  Recent Labs     10/18/20  0037   COLORU BROWN*   PHUR 5.0   WBCUA 15*   RBCUA >100*   BACTERIA 3+*   CLARITYU TURBID*   SPECGRAV 1.026   LEUKOCYTESUR MODERATE*   UROBILINOGEN 1.0   BILIRUBINUR LARGE*   BLOODU LARGE*   GLUCOSEU 100*   KETUA 15*   AMORPHOUS 3+     Microbiology:  Wound Culture: No results for input(s): WNDABS, ORG in the last 72 hours. Invalid input(s):  LABGRAM  Nasal Culture: No results for input(s): ORG, MRSAPCR in the last 72 hours. Blood Culture: No results for input(s): BC, BLOODCULT2 in the last 72 hours. Fungal Culture:   No results for input(s): FUNGSM in the last 72 hours. No results for input(s): FUNCXBLD in the last 72 hours. CSF Culture:  No results for input(s): COLORCSF, APPEARCSF, CFTUBE, CLOTCSF, WBCCSF, RBCCSF, NEUTCSF, NUMCELLSCSF, LYMPHSCSF, MONOCSF, GLUCCSF, VOLCSF in the last 72 hours. Respiratory Culture:  No results for input(s): Alison [place] in the last 72 hours. AFB:No results for input(s): AFBSMEAR in the last 72 hours. Urine Culture  No results for input(s): LABURIN in the last 72 hours.     RADIOLOGY:    XR CHEST PORTABLE   Final Result   Stable cardiomegaly in obscured left hemidiaphragm, equivocal for small left   effusion. ECHO  Dilated left ventricle. Normal wall thickness. Severely decreased left ventricular systolic function with an estimated EF   <20%. E/e'=14. Mild mitral regurgitation. Biatrial enlargement. Aortic valve appears sclerotic but opens adequately. No stenosis or   insufficiency. The right ventricle is enlarged and decreased in function. Moderate tricuspid regurgitation. PASP 37mmHg. Trivial pulmonic regurgitation. There is a trivial anterior pericardial effusion noted. There is fluid around the liver. IVC size is normal (<2.1 cm) but collapses < 50% with respiration consistent   with elevated RA pressure (8 mmHg). CONSULTS:    IP CONSULT TO CARDIOLOGY  IP CONSULT TO CARDIOLOGY  IP CONSULT TO CRITICAL CARE    ASSESSMENT AND PLAN:      Principal Problem:    V-tach ([facility] [address]  Active Problems:    Mixed hyperlipidemia    Essential hypertension, benign    Cardiomyopathy ([address]    DMII (diabetes mellitus, type 2) ([address]    Coronary artery disease    AICD (automatic cardioverter/defibrillator) present    Diffuse large B-cell lymphoma of solid organ excluding spleen (HCC)    Hypotension    Acute respiratory failure with hypoxia (HCC)    Acute renal failure superimposed on stage 3 chronic kidney disease (HCC)    YENNIFER (acute kidney injury) ([address]  Resolved Problems:    * No resolved hospital problems. *    Patient is a 69-year-old male with a past medical history of diabetes, coronary artery disease, cardiomyopathy status post AICD placement, CKD and B-cell lymphoma who presented to the ER with chest pain and palpitations. He was found to have ventricular tachycardia and underwent electrocardioversion. Cardiology was consulted and patient was placed on amiodarone drip. Post cardioversion patient blood pressure was low and central line placed and patient was started on epinephrine.     Ventricular tachycardia  -Cardioverted in the

## 2020-10-18 NOTE — CONSULTS
[address] [address]                                  CONSULTATION    PATIENT NAME: Emili Gallardo                       :        1945  MED REC NO:   [id number]                          ROOM:       2109  ACCOUNT NO:   [de-identified]                           ADMIT DATE: 10/17/2020  PROVIDER:     Yeny Platt MD    REASON FOR CONSULTATION:  Acute kidney injury. HISTORY OF PRESENTING ILLNESS:  He is a 28-year-old  male with  past medical history significant for lymphoma status post bone marrow  transplant, coronary artery disease, congestive heart failure, diabetes  mellitus type 2, hypercholesterolemia, chronic kidney disease who  presented to ER with chest pain and shortness of breath. The patient  was found to have a V-tach and hypotensive at the time of presentation. The patient was treated medically and admitted to the ICU. Renal  consultation had been called for acute kidney disease. Overnight, the  patient required pressors to keep the systolic blood pressure greater  than 90 and MAP of 60. At the time of consultation, the patient is alert, awake but confused  and disoriented. Denies any chest pain but has some shortness of breath  and dyspnea on exertion. Feeling weak, tired, decreased level of  energy. Denies any urinary symptoms. PAST MEDICAL HISTORY:  1. 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 2.  Cardiomyopathy. 3.  Chronic kidney disease. 4.  Hypercholesterolemia. 5.  Lymphoma, status post bone marrow transplant. 6.  History of cardiac arrhythmia. 7.  Sleep apnea. 8.  Diabetes mellitus type 2. PAST SURGICAL HISTORY:  1.  Status post AICD placement. 2.  Status post cholecystectomy. 3.  Status post coronary stent placement. 4.  Status post appendectomy. 5.  Used to get paracentesis for chronic liver disease.     ALLERGIES:  He is allergic to PENICILLIN, CORTISONE, METFORMIN and  MORPHINE. OUTPATIENT MEDICATIONS:  Include Lasix, mag oxide, Coreg, Pepcid,  nitroglycerin, Glucotrol, Lipitor, Plavix, Aldactone and Imodium. FAMILY HISTORY:  Not significant for any kidney disease per records. SOCIAL HISTORY:  He used to drink, quit six months ago. No cigarette  smoking for a long time. REVIEW OF SYSTEMS:  Hard of hearing. No vision problem. Confused,  disoriented. Denies any chest pain but has some shortness of breath and  dyspnea on exertion. Feeling weak, tired, decreased level of energy. Feeling a bit anxious. PHYSICAL EXAMINATION:  GENERAL:  He is lying in bed, but anxious. VITAL SIGNS:  Blood pressure is 100/58, pulse 54, temperature 97.5. HEENT EXAMINATION:  Pupils are reactive to light. CHEST:  Few scattered rhonchi. CVS:  S1, S2 audible. Irregular rate and rhythm. ABDOMEN:  Soft, nontender. Positive bowel sounds. EXTREMITIES:  1+ edema. CNS:  Nonfocal.    LABORATORY DATA:  Sodium 135, potassium 4.4, chloride 102, bicarb 15,  BUN 44, creatinine 3.2. WBC 10.9, hemoglobin 14.1, ALT 2691, AST 5126. IMPRESSION:  1. Acute kidney injury, most likely secondary to hypotension. 2.  Fluid overload. The patient received 3 liters of volume overnight  with very low urine output. 3.  Acidosis. 4.  History of chronic liver disease. 5.  Acute liver injury secondary to hypotension. 6.  V-tach on medication. 7.  History of cardiomyopathy. 8.  History of bone marrow transplant. 9.  History of lymphoma, status post bone marrow transplant. PLAN:  1. Daily weight. 2.  Strict I's and O's.  3.  Start the low dose Lasix drip. 4.  Give 1 amp of bicarb and add oral sodium bicarb. 5.  No acute indication of CRRT. Case discussed in detail with Medical team, Cardiology as well as ICU. Overall prognosis is guarded. We will follow.         Heron Herndon MD    D: 10/18/2020 13:20:07       T: 10/18/2020 28:93:21 [id number]  Job#: [id number]     Doc#: [id number]    CC:

## 2020-10-18 NOTE — PROGRESS NOTES
Titration of Levo to a MAP of >60    0013 page cardiology for consult. Pt's HR has been dipping into 40/30 and sits in 55-60. Also, he has gone up significantly on levo -currenlty 18mcg/ min to obtain a MAP of >60. Amniodarone is at 0.5mg/hr  0205 paged again  0211 discussed with MD- discontinue IV amnio new orders for PO BID.

## 2020-10-18 NOTE — PROGRESS NOTES
[address]   Daily Progress Note    Admit Date:  10/17/2020    Subjective:   Mr. Jameson Marie is a 77 y.o. male who  Denies chest pain. Objective:   BP (!) 100/58   Pulse 54   Temp 97.5 °F (36.4 °C) (Axillary)   Resp 26   Ht 5' 9.5\" (1.765 m)   Wt 204 lb 2.3 oz (92.6 kg)   SpO2 96%   BMI 29.71 kg/m²    I/O last 3 completed shifts: In: 962.5 [I.V.:962.5]  Out: 5644 [Urine:1362]  Wt Readings from Last 3 Encounters:   10/18/20 204 lb 2.3 oz (92.6 kg)   08/28/20 186 lb 6.4 oz (84.6 kg)   08/06/20 184 lb 14.4 oz (83.9 kg)       Physical Exam:  General:  Awake, alert, NAD  Skin:  Warm and dry  Neck:  , no carotid bruits  Chest:  Clear to auscultation, no wheezes/rhonchi/rales  Cardiovascular:  RRR,with ectopy.  normal S1/S2, no M/R/G  Abdomen:  Soft, nontender, +bowel sounds  Extremities:  No edema  Pulses:  Equal     Neurologic:  No focal deficits, cranial nerves intact    Medications:    amiodarone  200 mg Oral BID    sodium bicarbonate  1,300 mg Oral TID    midodrine  5 mg Oral TID WC    sodium chloride  500 mL Intravenous Once    clopidogrel  75 mg Oral Daily    aspirin  81 mg Oral Daily    sodium chloride flush  10 mL Intravenous 2 times per day    insulin lispro  0-18 Units Subcutaneous TID WC    insulin lispro  0-9 Units Subcutaneous Nightly    heparin (porcine)  5,000 Units Subcutaneous 3 times per day      furosemide (LASIX) 1mg/ml infusion 5 mg/hr (10/18/20 1500)    dextrose      norepinephrine 2 mcg/min (10/18/20 1104)       Lab Data:  CBC:   WBC   Date Value Ref Range Status   10/18/2020 10.9 4.0 - 11.0 K/uL Final   10/17/2020 7.6 4.0 - 11.0 K/uL Final   06/10/2020 4.4 4.0 - 11.0 K/uL Final     Hemoglobin   Date Value Ref Range Status   10/18/2020 14.1 13.5 - 17.5 g/dL Final   10/17/2020 14.9 13.5 - 17.5 g/dL Final   06/10/2020 13.4 (L) 13.5 - 17.5 g/dL Final     Platelets   Date Value Ref Range Status   10/18/2020 52 (L) 135 - 450 K/uL Final     Comment:     Result consistent with patient history   10/17/2020 93 (L) 135 - 450 K/uL Final   06/10/2020 89 (L) 135 - 450 K/uL Final     BMP:    Sodium   Date Value Ref Range Status   10/18/2020 135 (L) 136 - 145 mmol/L Final   10/18/2020 131 (L) 136 - 145 mmol/L Final   10/17/2020 130 (L) 136 - 145 mmol/L Final     Potassium   Date Value Ref Range Status   10/18/2020 4.4 3.5 - 5.1 mmol/L Final   10/17/2020 5.9 (H) 3.5 - 5.1 mmol/L Final   06/10/2020 4.7 3.5 - 5.1 mmol/L Final     Potassium reflex Magnesium   Date Value Ref Range Status   10/18/2020 5.0 3.5 - 5.1 mmol/L Final   10/17/2020 4.9 3.5 - 5.1 mmol/L Final     Comment:     Specimen hemolysis has exceeded the interference as defined by Roche. Value may be falsely increased. Suggest recollection if clinically  indicated.      02/29/2020 3.4 (L) 3.5 - 5.1 mmol/L Final     CO2   Date Value Ref Range Status   10/18/2020 15 (L) 21 - 32 mmol/L Final   10/18/2020 16 (L) 21 - 32 mmol/L Final   10/17/2020 16 (L) 21 - 32 mmol/L Final     BUN   Date Value Ref Range Status   10/18/2020 44 (H) 7 - 20 mg/dL Final   10/18/2020 43 (H) 7 - 20 mg/dL Final   10/17/2020 35 (H) 7 - 20 mg/dL Final     CREATININE   Date Value Ref Range Status   10/18/2020 3.2 (H) 0.8 - 1.3 mg/dL Final   10/18/2020 3.5 (H) 0.8 - 1.3 mg/dL Final   10/17/2020 2.7 (H) 0.8 - 1.3 mg/dL Final     LIVR:   AST   Date Value Ref Range Status   10/18/2020 5,126 (H) 15 - 37 U/L Final   02/29/2020 12 (L) 15 - 37 U/L Final   02/28/2020 17 15 - 37 U/L Final     ALT   Date Value Ref Range Status   10/18/2020 2,691 (H) 10 - 40 U/L Final   02/29/2020 8 (L) 10 - 40 U/L Final   02/28/2020 12 10 - 40 U/L Final     PT/INR:   Total Protein   Date Value Ref Range Status   10/18/2020 5.2 (L) 6.4 - 8.2 g/dL Final   02/29/2020 5.3 (L) 6.4 - 8.2 g/dL Final   02/28/2020 6.9 6.4 - 8.2 g/dL Final   02/12/2010 7.7 6.4 - 8.2 gm/dl Final   01/14/2010 6.6 6.4 - 8.2 gm/dl Final     INR   Date Value Ref Range Status   08/06/2020 1.03 0.86 - 1.14 Final     Comment: Troponin T   10/18/2020 1.05 (HH) <0.01 ng/mL Final     Comment:     Previous panic on this admission. Methodology by Troponin T     10/17/2020 1.16 (HH) <0.01 ng/mL Final     Comment:     Methodology by Troponin T     FASTING LIPID PANEL:  Cholesterol, Total   Date Value Ref Range Status   01/15/2010 233 (H) <200 mg/dl Final     HDL   Date Value Ref Range Status   01/15/2010 35 (L) 40 - 60 mg/dl Final     Triglycerides   Date Value Ref Range Status   01/15/2010 255 (H) <150 mg/dl Final     TSH: No results found for: TSH       Diagnostics:    EKG: Reviewed    Chest XRay:  Reveiwed    Assessment:  Patient Active Problem List    Diagnosis Date Noted    Mixed hyperlipidemia 08/02/2011     Priority: High     [facility]      Essential hypertension, benign 08/02/2011     Priority: High     Status Suboptimal control      Coronary atherosclerosis of native coronary artery 08/02/2011     Priority: High     Stable no chest pain        Cardiomyopathy ([address] 08/02/2011     Priority: High     EF 41%per myoview in 12/08 JOSE C 4/10 EF 40%(s/p TIA)  Status stable   Needs stress echo this summer to re assess cardiac /valvular function  Updating deleted diagnoses      Hypotension 10/17/2020    Acute respiratory failure with hypoxia ([address] 10/17/2020    Acute renal failure superimposed on stage 3 chronic kidney disease ([address] 10/17/2020    YENNIFER (acute kidney injury) ([address]     Diffuse large B-cell lymphoma of solid organ excluding spleen ([address] 03/02/2020    Ascites 02/29/2020    Anasarca 02/28/2020    Tachycardia 02/01/2019    Dizziness 09/24/2018    V-tach ([address] 06/23/2015    AICD (automatic cardioverter/defibrillator) present 06/23/2015     Medtronic Single chamber, Medtronic AICD      Ischemic cardiomyopathy     Syncope     Coronary artery disease     Lightheaded 03/31/2015    Chest pain 08/23/2012    DMII (diabetes mellitus, type 2) ([address] 08/02/2011     Managed  Per pcp          Plan:   1.  Switch to po amiodarone. EP to see pt. Agree with IV lasix drip.       Signed:  Electronically signed by Bro Aldana MD on 10/18/2020 at 3:45 PM

## 2020-10-19 NOTE — PROGRESS NOTES
Pulmonary Progress Note    Date of Admission: 10/17/2020   LOS: 2 days       CC:  shock    Subjective:  Feeling better. ROS:   No nausea  No Vomiting  No chest pain         PHYSICAL EXAM:   Blood pressure 113/62, pulse 70, temperature 97.9 °F (36.6 °C), temperature source Axillary, resp. rate 24, height 5' 9.5\" (1.765 m), weight 194 lb 7.1 oz (88.2 kg), SpO2 96 %.'  Gen: No distress. ENT:   Resp: No accessory muscle use. No crackles. No wheezes. No rhonchi. CV: Regular rate. Regular rhythm. No murmur or rub. No edema. Skin: Warm, dry, normal texture and turgor. No nodule on exposed extremities. M/S: No cyanosis. No clubbing. No joint deformity. Psych: Oriented x 3. No anxiety. Awake. Alert. Intact judgement and insight. Good Mood / Affect.   Memory appears in tact       Medications:    Scheduled Meds:   amiodarone  200 mg Oral BID    sodium bicarbonate  1,300 mg Oral TID    midodrine  5 mg Oral TID WC    clopidogrel  75 mg Oral Daily    aspirin  81 mg Oral Daily    sodium chloride flush  10 mL Intravenous 2 times per day    insulin lispro  0-18 Units Subcutaneous TID WC    insulin lispro  0-9 Units Subcutaneous Nightly    heparin (porcine)  5,000 Units Subcutaneous 3 times per day       Continuous Infusions:   furosemide (LASIX) 1mg/ml infusion 5 mg/hr (10/19/20 0833)    dextrose      norepinephrine 1 mcg/min (10/19/20 1008)       PRN Meds:  sodium chloride flush, polyethylene glycol, ondansetron, glucose, dextrose, glucagon (rDNA), dextrose    Labs reviewed:  CBC:   Recent Labs     10/17/20  0424 10/18/20  0446 10/19/20  0520   WBC 7.6 10.9 8.5   HGB 14.9 14.1 13.6   HCT 45.5 43.1 40.3*   MCV 95.5 94.8 92.4   PLT 93* 52* 71*     BMP:   Recent Labs     10/18/20  0446 10/18/20  0940 10/19/20  0520   * 135* 131*   K 5.0 4.4 4.0  4.0   CL 98* 102 96*   CO2 16* 15* 20*   PHOS 3.8 3.9  3.7 3.0   BUN 43* 44* 46*   CREATININE 3.5* 3.2* 2.7*     LIVER PROFILE:   Recent Labs 10/18/20  0940 10/19/20  0520   AST 5,126* 2,849*   ALT 2,691* 2,432*   BILIDIR 2.2* 1.9*   BILITOT 3.6* 3.1*   ALKPHOS 166* 162*     PT/INR: No results for input(s): PROTIME, INR in the last 72 hours. APTT: No results for input(s): APTT in the last 72 hours. UA:  Recent Labs     10/18/20  0037   COLORU BROWN*   PHUR 5.0   WBCUA 15*   RBCUA >100*   BACTERIA 3+*   CLARITYU TURBID*   SPECGRAV 1.026   LEUKOCYTESUR MODERATE*   UROBILINOGEN 1.0   BILIRUBINUR LARGE*   BLOODU LARGE*   GLUCOSEU 100*   AMORPHOUS 3+     No results for input(s): PH, PCO2, PO2 in the last 72 hours. Cx:      Films:  Radiology Review:  Pertinent images / reports were reviewed as a part of this visit. CT Chest w/ contrast: No results found for this or any previous visit. CT Chest w/o contrast: No results found for this or any previous visit. CTPA: No results found for this or any previous visit. CXR PA/LAT: No results found for this or any previous visit. CXR portable:   Results for orders placed during the hospital encounter of 10/17/20   XR CHEST PORTABLE    Narrative EXAMINATION:  ONE XRAY VIEW OF THE CHEST    10/17/2020 4:22 am    COMPARISON:  Chest radiographs 02/28/2020, 06/24/2015    HISTORY:  ORDERING SYSTEM PROVIDED HISTORY: SOB  TECHNOLOGIST PROVIDED HISTORY:  Reason for exam:->SOB  Reason for Exam: Chest Pain (laid down for bed and started having chest pain  45min. Pt has a pacemaker)  Acuity: Acute  Type of Exam: Initial    FINDINGS:  Stable enlargement of the cardiac silhouette. Intact single lead AICD left  chest generator. Obscured left hemidiaphragm and costophrenic sulcus. Clear  right lung. No pneumothorax. No acute osseous abnormality. Impression Stable cardiomegaly in obscured left hemidiaphragm, equivocal for small left  effusion. Assessment:     Sleep apnea. Not albe to tolerate cpap        Plan:      Shock  - levo 1 mcg.   - midodrin.      YENNIFER  - lasix drip  - improved urine output and Cr.       Cardiology   - cardiology following. Transaminitis  - slight improvement. - statin held. Metabolic acidosis  - bicarb tab    Hypoxemia   - improving. Wean O2 to sat >90%   - try to wean off 3L NC. Nutrition  - DIET CARB CONTROL; Low Sodium (2 GM); Daily Fluid Restriction: 1500 ml  -     Intake/Output Summary (Last 24 hours) at 10/19/2020 1019  Last data filed at 10/19/2020 1009  Gross per 24 hour   Intake 474.6 ml   Output 4450 ml   Net -3975.4 ml          Access  Arterial      PICC          CVC       CVC Triple Lumen 10/17/20 Right Femoral (Active)   Continued need for line? Yes 10/19/20 1000   Site Assessment Clean;Dry; Intact 10/19/20 1000   Proximal Lumen Status Infusing 10/19/20 1000   Medial Lumen Status Infusing 10/19/20 1000   Distal Lumen Status Infusing 10/19/20 1000   Dressing Type Transparent; Anti-microbial patch 10/19/20 1000   Dressing Status Clean;Dry; Intact 10/19/20 1000   Dressing Change Due 10/21/20 10/19/20 1000   Number of days: 2          Remove fem line today. I spent 34 minutes of critical care time with this patient excluding any procedures. This note was transcribed using [id number] [employer]. Please disregard any translational errors.       Stefanie Vergara [facility], [facility] [phone]

## 2020-10-19 NOTE — CARE COORDINATION
INITIAL CASE MANAGEMENT ASSESSMENT    Reviewed chart, patient sleeping, spoke with patient's wife in the room to assess possible discharge needs. Explained Case Management role/services. Living Situation: confirmed address, lives with spouse in a 2 story home with basement with 6 steps to enter    ADLs: independent     DME: has a glucometer and insulin and supplies, new walk-in shower with a shower chair    PT/OT Recs: Not ordered at this time, will need to follow to see if evaluation is needed     Active Services: none     Transportation: active , wife will drive at D/C     Medications: confirmed Medicare, [employer], and [facility] [email or web address] [email or web address] [place] all scripts at [facility] in [place]    PCP: [facility] - Dr Rodolfo Darby  Also follows GI/Dr Raina Coley at [facility], Dr. Melissa Pérez/Cardiology at [facility], Dr. Pond/oncology at [facility]      HD/PD: N/A    PLAN/COMMENTS: Plan is for patient to return home. Will need to follow for D/C needs. CM provided contact information for patient or family to call with any questions. CM will follow and assist as needed. Notified [facility] notification line [phone], spoke with Xiao Palomo and she gave notification ID #: [id number].     Electronically signed by Jacqueline Jennings RN on 10/19/2020 at 5:26 PM

## 2020-10-19 NOTE — PROGRESS NOTES
ASSESSMENT:  Principal Problem:    V-tach 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 *      PLAN:  1. YENNIFER most likely ATN from hypotension . 2. Increase UOP ,Cr better . Cut back on lasix gtt . 3. Acidosis on  oral bicarb. 4. Cirrhosis of liver from ETOH/cardiac   5. Hypotension on  oral proamatine   6. V tach per cards   7. Plan dw pt and family .      Verena Yang MD. [address]

## 2020-10-19 NOTE — PROGRESS NOTES
Progress Note  Admit Date: 10/17/2020      PCP: [facility] [place]     CC: F/U for VT    Days in hospital:  2    SUBJECTIVE / Interval History:  Patient states he feels okay. Does not complain of shortness of breath  Needing 3 L of oxygen  D/w wife    Urine output better      Allergies  Penicillins; Cortisone; Metformin; Metformin hcl; Morphine; and Lisinopril    Medications    Scheduled Meds:   amiodarone  200 mg Oral BID    sodium bicarbonate  1,300 mg Oral TID    midodrine  5 mg Oral TID WC    sodium chloride  500 mL Intravenous Once    clopidogrel  75 mg Oral Daily    aspirin  81 mg Oral Daily    sodium chloride flush  10 mL Intravenous 2 times per day    insulin lispro  0-18 Units Subcutaneous TID WC    insulin lispro  0-9 Units Subcutaneous Nightly    heparin (porcine)  5,000 Units Subcutaneous 3 times per day     Continuous Infusions:   furosemide (LASIX) 1mg/ml infusion 5 mg/hr (10/18/20 1500)    dextrose      norepinephrine 7 mcg/min (10/19/20 0739)       PRN Meds:  sodium chloride flush, polyethylene glycol, ondansetron, glucose, dextrose, glucagon (rDNA), dextrose    Vitals    BP 95/64   Pulse 59   Temp 97.6 °F (36.4 °C) (Oral)   Resp 18   Ht 5' 9.5\" (1.765 m)   Wt 194 lb 7.1 oz (88.2 kg)   SpO2 98%   BMI 28.30 kg/m²     Exam:    Gen: No distress. Eyes: PERRL. No sclera icterus. No conjunctival injection. ENT: No discharge. Pharynx clear. External appearance of ears and nose normal.  Neck: Trachea midline. No obvious mass. Resp: No accessory muscle use. No crackles. No wheezes. No rhonchi. No dullness on percussion. CV: Regular rate. Regular rhythm. No murmur or rub. No edema. GI: Non-tender. Non-distended. No hernia. Skin: Warm, dry, normal texture and turgor. No nodule on exposed extremities. Lymph: No cervical LAD. No supraclavicular LAD. M/S: No cyanosis. No clubbing. No joint deformity. Neuro: Moves all four extremities.  CN 2-12 tested, no defect noted.  Psych: Oriented x 3. No anxiety. Awake. Alert. Data    LABS  CBC:   Recent Labs     10/17/20  0424 10/18/20  0446 10/19/20  0520   WBC 7.6 10.9 8.5   HGB 14.9 14.1 13.6   HCT 45.5 43.1 40.3*   MCV 95.5 94.8 92.4   PLT 93* 52* 71*     BMP:   Recent Labs     10/18/20  0446 10/18/20  0940 10/19/20  0520   * 135* 131*   K 5.0 4.4 4.0  4.0   CL 98* 102 96*   CO2 16* 15* 20*   PHOS 3.8 3.9  3.7 3.0   BUN 43* 44* 46*   CREATININE 3.5* 3.2* 2.7*   GLUCOSE 89 91 136*     POC GLUCOSE:    Recent Labs     10/18/20  0446 10/18/20  0728 10/18/20  1131 10/18/20  1547 10/18/20  2037   POCGLU 85 82 79 81 78     LIVER PROFILE:   Recent Labs     10/17/20  0811 10/18/20  0940 10/19/20  0520   AST  --  5,126* 2,849*   ALT  --  2,691* 2,432*   LABALBU 3.6 3.2*  3.1* 3.1*   BILIDIR  --  2.2* 1.9*   BILITOT  --  3.6* 3.1*   ALKPHOS  --  166* 162*     PT/INR: No results for input(s): PROTIME, INR in the last 72 hours. APTT: No results for input(s): APTT in the last 72 hours. UA:  Recent Labs     10/18/20  0037   COLORU BROWN*   PHUR 5.0   WBCUA 15*   RBCUA >100*   BACTERIA 3+*   CLARITYU TURBID*   SPECGRAV 1.026   LEUKOCYTESUR MODERATE*   UROBILINOGEN 1.0   BILIRUBINUR LARGE*   BLOODU LARGE*   GLUCOSEU 100*   KETUA 15*   AMORPHOUS 3+     Microbiology:  Wound Culture: No results for input(s): WNDABS, ORG in the last 72 hours. Invalid input(s):  LABGRAM  Nasal Culture: No results for input(s): ORG, MRSAPCR in the last 72 hours. Blood Culture: No results for input(s): BC, BLOODCULT2 in the last 72 hours. Fungal Culture:   No results for input(s): FUNGSM in the last 72 hours. No results for input(s): FUNCXBLD in the last 72 hours. CSF Culture:  No results for input(s): COLORCSF, APPEARCSF, CFTUBE, CLOTCSF, WBCCSF, RBCCSF, NEUTCSF, NUMCELLSCSF, LYMPHSCSF, MONOCSF, GLUCCSF, VOLCSF in the last 72 hours. Respiratory Culture:  No results for input(s): Marc Landrum in the last 72 hours.   AFB:No results for input(s): AFBSMEAR in the last 72 hours. Urine Culture  No results for input(s): LABURIN in the last 72 hours. RADIOLOGY:    XR CHEST PORTABLE   Final Result   Stable cardiomegaly in obscured left hemidiaphragm, equivocal for small left   effusion. ECHO  Dilated left ventricle. Normal wall thickness. Severely decreased left ventricular systolic function with an estimated EF   <20%. E/e'=14. Mild mitral regurgitation. Biatrial enlargement. Aortic valve appears sclerotic but opens adequately. No stenosis or   insufficiency. The right ventricle is enlarged and decreased in function. Moderate tricuspid regurgitation. PASP 37mmHg. Trivial pulmonic regurgitation. There is a trivial anterior pericardial effusion noted. There is fluid around the liver. IVC size is normal (<2.1 cm) but collapses < 50% with respiration consistent   with elevated RA pressure (8 mmHg). 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 *    Patient is a 71-year-old male with a past medical history of diabetes, coronary artery disease, cardiomyopathy status post AICD placement, CKD and B-cell lymphoma who presented to the ER with chest pain and palpitations. He was found to have ventricular tachycardia and underwent electrocardioversion.   Patient was also found to be in shock. Cardiology was consulted and patient was placed on amiodarone drip. Post cardioversion patient blood pressure was low and central line placed and patient was started on epinephrine. Wide-complex tachycardia /ventricular tachycardia  -Cardioverted in the ER. Started on amnio drip>po  -AICD interrogated   -EP to evaluate    Hypotension/ cardiogenic shock   -Bp still low, on levophed   -Midodrine added    CHF-ischemic cardiomyopathy  -Last EF < 20 %  -She has baseline weight is 186 pounds. On admission weight is 196 pounds  -wt trending up   -Started on a Lasix drip      Coronary artery disease  -S/p stent in the LAD  -Continue Plavix and statin    Acute hypoxic respiratory failure  -Patient is hypoxic to the low 60s on admission  -Chest x-ray shows cardiomegaly and small pleural effusion    YENNIFER on CKD-worse with oliguria - improved with lasix drip  -Consult nephrology  creatinine on admission 2.4, baseline creatinine 1.4  - cardiorenal     Shock liver  -LFTs trending down    Diabetes type 2  -. At home takes glipizide.  -Now patient is off amnio drip and sugars are okay. DC Lantus continue sliding scale      History of decompensated liver cirrhosis  -Diagnosis of cardiac as well as alcoholic cirrhosis      Diffuse B-cell lymphoma-in remission  -The past had mass in the liver which is followed outpatient by oncology, ischemic        Alcohol abuse  -Quit drinking since more than 6 months      DVT Prophylaxis: heparin  Diet: DIET CARB CONTROL; Low Sodium (2 GM); Daily Fluid Restriction: 1500 ml  Code Status: Full Code        Discharge plan - ct care    The patient and / or the family were informed of the results of any tests, a time was given to answer questions, a plan was proposed and they agreed with plan. Discussed with consulting physicians, nursing and social work     The note was completed using EMR.  Every effort was made to ensure accuracy; however, inadvertent computerized transcription errors may be present.        Christie Daigle MD

## 2020-10-19 NOTE — PROGRESS NOTES
[facility]              Progress Note      Admit Date 10/17/2020  HPI: presented with sustained vt, rate 170 bpm below the rate cut off. Note multiple issues, including non-hodgkin lymphoma with cirrhosis, ef 20%, and renal issues. Interval history:     Mr. Brinda Giles denies chest pain, shortness of breath, palpitations today      Subjective:       Scheduled Meds:   amiodarone  200 mg Oral BID    sodium bicarbonate  1,300 mg Oral TID    midodrine  5 mg Oral TID WC    clopidogrel  75 mg Oral Daily    aspirin  81 mg Oral Daily    sodium chloride flush  10 mL Intravenous 2 times per day    insulin lispro  0-18 Units Subcutaneous TID WC    insulin lispro  0-9 Units Subcutaneous Nightly    heparin (porcine)  5,000 Units Subcutaneous 3 times per day     Continuous Infusions:   furosemide (LASIX) 1mg/ml infusion 2.5 mg/hr (10/19/20 1221)    dextrose      norepinephrine Stopped (10/19/20 1046)     PRN Meds:sodium chloride flush, polyethylene glycol, ondansetron, glucose, dextrose, glucagon (rDNA), dextrose       Objective:      Wt Readings from Last 3 Encounters:   10/19/20 194 lb 7.1 oz (88.2 kg)   20 186 lb 6.4 oz (84.6 kg)   20 184 lb 14.4 oz (83.9 kg)   Admit weight: Weight: 205 lb 11 oz (93.3 kg)      Temperature range over 24hrs:   Temp  Av.9 °F (36.6 °C)  Min: 97.5 °F (36.4 °C)  Max: 98.3 °F (36.8 °C)  Current Respiratory Rate:  Resp: 16  Current Pulse:  Pulse: 56  Current Blood Pressure:  BP: (!) 91/58  24hr Blood Pressure Range:  Systolic (09GOC), VQH:36 , Min:76 , LAS:004   ; Diastolic (74AAA), TF, Min:40, Max:90    Current Pulse Oximetry:  SpO2: 91 %      Intake/Output Summary (Last 24 hours) at 10/19/2020 1527  Last data filed at 10/19/2020 1327  Gross per 24 hour   Intake 686.6 ml   Output 3690 ml   Net -3003.4 ml       Telemetry monitor:     Physical Exam:  General:  Awake, alert, NAD  Skin:  Warm and dry  Neck:  No JVD  Chest:  Clear to auscultation, respiration easy  Cardiovascular:  RRR S1S2 no murmur to auscultation  Abdomen:   Bowel sounds normal, abd soft, non-tender  Extremities:  No edema  Device site intact      Imaging      Lab Review     Renal Profile:   Lab Results   Component Value Date    CREATININE 2.7 10/19/2020    BUN 46 10/19/2020     10/19/2020    K 4.0 10/19/2020    K 4.0 10/19/2020    CL 96 10/19/2020    CO2 20 10/19/2020     CBC:    Lab Results   Component Value Date    WBC 8.5 10/19/2020    RBC 4.36 10/19/2020    HGB 13.6 10/19/2020    HCT 40.3 10/19/2020    MCV 92.4 10/19/2020    RDW 16.9 10/19/2020    PLT 71 10/19/2020     BNP:  No results found for: BNP  Fasting Lipid Panel:    Lab Results   Component Value Date    CHOL 233 01/15/2010    HDL 35 01/15/2010    TRIG 255 01/15/2010     Cardiac Enzymes:    Lab Results   Component Value Date    CKTOTAL 65 02/12/2010    CKMB 0.78 01/15/2010    TROPONINI 0.38 10/19/2020     PT/ INR   Lab Results   Component Value Date    INR 1.03 08/06/2020    INR 0.97 08/04/2020    INR 1.09 02/28/2020    PROTIME 12.0 08/06/2020    PROTIME 11.2 08/04/2020    PROTIME 12.7 02/28/2020    PROTIME 10.4 01/13/2010     PTT No results found for: PTT   Lab Results   Component Value Date    MG 2.00 10/19/2020    No results found for: TSH    Assessment/Plan:     Patient Active Problem List   Diagnosis    DMII (diabetes mellitus, type 2) (HCC)    Mixed hyperlipidemia    Essential hypertension, benign    Coronary atherosclerosis of native coronary artery    Cardiomyopathy ([facility]    Chest pain    Lightheaded    Ischemic cardiomyopathy    Syncope    Coronary artery disease    V-tach ([facility]    AICD (automatic cardioverter/defibrillator) present    Dizziness    Tachycardia    Anasarca    Ascites    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 In view of cirrhosis will adjust device to detect vt rate of 170 bpm and hope anti tach pacing works and if not shock. While patient prefers no shock of course, the amio and liver may not be so good. 2. Plan adjust device tomorrow and d/c amio especially with lfts     3. Reviewed with patient, wife and RN.

## 2020-10-20 NOTE — PROGRESS NOTES
[facility]              Progress Note      Admit Date 10/17/2020  HPI: presented with sustained vt, rate 170 bpm below the rate cut off. Note multiple issues, including non-hodgkin lymphoma with cirrhosis, ef 20%, and renal issues. Interval history:     Mr. Ld Suero denies chest pain, shortness of breath, palpitations today      Subjective:       Scheduled Meds:   amiodarone  200 mg Oral BID    sodium bicarbonate  1,300 mg Oral TID    midodrine  5 mg Oral TID WC    clopidogrel  75 mg Oral Daily    aspirin  81 mg Oral Daily    sodium chloride flush  10 mL Intravenous 2 times per day    insulin lispro  0-18 Units Subcutaneous TID WC    insulin lispro  0-9 Units Subcutaneous Nightly    [Held by provider] heparin (porcine)  5,000 Units Subcutaneous 3 times per day     Continuous Infusions:   dextrose       PRN Meds:sodium chloride flush, polyethylene glycol, ondansetron, glucose, dextrose, glucagon (rDNA), dextrose       Objective: Wt Readings from Last 3 Encounters:   10/20/20 190 lb 0.6 oz (86.2 kg)   20 186 lb 6.4 oz (84.6 kg)   20 184 lb 14.4 oz (83.9 kg)   Admit weight: Weight: 205 lb 11 oz (93.3 kg)      Temperature range over 24hrs:   Temp  Av.5 °F (36.9 °C)  Min: 98 °F (36.7 °C)  Max: 98.8 °F (37.1 °C)  Current Respiratory Rate:  Resp: 21  Current Pulse:  Pulse: 63  Current Blood Pressure:  BP: (!) 98/58  24hr Blood Pressure Range:  Systolic (29QQR), CDW:75 , Min:81 , AQR:218   ; Diastolic (00YIT), CCU:66, Min:49, Max:79    Current Pulse Oximetry:  SpO2: 95 %      Intake/Output Summary (Last 24 hours) at 10/20/2020 1634  Last data filed at 10/20/2020 1313  Gross per 24 hour   Intake 1660.3 ml   Output 2300 ml   Net -639.7 ml       Telemetry monitor:     Physical Exam:  General:  Awake, alert, NAD  Skin:  Warm and dry  Neck:  No JVD  Chest:  Clear to auscultation, respiration easy  Cardiovascular:  RRR S1S2 no murmur to auscultation  Abdomen:   Bowel sounds normal, abd soft, non-tender  Extremities:  No edema  Device site intact      Imaging      Lab Review     Renal Profile:   Lab Results   Component Value Date    CREATININE 2.0 10/20/2020    BUN 43 10/20/2020     10/20/2020    K 3.4 10/20/2020    K 3.4 10/20/2020    CL 97 10/20/2020    CO2 21 10/20/2020     CBC:    Lab Results   Component Value Date    WBC 5.7 10/20/2020    RBC 4.08 10/20/2020    HGB 12.7 10/20/2020    HCT 37.5 10/20/2020    MCV 91.9 10/20/2020    RDW 17.2 10/20/2020    PLT 54 10/20/2020     BNP:  No results found for: BNP  Fasting Lipid Panel:    Lab Results   Component Value Date    CHOL 233 01/15/2010    HDL 35 01/15/2010    TRIG 255 01/15/2010     Cardiac Enzymes:    Lab Results   Component Value Date    CKTOTAL 65 02/12/2010    CKMB 0.78 01/15/2010    TROPONINI 0.38 10/19/2020     PT/ INR   Lab Results   Component Value Date    INR 1.03 08/06/2020    INR 0.97 08/04/2020    INR 1.09 02/28/2020    PROTIME 12.0 08/06/2020    PROTIME 11.2 08/04/2020    PROTIME 12.7 02/28/2020    PROTIME 10.4 01/13/2010     PTT No results found for: PTT   Lab Results   Component Value Date    MG 1.80 10/20/2020    No results found for: TSH    Assessment/Plan:     Patient Active Problem List   Diagnosis    DMII (diabetes mellitus, type 2) (HCC)    Mixed hyperlipidemia    Essential hypertension, benign    Coronary atherosclerosis of native coronary artery    Cardiomyopathy ([address]    Chest pain    Lightheaded    Ischemic cardiomyopathy    Syncope    Coronary artery disease    V-tach ([address]    AICD (automatic cardioverter/defibrillator) present    Dizziness    Tachycardia    Anasarca    Ascites    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  In view of cirrhosis will adjust device to detect vt rate of 170 bpm and hope anti tach pacing works and if not shock. While patient prefers no shock of course, the amio and liver may not be so good. 2.  device adjusted to rate cutoff 162 bpm  3.  Will d/c amio as reviewed with him and wife  4. please call if questions

## 2020-10-20 NOTE — PROGRESS NOTES
Awake. Alert. Data    LABS  CBC:   Recent Labs     10/18/20  0446 10/19/20  0520 10/20/20  0455   WBC 10.9 8.5 5.7   HGB 14.1 13.6 12.7*   HCT 43.1 40.3* 37.5*   MCV 94.8 92.4 91.9   PLT 52* 71* 54*     BMP:   Recent Labs     10/18/20  0940 10/19/20  0520 10/20/20  0455   * 131* 133*   K 4.4 4.0  4.0 3.4*  3.4*    96* 97*   CO2 15* 20* 21   PHOS 3.9  3.7 3.0 2.4*   BUN 44* 46* 43*   CREATININE 3.2* 2.7* 2.0*   GLUCOSE 91 136* 120*     POC GLUCOSE:    Recent Labs     10/19/20  0755 10/19/20  1103 10/19/20  1511 10/19/20  2030 10/20/20  0747   POCGLU 133* 173* 215* 168* 127*     LIVER PROFILE:   Recent Labs     10/18/20  0940 10/19/20  0520 10/20/20  0455   AST 5,126* 2,849*  --    ALT 2,691* 2,432*  --    LABALBU 3.2*  3.1* 3.1* 2.9*   BILIDIR 2.2* 1.9*  --    BILITOT 3.6* 3.1*  --    ALKPHOS 166* 162*  --      PT/INR: No results for input(s): PROTIME, INR in the last 72 hours. APTT: No results for input(s): APTT in the last 72 hours. UA:  Recent Labs     10/18/20  0037   COLORU BROWN*   PHUR 5.0   WBCUA 15*   RBCUA >100*   BACTERIA 3+*   CLARITYU TURBID*   SPECGRAV 1.026   LEUKOCYTESUR MODERATE*   UROBILINOGEN 1.0   BILIRUBINUR LARGE*   BLOODU LARGE*   GLUCOSEU 100*   KETUA 15*   AMORPHOUS 3+     Microbiology:  Wound Culture: No results for input(s): WNDABS, ORG in the last 72 hours. Invalid input(s):  LABGRAM  Nasal Culture: No results for input(s): ORG, MRSAPCR in the last 72 hours. Blood Culture: No results for input(s): BC, BLOODCULT2 in the last 72 hours. Fungal Culture:   No results for input(s): FUNGSM in the last 72 hours. No results for input(s): FUNCXBLD in the last 72 hours. CSF Culture:  No results for input(s): COLORCSF, APPEARCSF, CFTUBE, CLOTCSF, WBCCSF, RBCCSF, NEUTCSF, NUMCELLSCSF, LYMPHSCSF, MONOCSF, GLUCCSF, VOLCSF in the last 72 hours. Respiratory Culture:  No results for input(s): Gladis Tran in the last 72 hours.   AFB:No results for input(s): AFBSMEAR in the Cardiology was consulted and patient was placed on amiodarone drip. Post cardioversion patient blood pressure was low and central line placed and patient was started on epinephrine. Patient went into cardiogenic shock with acute kidney injury. Nephrology was consulted. Wide-complex tachycardia /ventricular tachycardia-EP adjusted parameters for the AICD  -Cardioverted in the ER. Started on amnio drip>po(possibly stop amiodarone)  -AICD interrogated   -EP to evaluate-    Hypotension/ cardiogenic shock   -improved, off pressors  -Midodrine added    CHF-ischemic cardiomyopathy  -Last EF < 20 %  -She has baseline weight is 186 pounds. On admission weight is 196 pounds  -wt trending down slowly  -got low Lasix drip      Coronary artery disease  -S/p stent in the LAD  -Continue Plavix and statin    Acute hypoxic respiratory failure-wean oxygen as tolerated  -Patient is hypoxic to the low 60s on admission  -Chest x-ray shows cardiomegaly and small pleural effusion    YENNIFER on CKD- - improved with lasix drip, continue low-dose diuresis  -Consult nephrology  creatinine on admission 2.4, decreased about 3.2 baseline creatinine 1.4  - cardiorenal     Shock liver  -LFTs trending down    Diabetes type 2  -. At home takes glipizide.  -Now patient is off amnio drip and sugars are okay. DC Lantus continue sliding scale      History of decompensated liver cirrhosis  -Diagnosis of cardiac as well as alcoholic cirrhosis      Diffuse B-cell lymphoma-in remission  -The past had mass in the liver which is followed outpatient by oncology, ischemic        Alcohol abuse  -Quit drinking since more than 6 months      DVT Prophylaxis: heparin  Diet: DIET CARB CONTROL; Low Sodium (2 GM);  Daily Fluid Restriction: 1500 ml  Dietary Nutrition Supplements: Frozen Oral Supplement  Code Status: Full Code        Discharge plan - transfer out of ICU    The patient and / or the family were informed of the results of any tests, a time was given to answer questions, a plan was proposed and they agreed with plan. Discussed with consulting physicians, nursing and social work     The note was completed using EMR. Every effort was made to ensure accuracy; however, inadvertent computerized transcription errors may be present.        Mesha Solorio MD

## 2020-10-20 NOTE — PROGRESS NOTES
Pulmonary/Critical Care Progress Note    After chart review, patient is off vasopressors with stable vital signs. No further critical care needs identified at this time. Will sign off, please call with questions. Thank you for the consult.     FLORA Milner  [facility] Pulmonary, Sleep, and Critical Care

## 2020-10-20 NOTE — PLAN OF CARE
will improve  Description: Ability to tolerate increased activity will improve  Outcome: Ongoing     Problem: Fluid Volume - Excess:  Goal: Control of fluid volume excess will improve  Description: Control of fluid volume excess will improve  Outcome: Ongoing     Problem: Gas Exchange - Impaired:  Goal: Levels of oxygenation will improve  Description: Levels of oxygenation will improve  Outcome: Ongoing     Problem: Mood - Altered:  Goal: Mood stable  Description: Mood stable  Outcome: Ongoing     Problem: Tobacco Use:  Goal: Will participate in inpatient tobacco-use cessation counseling  Description: Will participate in inpatient tobacco-use cessation counseling  Outcome: Ongoing     Problem: Venous Thromboembolism:  Goal: Will show no signs or symptoms of venous thromboembolism  Description: Will show no signs or symptoms of venous thromboembolism  Outcome: Ongoing  Goal: Absence of signs or symptoms of impaired coagulation  Description: Absence of signs or symptoms of impaired coagulation  Outcome: Ongoing     Problem: OXYGENATION/RESPIRATORY FUNCTION  Goal: Patient will maintain patent airway  Outcome: Ongoing  Goal: Patient will achieve/maintain normal respiratory rate/effort  Description: Respiratory rate and effort will be within normal limits for the patient  Outcome: Ongoing     Problem: HEMODYNAMIC STATUS  Goal: Patient has stable vital signs and fluid balance  Outcome: Ongoing     Problem: FLUID AND ELECTROLYTE IMBALANCE  Goal: Fluid and electrolyte balance are achieved/maintained  Outcome: Ongoing     Problem: ACTIVITY INTOLERANCE/IMPAIRED MOBILITY  Goal: Mobility/activity is maintained at optimum level for patient  Outcome: Ongoing

## 2020-10-20 NOTE — PLAN OF CARE
Problem: Falls - Risk of:  Goal: Will remain free from falls  Description: Will remain free from falls  10/20/2020 1404 by Emily Bailey RN  Outcome: Ongoing     Problem: Falls - Risk of:  Goal: Absence of physical injury  Description: Absence of physical injury  10/20/2020 1404 by Emily Bailey RN  Outcome: Ongoing     Problem: Skin Integrity:  Goal: Will show no infection signs and symptoms  Description: Will show no infection signs and symptoms  10/20/2020 1404 by Emily Bailey RN  Outcome: Ongoing     Problem: Skin Integrity:  Goal: Absence of new skin breakdown  Description: Absence of new skin breakdown  10/20/2020 1404 by Emily Bailey RN  Outcome: Ongoing     Problem: Discharge Planning:  Goal: Discharged to appropriate level of care  Description: Discharged to appropriate level of care  10/20/2020 1404 by Emily Bailey RN  Outcome: Ongoing     Problem: Cardiac Output - Decreased:  Goal: Avoidance of environmental tobacco smoke  Description: Absence of orthostatic hypotension  10/20/2020 1404 by Emily Bailey RN  Outcome: Ongoing     Problem: Cardiac Output - Decreased:  Goal: Cardiac output within specified parameters  Description: Cardiac output within specified parameters  10/20/2020 1404 by Emily Bailey RN  Outcome: Ongoing     Problem: Cardiac Output - Decreased:  Goal: Hemodynamic stability will improve  Description: Hemodynamic stability will improve  10/20/2020 1404 by Emily Bailey RN  Outcome: Ongoing     Problem: Fluid Volume - Imbalance:  Goal: Absence of imbalanced fluid volume signs and symptoms  Description: Absence of imbalanced fluid volume signs and symptoms  10/20/2020 1404 by Emily Bailey RN  Outcome: Ongoing     Problem: Tissue Perfusion, Altered:  Goal: Circulatory function within specified parameters  Description: Circulatory function within specified parameters  10/20/2020 1404 by Emily Bailey RN  Outcome: Ongoing     Problem: Tissue Perfusion - Cardiopulmonary, Altered:  Goal: Hemodynamic stability will improve  Description: Hemodynamic stability will improve  10/20/2020 1404 by Izell Holter, RN  Outcome: Ongoing     Problem: Tissue Perfusion - Cardiopulmonary, Altered:  Goal: Absence of angina  Description: Absence of angina  10/20/2020 1404 by Izell Holter, RN  Outcome: Ongoing     Problem: Tissue Perfusion - Cardiopulmonary, Altered:  Goal: Hemodynamic stability will improve  Description: Hemodynamic stability will improve  10/20/2020 1404 by Izell Holter, RN  Outcome: Ongoing     Problem: Tissue Perfusion - Cardiopulmonary, Altered:  Goal: Circulation will improve to fullest extent possible  Description: Circulation will improve to fullest extent possible  10/20/2020 1404 by Izell Holter, RN  Outcome: Ongoing     Problem:  Activity Intolerance:  Goal: Ability to tolerate increased activity will improve  Description: Ability to tolerate increased activity will improve  10/20/2020 1404 by Izell Holter, RN  Outcome: Ongoing     Problem: Fluid Volume - Excess:  Goal: Control of fluid volume excess will improve  Description: Control of fluid volume excess will improve  10/20/2020 1404 by Izell Holter, RN  Outcome: Ongoing     Problem: Gas Exchange - Impaired:  Goal: Levels of oxygenation will improve  Description: Levels of oxygenation will improve  10/20/2020 1404 by Izell Holter, RN  Outcome: Ongoing     Problem: Mood - Altered:  Goal: Mood stable  Description: Mood stable  10/20/2020 1404 by Izell Holter, RN  Outcome: Ongoing     Problem: Venous Thromboembolism:  Goal: Will show no signs or symptoms of venous thromboembolism  Description: Will show no signs or symptoms of venous thromboembolism  10/20/2020 1404 by Izell Holter, RN  Outcome: Ongoing     Problem: Venous Thromboembolism:  Goal: Absence of signs or symptoms of impaired coagulation  Description: Absence of signs or symptoms of impaired coagulation  10/20/2020 1404 by Edvin Rose RN  Outcome: Ongoing     Problem: OXYGENATION/RESPIRATORY FUNCTION  Goal: Patient will maintain patent airway  10/20/2020 1404 by Edvin Rose RN  Outcome: Ongoing     Problem: OXYGENATION/RESPIRATORY FUNCTION  Goal: Patient will achieve/maintain normal respiratory rate/effort  Description: Respiratory rate and effort will be within normal limits for the patient  10/20/2020 1404 by Edvin Rose RN  Outcome: Ongoing     Problem: HEMODYNAMIC STATUS  Goal: Patient has stable vital signs and fluid balance  10/20/2020 1404 by Edvin Rose RN  Outcome: Ongoing     Problem: FLUID AND ELECTROLYTE IMBALANCE  Goal: Fluid and electrolyte balance are achieved/maintained  10/20/2020 1404 by Edvin Rose RN  Outcome: Ongoing     Problem: ACTIVITY INTOLERANCE/IMPAIRED MOBILITY  Goal: Mobility/activity is maintained at optimum level for patient  10/20/2020 1404 by Edvin Rose RN  Outcome: Ongoing

## 2020-10-21 NOTE — PLAN OF CARE
Problem: Falls - Risk of:  Goal: Will remain free from falls    Outcome: Ongoing  Note: Fall risk assessment completed every shift. All precautions in place. Pt has call light within reach at all times. Room clear of clutter. Pt aware to call for assistance when getting up. Problem: Skin Integrity:  Goal: Will show no infection signs and symptoms    Outcome: Ongoing  Note: Skin assessment completed every shift. Pt assessed for incontinence, appropriate barrier cream applied prn. Pt encouraged to turn/rotate every 2 hours. Assistance provided if pt unable to do so themselves. Problem: Cardiac Output - Decreased:  Goal: Avoidance of environmental tobacco smoke    Outcome: Ongoing  Note: Assess breath sounds, oxygen requirements and saturations, and activity tolerance. Monitor intake and output and daily weights and lab values. Encourage fluid and dietary restrictions.          Problem: Fluid Volume - Imbalance:  Goal: Absence of imbalanced fluid volume signs and symptoms    Outcome: Ongoing     Problem: Tissue Perfusion - Cardiopulmonary, Altered:  Goal: Hemodynamic stability will improve    Outcome: Ongoing

## 2020-10-21 NOTE — PROGRESS NOTES
Department of Internal Medicine  Nephrology Progress Note    SUBJECTIVE:  We are following this patient for gaby . Patient progress reviewed. REVIEW OF SYSTEMS:  More awake and oriented  , no SOB/MCCLELLAN . no family at bedside   Physical Exam:    VITALS:  BP (!) 92/59   Pulse 63   Temp 98 °F (36.7 °C) (Oral)   Resp 16   Ht 5' 9.5\" (1.765 m)   Wt 192 lb 3.9 oz (87.2 kg)   SpO2 96%   BMI 27.98 kg/m²   24HR INTAKE/OUTPUT:      Intake/Output Summary (Last 24 hours) at 10/21/2020 1241  Last data filed at 10/21/2020 0926  Gross per 24 hour   Intake 1200 ml   Output 1550 ml   Net -350 ml       Constitutional:  Looks comfortable   Respiratory:  Decrease BS at bases   Gastrointestinal: No  tenderness.   Normal Bowel Sounds  Cardiovascular:  S1, S2 RRR   Edema:  +  Edema  CNS non focal    DATA:    CBC:  Lab Results   Component Value Date    WBC 6.0 10/21/2020    RBC 4.22 10/21/2020    HGB 13.1 10/21/2020    HCT 38.8 10/21/2020    MCV 92.0 10/21/2020    MCH 31.0 10/21/2020    MCHC 33.7 10/21/2020    RDW 17.2 10/21/2020    PLT 58 10/21/2020    MPV 10.0 10/21/2020     CMP:  Lab Results   Component Value Date     10/21/2020    K 3.6 10/21/2020    K 3.4 10/20/2020    CL 96 10/21/2020    CO2 26 10/21/2020    BUN 40 10/21/2020    CREATININE 1.7 10/21/2020    GFRAA 48 10/21/2020    GFRAA >60 02/13/2010    AGRATIO 1.4 02/29/2020    LABGLOM 40 10/21/2020    GLUCOSE 188 10/21/2020    PROT 5.2 10/19/2020    PROT 7.7 02/12/2010    CALCIUM 8.4 10/21/2020    BILITOT 3.1 10/19/2020    ALKPHOS 162 10/19/2020    AST 2,849 10/19/2020    ALT 2,432 10/19/2020      Hepatic Function Panel:   Lab Results   Component Value Date    ALKPHOS 162 10/19/2020    ALT 2,432 10/19/2020    AST 2,849 10/19/2020    PROT 5.2 10/19/2020    PROT 7.7 02/12/2010    BILITOT 3.1 10/19/2020    BILIDIR 1.9 10/19/2020    IBILI 1.2 10/19/2020      Phosphorus:   Lab Results   Component Value Date    PHOS 2.3 10/21/2020       ASSESSMENT:  Principal Problem: V-tach [facility])  Active Problems:    Mixed hyperlipidemia    Essential hypertension, benign    Cardiomyopathy ([facility] [address]    DMII (diabetes mellitus, type 2) [facility]    Coronary artery disease    AICD (automatic cardioverter/defibrillator) present    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 *      PLAN:  1. YENNIFER most likely ATN from hypotension . 2. Increase UOP ,Cr better . Off  lasix gtt . 3. Acidosis on  oral bicarb. 4. Cirrhosis of liver from ETOH/cardiac   5. Hypotension on  oral proamatine   6. V tach per cards   7.  Plan dw pt and nurse     Jhony Murillo MD. [address]43 [address]

## 2020-10-21 NOTE — PROGRESS NOTES
Progress Note  Admit Date: 10/17/2020      PCP: [facility]     CC: F/U for VT    Days in hospital:  4    SUBJECTIVE / Interval History:  Patient feels much better. Now off oxygen    D/w wife    Urine output better, neg 3 L      Allergies  Penicillins; Cortisone; Metformin; Metformin hcl; Morphine; and Lisinopril    Medications    Scheduled Meds:   magnesium sulfate  2 g Intravenous Once    sodium bicarbonate  1,300 mg Oral TID    midodrine  5 mg Oral TID WC    clopidogrel  75 mg Oral Daily    aspirin  81 mg Oral Daily    sodium chloride flush  10 mL Intravenous 2 times per day    insulin lispro  0-18 Units Subcutaneous TID WC    insulin lispro  0-9 Units Subcutaneous Nightly    [Held by provider] heparin (porcine)  5,000 Units Subcutaneous 3 times per day     Continuous Infusions:   dextrose         PRN Meds:  sodium chloride flush, polyethylene glycol, ondansetron, glucose, dextrose, glucagon (rDNA), dextrose    Vitals    /71   Pulse 70   Temp 97.7 °F (36.5 °C) (Oral)   Resp 23   Ht 5' 9.5\" (1.765 m)   Wt 192 lb 3.9 oz (87.2 kg)   SpO2 97%   BMI 27.98 kg/m²     Exam:    Gen: No distress. Eyes: PERRL. No sclera icterus. No conjunctival injection. ENT: No discharge. Pharynx clear. External appearance of ears and nose normal.  Neck: Trachea midline. No obvious mass. Resp: No accessory muscle use. No crackles. No wheezes. No rhonchi. No dullness on percussion. CV: Regular rate. Regular rhythm. No murmur or rub. No edema. GI: Non-tender. Non-distended. No hernia. Skin: Warm, dry, normal texture and turgor. No nodule on exposed extremities. Lymph: No cervical LAD. No supraclavicular LAD. M/S: No cyanosis. No clubbing. No joint deformity. Neuro: Moves all four extremities. CN 2-12 tested, no defect noted. Psych: Oriented x 3. No anxiety. Awake. Alert.      Data    LABS  CBC:   Recent Labs     10/19/20  0520 10/20/20  0455 10/21/20  0434   WBC 8.5 5.7 6.0   HGB 13.6 12.7* 13.1*   HCT 40.3* 37.5* 38.8*   MCV 92.4 91.9 92.0   PLT 71* 54* 58*     BMP:   Recent Labs     10/19/20  0520 10/20/20  0455 10/21/20  0434   * 133* 133*   K 4.0  4.0 3.4*  3.4* 3.6   CL 96* 97* 96*   CO2 20* 21 26   PHOS 3.0 2.4* 2.3*   BUN 46* 43* 40*   CREATININE 2.7* 2.0* 1.7*   GLUCOSE 136* 120* 188*     POC GLUCOSE:    Recent Labs     10/19/20  2030 10/20/20  0747 10/20/20  1147 10/20/20  1547 10/20/20  2004   POCGLU 168* 127* 149* 122* 243*     LIVER PROFILE:   Recent Labs     10/18/20  0940 10/19/20  0520 10/20/20  0455 10/21/20  0434   AST 5,126* 2,849*  --   --    ALT 2,691* 2,432*  --   --    LABALBU 3.2*  3.1* 3.1* 2.9* 3.2*   BILIDIR 2.2* 1.9*  --   --    BILITOT 3.6* 3.1*  --   --    ALKPHOS 166* 162*  --   --      PT/INR: No results for input(s): PROTIME, INR in the last 72 hours. APTT: No results for input(s): APTT in the last 72 hours. UA:  No results for input(s): NITRITE, COLORU, PHUR, LABCAST, WBCUA, RBCUA, MUCUS, TRICHOMONAS, YEAST, BACTERIA, CLARITYU, SPECGRAV, LEUKOCYTESUR, UROBILINOGEN, BILIRUBINUR, BLOODU, GLUCOSEU, KETUA, AMORPHOUS in the last 72 hours. Microbiology:  Wound Culture: No results for input(s): WNDABS, ORG in the last 72 hours. Invalid input(s):  LABGRAM  Nasal Culture: No results for input(s): ORG, MRSAPCR in the last 72 hours. Blood Culture: No results for input(s): BC, BLOODCULT2 in the last 72 hours. Fungal Culture:   No results for input(s): FUNGSM in the last 72 hours. No results for input(s): FUNCXBLD in the last 72 hours. CSF Culture:  No results for input(s): COLORCSF, APPEARCSF, CFTUBE, CLOTCSF, WBCCSF, RBCCSF, NEUTCSF, NUMCELLSCSF, LYMPHSCSF, MONOCSF, GLUCCSF, VOLCSF in the last 72 hours. Respiratory Culture:  No results for input(s): Vannessa July in the last 72 hours. AFB:No results for input(s): AFBSMEAR in the last 72 hours. Urine Culture  No results for input(s): LABURIN in the last 72 hours.     RADIOLOGY:    XR CHEST PORTABLE Final Result   Stable cardiomegaly in obscured left hemidiaphragm, equivocal for small left   effusion. ECHO  Dilated left ventricle. Normal wall thickness. Severely decreased left ventricular systolic function with an estimated EF   <20%. E/e'=14. Mild mitral regurgitation. Biatrial enlargement. Aortic valve appears sclerotic but opens adequately. No stenosis or   insufficiency. The right ventricle is enlarged and decreased in function. Moderate tricuspid regurgitation. PASP 37mmHg. Trivial pulmonic regurgitation. There is a trivial anterior pericardial effusion noted. There is fluid around the liver. IVC size is normal (<2.1 cm) but collapses < 50% with respiration consistent   with elevated RA pressure (8 mmHg). 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 *    Patient is a 69-year-old male with a past medical history of diabetes, coronary artery disease, cardiomyopathy status post AICD placement, CKD and B-cell lymphoma who presented to the ER with chest pain and palpitations. He was found to have ventricular tachycardia and underwent electrocardioversion. Patient was also found to be in shock. Cardiology was consulted and patient was placed on amiodarone drip.   Post cardioversion patient blood pressure was low and central line placed and patient was started on epinephrine. Patient went into cardiogenic shock with acute kidney injury. Nephrology was consulted. Pelon improved . AICD shock rate was reset . Wide-complex tachycardia /ventricular tachycardia-EP adjusted parameters for the AICD  -Cardioverted in the ER.   - amio stop due to toxic effect on liver   -AICD interrogated   -EP to evaluate-    Hypotension/ cardiogenic shock   -improved, off pressors  -Midodrine added    CHF-ischemic cardiomyopathy  -Last EF < 20 %  -She has baseline weight is 186 pounds. On admission weight is 196 pounds  -wt trending down slowly  -got low Lasix drip      Coronary artery disease  -S/p stent in the LAD  -Continue Plavix and statin    Acute hypoxic respiratory failure-wean oxygen as tolerated  -Patient is hypoxic to the low 60s on admission  -Chest x-ray shows cardiomegaly and small pleural effusion    PELON on CKD- - improved   -Consult nephrology  creatinine on admission 2.4, decreased about 3.2 baseline creatinine 1.4  - cardiorenal     Shock liver  -LFTs trending down    Diabetes type 2  -. At home takes glipizide. continue sliding scale      History of decompensated liver cirrhosis  -Diagnosis of cardiac as well as alcoholic cirrhosis      Diffuse B-cell lymphoma-in remission  -The past had mass in the liver which is followed outpatient by oncology, ischemic      Thrombocytopenia  - due to cirrhosis    Hypomag/ hypophosphatemia  -replace and monitor        Alcohol abuse  -Quit drinking since more than 6 months      DVT Prophylaxis: heparin  Diet: DIET CARB CONTROL; Low Sodium (2 GM); Daily Fluid Restriction: 1500 ml  Dietary Nutrition Supplements: Frozen Oral Supplement  Code Status: Full Code        Discharge plan - transfer out of ICU.   Possible discharge tomorrow    The patient and / or the family were informed of the results of any tests, a time was given to answer questions, a plan was proposed and they agreed with plan.    Discussed with consulting physicians, nursing and social work     The note was completed using EMR. Every effort was made to ensure accuracy; however, inadvertent computerized transcription errors may be present.        Tim Eisenberg MD

## 2020-10-21 NOTE — PROGRESS NOTES
Occupational Therapy   Occupational Therapy Initial Assessment  Date: 10/21/2020   Patient Name: Julito Cash  MRN: [id number]     : 1945    Date of Service: 10/21/2020    Discharge Recommendations:  Home with assist PRN  OT Equipment Recommendations  Equipment Needed: No    Julito Cash scored a 18/24 on the AM-[facility] ADL Inpatient form. At this time, no further OT is recommended upon discharge due to current level of functioning. Recommend patient returns to prior setting with prior services. Assessment   Performance deficits / Impairments: Decreased endurance;Decreased functional mobility ; Decreased ADL status; Decreased balance;Decreased strength  Assessment: Per ED notes: \"Pt presents to the emergency room for evaluation following a 45-minute history of chest pain and palpitations. He describes moderately severe, pressure-like pain in the center of his chest. In the emergency room he was found to have ventricular tachycardia and underwent electrical cardioversion. In addition to this, he was also found to be hypoxic and to have worsening renal failure. \" PTA pt lives at home with spouse and was independent with ADLs and functional mobility. Today, pt completed few steps chair <> BSC with CGA d/t urgency to void. Pt reports feeling close to baseline and safe to return home at discharge. Prognosis: Good  Decision Making: Low Complexity  OT Education: OT Role;Plan of Care;Transfer Training  REQUIRES OT FOLLOW UP: Yes  Activity Tolerance  Activity Tolerance: Patient Tolerated treatment well  Safety Devices  Safety Devices in place: Yes  Type of devices: Left in chair;Nurse notified;Call light within reach;Gait belt           Patient Diagnosis(es): The primary encounter diagnosis was V-tach [facility]). Diagnoses of Chest pain, unspecified type and YENNIFER (acute kidney injury) ([facility] were also pertinent to this visit.      has a past medical history of Cardiomyopathy ([facility] [address] Cirrhosis ([facility] [address] Diabetes mellitus ([facility] [address] supervision  Short term goal 2: Pt will tolerate standing > 5 min for functional task with supervision  Short term goal 3: Pt will complete toileting with SBA  Short term goal 4: Pt will complete LB dressing with SBA  Long term goals  Time Frame for Long term goals : [id number]  Patient Goals   Patient goals : to return home       Therapy Time   Individual Concurrent Group Co-treatment   Time In 0930         Time Out 1010         Minutes 40         Timed Code Treatment Minutes: 25 Minutes     This note to serve as OT d/c summary if pt is d/c-ed prior to next therapy session.     Albino Starr, OTR/L

## 2020-10-21 NOTE — PROGRESS NOTES
Physical Therapy    Facility/Department: [address] ICU  Initial Assessment    NAME: Wilson Knapp  : [birth year]  MRN: [id number]    Date of Service: 10/21/2020    Discharge Recommendations:  Home with assist PRN   PT Equipment Recommendations  Equipment Needed: No  Wilson Knapp scored a 19/24 on the AM-PAC short mobility form. At this time, no further PT is recommended upon discharge. Recommend patient returns to prior setting with prior services. Assessment   Body structures, Functions, Activity limitations: Decreased functional mobility   Assessment: pt is a 78 yo male who was adm to hosp with chest pain and palpitations s/p cardioversion with low BP; pt currently is below his baseline for being Ind without an AD; anticipate pt will be able to return home with PRN assist from family at discharge when medically stable  Prognosis: Good  Decision Making: Medium Complexity  PT Education: PT Role;General Safety  Barriers to Learning: slightly [place]  REQUIRES PT FOLLOW UP: Yes  Activity Tolerance  Activity Tolerance: Patient Tolerated treatment well       Patient Diagnosis(es): The primary encounter diagnosis was V-tach [facility]). Diagnoses of Chest pain, unspecified type and YENNIFER (acute kidney injury) ([facility] [address] were also pertinent to this visit. has a past medical history of Cardiomyopathy ([address] Cirrhosis ([address] Diabetes mellitus ([address] History of abdominal paracentesis, Hyperlipidemia, Hypertension, Lymphoma ([address] NSVT (nonsustained ventricular tachycardia) ([address] Sleep apnea, and Syncope.   has a past surgical history that includes Coronary angioplasty with stent; Cholecystectomy; laminectomy; Tonsillectomy and adenoidectomy; Cardiac defibrillator placement (6/23/15); toenail excision (2017); and Upper gastrointestinal endoscopy (N/A, 2020).     Restrictions  Position Activity Restriction  Other position/activity restrictions: 1L O2 via NC  Vision/Hearing  Vision: Within Functional Limits  Hearing: (slightly [place])     Subjective  General  Chart Reviewed: Yes  Patient assessed for rehabilitation services?: Yes  Additional Pertinent Hx: per H&P note:  Pt is an 77y.o. year-old male with a history of pretension, hyperlipidemia, diabetes mellitus, coronary artery disease with a primary cardiomyopathy and is status post AICD placement, chronic renal failure and diffuse B-cell lymphoma. He presents to the emergency room for evaluation following a 45-minute history of chest pain and palpitations. He describes moderately severe, pressure-like pain in the center of his chest. In the emergency room he was found to have ventricular tachycardia and underwent electrical cardioversion. Cardiology was consulted and he was started on an Amiodarone drip. The chest pain began before his ventricular tachycardia. In addition to this, he was also found to be hypoxic and to have worsening renal failure. He states that he felt fine before the episode this morning. His blood pressure remained low after cardioversion, and a central line was placed and he was started on an epinephrine drip. He is being admitted for further evaluation and treatment.   Response To Previous Treatment: Not applicable  Family / Caregiver Present: No  Follows Commands: Within Functional Limits  Subjective  Subjective: pt very pleasant and agreeable to working with therapy; pt denies any pain  Pain Screening  Patient Currently in Pain: Denies          Orientation  Orientation  Overall Orientation Status: Within Functional Limits  Social/Functional History  Social/Functional History  Lives With: Spouse  Type of Home: House  Home Layout: Two level, Bed/Bath upstairs, 1/2 bath on main level, Laundry in basement  Home Access: Stairs to enter without rails  Entrance Stairs - Number of Steps: 3 steps without rail then 2 steps with rail and 1 step into home; flight up to bed/bath with rail  Bathroom Shower/Tub: Walk-in shower  Bathroom Toilet: Standard  Bathroom Equipment: Shower chair, Grab bars in [address] [place]: (no DME)  Receives Help From: Family  ADL Assistance: Independent  Homemaking Assistance: (shares IADLs with spouse)  Ambulation Assistance: Independent  Transfer Assistance: Independent  Occupation: On disability  Additional Comments: Pt reports spouse is retired and can assist as needed at discharge  [address]  Overall Cognitive Status: WFL    Objective          AROM RLE (degrees)  RLE AROM: WFL  AROM LLE (degrees)  LLE AROM : WFL  Strength RLE  Strength RLE: WFL  Strength LLE  Strength LLE: WFL     Sensation  Overall Sensation Status: WFL  Bed mobility  Comment: pt up in chair at beginning and end of session  Transfers  Sit to Stand: Stand by assistance  Stand to sit: Stand by assistance  Ambulation  Ambulation?: Yes  Ambulation 1  Surface: level tile  Device: No Device  Assistance: Stand by assistance  Quality of Gait: took a few steps to [facility]; no LOB  Distance: 3 steps x2  Comments: pt used commode with assist to cleanse due to multiple lines     Balance  Comments: sup for sitting balance; SBA for static standing balance        Plan   Plan  Times per week: 3-5  Current Treatment Recommendations: Functional Mobility Training  Safety Devices  Type of devices: Call light within reach, Left in chair, Nurse notified    G-Code       OutComes Score                                                  -PAC Score  -PAC Inpatient Mobility Raw Score : 19 (10/21/20 1010)  AM-PAC Inpatient T-Scale Score : 45.44 (10/21/20 1010)  Mobility Inpatient CMS 0-100% Score: 41.77 (10/21/20 1010)  Mobility Inpatient CMS G-Code Modifier : CK (10/21/20 1010)          Goals  Short term goals  Time Frame for Short term goals: by discharge  Short term goal 1: bed mob MI  Short term goal 2: transfers MI  Short term goal 3: amb 100' without AD SBA  Short term goal 4: negotiate stairs when able  Patient Goals   Patient goals : to return home       Therapy Time

## 2020-10-21 NOTE — PLAN OF CARE
Problem: Falls - Risk of:  Goal: Will remain free from falls  Description: Will remain free from falls  10/21/2020 0702 by Madina Arreguin RN  Outcome: Ongoing  10/20/2020 2109 by Yohana Ross RN  Outcome: Ongoing  Note: Fall risk assessment completed every shift. All precautions in place. Pt has call light within reach at all times. Room clear of clutter. Pt aware to call for assistance when getting up. Goal: Absence of physical injury  Description: Absence of physical injury  10/21/2020 0702 by Madina Arreguin RN  Outcome: Ongoing  10/20/2020 2109 by Yohana Ross RN  Outcome: Ongoing     Problem: Skin Integrity:  Goal: Will show no infection signs and symptoms  Description: Will show no infection signs and symptoms  10/21/2020 0702 by Madina Arreguin RN  Outcome: Ongoing  10/20/2020 2109 by Yohana Ross RN  Outcome: Ongoing  Note: Skin assessment completed every shift. Pt assessed for incontinence, appropriate barrier cream applied prn. Pt encouraged to turn/rotate every 2 hours. Assistance provided if pt unable to do so themselves. Goal: Absence of new skin breakdown  Description: Absence of new skin breakdown  10/21/2020 0702 by Madina Arreguin RN  Outcome: Ongoing  10/20/2020 2109 by Yohana Ross RN  Outcome: Ongoing     Problem: Cardiac Output - Decreased:  Goal: Avoidance of environmental tobacco smoke  Description: Absence of orthostatic hypotension  10/21/2020 0702 by Madina Arreguin RN  Outcome: Ongoing  10/20/2020 2109 by Yohana Ross RN  Outcome: Ongoing  Note: Assess breath sounds, oxygen requirements and saturations, and activity tolerance. Monitor intake and output and daily weights and lab values. Encourage fluid and dietary restrictions.       Goal: Cardiac output within specified parameters  Description: Cardiac output within specified parameters  10/21/2020 0702 by Madina Arreguin RN  Outcome: Ongoing  10/20/2020 2109 by Yohana Ross RN  Outcome: Ongoing  Goal: Hemodynamic excess will improve  Description: Control of fluid volume excess will improve  10/21/2020 0702 by Juan Weiss RN  Outcome: Ongoing  10/20/2020 2109 by Bambi Guillen RN  Outcome: Ongoing     Problem: Gas Exchange - Impaired:  Goal: Levels of oxygenation will improve  Description: Levels of oxygenation will improve  10/21/2020 0702 by Juan Weiss RN  Outcome: Ongoing  10/20/2020 2109 by Bambi Guillen RN  Outcome: Ongoing     Problem: Mood - Altered:  Goal: Mood stable  Description: Mood stable  10/21/2020 0702 by Juan Weiss RN  Outcome: Ongoing  10/20/2020 2109 by Bambi Guillen RN  Outcome: Ongoing     Problem: Tobacco Use:  Goal: Will participate in inpatient tobacco-use cessation counseling  Description: Will participate in inpatient tobacco-use cessation counseling  10/21/2020 0702 by Juan Weiss RN  Outcome: Ongoing  10/20/2020 2109 by Bambi Guillen RN  Outcome: Ongoing     Problem: Venous Thromboembolism:  Goal: Will show no signs or symptoms of venous thromboembolism  Description: Will show no signs or symptoms of venous thromboembolism  10/21/2020 0702 by Juan Weiss RN  Outcome: Ongoing  10/20/2020 2109 by Bambi Guillen RN  Outcome: Ongoing  Goal: Absence of signs or symptoms of impaired coagulation  Description: Absence of signs or symptoms of impaired coagulation  10/21/2020 0702 by Juan Weiss RN  Outcome: Ongoing  10/20/2020 2109 by Bambi Guillen RN  Outcome: Ongoing     Problem: OXYGENATION/RESPIRATORY FUNCTION  Goal: Patient will maintain patent airway  10/21/2020 0702 by Juan Weiss RN  Outcome: Ongoing  10/20/2020 2109 by Bambi Guillen RN  Outcome: Ongoing  Goal: Patient will achieve/maintain normal respiratory rate/effort  Description: Respiratory rate and effort will be within normal limits for the patient  10/21/2020 8696 by Juan Weiss RN  Outcome: Ongoing  10/20/2020 2109 by Bambi Guillen RN  Outcome: Ongoing     Problem: HEMODYNAMIC STATUS  Goal: Patient has stable vital signs and fluid balance  10/21/2020 0702 by Brien Jane RN  Outcome: Ongoing  10/20/2020 2109 by Stephanie Atkinson RN  Outcome: Ongoing     Problem: FLUID AND ELECTROLYTE IMBALANCE  Goal: Fluid and electrolyte balance are achieved/maintained  10/21/2020 0702 by Brien Jane RN  Outcome: Ongoing  10/20/2020 2109 by Stephanie Atkinson RN  Outcome: Ongoing     Problem: ACTIVITY INTOLERANCE/IMPAIRED MOBILITY  Goal: Mobility/activity is maintained at optimum level for patient  10/21/2020 8309 by Brien Jane RN  Outcome: Ongoing  10/20/2020 2109 by Stephanie Atkinson RN  Outcome: Ongoing

## 2020-10-22 NOTE — PROGRESS NOTES
carvedilol 12.5 MG tablet  Commonly known as:  COREG     naproxen 500 MG tablet  Commonly known as:  NAPROSYN               Where to Get Your Medications        These medications were sent to [address] [place] [address] [place] , [address] [place] [address] & [place] [id number] [place] [address], [facility]      Phone:  [phone]   midodrine 5 MG tablet         Silvina Bailey, [address] [facility]  Heart Failure Discharge Medication Reconciliation Program  [phone]

## 2020-10-22 NOTE — DISCHARGE SUMMARY
Hospital Medicine Discharge Summary      Patient ID: Leonila Bay      Patient's PCP: [facility] Date: 10/17/2020     Discharge Date: 10/22/2020  The patient was seen and examined on day of discharge and this discharge summary is in conjunction with any daily progress note from day of discharge. Admitting Physician: Jocelyn Garzon MD    Discharge Physician: Rico Shipley MD     Admitted for   Chief Complaint   Patient presents with    Chest Pain     layed down for bed and started having chest pain 45min. Pt has a pacemaker       Admitting Diagnosis Ventricular tachycardia ([facility]) [I47.2]    Discharge Diagnoses: Active Hospital Problems    Diagnosis Date Noted    Essential hypertension, benign [I10] 08/02/2011     Priority: High    Mixed hyperlipidemia [E78.2] 08/02/2011     Priority: High    Cardiomyopathy ([facility]) [I42.9] 08/02/2011     Priority: High    Hypotension [I95.9] 10/17/2020    Acute respiratory failure with hypoxia (HCC) [J96.01] 10/17/2020    Acute renal failure superimposed on stage 3 chronic kidney disease ([facility]) [N17.9, N18.30] 10/17/2020    YENNIFER (acute kidney injury) ([facility]) [N17.9]     Diffuse large B-cell lymphoma of solid organ excluding spleen (HCC) [C83.39] 03/02/2020    V-tach ([facility]) [I47.2] 06/23/2015    AICD (automatic cardioverter/defibrillator) present [Z95.810] 06/23/2015    Coronary artery disease [I25.10]     DMII (diabetes mellitus, type 2) ([facility]) [E11.9] 08/02/2011       Follow Up: Primary Care Physician in one week    PCP to Follow up on   Needs FU with cardiology           Hospital Course:     Patient is a 69-year-old male with a past medical history of diabetes, coronary artery disease, cardiomyopathy status post AICD placement, CKD, cirrhosis and B-cell lymphoma who presented to the ER with chest pain and palpitations. He was found to have ventricular tachycardia and underwent electrocardioversion. Patient was also found to be in shock. Cardiology was consulted and patient was placed on amiodarone drip. Post cardioversion patient blood pressure was low and central line placed and patient was started on epinephrine. Patient went into cardiogenic shock with acute kidney injury. Nephrology was consulted. Pelon resolved. AICD shock rate was reset .      Wide-complex tachycardia /ventricular tachycardia-EP adjusted parameters for the AICD  -Cardioverted in the ER.   - amio stop due to toxic effect on liver   -AICD interrogated   -EP to evaluate-     Hypotension/ cardiogenic shock -improved  -improved, off pressors  -Midodrine added     CHF-ischemic cardiomyopathy  -Last EF < 20 %  -She has baseline weight is 186 pounds. On admission weight is 196 pounds, at the time of discharge 193  -wt trending down slowly           Coronary artery disease  -S/p stent in the LAD  -Continue Plavix and statin     Acute hypoxic respiratory failure-wean oxygen as tolerated  -Patient is hypoxic to the low 60s on admission  -Chest x-ray shows cardiomegaly and small pleural effusion     PELON on CKD- - improved   -Consult nephrology  creatinine on admission 2.4, decreased about 3.2 baseline creatinine 1.4  - cardiorenal      Shock liver  -LFTs trending down     Diabetes type 2  -. At home takes glipizide.    continue sliding scale        History of decompensated liver cirrhosis  -Diagnosis of cardiac as well as alcoholic cirrhosis        Diffuse B-cell lymphoma-in remission  -The past had mass in the liver which is followed outpatient by oncology, ischemic        Thrombocytopenia  - due to cirrhosis     Hypomag/ hypophosphatemia  -replace and monitor           Alcohol abuse  -Quit drinking since more than 6 months    Consults:     IP CONSULT TO CARDIOLOGY  IP CONSULT TO CARDIOLOGY  IP CONSULT TO CRITICAL CARE  IP CONSULT TO NEPHROLOGY        Disposition: home    Discharged Condition: Stable    Code Status: Prior    Activity: activity as tolerated    Diet: cardiac      Labs: For convenience and continuity at follow-up the following most recent labs are provided:    CBC:   Lab Results   Component Value Date    WBC 5.9 10/22/2020    HGB 13.0 10/22/2020    HCT 39.1 10/22/2020    PLT 62 10/22/2020       RENAL:   Lab Results   Component Value Date     10/22/2020    K 3.7 10/22/2020    CL 99 10/22/2020    CO2 24 10/22/2020    BUN 34 10/22/2020    CREATININE 1.2 10/22/2020           Discharge Medications:    Marvin Edwards   Home Medication Instructions LMR:[id number]    Printed on:10/22/20 3004   Medication Information                      aspirin 81 MG tablet  Take 81 mg by mouth daily             atorvastatin (LIPITOR) 80 MG tablet  Take 80 mg by mouth daily. Calcium Carb-Cholecalciferol (CALCIUM 1000 + D PO)  Take 1,000 mg by mouth 2 times daily             clopidogrel (PLAVIX) 75 MG tablet  Take 75 mg by mouth daily. famotidine (PEPCID) 20 MG tablet  Take 20 mg by mouth 2 times daily              furosemide (LASIX) 40 MG tablet  Take 40 mg by mouth every other day Switched to every other day on 10/12/20 for hypotension             glipiZIDE (GLUCOTROL) 5 MG tablet  Take 7.5 mg by mouth 2 times daily (before meals)              loperamide (IMODIUM) 2 MG capsule  Take 2 mg by mouth 4 times daily as needed for Diarrhea             MAGNESIUM OXIDE 400 PO  Take 400 mg by mouth 2 times daily              midodrine (PROAMATINE) 5 MG tablet  Take 1 tablet by mouth 3 times daily (with meals)             nitroGLYCERIN (NITROSTAT) 0.4 MG SL tablet  Place 0.4 mg under the tongue every 5 minutes as needed for Chest pain up to max of 3 total doses.  If no relief after 1 dose, call 911.             tamsulosin (FLOMAX) 0.4 MG capsule  Take 0.4 mg by mouth nightly             vitamin D (CHOLECALCIFEROL) 1000 UNITS TABS tablet  Take 1,000 Units by mouth daily                 Future Appointments   Date Time Provider Jamal Mendoza   10/27/2020  2:30 PM Jerome Call, APRN - CNP [facility] Cardio [facility]   11/25/2020  2:00 [facility]   11/25/2020  3:15 PM Megan Belle MD FF Cardio [facility]   12/29/2020 11:00 AM SCHEDULE, [place] REMOTE TRANSMISSION FF Cardio [facility]       Time Spent on discharge is more than 45 minutes in the examination, evaluation, counseling and review of medications and discharge plan. Signed:  Isa Ribera MD   10/22/2020    The note was completed using EMR. Every effort was made to ensure accuracy; however, inadvertent computerized transcription errors may be present. Thank you [facility] for the opportunity to be involved in this patient's care. If you have any questions or concerns please feel free to contact me at [phone].

## 2020-10-22 NOTE — PROGRESS NOTES
Physical Therapy  Facility/Department: [facility] 2U ICU  Daily Treatment Note/Discharge Summary  NAME: Sebastien Estes  :   MRN: [id number]    Date of Service: 10/22/2020    Discharge Recommendations:  Home with assist PRN   PT Equipment Recommendations  Equipment Needed: No    Assessment   Body structures, Functions, Activity limitations: Decreased functional mobility   Assessment: pt is a 76 yo male who was adm to hosp with chest pain and palpitations s/p cardioversion with low BP. Pt appears at/very near baseline with functional activities. Pt reports adequate assist/support upon d/c home. No further PT indicated upon d/c. Prognosis: Good  Decision Making: Medium Complexity  PT Education: PT Role;General Safety  Barriers to Learning: Slightly [place]. REQUIRES PT FOLLOW UP: No  Activity Tolerance  Activity Tolerance: Patient Tolerated treatment well     Patient Diagnosis(es): The primary encounter diagnosis was V-tach [facility]). Diagnoses of Chest pain, unspecified type and YENNIFER (acute kidney injury) ([facility] [address] were also pertinent to this visit. has a past medical history of Cardiomyopathy ([facility] [address] Cirrhosis ([address] Diabetes mellitus ([address] History of abdominal paracentesis, Hyperlipidemia, Hypertension, Lymphoma ([address] NSVT (nonsustained ventricular tachycardia) ([address] Sleep apnea, and Syncope.   has a past surgical history that includes Coronary angioplasty with stent; Cholecystectomy; laminectomy; Tonsillectomy and adenoidectomy; Cardiac defibrillator placement (6/23/15); toenail excision (2017); and Upper gastrointestinal endoscopy (N/A, 2020). Restrictions  Position Activity Restriction  Other position/activity restrictions: RA. Subjective   General  Chart Reviewed:  Yes  Additional Pertinent Hx: per H&P note:  Pt is an 77y.o. year-old male with a history of pretension, hyperlipidemia, diabetes mellitus, coronary artery disease with a primary cardiomyopathy and is status post AICD placement, chronic renal failure and diffuse B-cell lymphoma. He presents to the emergency room for evaluation following a 45-minute history of chest pain and palpitations. He describes moderately severe, pressure-like pain in the center of his chest. In the emergency room he was found to have ventricular tachycardia and underwent electrical cardioversion. Cardiology was consulted and he was started on an Amiodarone drip. The chest pain began before his ventricular tachycardia. In addition to this, he was also found to be hypoxic and to have worsening renal failure. He states that he felt fine before the episode this morning. His blood pressure remained low after cardioversion, and a central line was placed and he was started on an epinephrine drip. He is being admitted for further evaluation and treatment. Response To Previous Treatment: Patient with no complaints from previous session. Family / Caregiver Present: No  Referring Practitioner: Tae Ritter NP  Subjective  Subjective: Pt agreeable to PT Rx. Hopeful to go home today. Orientation  Orientation  Overall Orientation Status: Within Functional Limits  Cognition   Cognition  Overall Cognitive Status: WFL  Objective   Bed mobility  Supine to Sit: Independent  Sit to Supine: Independent  Transfers  Sit to Stand: Independent  Stand to sit: Independent  Comment: Toilet Transfer Independently. Ambulation  Ambulation?: Yes  Ambulation 1  Surface: level tile  Device: No Device  Distance: Pt amb within hospital room setting, to/from bathroom without assist device SBA. Wide [place]URDES, lat sway although no specific LOB noted. Comments: Pt able to complete care following use of toilet and stand at sink independently to wash hands.                     AM-PAC Score  AM-PAC Inpatient Mobility Raw Score : 22 (10/22/20 0940)  AM-PAC Inpatient T-Scale Score : 53.28 (10/22/20 0940)  Mobility Inpatient CMS 0-100% Score: 20.91 (10/22/20 0940)  Mobility Inpatient CMS G-Code Modifier Haritha Kee (10/22/20 0355)          Goals  Short term goals  Time Frame for Short term goals: by discharge  Short term goal 1: bed mob MI  Short term goal 2: transfers MI  Short term goal 3: amb 100' without AD SBA  Short term goal 4: negotiate stairs when able  Patient Goals   Patient goals : to return home    Plan    Plan  Times per week: D/C Acute Care PT Services.   Current Treatment Recommendations: Functional Mobility Training  Safety Devices  Type of devices: Bed alarm in place, Call light within reach, Left in bed, Nurse notified     Therapy Time   Individual Concurrent Group Co-treatment   Time In 0900         Time Out 0925         Minutes [address] [place] [address]

## 2020-10-22 NOTE — PLAN OF CARE
Problem: Falls - Risk of:  Goal: Will remain free from falls  Description: Will remain free from falls  Outcome: Ongoing     Problem: Falls - Risk of:  Goal: Absence of physical injury  Description: Absence of physical injury  Outcome: Ongoing     Problem: Skin Integrity:  Goal: Will show no infection signs and symptoms  Description: Will show no infection signs and symptoms  Outcome: Ongoing     Problem: Skin Integrity:  Goal: Absence of new skin breakdown  Description: Absence of new skin breakdown  Outcome: Ongoing     Problem: Cardiac Output - Decreased:  Goal: Cardiac output within specified parameters  Description: Cardiac output within specified parameters  Outcome: Ongoing     Problem: Fluid Volume - Imbalance:  Goal: Absence of imbalanced fluid volume signs and symptoms  Description: Absence of imbalanced fluid volume signs and symptoms  Outcome: Ongoing     Problem:  Activity Intolerance:  Goal: Ability to tolerate increased activity will improve  Description: Ability to tolerate increased activity will improve  Outcome: Ongoing     Problem: OXYGENATION/RESPIRATORY FUNCTION  Goal: Patient will maintain patent airway  Outcome: Ongoing     Problem: HEMODYNAMIC STATUS  Goal: Patient has stable vital signs and fluid balance  Outcome: Ongoing     Problem: FLUID AND ELECTROLYTE IMBALANCE  Goal: Fluid and electrolyte balance are achieved/maintained  Outcome: Ongoing     Problem: ACTIVITY INTOLERANCE/IMPAIRED MOBILITY  Goal: Mobility/activity is maintained at optimum level for patient  Outcome: Ongoing

## 2020-10-22 NOTE — PROGRESS NOTES
Department of Internal Medicine  Nephrology Progress Note    SUBJECTIVE:  We are following this patient for gaby . Patient progress reviewed. REVIEW OF SYSTEMS:  Feels good . no SOB/MCCLELLAN . family at bedside   Physical Exam:    VITALS:  /62   Pulse 87   Temp 98.6 °F (37 °C) (Axillary)   Resp 18   Ht 5' 9.5\" (1.765 m)   Wt 193 lb 9 oz (87.8 kg)   SpO2 93%   BMI 28.17 kg/m²   24HR INTAKE/OUTPUT:      Intake/Output Summary (Last 24 hours) at 10/22/2020 1210  Last data filed at 10/22/2020 0851  Gross per 24 hour   Intake 1258 ml   Output 915 ml   Net 343 ml       Constitutional:  Looks comfortable   Respiratory:  Decrease BS at bases   Gastrointestinal: No  tenderness.   Normal Bowel Sounds  Cardiovascular:  S1, S2 RRR   Edema:  +  Edema  CNS non focal    DATA:    CBC:  Lab Results   Component Value Date    WBC 5.9 10/22/2020    RBC 4.18 10/22/2020    HGB 13.0 10/22/2020    HCT 39.1 10/22/2020    MCV 93.6 10/22/2020    MCH 31.2 10/22/2020    MCHC 33.3 10/22/2020    RDW 17.2 10/22/2020    PLT 62 10/22/2020    MPV 11.0 10/22/2020     CMP:  Lab Results   Component Value Date     10/22/2020    K 3.7 10/22/2020    CL 99 10/22/2020    CO2 24 10/22/2020    BUN 34 10/22/2020    CREATININE 1.2 10/22/2020    GFRAA >60 10/22/2020    GFRAA >60 02/13/2010    AGRATIO 1.4 02/29/2020    LABGLOM 59 10/22/2020    GLUCOSE 141 10/22/2020    PROT 5.2 10/19/2020    PROT 7.7 02/12/2010    CALCIUM 8.3 10/22/2020    BILITOT 3.1 10/19/2020    ALKPHOS 162 10/19/2020    AST 2,849 10/19/2020    ALT 2,432 10/19/2020      Hepatic Function Panel:   Lab Results   Component Value Date    ALKPHOS 162 10/19/2020    ALT 2,432 10/19/2020    AST 2,849 10/19/2020    PROT 5.2 10/19/2020    PROT 7.7 02/12/2010    BILITOT 3.1 10/19/2020    BILIDIR 1.9 10/19/2020    IBILI 1.2 10/19/2020      Phosphorus:   Lab Results   Component Value Date    PHOS 2.7 10/22/2020       ASSESSMENT:  Principal Problem:    V-tach [facility])  Active Problems:    Mixed hyperlipidemia    Essential hypertension, benign    Cardiomyopathy ([facility] [address]    DMII (diabetes mellitus, type 2) ([facility] [address]    Coronary artery disease    AICD (automatic cardioverter/defibrillator) present    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 *      PLAN:  1. YENNIFER most likely ATN from hypotension . 2. Resolving . 3. Acidosis resolved. 4. Cirrhosis of liver from ETOH/cardiac   5. Hypotension on  oral proamatine   6. V tach per cards   7. F/u with neph as needed.       Alissa Mccormack MD. Dane Tubbs

## 2020-10-22 NOTE — PROGRESS NOTES
Late entries:     0700: Handoff/report completed with Tracie Hurst, night shift RN. Patient in bed, VSS. Assisted to commode. Large bowel movement. Assisted back into bed. 0840: Shift assessment completed, see flow sheets. VSS. Patient in bed eating breakfast. Denies any needs. [id number] Cohen, PT at bedside working with patient. Patient to bathroom and back to bed with physical therapist.     Vinny Lombardo: Call from retail pharmacy. Regarding patient insurance as discharge is expected today. 4242: Case management at patient bedside. 1050: Call to social work regarding medications at discharge. Message left. 1103: Perfect serve to Dr. Angelita Vásquez regarding patient discharge and medications to be taken at home. Dr. Cher Palencia to write a script for the needed medications. 1131: Call to [address] [place] [address] regarding setting up an appointment for CHF follow up. Heart line RN to follow up with the doctor to see if an appointment is needed. 1141: Spoke with Balbina Waterman, , regarding midodrine medication. Wife to walk to pharmacy and pay co-pay for medication and then take the script written by Dr. Cher Palencia to the [place] to be filled. 1216: Call again to [facility], spoke with Vern Delgado and follow up appointment has been scheduled. 1239: Discharge instructions given. Script given. Patient and wife stated understanding. PIV taken out. Patient wheeled down to car, wife taking patient home.      Electronically signed by Michelle Jackson RN on 10/22/2020 at 12:43 PM

## 2020-10-22 NOTE — PLAN OF CARE
Problem: Falls - Risk of:  Goal: Will remain free from falls  Description: Will remain free from falls  10/22/2020 5003 by Domenico Maharaj RN  Outcome: Met This Shift  Note: Patient standby assist. Working with physical therapy. Tolerates ambulation well. 10/22/2020 0040 by Navi Milian RN  Outcome: Ongoing     Problem: Gas Exchange - Impaired:  Goal: Levels of oxygenation will improve  Description: Levels of oxygenation will improve  Outcome: Met This Shift  Note: Patient oxygenation stable at this time on room air. Problem: Mood - Altered:  Goal: Mood stable  Description: Mood stable  Outcome: Met This Shift     Problem: Falls - Risk of:  Goal: Absence of physical injury  Description: Absence of physical injury  10/22/2020 2658 by Domenico Maharaj RN  Outcome: Ongoing  10/22/2020 0040 by Navi Milian RN  Outcome: Ongoing     Problem: Skin Integrity:  Goal: Will show no infection signs and symptoms  Description: Will show no infection signs and symptoms  10/22/2020 0040 by Navi Milian RN  Outcome: Ongoing  Goal: Absence of new skin breakdown  Description: Absence of new skin breakdown  10/22/2020 0923 by Domenico Maharaj RN  Outcome: Ongoing  Note: Monitoring patient skin integrity for skin breakdown, turning and repositioning q2h per protocol. Patient demonstrates turning and repositioning self.      10/22/2020 0040 by Navi Milian RN  Outcome: Ongoing     Problem: Cardiac Output - Decreased:  Goal: Cardiac output within specified parameters  Description: Cardiac output within specified parameters  10/22/2020 0923 by Domenico Maharaj RN  Outcome: Ongoing  10/22/2020 0040 by Navi Milian RN  Outcome: Ongoing  Goal: Hemodynamic stability will improve  Description: Hemodynamic stability will improve  Outcome: Ongoing     Problem: Fluid Volume - Imbalance:  Goal: Absence of imbalanced fluid volume signs and symptoms  Description: Absence of imbalanced fluid volume signs and symptoms  10/22/2020 0040 by Savannah Cordova RN  Outcome: Ongoing     Problem: Tissue Perfusion - Cardiopulmonary, Altered:  Goal: Hemodynamic stability will improve  Description: Hemodynamic stability will improve  Outcome: Ongoing  Goal: Absence of angina  Description: Absence of angina  Outcome: Ongoing     Problem:  Activity Intolerance:  Goal: Ability to tolerate increased activity will improve  Description: Ability to tolerate increased activity will improve  10/22/2020 0040 by Savannah Cordova RN  Outcome: Ongoing     Problem: OXYGENATION/RESPIRATORY FUNCTION  Goal: Patient will maintain patent airway  10/22/2020 0040 by Savannah Cordova RN  Outcome: Ongoing     Problem: HEMODYNAMIC STATUS  Goal: Patient has stable vital signs and fluid balance  10/22/2020 0040 by Savannah Cordova RN  Outcome: Ongoing     Problem: FLUID AND ELECTROLYTE IMBALANCE  Goal: Fluid and electrolyte balance are achieved/maintained  10/22/2020 0040 by Savannah Cordova RN  Outcome: Ongoing     Problem: ACTIVITY INTOLERANCE/IMPAIRED MOBILITY  Goal: Mobility/activity is maintained at optimum level for patient  10/22/2020 0040 by Savannah Cordova RN  Outcome: Ongoing

## 2020-10-22 NOTE — PROGRESS NOTES
CLINICAL PHARMACY NOTE: MEDS TO [facility] Patient?: No  Total # of Prescriptions Filled: 1   The following medications were delivered to the patient:  Discharge Medication List as of 10/22/2020 12:19 PM           Details   midodrine (PROAMATINE) 5 MG tablet Take 1 tablet by mouth 3 times daily (with meals), Disp-90 tablet,R-3Print   Total # of Interventions Completed: 1  Time Spent (min): 30    Additional Documentation:

## 2020-10-22 NOTE — PROGRESS NOTES
Progress Note  Admit Date: 10/17/2020      PCP: [facility]     CC: F/U for VT    Days in hospital:  5    SUBJECTIVE / Interval History:  Patient feels much better. Now off oxygen    D/w wife    Urine output better, neg 3 L      Allergies  Penicillins; Cortisone; Metformin; Metformin hcl; Morphine; and Lisinopril    Medications    Scheduled Meds:   midodrine  5 mg Oral TID WC    clopidogrel  75 mg Oral Daily    aspirin  81 mg Oral Daily    sodium chloride flush  10 mL Intravenous 2 times per day    insulin lispro  0-18 Units Subcutaneous TID WC    insulin lispro  0-9 Units Subcutaneous Nightly    [Held by provider] heparin (porcine)  5,000 Units Subcutaneous 3 times per day     Continuous Infusions:   dextrose         PRN Meds:  sodium chloride flush, polyethylene glycol, ondansetron, glucose, dextrose, glucagon (rDNA), dextrose    Vitals    /76   Pulse 100   Temp 97.6 °F (36.4 °C) (Oral)   Resp 18   Ht 5' 9.5\" (1.765 m)   Wt 193 lb 9 oz (87.8 kg)   SpO2 93%   BMI 28.17 kg/m²     Exam:    Gen: No distress. Eyes: PERRL. No sclera icterus. No conjunctival injection. ENT: No discharge. Pharynx clear. External appearance of ears and nose normal.  Neck: Trachea midline. No obvious mass. Resp: No accessory muscle use. No crackles. No wheezes. No rhonchi. No dullness on percussion. CV: Regular rate. Regular rhythm. No murmur or rub. No edema. GI: Non-tender. Non-distended. No hernia. Skin: Warm, dry, normal texture and turgor. No nodule on exposed extremities. Lymph: No cervical LAD. No supraclavicular LAD. M/S: No cyanosis. No clubbing. No joint deformity. Neuro: Moves all four extremities. CN 2-12 tested, no defect noted. Psych: Oriented x 3. No anxiety. Awake. Alert.      Data    LABS  CBC:   Recent Labs     10/20/20  0455 10/21/20  0434 10/22/20  0431   WBC 5.7 6.0 5.9   HGB 12.7* 13.1* 13.0*   HCT 37.5* 38.8* 39.1*   MCV 91.9 92.0 93.6   PLT 54* 58* 62*     BMP: Recent Labs     10/20/20  0455 10/21/20  0434 10/22/20  0431   * 133* 137   K 3.4*  3.4* 3.6 3.7   CL 97* 96* 99   CO2 21 26 24   PHOS 2.4* 2.3* 2.7   BUN 43* 40* 34*   CREATININE 2.0* 1.7* 1.2   GLUCOSE 120* 188* 141*     POC GLUCOSE:    Recent Labs     10/20/20  2004 10/21/20  0726 10/21/20  1135 10/21/20  1559 10/21/20  1943   POCGLU 243* 190* 283* 310* 226*     LIVER PROFILE:   Recent Labs     10/20/20  0455 10/21/20  0434   LABALBU 2.9* 3.2*     PT/INR: No results for input(s): PROTIME, INR in the last 72 hours. APTT: No results for input(s): APTT in the last 72 hours. UA:  No results for input(s): NITRITE, COLORU, PHUR, LABCAST, WBCUA, RBCUA, MUCUS, TRICHOMONAS, YEAST, BACTERIA, CLARITYU, SPECGRAV, LEUKOCYTESUR, UROBILINOGEN, BILIRUBINUR, BLOODU, GLUCOSEU, KETUA, AMORPHOUS in the last 72 hours. Microbiology:  Wound Culture: No results for input(s): WNDABS, ORG in the last 72 hours. Invalid input(s):  LABGRAM  Nasal Culture: No results for input(s): ORG, MRSAPCR in the last 72 hours. Blood Culture: No results for input(s): BC, BLOODCULT2 in the last 72 hours. Fungal Culture:   No results for input(s): FUNGSM in the last 72 hours. No results for input(s): FUNCXBLD in the last 72 hours. CSF Culture:  No results for input(s): COLORCSF, APPEARCSF, CFTUBE, CLOTCSF, WBCCSF, RBCCSF, NEUTCSF, NUMCELLSCSF, LYMPHSCSF, MONOCSF, GLUCCSF, VOLCSF in the last 72 hours. Respiratory Culture:  No results for input(s): Betzy Wilfrid in the last 72 hours. AFB:No results for input(s): AFBSMEAR in the last 72 hours. Urine Culture  No results for input(s): LABURIN in the last 72 hours. RADIOLOGY:    XR CHEST PORTABLE   Final Result   Stable cardiomegaly in obscured left hemidiaphragm, equivocal for small left   effusion. ECHO  Dilated left ventricle. Normal wall thickness. Severely decreased left ventricular systolic function with an estimated EF   <20%. E/e'=14.    Mild mitral regurgitation. Biatrial enlargement. Aortic valve appears sclerotic but opens adequately. No stenosis or   insufficiency. The right ventricle is enlarged and decreased in function. Moderate tricuspid regurgitation. PASP 37mmHg. Trivial pulmonic regurgitation. There is a trivial anterior pericardial effusion noted. There is fluid around the liver. IVC size is normal (<2.1 cm) but collapses < 50% with respiration consistent   with elevated RA pressure (8 mmHg). 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 *    Patient is a 61-year-old male with a past medical history of diabetes, coronary artery disease, cardiomyopathy status post AICD placement, CKD, cirrhosis and B-cell lymphoma who presented to the ER with chest pain and palpitations. He was found to have ventricular tachycardia and underwent electrocardioversion. Patient was also found to be in shock. Cardiology was consulted and patient was placed on amiodarone drip. Post cardioversion patient blood pressure was low and central line placed and patient was started on epinephrine. Patient went into cardiogenic shock with acute kidney injury. Nephrology was consulted. Pelon resolved. AICD shock rate was reset .      Wide-complex tachycardia /ventricular tachycardia-EP adjusted parameters for the AICD  -Cardioverted in the ER.   - amio stop due to toxic effect on liver   -AICD interrogated   -EP to evaluate-    Hypotension/ cardiogenic shock -improved  -improved, off pressors  -Midodrine added    CHF-ischemic cardiomyopathy  -Last EF < 20 %  -She has baseline weight is 186 pounds. On admission weight is 196 pounds, at the time of discharge 193  -wt trending down slowly        Coronary artery disease  -S/p stent in the LAD  -Continue Plavix and statin    Acute hypoxic respiratory failure-wean oxygen as tolerated  -Patient is hypoxic to the low 60s on admission  -Chest x-ray shows cardiomegaly and small pleural effusion    YENNIFER on CKD- - improved   -Consult nephrology  creatinine on admission 2.4, decreased about 3.2 baseline creatinine 1.4  - cardiorenal     Shock liver  -LFTs trending down    Diabetes type 2  -. At home takes glipizide. continue sliding scale      History of decompensated liver cirrhosis  -Diagnosis of cardiac as well as alcoholic cirrhosis      Diffuse B-cell lymphoma-in remission  -The past had mass in the liver which is followed outpatient by oncology, ischemic      Thrombocytopenia  - due to cirrhosis    Hypomag/ hypophosphatemia  -replace and monitor        Alcohol abuse  -Quit drinking since more than 6 months      DVT Prophylaxis: heparin  Diet: DIET CARB CONTROL; Low Sodium (2 GM); Daily Fluid Restriction: 1500 ml  Dietary Nutrition Supplements: Frozen Oral Supplement  Code Status: Full Code        Discharge plan - today  The patient and / or the family were informed of the results of any tests, a time was given to answer questions, a plan was proposed and they agreed with plan. Discussed with consulting physicians, nursing and social work     The note was completed using EMR. Every effort was made to ensure accuracy; however, inadvertent computerized transcription errors may be present.        Elle Maldonado MD

## 2020-10-22 NOTE — CARE COORDINATION
Spoke with patient in the room regarding D/C today. He is agreeable and eager to go home. His wife will transport him. Reviewed IMM. Denies D/C needs.     Electronically signed by Jessica Burr RN on 10/22/2020 at 9:48 AM

## 2020-10-23 NOTE — CARE COORDINATION
[facility] Transitions Initial Follow Up Call    Call within 2 business days of discharge: Yes    Patient: Eloy Carmona Patient : [birth year]   MRN: [id number]  Reason for Admission: V tach  Discharge Date: 10/22/20 RARS: Readmission Risk Score: 19      Last Discharge [facility]       Complaint Diagnosis Description Type Department Provider    10/17/20 Chest Pain V-tach [facility]) . .. ED to Hosp-Admission (Discharged) (ADMITTED) [facility] ICU Luis Mendez MD; Ismael Aptos. .. Challenges to be reviewed by the provider   Additional needs identified to be addressed with provider No  none    Discussed COVID-19 related testing which was not done at this time. Test results were not done. Patient informed of results, if available? Not done         Method of communication with provider : none    Advance Care Planning:   Does patient have an Advance Directive:  decision maker updated. Was this a readmission? No    Care Transition Nurse (CTN) contacted the patient by telephone to perform post hospital discharge assessment. Verified name and  with patient as identifiers. Provided introduction to self, and explanation of the CTN role. CTN reviewed discharge instructions, medical action plan and red flags with patient who verbalized understanding. Patient given an opportunity to ask questions and does not have any further questions or concerns at this time. Were discharge instructions available to patient? Yes. Reviewed appropriate site of care based on symptoms and resources available to patient including: PCP and Specialist. The patient agrees to contact the PCP office for questions related to their healthcare. Medication reconciliation was performed with family, who verbalizes understanding of administration of home medications. Advised obtaining a 90-day supply of all daily and as-needed medications. Discussed follow-up appointments.  If no appointment was previously scheduled, appointment scheduling offered: Yes. Is follow up appointment scheduled within 7 days of discharge? Yes  Non-[facility] follow up appointment(s): N/A    Plan for follow-up call in 7-10 days based on severity of symptoms and risk factors. Plan for next call: symptom management-SOB  CTN provided contact information for future needs. Non-face-to-face services provided:  Obtained and reviewed discharge summary and/or continuity of care documents  Assessment and support for treatment adherence and medication management-medication list reviewed & 1111f completed. Care Transitions 24 Hour Call    Do you have any ongoing symptoms?:  No  Do you have a copy of your discharge instructions?:  Yes  Do you have all of your prescriptions and are they filled?:  Yes  Have you been contacted by a [address]?:  No  Have you scheduled your follow up appointment?:  Yes  How are you going to get to your appointment?:  Car - family or friend to transport  Were you discharged with any Home Care or Post Acute Services:  No  Do you feel like you have everything you need to keep you well at home?:  Yes  Care Transitions Interventions       Initial attempt at [facility] discharge phone call. Spoke with Landon Nasirmendez. He states he is doing \"fine\". Landon Jc states his wife provides his transportation to his appointments. He states he weighs himself every day @ [facility] denies any SOB, chest pain, or edema. He states \"I am getting away from salt\" when asked about his diet. He states he does not monitor/restrict his fluid intake. Encouraged him to review his discharge papers and to discuss at his appointment next week. Discussed the heart failure zones. Landon Jc places himself in the \"green\" zone today. He states his BG level was 143 this morning. He denies any needs. His wife took writer's contact info. Encouraged them to reach out to his physician with any issues over the weekend.     Follow Up  Future Appointments   Date Time Provider Jamal Mendoza 10/27/2020  2:30 PM KIN Hernandez - CNP FF Cardio [facility]   11/25/2020  2:00 PM ECHO ROOM 1 [facility] ECHO [facility]   11/25/2020  3:15 PM Manuel Garner MD FF Cardio [facility]   12/29/2020 11:00 AM SCHEDULE, [place] REMOTE TRANSMISSION FF Cardio [facility]       Reed Lezama RN

## 2020-10-30 NOTE — CARE COORDINATION
[facility] Transitions Follow Up Call    10/30/2020    Patient: Verenice Fofana  Patient : [id number]   MRN: [id number]  Reason for Admission: V-tach  Discharge Date: 10/22/20 RARS: Readmission Risk Score: 23         Spoke with: Sade Schwarz (patient)      Needs to be reviewed by the provider   Additional needs identified to be addressed with provider No  none  Discussed COVID-19 related testing which was not done at this time. Test results were not done. Patient informed of results, if available? Not done           Spoke with Bruce An. He states he is doing \"fine - getting better each day\". Bruce An states he had a follow up appointment with his cardiac office. He states his weight today = 197.8lbs, which is up from 197.6lbs yesterday. Bruce An denies any SOB, chest pain, or edema. He places himself in the \"green\" heart failure zone today. Bruce An states he has not checked his BG level yet today. He denies any needs today. Bruce An is agreeable to follow up from [facility]. He has [facility] contact info. Bruce An states he will reach out to one of his physicians with any medical issues or concerns over the weekend. Plan for follow-up call in 5-7 days based on severity of symptoms and risk factors. Plan for next call: symptom management-SOB & chest pain  [facility] provided contact information for future needs. Care Transitions Subsequent and Final Call    Subsequent and Final Calls  Do you have any ongoing symptoms?:  No  Have your medications changed?:  No  Do you have any questions related to your medications?:  No  Do you currently have any active services?:  No  Do you have any needs or concerns that I can assist you with?:  No  Identified Barriers:  None  Care Transitions Interventions  Other Interventions:             Follow Up  Future Appointments   Date Time Provider Jamal Mendoza   2020  2:00 PM [facility] [place] [facility]    2020  3:15 PM Johana Pope MD [facility]   2020  3:15 PM SCHEDULE,

## 2020-11-03 PROBLEM — R42 DIZZINESS: Status: RESOLVED | Noted: 2018-09-24 | Resolved: 2020-01-01

## 2020-11-16 NOTE — PROGRESS NOTES
Pt to preop from home with family. Consent obtained for ultrasound guided paracentesis. All belongings kept with patient.

## 2020-11-16 NOTE — PROGRESS NOTES
Patient admitted to Phase 2 recovery from radiology, awake moves all ext to command. respiraitons adeq on RA spo2 97%. Skin warm and dry with good color. abd soft. Patient denies pain at this time, will continue to monitor.

## 2020-11-16 NOTE — BRIEF OP NOTE
Brief Postoperative Note    Manjit Aguirre  YOB: 1945  [id number]    Pre-operative Diagnosis: ascites    Post-operative Diagnosis: Same    Procedure: US-guided paracentesis    Anesthesia: Local    Surgeons: Amy Bedoya MD    Estimated Blood Loss: Less than 5 mL    Complications: None    Specimens: Was Obtained: ascitic fluid drained and sent for labs    Findings: Successful US-guided paracentesis.      Electronically signed by Amy Bedoya MD on 11/16/2020 at 2:31 PM

## 2020-11-24 NOTE — PROGRESS NOTES
[facility]   Cardiac Follow-up    Referring Provider: Arben Villatoro     Chief Complaint   Patient presents with    Coronary Artery Disease    Hypertension    ischemic cardiomyopathy  History of Present Illness:   Mr. Alex Barahona is a 77 y.o. male. His cardiac history includes  known cardiomyopathy, hypertension, hyperlipidemia, and diabetes. In 2006 he had a Taxus stent in his LAD. He underwent ICD implantation per Dr. Jose Cruz Nath 6/2015. Diagnosed with a liver tumor in 2018. Jason Arslan [facility] 10/17-10/22/20   Hospital Course:   Patient is a 44-year-old male with a past medical history of diabetes, coronary artery disease, cardiomyopathy status post AICD placement, CKD, cirrhosis and B-cell lymphoma who presented to the ER with chest pain and palpitations.  He was found to have ventricular tachycardia and underwent electrocardioversion.  Patient was also found to be in shock.  Cardiology was consulted and patient was placed on amiodarone drip.  Post cardioversion patient blood pressure was low and central line placed and patient was started on epinephrine.  Patient went into cardiogenic shock with acute kidney injury.  Nephrology was consulted. Pelon resolved. AICD shock rate was reset . Today, he reports he has not been feeling well. His abdomen is swollen, he is also complaining of a swollen testicle. He had a paracenteses 6 wks ago, 6 liters taken off. He has had episodes of low BP. He has mild SOB but no worse. He denies exertional chest pain, PND, palpitations, light-headedness. Does not smoke. His wife is with him for the visit. He has follow up scheduled with EP. Past Medical History:   has a past medical history of Cardiomyopathy ([address] Cirrhosis ([address] Diabetes mellitus ([address] History of abdominal paracentesis, Hyperlipidemia, Hypertension, Lymphoma ([address] NSVT (nonsustained ventricular tachycardia) ([address] Sleep apnea, and Syncope.     Surgical History:   has a past surgical history that includes Coronary angioplasty with stent; Cholecystectomy; laminectomy; Tonsillectomy and adenoidectomy; Cardiac defibrillator placement (6/23/15); toenail excision (07/21/2017); and Upper gastrointestinal endoscopy (N/A, 8/4/2020). Social History:   reports that he quit smoking about 45 years ago. He has never used smokeless tobacco. He reports current alcohol use. He reports that he does not use drugs. Family History:  family history includes Heart Disease in his father and mother. Current Outpatient Medications   Medication Sig Dispense Refill    midodrine (PROAMATINE) 5 MG tablet Take 1 tablet by mouth 3 times daily (with meals) 90 tablet 3    tamsulosin (FLOMAX) 0.4 MG capsule Take 0.4 mg by mouth nightly      furosemide (LASIX) 40 MG tablet Take 40 mg by mouth daily       MAGNESIUM OXIDE 400 PO Take 400 mg by mouth 2 times daily       Calcium Carb-Cholecalciferol (CALCIUM 1000 + D PO) Take 1,000 mg by mouth 2 times daily      famotidine (PEPCID) 20 MG tablet Take 20 mg by mouth 2 times daily       aspirin 81 MG tablet Take 81 mg by mouth daily      vitamin D (CHOLECALCIFEROL) 1000 UNITS TABS tablet Take 1,000 Units by mouth daily      loperamide (IMODIUM) 2 MG capsule Take 2 mg by mouth 4 times daily as needed for Diarrhea      nitroGLYCERIN (NITROSTAT) 0.4 MG SL tablet Place 0.4 mg under the tongue every 5 minutes as needed for Chest pain up to max of 3 total doses. If no relief after 1 dose, call 911.  glipiZIDE (GLUCOTROL) 5 MG tablet Take 7.5 mg by mouth 2 times daily (before meals)       atorvastatin (LIPITOR) 80 MG tablet Take 80 mg by mouth daily. No current facility-administered medications for this visit. Allergies:  Penicillins; Cortisone; Metformin; Metformin hcl; Morphine; and Lisinopril     Review of Systems:   · Constitutional: there has been no unanticipated weight loss. There's been no change in energy level, sleep pattern, or activity level.      · Eyes: No visual changes or diplopia. No scleral icterus. · ENT: No Headaches, hearing loss or vertigo. No mouth sores or sore throat. · Cardiovascular: Reviewed in HPI  · Respiratory: No cough or wheezing, no sputum production. No hematemesis. · Gastrointestinal: No abdominal pain, appetite loss, blood in stools. No change in bowel or bladder habits. · Genitourinary: No dysuria, trouble voiding, or hematuria. · Musculoskeletal:  No gait disturbance, weakness or joint complaints. · Integumentary: No rash or pruritis. · Neurological: No headache, diplopia, change in muscle strength, numbness or tingling. No change in gait, balance, coordination, mood, affect, memory, mentation, behavior. · Psychiatric: No anxiety, no depression. · Endocrine: No malaise, fatigue or temperature intolerance. No excessive thirst, fluid intake, or urination. No tremor. · Hematologic/Lymphatic: No abnormal bruising or bleeding, blood clots or swollen lymph nodes. · Allergic/Immunologic: No nasal congestion or hives. Physical Examination:       Blood pressure 98/72, pulse 76, height 5' 9\" (1.753 m), weight 194 lb 3.2 oz (88.1 kg). Constitutional and General Appearance:  Appears stated age, NAD  Respiratory:  · Normal excursion and expansion without use of accessory muscles  · Resp Auscultation: Normal breath sounds without dullness  Cardiovascular:  · The apical impulses not displaced  · Heart tones are crisp and normal  · Cervical veins are not engorged  · The carotid upstroke is normal in amplitude and contour without delay or bruit  · Peripheral pulses are symmetrical and full  · There is no clubbing, cyanosis of the extremities.   · abdominal swelling with tense ascites,scrotal edema   · Femoral Arteries: 2+ and equal  · Pedal Pulses: 2+ and equal   Abdomen:  · No masses or tenderness  · Liver/Spleen: No Abnormalities Noted  Neurological/Psychiatric:  · Alert and oriented in all spheres  · Moves all extremities well  · Exhibits normal gait balance and coordination  · No abnormalities of mood, affect, memory, mentation, or behavior are noted    Stress Echo: 8/9/2011 [facility], EF 45%, mild TR, RVSP 34.5 mmHg. [facility] 4/10/15: EF 40%. 3/31/15 Nuclear stress: small sized apical primarily fixed defect consistent with fibrosis. Diaphragm attenuation reduces the specificity of this finding. No ischemia. Dilated left ventricle with global hypokinesis and EF 35%. Frequent PVCs and slow non-sustained polymorphic VT    ECHO 3/4/19:  Left ventricle - normal in size, reduced function with EF of 40%, global hypokinesis, more severe hypokinesis of inferior wall. Mitral valve - trivial regurgitation   Aortic valve - sclerotic   Tricuspid valve - trivial regurgitation with PASP of 32mmHg   Pacer / ICD wire is visualized in the right ventricle and right atrium. Echo 1/9/20 with EF below 20% with 4 chamber dilatation  Assessment:   1. Coronary atherosclerosis of native coronary artery: Stable. No anginal symptoms. Had CP prior to stenting. 2004 cath> Cyphers to Cx/Nataly,  2007 cath> Cypher to LAD     2011 stress echo> negative for ischemia. 8/28/12 [facility]> patent LAD, marginal and CFX stents. Chronic RCA occlusion. 2.   Cardiomyopathy: Stable. Continue current meds. Stress echo 2011> Negative for ischemia, resting EF of 45%, with exercise his EF went up to 55%. [facility] 8/28/2012> EF 40%. ECHO 7/14/16>  Technically limited study due to body habitus. Arrhythmia noted during exam. Normal left ventricle size. There is mild concentric left ventricular hypertrophy. Left ventricular function is difficult to estimate due to arrhythmia but appears to be 50-55%. There is reversal of E/A inflow velocities across the mitral valve suggesting impaired left ventricular relaxation. The left atrium is dilated. There is trivial tricuspid regurgitation with RVSP estimated at 27 mmHg. Trivial mitral regurgitation is present.   ECHO 3/4/19> EF of 40%, global hypokinesis, more severe hypokinesis of inferior wall. Echo 1/9/20> EF 15 to 20%  Lasix ,Aldactone, Coreg patient not on ACE inhibitor  NEEDS ECHO SOON     3. AICD: Receives routine device checks every three months. - recently hospitalized for episode of VTACH- Restart Coreg 3.125 BID - help prevent future episodes of Vtach- following with EP      4. Essential hypertension: Stable. Blood pressure 98/72, pulse 76, height 5' 9\" (1.753 m), weight 194 lb 3.2 oz (88.1 kg). recheck by me 104/80, pt home monitor checked in office 107/83     5. Hyperlipidemia: Usually drawn thru the [facility]. 6. Diabetes mellitus: Followed by PCP. 7. 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 Cardiac test and lab results personally reviewed by me during this office visit and discussed. Restart Coreg 3.215 BID - help prevent future episodes of Vtach   Echo soon  Ultrasound guided paracentesis - ascites   Continue risk factor modifications. Call for any change in symptoms. Return for regular follow up in 3 months. I appreciate the opportunity of cooperating in the care of this individual.    Chandu Richmond MD., [facility]    Patient's problem list, medications, allergies, past medical, surgical, social and family histories were reviewed and updated as appropriate. Scribe's attestation: This note was scribed in the presence of Dr. Tg Arroyo MD, by Ricky Kaur RN. The scribe's documentation has been prepared under my direction and personally reviewed by me in its entirety. I confirm that the note above accurately reflects all work, treatment, procedures, and medical decision making performed by me.

## 2020-12-07 NOTE — PROGRESS NOTES
PT DISCHARGED HOME WITH FAMILY, PT IN STABLE CONDITION, TRANSPORTED TO CAR VIA WHEELCHAIR WITH this RN

## 2020-12-07 NOTE — PROGRESS NOTES
[facility]   Electrophysiology Follow up   Date: 12/8/2020  I had the privilege of visiting Leonides Olvera in the office. CC: MCCLELLAN   HPI: Leonides Olvera is a 77 y.o. male  with CAD s/p PCI, ischemic cardiomyopathy, HTN, HLD and DM. He is on chemo for non-Hodgkin's lymphoma (B-cell); he has a port-a-cath in right chest.     He has history of recurrent syncope in the past. MCOT with frequent episodes of polymorphic PVCs and also several episodes of polymorphic non-sustained VT.      Because of ischemic cardiomyopathy, history of CAD and moderate LV dysfunction, and VT seen on MCOT. On 6/23/2015 single chamber ICD was placed.      One episode of tachycardia on 13-Aug-2017 at 11:30 for 24 seconds with average rate of 188 likely SVT. Therapies withheld due to wavelet suggesting SVT. He presented to [facility] ED on 10/17/2020 with complaints of chest pain and palpitations. He was found to have VT/Wide complex tachycardia and underwent a DCCV. He was also found to be in shock. Post cardioversion he became hypotensive and required central line access and epinephrine. His rate detection was adjusted for future therapies and his amiodarone was discontinued d/t cirrhosis. 12/7/2020 underwent an ultrasound guided paracentesis which removed 4.5 liters of fluid    Wesley Koehler presents to the office in follow up. He states he is not doing well today. He states that he went to [facility]. He is short of breath today and continues to have swelling in his scrotum and his abdomen regardless of the 4.5 liters of fluids removed yesterday. He cannot get up quickly or he becomes lightheaded and may fall. Discussed caution with position changes.      Assessment and plan:   Patient Active Problem List    Diagnosis Date Noted    Mixed hyperlipidemia 08/02/2011     Priority: High    Essential hypertension, benign 08/02/2011     Priority: High    Coronary atherosclerosis of native coronary artery 08/02/2011     Priority: High    Cardiomyopathy [facility] 08/02/2011     Priority: High    Hypotension 10/17/2020    Acute respiratory failure with hypoxia ([facility] 10/17/2020    Acute renal failure superimposed on stage 3 chronic kidney disease ([facility] 10/17/2020    YENNIFER (acute kidney injury) ([facility]     Diffuse large B-cell lymphoma of solid organ excluding spleen ([facility] 03/02/2020    Ascites 02/29/2020    Anasarca 02/28/2020    Tachycardia 02/01/2019    V-tach ([facility] 06/23/2015    AICD (automatic cardioverter/defibrillator) present 06/23/2015    Ischemic cardiomyopathy     Syncope     Coronary artery disease     Lightheaded 03/31/2015    Chest pain 08/23/2012    DMII (diabetes mellitus, type 2) ([facility] 08/02/2011     - Ventricular Tachycardia              Recurrent episodes, Hospitalized 10/2020   Amiodarone was stopped d/t cirrhosis   Single chamber ICD   The CIED was interrogated and programmed and I supervised and reviewed all the data. All findings and changes are in device interrogation sheet and reflect my personal interpretation and changes and is scanned to Epic.     0.1%, 6.1 years remaining    14 NSVT episodes last on 12/5//2020   1 VF episode pace terminated 11/19/2020 lasting 9 seconds  optivol elevated      If recurrent VT, sotalol can be used but requires hospitaliztion for loading. Discussed with patient.      -Cirrhosis   Advanced, end stage cirrhosis. Paracentesis 12/7/2020 4.5 liters of fluid removed   Continues to have abdominal and scrotal swelling    -Cardiomyopathy   -Echo 12/7/2020 EF <20%    -Echo 1/9/2020 EF 15-20%              -On Lasix, aldactone, and  Coreg      - CAD              - Cath: 8/28/12 patent stents in LAD, CX and Marginal. RCA occulusion with collaterals, EF 40%. - 3/31/15 stress: small sized apical primarily fixed defect consistent with fibrosis. Diaphragm attenuation reduces the specificity of this finding. No ischemia. Dilated left ventricle with global hypokinesis and EF 35%. Frequent PVCs and slow non-sustained polymorphic VT   -Follows with      HTN:  Controlled. Continue current medications.      -HLD-    On Statin     - Lymphoma:               History of chemotherapy . Diagnostic studies:   ECG 12/8/20  Sinus rhythm/ frequent PVC's    Echo 12/7/2020   Summary   -Left ventricular cavity size is normal.   -Overall left ventricular systolic function appears severely reduced.   -Ejection fraction is visually estimated to be <20%. -There is abnormal septal motion noted. -Cannot rule out other wall motion abnormalities. -Grade I diastolic dysfunction with normal LV filling pressures. E/e' =   11.8.   -The left atrium is dilated. -The right atrium is severely dilated. -The aortic valve appears sclerotic but opens well. -Mild mitral regurgitation.   -Moderate tricuspid regurgitation.   -Right ventricle size is normal with reduced function. TAPSE = 1.3 cm. RVS   velocity is 8.27 cm/s. -RVSP = 28 mmHG. Cannot rule out pleural effusion vs shadowing. Echo 1/9/2020   Summary   Dilated left ventricle. Normal wall thickness. Severely decreased left ventricular systolic function with an estimated EF   <20%. E/e'=14. Mild mitral regurgitation. Biatrial enlargement. Aortic valve appears sclerotic but opens adequately. No stenosis or   insufficiency. The right ventricle is enlarged and decreased in function. Moderate tricuspid regurgitation. PASP 37mmHg. Trivial pulmonic regurgitation. There is a trivial anterior pericardial effusion noted. There is fluid around the liver. IVC size is normal (<2.1 cm) but collapses < 50% with respiration consistent   with elevated RA pressure (8 mmHg). I independently reviewed the cardiac diagnostic studies, ECG and relevant imaging studies.      Physical Examination:  Vitals:    12/08/20 1521   BP: 107/71   Pulse: 80      Wt Readings from Last 3 Encounters:   12/08/20 188 lb (85.3 kg)   12/04/20 194 lb 3.2 for Diarrhea   Yes Historical Provider, MD   nitroGLYCERIN (NITROSTAT) 0.4 MG SL tablet Place 0.4 mg under the tongue every 5 minutes as needed for Chest pain up to max of 3 total doses. If no relief after 1 dose, call 911. Yes Historical Provider, MD   glipiZIDE (GLUCOTROL) 5 MG tablet Take 7.5 mg by mouth 2 times daily (before meals)    Yes Historical Provider, MD   atorvastatin (LIPITOR) 80 MG tablet Take 80 mg by mouth daily. Yes Historical Provider, MD       Allergies   Allergen Reactions    Penicillins Swelling    Cortisone      Other reaction(s): Other (See Comments)  Hot flashes  Other reaction(s): Other (See Comments)  Hot flashes    Metformin      Other reaction(s): Other (See Comments)  Violent behavior  Other reaction(s): Other (See Comments)  Violent behavior    Metformin Hcl     Morphine Other (See Comments)     Agitation/violence  Tolerates Percocet (oxycodone/APAP)    Lisinopril      cough       Social History:  Reviewed. reports that he quit smoking about 45 years ago. He has never used smokeless tobacco. He reports current alcohol use. He reports that he does not use drugs. Family History:  Reviewed. Reviewed. No family history of SCD. Relevant and available labs, and cardiovascular diagnostics reviewed. Reviewed. I independently reviewed relevant and available cardiac diagnostic tests ECG, CXR, Echo, Stress test, Device interrogation, Holter, CT scan. All questions and concerns were addressed to the patient/family. Alternatives to my treatment were discussed. I have discussed the above stated plan and the patient verbalized understanding and agreed with the plan. Scribe attestation: This note was scribed in the presence of Rima Ewing MD by Prabha Stauffer RN  Physician Attestation: I, Dr. Rima Ewing, confirm that the scribe's documentation has been prepared under my direction and personally reviewed by me in its entirety.   I also confirm that the note above accurately reflects all work, treatment, procedures, and medical decision making performed by me. NOTE: This report was transcribed using voice recognition software. Every effort was made to ensure accuracy, however, inadvertent computerized transcription errors may be present.      Hemal Jensen MD, MPH  [facility]   Office: [phone]

## 2020-12-08 NOTE — PROGRESS NOTES
Patient comes in for programming evaluation for his defibrillator. All sensing and pacing parameters are within normal range. Battery life 6.1 years   0.1%. 1 SVT-wavelet episode noted on 11/19/2020 lasting 45 seconds. 1 VF pace-terminated episode noted on 11/19/2020 lasting 9 seconds. 14 NSVT episodes noted. Last on 12/5/2020, longest 4 seconds. Patient remains on Coreg. No changes need to be made at this time. Please see interrogation for more detail. Optivol is elevated. Possible OptiVol fluid accumulation: 06-Sep-2020 -- ongoing. Patient will see Dr. Nissa Ayon today and follow up in 3 months in office or remotely.

## 2020-12-18 NOTE — TELEPHONE ENCOUNTER
Please call the pt to schedule US Guided Paracentesis (this is done in IR), should be done in early January, thanks!

## 2020-12-28 NOTE — BRIEF OP NOTE
Brief Postoperative Note    Esther Espinosa  YOB: 1945  [id number]    Pre-operative Diagnosis: ascites    Post-operative Diagnosis: Same    Procedure: US-guided paracentesis    Anesthesia: Local    Surgeons: Edilberto Rodríguez MD    Estimated Blood Loss: Less than 5 mL    Complications: None    Specimens: Was Obtained: ascitic fluid drained    Findings: Successful US-guided paracentesis.     Electronically signed by Edilberto Rodríguez MD on 12/28/2020 at 12:19 PM

## 2020-12-28 NOTE — PROGRESS NOTES
6500ml of fluid removed  Spoke to patients ANG fan and advised her the amount of fluid removed and that patient was returning to the floor.

## 2020-12-28 NOTE — PROGRESS NOTES
Patient/report received from IR, vss, denies pain/needs, drsg to paracentesis site c/d/i, will monitor, call light in reach

## 2021-01-01 ENCOUNTER — HOSPITAL ENCOUNTER (OUTPATIENT)
Dept: INTERVENTIONAL RADIOLOGY/VASCULAR | Age: 76
Discharge: HOME | End: 2021-05-17
Payer: MEDICARE

## 2021-01-01 ENCOUNTER — OFFICE VISIT (OUTPATIENT)
Dept: CARDIOLOGY CLINIC | Age: 76
End: 2021-01-01
Payer: MEDICARE

## 2021-01-01 ENCOUNTER — TELEPHONE (OUTPATIENT)
Dept: CARDIOLOGY CLINIC | Age: 76
End: 2021-01-01

## 2021-01-01 ENCOUNTER — HOSPITAL ENCOUNTER (INPATIENT)
Age: 76
LOS: 4 days | Discharge: HOME | DRG: 291 | End: 2021-03-23
Attending: EMERGENCY MEDICINE | Admitting: INTERNAL MEDICINE
Payer: MEDICARE

## 2021-01-01 ENCOUNTER — APPOINTMENT (OUTPATIENT)
Dept: GENERAL RADIOLOGY | Age: 76
DRG: 308 | End: 2021-01-01
Payer: MEDICARE

## 2021-01-01 ENCOUNTER — NURSE ONLY (OUTPATIENT)
Dept: CARDIOLOGY CLINIC | Age: 76
End: 2021-01-01
Payer: MEDICARE

## 2021-01-01 ENCOUNTER — TELEPHONE (OUTPATIENT)
Dept: OTHER | Age: 76
End: 2021-01-01

## 2021-01-01 ENCOUNTER — HOSPITAL ENCOUNTER (OUTPATIENT)
Age: 76
Discharge: HOME | End: 2021-05-17
Payer: MEDICARE

## 2021-01-01 ENCOUNTER — APPOINTMENT (OUTPATIENT)
Dept: GENERAL RADIOLOGY | Age: 76
DRG: 291 | End: 2021-01-01
Payer: MEDICARE

## 2021-01-01 ENCOUNTER — HOSPITAL ENCOUNTER (OUTPATIENT)
Dept: INTERVENTIONAL RADIOLOGY/VASCULAR | Age: 76
Discharge: HOME | End: 2021-01-20
Payer: MEDICARE

## 2021-01-01 ENCOUNTER — HOSPITAL ENCOUNTER (OUTPATIENT)
Age: 76
Discharge: HOME | End: 2021-02-10
Payer: MEDICARE

## 2021-01-01 ENCOUNTER — HOSPITAL ENCOUNTER (OUTPATIENT)
Dept: INTERVENTIONAL RADIOLOGY/VASCULAR | Age: 76
Discharge: HOME | End: 2021-04-21
Payer: MEDICARE

## 2021-01-01 ENCOUNTER — CARE COORDINATION (OUTPATIENT)
Dept: CASE MANAGEMENT | Age: 76
End: 2021-01-01

## 2021-01-01 ENCOUNTER — HOSPITAL ENCOUNTER (OUTPATIENT)
Dept: INTERVENTIONAL RADIOLOGY/VASCULAR | Age: 76
Discharge: HOME | End: 2021-02-10
Payer: MEDICARE

## 2021-01-01 ENCOUNTER — HOSPITAL ENCOUNTER (INPATIENT)
Age: 76
LOS: 8 days | DRG: 308 | End: 2021-07-11
Attending: EMERGENCY MEDICINE
Payer: MEDICARE

## 2021-01-01 ENCOUNTER — APPOINTMENT (OUTPATIENT)
Dept: ULTRASOUND IMAGING | Age: 76
DRG: 291 | End: 2021-01-01
Payer: MEDICARE

## 2021-01-01 ENCOUNTER — APPOINTMENT (OUTPATIENT)
Dept: INTERVENTIONAL RADIOLOGY/VASCULAR | Age: 76
DRG: 291 | End: 2021-01-01
Payer: MEDICARE

## 2021-01-01 ENCOUNTER — HOSPITAL ENCOUNTER (OUTPATIENT)
Dept: INTERVENTIONAL RADIOLOGY/VASCULAR | Age: 76
Discharge: HOME | End: 2021-03-09
Payer: MEDICARE

## 2021-01-01 VITALS
WEIGHT: 173.3 LBS | RESPIRATION RATE: 19 BRPM | BODY MASS INDEX: 24.81 KG/M2 | SYSTOLIC BLOOD PRESSURE: 120 MMHG | OXYGEN SATURATION: 96 % | DIASTOLIC BLOOD PRESSURE: 78 MMHG | HEIGHT: 70 IN | HEART RATE: 75 BPM

## 2021-01-01 VITALS
TEMPERATURE: 98.1 F | DIASTOLIC BLOOD PRESSURE: 63 MMHG | OXYGEN SATURATION: 100 % | RESPIRATION RATE: 18 BRPM | SYSTOLIC BLOOD PRESSURE: 101 MMHG | HEART RATE: 55 BPM

## 2021-01-01 VITALS
HEIGHT: 69 IN | WEIGHT: 180.5 LBS | OXYGEN SATURATION: 83 % | BODY MASS INDEX: 26.73 KG/M2 | DIASTOLIC BLOOD PRESSURE: 60 MMHG | HEART RATE: 82 BPM | SYSTOLIC BLOOD PRESSURE: 90 MMHG

## 2021-01-01 VITALS
DIASTOLIC BLOOD PRESSURE: 68 MMHG | HEART RATE: 52 BPM | WEIGHT: 174.8 LBS | OXYGEN SATURATION: 99 % | BODY MASS INDEX: 25.89 KG/M2 | SYSTOLIC BLOOD PRESSURE: 98 MMHG | HEIGHT: 69 IN

## 2021-01-01 VITALS
DIASTOLIC BLOOD PRESSURE: 48 MMHG | SYSTOLIC BLOOD PRESSURE: 92 MMHG | WEIGHT: 160.3 LBS | HEART RATE: 73 BPM | RESPIRATION RATE: 18 BRPM | HEIGHT: 70 IN | BODY MASS INDEX: 22.95 KG/M2 | TEMPERATURE: 97.5 F | OXYGEN SATURATION: 96 %

## 2021-01-01 VITALS
OXYGEN SATURATION: 98 % | WEIGHT: 170.2 LBS | HEART RATE: 62 BPM | DIASTOLIC BLOOD PRESSURE: 50 MMHG | HEIGHT: 69 IN | SYSTOLIC BLOOD PRESSURE: 80 MMHG | BODY MASS INDEX: 25.21 KG/M2

## 2021-01-01 VITALS
RESPIRATION RATE: 16 BRPM | OXYGEN SATURATION: 99 % | TEMPERATURE: 98 F | DIASTOLIC BLOOD PRESSURE: 63 MMHG | HEART RATE: 69 BPM | SYSTOLIC BLOOD PRESSURE: 96 MMHG

## 2021-01-01 VITALS
HEIGHT: 69 IN | OXYGEN SATURATION: 87 % | TEMPERATURE: 97.9 F | BODY MASS INDEX: 29.06 KG/M2 | SYSTOLIC BLOOD PRESSURE: 70 MMHG | WEIGHT: 196.21 LBS | DIASTOLIC BLOOD PRESSURE: 50 MMHG

## 2021-01-01 VITALS
TEMPERATURE: 97.2 F | WEIGHT: 189 LBS | SYSTOLIC BLOOD PRESSURE: 89 MMHG | DIASTOLIC BLOOD PRESSURE: 57 MMHG | BODY MASS INDEX: 27.91 KG/M2 | RESPIRATION RATE: 18 BRPM | HEART RATE: 79 BPM | OXYGEN SATURATION: 97 %

## 2021-01-01 VITALS
WEIGHT: 171.1 LBS | BODY MASS INDEX: 25.27 KG/M2 | HEART RATE: 57 BPM | RESPIRATION RATE: 16 BRPM | TEMPERATURE: 98.2 F | SYSTOLIC BLOOD PRESSURE: 107 MMHG | OXYGEN SATURATION: 97 % | DIASTOLIC BLOOD PRESSURE: 54 MMHG

## 2021-01-01 VITALS
HEART RATE: 55 BPM | HEIGHT: 70 IN | DIASTOLIC BLOOD PRESSURE: 62 MMHG | WEIGHT: 191.7 LBS | OXYGEN SATURATION: 89 % | BODY MASS INDEX: 27.44 KG/M2 | SYSTOLIC BLOOD PRESSURE: 100 MMHG

## 2021-01-01 VITALS
DIASTOLIC BLOOD PRESSURE: 66 MMHG | SYSTOLIC BLOOD PRESSURE: 99 MMHG | OXYGEN SATURATION: 97 % | HEART RATE: 71 BPM | RESPIRATION RATE: 18 BRPM | TEMPERATURE: 96.1 F

## 2021-01-01 DIAGNOSIS — E87.70 HYPERVOLEMIA, UNSPECIFIED HYPERVOLEMIA TYPE: ICD-10-CM

## 2021-01-01 DIAGNOSIS — C83.39 DIFFUSE LARGE B-CELL LYMPHOMA OF SOLID ORGAN EXCLUDING SPLEEN (HCC): ICD-10-CM

## 2021-01-01 DIAGNOSIS — I47.20 V-TACH: ICD-10-CM

## 2021-01-01 DIAGNOSIS — I25.10 ATHEROSCLEROSIS OF NATIVE CORONARY ARTERY OF NATIVE HEART WITHOUT ANGINA PECTORIS: ICD-10-CM

## 2021-01-01 DIAGNOSIS — I47.20 VENTRICULAR TACHYARRHYTHMIA: ICD-10-CM

## 2021-01-01 DIAGNOSIS — I46.9 CARDIAC ARREST (HCC): ICD-10-CM

## 2021-01-01 DIAGNOSIS — I42.9 CARDIOMYOPATHY, UNSPECIFIED TYPE (HCC): ICD-10-CM

## 2021-01-01 DIAGNOSIS — Z79.899 LONG-TERM USE OF HIGH-RISK MEDICATION: ICD-10-CM

## 2021-01-01 DIAGNOSIS — R18.8 OTHER ASCITES: ICD-10-CM

## 2021-01-01 DIAGNOSIS — I42.8 NONISCHEMIC CARDIOMYOPATHY (HCC): ICD-10-CM

## 2021-01-01 DIAGNOSIS — Z95.810 CARDIAC DEFIBRILLATOR IN PLACE: ICD-10-CM

## 2021-01-01 DIAGNOSIS — Z95.810 ICD (IMPLANTABLE CARDIOVERTER-DEFIBRILLATOR) IN PLACE: ICD-10-CM

## 2021-01-01 DIAGNOSIS — I25.5 ISCHEMIC CARDIOMYOPATHY: Primary | ICD-10-CM

## 2021-01-01 DIAGNOSIS — E87.79 OTHER HYPERVOLEMIA: Primary | ICD-10-CM

## 2021-01-01 DIAGNOSIS — E78.2 MIXED HYPERLIPIDEMIA: ICD-10-CM

## 2021-01-01 DIAGNOSIS — I50.22 CHRONIC SYSTOLIC CONGESTIVE HEART FAILURE (HCC): Primary | ICD-10-CM

## 2021-01-01 DIAGNOSIS — I25.5 ISCHEMIC CARDIOMYOPATHY: ICD-10-CM

## 2021-01-01 DIAGNOSIS — E87.70 HYPERVOLEMIA, UNSPECIFIED HYPERVOLEMIA TYPE: Primary | ICD-10-CM

## 2021-01-01 DIAGNOSIS — R18.8 OTHER ASCITES: Primary | ICD-10-CM

## 2021-01-01 DIAGNOSIS — I46.9 CARDIAC ARREST (HCC): Primary | ICD-10-CM

## 2021-01-01 DIAGNOSIS — C83.39 DIFFUSE LARGE B-CELL LYMPHOMA OF SOLID ORGAN EXCLUDING SPLEEN (HCC): Primary | ICD-10-CM

## 2021-01-01 DIAGNOSIS — R06.02 SOB (SHORTNESS OF BREATH): ICD-10-CM

## 2021-01-01 DIAGNOSIS — I25.10 CORONARY ARTERY DISEASE INVOLVING NATIVE CORONARY ARTERY OF NATIVE HEART WITHOUT ANGINA PECTORIS: ICD-10-CM

## 2021-01-01 DIAGNOSIS — R00.0 TACHYCARDIA: ICD-10-CM

## 2021-01-01 DIAGNOSIS — E87.79 OTHER HYPERVOLEMIA: ICD-10-CM

## 2021-01-01 LAB
A/G RATIO: 0.9 (ref 1.1–2.2)
A/G RATIO: 1 (ref 1.1–2.2)
A/G RATIO: 1.1 (ref 1.1–2.2)
ABO/RH: NORMAL
ALBUMIN FLUID: 0.7 G/DL
ALBUMIN SERPL-MCNC: 2.5 G/DL (ref 3.4–5)
ALBUMIN SERPL-MCNC: 2.6 G/DL (ref 3.4–5)
ALBUMIN SERPL-MCNC: 2.7 G/DL (ref 3.4–5)
ALBUMIN SERPL-MCNC: 2.8 G/DL (ref 3.4–5)
ALBUMIN SERPL-MCNC: 2.8 G/DL (ref 3.4–5)
ALBUMIN SERPL-MCNC: 2.9 G/DL (ref 3.4–5)
ALBUMIN SERPL-MCNC: 3.1 G/DL (ref 3.4–5)
ALBUMIN SERPL-MCNC: 3.2 G/DL (ref 3.4–5)
ALBUMIN SERPL-MCNC: 3.4 G/DL (ref 3.4–5)
ALP BLD-CCNC: 104 U/L (ref 40–129)
ALP BLD-CCNC: 105 U/L (ref 40–129)
ALP BLD-CCNC: 107 U/L (ref 40–129)
ALP BLD-CCNC: 109 U/L (ref 40–129)
ALP BLD-CCNC: 121 U/L (ref 40–129)
ALP BLD-CCNC: 121 U/L (ref 40–129)
ALP BLD-CCNC: 123 U/L (ref 40–129)
ALP BLD-CCNC: 127 U/L (ref 40–129)
ALP BLD-CCNC: 140 U/L (ref 40–129)
ALP BLD-CCNC: 148 U/L (ref 40–129)
ALP BLD-CCNC: 94 U/L (ref 40–129)
ALT SERPL-CCNC: 14 U/L (ref 10–40)
ALT SERPL-CCNC: 16 U/L (ref 10–40)
ALT SERPL-CCNC: 17 U/L (ref 10–40)
ALT SERPL-CCNC: 18 U/L (ref 10–40)
ALT SERPL-CCNC: 20 U/L (ref 10–40)
ALT SERPL-CCNC: 39 U/L (ref 10–40)
ALT SERPL-CCNC: 441 U/L (ref 10–40)
ALT SERPL-CCNC: 530 U/L (ref 10–40)
ALT SERPL-CCNC: 575 U/L (ref 10–40)
ALT SERPL-CCNC: 603 U/L (ref 10–40)
ALT SERPL-CCNC: 815 U/L (ref 10–40)
AMYLASE: 43 U/L (ref 25–115)
ANION GAP SERPL CALCULATED.3IONS-SCNC: 10 MMOL/L (ref 3–16)
ANION GAP SERPL CALCULATED.3IONS-SCNC: 11 MMOL/L (ref 3–16)
ANION GAP SERPL CALCULATED.3IONS-SCNC: 11 MMOL/L (ref 3–16)
ANION GAP SERPL CALCULATED.3IONS-SCNC: 14 MMOL/L (ref 3–16)
ANION GAP SERPL CALCULATED.3IONS-SCNC: 15 MMOL/L (ref 3–16)
ANION GAP SERPL CALCULATED.3IONS-SCNC: 15 MMOL/L (ref 3–16)
ANION GAP SERPL CALCULATED.3IONS-SCNC: 6 MMOL/L (ref 3–16)
ANION GAP SERPL CALCULATED.3IONS-SCNC: 6 MMOL/L (ref 3–16)
ANION GAP SERPL CALCULATED.3IONS-SCNC: 7 MMOL/L (ref 3–16)
ANION GAP SERPL CALCULATED.3IONS-SCNC: 8 MMOL/L (ref 3–16)
ANTIBODY SCREEN: NORMAL
APPEARANCE FLUID: CLEAR
APTT: 29.2 SEC (ref 26.2–38.6)
APTT: 29.7 SEC (ref 26.2–38.6)
APTT: 30.7 SEC (ref 24.2–36.2)
APTT: 30.7 SEC (ref 24.2–36.2)
APTT: 31.4 SEC (ref 24.2–36.2)
APTT: 32.7 SEC (ref 24.2–36.2)
APTT: 33.3 SEC (ref 24.2–36.2)
APTT: 33.9 SEC (ref 24.2–36.2)
APTT: 34 SEC (ref 26.2–38.6)
APTT: 35 SEC (ref 26.2–38.6)
APTT: 49.3 SEC (ref 26.2–38.6)
APTT: 54.6 SEC (ref 26.2–38.6)
APTT: 70 SEC (ref 26.2–38.6)
APTT: 70.8 SEC (ref 26.2–38.6)
APTT: 74 SEC (ref 26.2–38.6)
APTT: 75.3 SEC (ref 26.2–38.6)
AST SERPL-CCNC: 18 U/L (ref 15–37)
AST SERPL-CCNC: 21 U/L (ref 15–37)
AST SERPL-CCNC: 216 U/L (ref 15–37)
AST SERPL-CCNC: 22 U/L (ref 15–37)
AST SERPL-CCNC: 275 U/L (ref 15–37)
AST SERPL-CCNC: 35 U/L (ref 15–37)
AST SERPL-CCNC: 383 U/L (ref 15–37)
AST SERPL-CCNC: 50 U/L (ref 15–37)
AST SERPL-CCNC: 64 U/L (ref 15–37)
AST SERPL-CCNC: 868 U/L (ref 15–37)
AST SERPL-CCNC: 872 U/L (ref 15–37)
BASE EXCESS ARTERIAL: -1.7 MMOL/L (ref -3–3)
BASE EXCESS ARTERIAL: -2.3 MMOL/L (ref -3–3)
BASE EXCESS ARTERIAL: -2.9 MMOL/L (ref -3–3)
BASE EXCESS ARTERIAL: -2.9 MMOL/L (ref -3–3)
BASE EXCESS ARTERIAL: -3.8 MMOL/L (ref -3–3)
BASOPHILS ABSOLUTE: 0 K/UL (ref 0–0.2)
BASOPHILS ABSOLUTE: 0.1 K/UL (ref 0–0.2)
BASOPHILS RELATIVE PERCENT: 0.2 %
BASOPHILS RELATIVE PERCENT: 0.5 %
BASOPHILS RELATIVE PERCENT: 0.6 %
BASOPHILS RELATIVE PERCENT: 0.6 %
BASOPHILS RELATIVE PERCENT: 0.7 %
BASOPHILS RELATIVE PERCENT: 0.7 %
BASOPHILS RELATIVE PERCENT: 0.8 %
BASOPHILS RELATIVE PERCENT: 0.9 %
BILIRUB SERPL-MCNC: 0.6 MG/DL (ref 0–1)
BILIRUB SERPL-MCNC: 0.8 MG/DL (ref 0–1)
BILIRUB SERPL-MCNC: 0.8 MG/DL (ref 0–1)
BILIRUB SERPL-MCNC: 1 MG/DL (ref 0–1)
BILIRUB SERPL-MCNC: 1.3 MG/DL (ref 0–1)
BILIRUB SERPL-MCNC: 1.3 MG/DL (ref 0–1)
BILIRUB SERPL-MCNC: 1.4 MG/DL (ref 0–1)
BILIRUBIN DIRECT: 0.3 MG/DL (ref 0–0.3)
BILIRUBIN DIRECT: 0.5 MG/DL (ref 0–0.3)
BILIRUBIN DIRECT: <0.2 MG/DL (ref 0–0.3)
BILIRUBIN URINE: NEGATIVE
BILIRUBIN URINE: NEGATIVE
BILIRUBIN, INDIRECT: 0.5 MG/DL (ref 0–1)
BILIRUBIN, INDIRECT: 0.8 MG/DL (ref 0–1)
BILIRUBIN, INDIRECT: ABNORMAL MG/DL (ref 0–1)
BLOOD CULTURE, ROUTINE: ABNORMAL
BLOOD, URINE: ABNORMAL
BLOOD, URINE: NEGATIVE
BODY FLUID CULTURE, STERILE: NORMAL
BUN BLDV-MCNC: 12 MG/DL (ref 7–20)
BUN BLDV-MCNC: 12 MG/DL (ref 7–20)
BUN BLDV-MCNC: 13 MG/DL (ref 7–20)
BUN BLDV-MCNC: 14 MG/DL (ref 7–20)
BUN BLDV-MCNC: 14 MG/DL (ref 7–20)
BUN BLDV-MCNC: 15 MG/DL (ref 7–20)
BUN BLDV-MCNC: 15 MG/DL (ref 7–20)
BUN BLDV-MCNC: 21 MG/DL (ref 7–20)
BUN BLDV-MCNC: 32 MG/DL (ref 7–20)
BUN BLDV-MCNC: 36 MG/DL (ref 7–20)
BUN BLDV-MCNC: 42 MG/DL (ref 7–20)
BUN BLDV-MCNC: 44 MG/DL (ref 7–20)
BUN BLDV-MCNC: 49 MG/DL (ref 7–20)
BUN BLDV-MCNC: 50 MG/DL (ref 7–20)
BUN BLDV-MCNC: 52 MG/DL (ref 7–20)
C-REACTIVE PROTEIN: 23.4 MG/L (ref 0–5.1)
CALCIUM SERPL-MCNC: 7.2 MG/DL (ref 8.3–10.6)
CALCIUM SERPL-MCNC: 7.6 MG/DL (ref 8.3–10.6)
CALCIUM SERPL-MCNC: 7.6 MG/DL (ref 8.3–10.6)
CALCIUM SERPL-MCNC: 7.9 MG/DL (ref 8.3–10.6)
CALCIUM SERPL-MCNC: 8.1 MG/DL (ref 8.3–10.6)
CALCIUM SERPL-MCNC: 8.1 MG/DL (ref 8.3–10.6)
CALCIUM SERPL-MCNC: 8.2 MG/DL (ref 8.3–10.6)
CALCIUM SERPL-MCNC: 8.2 MG/DL (ref 8.3–10.6)
CALCIUM SERPL-MCNC: 8.4 MG/DL (ref 8.3–10.6)
CALCIUM SERPL-MCNC: 8.4 MG/DL (ref 8.3–10.6)
CALCIUM SERPL-MCNC: 8.5 MG/DL (ref 8.3–10.6)
CALCIUM SERPL-MCNC: 8.5 MG/DL (ref 8.3–10.6)
CALCIUM SERPL-MCNC: 8.6 MG/DL (ref 8.3–10.6)
CALCIUM SERPL-MCNC: 8.8 MG/DL (ref 8.3–10.6)
CALCIUM SERPL-MCNC: 9.7 MG/DL (ref 8.3–10.6)
CARBOXYHEMOGLOBIN ARTERIAL: 0.2 % (ref 0–1.5)
CARBOXYHEMOGLOBIN ARTERIAL: 0.2 % (ref 0–1.5)
CARBOXYHEMOGLOBIN ARTERIAL: 0.4 % (ref 0–1.5)
CARBOXYHEMOGLOBIN ARTERIAL: 1.2 % (ref 0–1.5)
CARBOXYHEMOGLOBIN ARTERIAL: 1.3 % (ref 0–1.5)
CELL COUNT FLUID TYPE: NORMAL
CHLORIDE BLD-SCNC: 100 MMOL/L (ref 99–110)
CHLORIDE BLD-SCNC: 101 MMOL/L (ref 99–110)
CHLORIDE BLD-SCNC: 101 MMOL/L (ref 99–110)
CHLORIDE BLD-SCNC: 102 MMOL/L (ref 99–110)
CHLORIDE BLD-SCNC: 102 MMOL/L (ref 99–110)
CHLORIDE BLD-SCNC: 104 MMOL/L (ref 99–110)
CHLORIDE BLD-SCNC: 95 MMOL/L (ref 99–110)
CHLORIDE BLD-SCNC: 95 MMOL/L (ref 99–110)
CHLORIDE BLD-SCNC: 96 MMOL/L (ref 99–110)
CHLORIDE BLD-SCNC: 97 MMOL/L (ref 99–110)
CHLORIDE BLD-SCNC: 98 MMOL/L (ref 99–110)
CHLORIDE BLD-SCNC: 98 MMOL/L (ref 99–110)
CHLORIDE BLD-SCNC: 99 MMOL/L (ref 99–110)
CHOLESTEROL, TOTAL: 105 MG/DL (ref 0–199)
CLARITY: CLEAR
CLARITY: CLEAR
CLOT EVALUATION: NORMAL
CO2: 21 MMOL/L (ref 21–32)
CO2: 21 MMOL/L (ref 21–32)
CO2: 22 MMOL/L (ref 21–32)
CO2: 23 MMOL/L (ref 21–32)
CO2: 23 MMOL/L (ref 21–32)
CO2: 25 MMOL/L (ref 21–32)
CO2: 26 MMOL/L (ref 21–32)
CO2: 27 MMOL/L (ref 21–32)
CO2: 28 MMOL/L (ref 21–32)
COLOR FLUID: YELLOW
COLOR: YELLOW
COLOR: YELLOW
COMMENT UA: ABNORMAL
CREAT SERPL-MCNC: 1 MG/DL (ref 0.8–1.3)
CREAT SERPL-MCNC: 1.1 MG/DL (ref 0.8–1.3)
CREAT SERPL-MCNC: 1.1 MG/DL (ref 0.8–1.3)
CREAT SERPL-MCNC: 1.2 MG/DL (ref 0.8–1.3)
CREAT SERPL-MCNC: 1.3 MG/DL (ref 0.8–1.3)
CREAT SERPL-MCNC: 1.4 MG/DL (ref 0.8–1.3)
CREAT SERPL-MCNC: 2 MG/DL (ref 0.8–1.3)
CREAT SERPL-MCNC: 2 MG/DL (ref 0.8–1.3)
CREAT SERPL-MCNC: 2.5 MG/DL (ref 0.8–1.3)
CREAT SERPL-MCNC: 2.5 MG/DL (ref 0.8–1.3)
CREAT SERPL-MCNC: 3 MG/DL (ref 0.8–1.3)
CREAT SERPL-MCNC: 3.2 MG/DL (ref 0.8–1.3)
CREATININE URINE: 59.5 MG/DL (ref 39–259)
CULTURE, BLOOD 2: ABNORMAL
EKG ATRIAL RATE: 64 BPM
EKG ATRIAL RATE: 67 BPM
EKG ATRIAL RATE: 97 BPM
EKG DIAGNOSIS: NORMAL
EKG P AXIS: 216 DEGREES
EKG P AXIS: 91 DEGREES
EKG P AXIS: 94 DEGREES
EKG P-R INTERVAL: 180 MS
EKG P-R INTERVAL: 222 MS
EKG P-R INTERVAL: 234 MS
EKG Q-T INTERVAL: 408 MS
EKG Q-T INTERVAL: 436 MS
EKG Q-T INTERVAL: 476 MS
EKG QRS DURATION: 104 MS
EKG QRS DURATION: 110 MS
EKG QRS DURATION: 112 MS
EKG QTC CALCULATION (BAZETT): 460 MS
EKG QTC CALCULATION (BAZETT): 491 MS
EKG QTC CALCULATION (BAZETT): 518 MS
EKG R AXIS: -53 DEGREES
EKG R AXIS: -54 DEGREES
EKG R AXIS: -55 DEGREES
EKG T AXIS: 104 DEGREES
EKG T AXIS: 135 DEGREES
EKG T AXIS: 94 DEGREES
EKG VENTRICULAR RATE: 64 BPM
EKG VENTRICULAR RATE: 67 BPM
EKG VENTRICULAR RATE: 97 BPM
EOSINOPHILS ABSOLUTE: 0 K/UL (ref 0–0.6)
EOSINOPHILS ABSOLUTE: 0 K/UL (ref 0–0.6)
EOSINOPHILS ABSOLUTE: 0.1 K/UL (ref 0–0.6)
EOSINOPHILS ABSOLUTE: 0.2 K/UL (ref 0–0.6)
EOSINOPHILS ABSOLUTE: 0.2 K/UL (ref 0–0.6)
EOSINOPHILS RELATIVE PERCENT: 0.4 %
EOSINOPHILS RELATIVE PERCENT: 0.8 %
EOSINOPHILS RELATIVE PERCENT: 0.8 %
EOSINOPHILS RELATIVE PERCENT: 1.8 %
EOSINOPHILS RELATIVE PERCENT: 2 %
EOSINOPHILS RELATIVE PERCENT: 2.2 %
EOSINOPHILS RELATIVE PERCENT: 2.5 %
EOSINOPHILS RELATIVE PERCENT: 2.6 %
EOSINOPHILS RELATIVE PERCENT: 2.7 %
EOSINOPHILS RELATIVE PERCENT: 3.1 %
EPITHELIAL CELLS, UA: 1 /HPF (ref 0–5)
EPITHELIAL CELLS, UA: 10 /HPF (ref 0–5)
ESTIMATED AVERAGE GLUCOSE: 151.3 MG/DL
FLUID PATH CONSULT: YES
FLUID TYPE: NORMAL
GFR AFRICAN AMERICAN: 23
GFR AFRICAN AMERICAN: 25
GFR AFRICAN AMERICAN: 31
GFR AFRICAN AMERICAN: 31
GFR AFRICAN AMERICAN: 40
GFR AFRICAN AMERICAN: 40
GFR AFRICAN AMERICAN: 60
GFR AFRICAN AMERICAN: >60
GFR NON-AFRICAN AMERICAN: 19
GFR NON-AFRICAN AMERICAN: 20
GFR NON-AFRICAN AMERICAN: 25
GFR NON-AFRICAN AMERICAN: 25
GFR NON-AFRICAN AMERICAN: 33
GFR NON-AFRICAN AMERICAN: 33
GFR NON-AFRICAN AMERICAN: 49
GFR NON-AFRICAN AMERICAN: 54
GFR NON-AFRICAN AMERICAN: 59
GFR NON-AFRICAN AMERICAN: >60
GLOBULIN: 2.5 G/DL
GLOBULIN: 2.5 G/DL
GLOBULIN: 2.7 G/DL
GLOBULIN: 2.7 G/DL
GLOBULIN: 2.8 G/DL
GLOBULIN: 3.1 G/DL
GLUCOSE BLD-MCNC: 100 MG/DL (ref 70–99)
GLUCOSE BLD-MCNC: 102 MG/DL (ref 70–99)
GLUCOSE BLD-MCNC: 105 MG/DL (ref 70–99)
GLUCOSE BLD-MCNC: 105 MG/DL (ref 70–99)
GLUCOSE BLD-MCNC: 112 MG/DL (ref 70–99)
GLUCOSE BLD-MCNC: 113 MG/DL (ref 70–99)
GLUCOSE BLD-MCNC: 116 MG/DL (ref 70–99)
GLUCOSE BLD-MCNC: 123 MG/DL (ref 70–99)
GLUCOSE BLD-MCNC: 124 MG/DL (ref 70–99)
GLUCOSE BLD-MCNC: 126 MG/DL (ref 70–99)
GLUCOSE BLD-MCNC: 126 MG/DL (ref 70–99)
GLUCOSE BLD-MCNC: 128 MG/DL (ref 70–99)
GLUCOSE BLD-MCNC: 130 MG/DL (ref 70–99)
GLUCOSE BLD-MCNC: 143 MG/DL (ref 70–99)
GLUCOSE BLD-MCNC: 145 MG/DL (ref 70–99)
GLUCOSE BLD-MCNC: 145 MG/DL (ref 70–99)
GLUCOSE BLD-MCNC: 149 MG/DL (ref 70–99)
GLUCOSE BLD-MCNC: 151 MG/DL (ref 70–99)
GLUCOSE BLD-MCNC: 151 MG/DL (ref 70–99)
GLUCOSE BLD-MCNC: 153 MG/DL (ref 70–99)
GLUCOSE BLD-MCNC: 158 MG/DL (ref 70–99)
GLUCOSE BLD-MCNC: 161 MG/DL (ref 70–99)
GLUCOSE BLD-MCNC: 163 MG/DL (ref 70–99)
GLUCOSE BLD-MCNC: 163 MG/DL (ref 70–99)
GLUCOSE BLD-MCNC: 164 MG/DL (ref 70–99)
GLUCOSE BLD-MCNC: 166 MG/DL (ref 70–99)
GLUCOSE BLD-MCNC: 172 MG/DL (ref 70–99)
GLUCOSE BLD-MCNC: 175 MG/DL (ref 70–99)
GLUCOSE BLD-MCNC: 181 MG/DL (ref 70–99)
GLUCOSE BLD-MCNC: 181 MG/DL (ref 70–99)
GLUCOSE BLD-MCNC: 185 MG/DL (ref 70–99)
GLUCOSE BLD-MCNC: 188 MG/DL (ref 70–99)
GLUCOSE BLD-MCNC: 189 MG/DL (ref 70–99)
GLUCOSE BLD-MCNC: 191 MG/DL (ref 70–99)
GLUCOSE BLD-MCNC: 192 MG/DL (ref 70–99)
GLUCOSE BLD-MCNC: 196 MG/DL (ref 70–99)
GLUCOSE BLD-MCNC: 196 MG/DL (ref 70–99)
GLUCOSE BLD-MCNC: 203 MG/DL (ref 70–99)
GLUCOSE BLD-MCNC: 203 MG/DL (ref 70–99)
GLUCOSE BLD-MCNC: 206 MG/DL (ref 70–99)
GLUCOSE BLD-MCNC: 208 MG/DL (ref 70–99)
GLUCOSE BLD-MCNC: 209 MG/DL (ref 70–99)
GLUCOSE BLD-MCNC: 209 MG/DL (ref 70–99)
GLUCOSE BLD-MCNC: 223 MG/DL (ref 70–99)
GLUCOSE BLD-MCNC: 223 MG/DL (ref 70–99)
GLUCOSE BLD-MCNC: 234 MG/DL (ref 70–99)
GLUCOSE BLD-MCNC: 249 MG/DL (ref 70–99)
GLUCOSE BLD-MCNC: 261 MG/DL (ref 70–99)
GLUCOSE BLD-MCNC: 261 MG/DL (ref 70–99)
GLUCOSE BLD-MCNC: 315 MG/DL (ref 70–99)
GLUCOSE BLD-MCNC: 324 MG/DL (ref 70–99)
GLUCOSE BLD-MCNC: 336 MG/DL (ref 70–99)
GLUCOSE BLD-MCNC: 377 MG/DL (ref 70–99)
GLUCOSE BLD-MCNC: 76 MG/DL (ref 70–99)
GLUCOSE BLD-MCNC: 86 MG/DL (ref 70–99)
GLUCOSE BLD-MCNC: 91 MG/DL (ref 70–99)
GLUCOSE BLD-MCNC: 97 MG/DL (ref 70–99)
GLUCOSE URINE: NEGATIVE MG/DL
GLUCOSE URINE: NEGATIVE MG/DL
GRAM STAIN RESULT: NORMAL
HBA1C MFR BLD: 6.9 %
HCO3 ARTERIAL: 20.9 MMOL/L (ref 21–29)
HCO3 ARTERIAL: 21.5 MMOL/L (ref 21–29)
HCO3 ARTERIAL: 21.9 MMOL/L (ref 21–29)
HCO3 ARTERIAL: 22.1 MMOL/L (ref 21–29)
HCO3 ARTERIAL: 22.3 MMOL/L (ref 21–29)
HCT VFR BLD CALC: 34.9 % (ref 40.5–52.5)
HCT VFR BLD CALC: 35 % (ref 40.5–52.5)
HCT VFR BLD CALC: 35.5 % (ref 40.5–52.5)
HCT VFR BLD CALC: 35.7 % (ref 40.5–52.5)
HCT VFR BLD CALC: 37.3 % (ref 40.5–52.5)
HCT VFR BLD CALC: 37.5 % (ref 40.5–52.5)
HCT VFR BLD CALC: 38.7 % (ref 40.5–52.5)
HCT VFR BLD CALC: 40.3 % (ref 40.5–52.5)
HCT VFR BLD CALC: 41.6 % (ref 40.5–52.5)
HCT VFR BLD CALC: 42.6 % (ref 40.5–52.5)
HDLC SERPL-MCNC: 38 MG/DL (ref 40–60)
HEMOGLOBIN, ART, EXTENDED: 11.5 G/DL (ref 13.5–17.5)
HEMOGLOBIN, ART, EXTENDED: 12 G/DL (ref 13.5–17.5)
HEMOGLOBIN, ART, EXTENDED: 12.2 G/DL (ref 13.5–17.5)
HEMOGLOBIN, ART, EXTENDED: 12.2 G/DL (ref 13.5–17.5)
HEMOGLOBIN, ART, EXTENDED: 14.2 G/DL (ref 13.5–17.5)
HEMOGLOBIN: 11.6 G/DL (ref 13.5–17.5)
HEMOGLOBIN: 11.8 G/DL (ref 13.5–17.5)
HEMOGLOBIN: 11.9 G/DL (ref 13.5–17.5)
HEMOGLOBIN: 12 G/DL (ref 13.5–17.5)
HEMOGLOBIN: 12.4 G/DL (ref 13.5–17.5)
HEMOGLOBIN: 12.5 G/DL (ref 13.5–17.5)
HEMOGLOBIN: 12.6 G/DL (ref 13.5–17.5)
HEMOGLOBIN: 13.4 G/DL (ref 13.5–17.5)
HEMOGLOBIN: 13.6 G/DL (ref 13.5–17.5)
HEMOGLOBIN: 13.9 G/DL (ref 13.5–17.5)
HYALINE CASTS: 1 /LPF (ref 0–8)
HYALINE CASTS: 2 /LPF (ref 0–8)
INR BLD: 1.01 (ref 0.86–1.14)
INR BLD: 1.03 (ref 0.86–1.14)
INR BLD: 1.06 (ref 0.86–1.14)
INR BLD: 1.06 (ref 0.86–1.14)
INR BLD: 1.09 (ref 0.86–1.14)
INR BLD: 1.14 (ref 0.86–1.14)
INR BLD: 1.25 (ref 0.88–1.12)
KETONES, URINE: NEGATIVE MG/DL
KETONES, URINE: NEGATIVE MG/DL
LACTIC ACID, SEPSIS: 4.3 MMOL/L (ref 0.4–1.9)
LACTIC ACID, SEPSIS: 5 MMOL/L (ref 0.4–1.9)
LACTIC ACID: 1.5 MMOL/L (ref 0.4–2)
LACTIC ACID: 1.7 MMOL/L (ref 0.4–2)
LACTIC ACID: 2 MMOL/L (ref 0.4–2)
LACTIC ACID: 3.5 MMOL/L (ref 0.4–2)
LACTIC ACID: 4.9 MMOL/L (ref 0.4–2)
LDL CHOLESTEROL CALCULATED: 49 MG/DL
LEUKOCYTE ESTERASE, URINE: ABNORMAL
LEUKOCYTE ESTERASE, URINE: ABNORMAL
LIPASE: 20 U/L (ref 13–60)
LIPASE: 20 U/L (ref 13–60)
LYMPHOCYTES ABSOLUTE: 0.2 K/UL (ref 1–5.1)
LYMPHOCYTES ABSOLUTE: 0.2 K/UL (ref 1–5.1)
LYMPHOCYTES ABSOLUTE: 0.3 K/UL (ref 1–5.1)
LYMPHOCYTES ABSOLUTE: 0.4 K/UL (ref 1–5.1)
LYMPHOCYTES ABSOLUTE: 0.4 K/UL (ref 1–5.1)
LYMPHOCYTES ABSOLUTE: 0.5 K/UL (ref 1–5.1)
LYMPHOCYTES ABSOLUTE: 0.5 K/UL (ref 1–5.1)
LYMPHOCYTES ABSOLUTE: 0.6 K/UL (ref 1–5.1)
LYMPHOCYTES ABSOLUTE: 0.6 K/UL (ref 1–5.1)
LYMPHOCYTES ABSOLUTE: 0.9 K/UL (ref 1–5.1)
LYMPHOCYTES RELATIVE PERCENT: 10.9 %
LYMPHOCYTES RELATIVE PERCENT: 11.3 %
LYMPHOCYTES RELATIVE PERCENT: 3.8 %
LYMPHOCYTES RELATIVE PERCENT: 5.2 %
LYMPHOCYTES RELATIVE PERCENT: 5.2 %
LYMPHOCYTES RELATIVE PERCENT: 6.6 %
LYMPHOCYTES RELATIVE PERCENT: 7.1 %
LYMPHOCYTES RELATIVE PERCENT: 8.2 %
LYMPHOCYTES RELATIVE PERCENT: 8.8 %
LYMPHOCYTES RELATIVE PERCENT: 9.4 %
LYMPHOCYTES, BODY FLUID: 13 %
MACROPHAGE FLUID: 22 %
MAGNESIUM: 1.6 MG/DL (ref 1.8–2.4)
MAGNESIUM: 1.7 MG/DL (ref 1.8–2.4)
MAGNESIUM: 1.8 MG/DL (ref 1.8–2.4)
MAGNESIUM: 1.9 MG/DL (ref 1.8–2.4)
MAGNESIUM: 2.2 MG/DL (ref 1.8–2.4)
MAGNESIUM: 2.3 MG/DL (ref 1.8–2.4)
MAGNESIUM: 2.4 MG/DL (ref 1.8–2.4)
MAGNESIUM: 2.5 MG/DL (ref 1.8–2.4)
MAGNESIUM: 2.6 MG/DL (ref 1.8–2.4)
MCH RBC QN AUTO: 32.1 PG (ref 26–34)
MCH RBC QN AUTO: 32.2 PG (ref 26–34)
MCH RBC QN AUTO: 32.6 PG (ref 26–34)
MCH RBC QN AUTO: 32.9 PG (ref 26–34)
MCH RBC QN AUTO: 33 PG (ref 26–34)
MCH RBC QN AUTO: 33 PG (ref 26–34)
MCH RBC QN AUTO: 33.1 PG (ref 26–34)
MCH RBC QN AUTO: 33.2 PG (ref 26–34)
MCH RBC QN AUTO: 33.3 PG (ref 26–34)
MCH RBC QN AUTO: 33.3 PG (ref 26–34)
MCHC RBC AUTO-ENTMCNC: 32.6 G/DL (ref 31–36)
MCHC RBC AUTO-ENTMCNC: 32.7 G/DL (ref 31–36)
MCHC RBC AUTO-ENTMCNC: 32.7 G/DL (ref 31–36)
MCHC RBC AUTO-ENTMCNC: 33.1 G/DL (ref 31–36)
MCHC RBC AUTO-ENTMCNC: 33.3 G/DL (ref 31–36)
MCHC RBC AUTO-ENTMCNC: 33.3 G/DL (ref 31–36)
MCHC RBC AUTO-ENTMCNC: 33.5 G/DL (ref 31–36)
MCHC RBC AUTO-ENTMCNC: 33.5 G/DL (ref 31–36)
MCHC RBC AUTO-ENTMCNC: 33.6 G/DL (ref 31–36)
MCHC RBC AUTO-ENTMCNC: 33.7 G/DL (ref 31–36)
MCV RBC AUTO: 101.2 FL (ref 80–100)
MCV RBC AUTO: 101.9 FL (ref 80–100)
MCV RBC AUTO: 97.3 FL (ref 80–100)
MCV RBC AUTO: 98 FL (ref 80–100)
MCV RBC AUTO: 98.2 FL (ref 80–100)
MCV RBC AUTO: 98.2 FL (ref 80–100)
MCV RBC AUTO: 98.5 FL (ref 80–100)
MCV RBC AUTO: 99 FL (ref 80–100)
MCV RBC AUTO: 99.1 FL (ref 80–100)
MCV RBC AUTO: 99.3 FL (ref 80–100)
MESOTHELIAL FLUID: 45 %
METHEMOGLOBIN ARTERIAL: 0 %
METHEMOGLOBIN ARTERIAL: 0.1 %
METHEMOGLOBIN ARTERIAL: 0.4 %
METHEMOGLOBIN ARTERIAL: 0.4 %
METHEMOGLOBIN ARTERIAL: 0.5 %
MICROSCOPIC EXAMINATION: YES
MICROSCOPIC EXAMINATION: YES
MONOCYTE, FLUID: 1 %
MONOCYTES ABSOLUTE: 0.3 K/UL (ref 0–1.3)
MONOCYTES ABSOLUTE: 0.4 K/UL (ref 0–1.3)
MONOCYTES ABSOLUTE: 0.5 K/UL (ref 0–1.3)
MONOCYTES ABSOLUTE: 0.5 K/UL (ref 0–1.3)
MONOCYTES ABSOLUTE: 0.6 K/UL (ref 0–1.3)
MONOCYTES ABSOLUTE: 0.6 K/UL (ref 0–1.3)
MONOCYTES ABSOLUTE: 0.7 K/UL (ref 0–1.3)
MONOCYTES ABSOLUTE: 0.7 K/UL (ref 0–1.3)
MONOCYTES RELATIVE PERCENT: 11.9 %
MONOCYTES RELATIVE PERCENT: 6.5 %
MONOCYTES RELATIVE PERCENT: 7.2 %
MONOCYTES RELATIVE PERCENT: 7.4 %
MONOCYTES RELATIVE PERCENT: 8.1 %
MONOCYTES RELATIVE PERCENT: 8.3 %
MONOCYTES RELATIVE PERCENT: 8.6 %
MONOCYTES RELATIVE PERCENT: 8.6 %
MONOCYTES RELATIVE PERCENT: 9.6 %
MONOCYTES RELATIVE PERCENT: 9.9 %
MRSA SCREEN RT-PCR: NORMAL
NEUTROPHIL, FLUID: 19 %
NEUTROPHILS ABSOLUTE: 3 K/UL (ref 1.7–7.7)
NEUTROPHILS ABSOLUTE: 4.1 K/UL (ref 1.7–7.7)
NEUTROPHILS ABSOLUTE: 4.3 K/UL (ref 1.7–7.7)
NEUTROPHILS ABSOLUTE: 4.3 K/UL (ref 1.7–7.7)
NEUTROPHILS ABSOLUTE: 4.4 K/UL (ref 1.7–7.7)
NEUTROPHILS ABSOLUTE: 4.8 K/UL (ref 1.7–7.7)
NEUTROPHILS ABSOLUTE: 5.1 K/UL (ref 1.7–7.7)
NEUTROPHILS ABSOLUTE: 5.2 K/UL (ref 1.7–7.7)
NEUTROPHILS ABSOLUTE: 5.8 K/UL (ref 1.7–7.7)
NEUTROPHILS ABSOLUTE: 6.3 K/UL (ref 1.7–7.7)
NEUTROPHILS RELATIVE PERCENT: 73.4 %
NEUTROPHILS RELATIVE PERCENT: 77.5 %
NEUTROPHILS RELATIVE PERCENT: 79.1 %
NEUTROPHILS RELATIVE PERCENT: 80.1 %
NEUTROPHILS RELATIVE PERCENT: 81.9 %
NEUTROPHILS RELATIVE PERCENT: 82.1 %
NEUTROPHILS RELATIVE PERCENT: 82.5 %
NEUTROPHILS RELATIVE PERCENT: 83.2 %
NEUTROPHILS RELATIVE PERCENT: 85.7 %
NEUTROPHILS RELATIVE PERCENT: 87 %
NITRITE, URINE: NEGATIVE
NITRITE, URINE: NEGATIVE
NRBC FLUID: 1 %
NUCLEATED CELLS FLUID: 280 /CUMM
NUMBER OF CELLS COUNTED FLUID: 100
O2 SAT, ARTERIAL: 96.4 %
O2 SAT, ARTERIAL: 97.7 %
O2 SAT, ARTERIAL: 98 %
O2 SAT, ARTERIAL: 98.2 %
O2 SAT, ARTERIAL: 98.8 %
O2 THERAPY: ABNORMAL
ORGANISM: ABNORMAL
PCO2 ARTERIAL: 33.5 MMHG (ref 35–45)
PCO2 ARTERIAL: 35.1 MMHG (ref 35–45)
PCO2 ARTERIAL: 35.1 MMHG (ref 35–45)
PCO2 ARTERIAL: 36.1 MMHG (ref 35–45)
PCO2 ARTERIAL: 39.5 MMHG (ref 35–45)
PDW BLD-RTO: 15.5 % (ref 12.4–15.4)
PDW BLD-RTO: 15.6 % (ref 12.4–15.4)
PDW BLD-RTO: 15.7 % (ref 12.4–15.4)
PDW BLD-RTO: 15.7 % (ref 12.4–15.4)
PDW BLD-RTO: 15.8 % (ref 12.4–15.4)
PDW BLD-RTO: 16.3 % (ref 12.4–15.4)
PDW BLD-RTO: 17 % (ref 12.4–15.4)
PDW BLD-RTO: 17.3 % (ref 12.4–15.4)
PDW BLD-RTO: 17.3 % (ref 12.4–15.4)
PDW BLD-RTO: 17.6 % (ref 12.4–15.4)
PERFORMED ON: ABNORMAL
PERFORMED ON: NORMAL
PH ARTERIAL: 7.36 (ref 7.35–7.45)
PH ARTERIAL: 7.37 (ref 7.35–7.45)
PH ARTERIAL: 7.39 (ref 7.35–7.45)
PH ARTERIAL: 7.4 (ref 7.35–7.45)
PH ARTERIAL: 7.43 (ref 7.35–7.45)
PH UA: 7.5 (ref 5–8)
PH UA: 7.5 (ref 5–8)
PHOSPHORUS: 2.6 MG/DL (ref 2.5–4.9)
PHOSPHORUS: 3.1 MG/DL (ref 2.5–4.9)
PHOSPHORUS: 4.4 MG/DL (ref 2.5–4.9)
PHOSPHORUS: 5.1 MG/DL (ref 2.5–4.9)
PHOSPHORUS: 5.8 MG/DL (ref 2.5–4.9)
PLATELET # BLD: 105 K/UL (ref 135–450)
PLATELET # BLD: 115 K/UL (ref 135–450)
PLATELET # BLD: 118 K/UL (ref 135–450)
PLATELET # BLD: 119 K/UL (ref 135–450)
PLATELET # BLD: 130 K/UL (ref 135–450)
PLATELET # BLD: 66 K/UL (ref 135–450)
PLATELET # BLD: 79 K/UL (ref 135–450)
PLATELET # BLD: 81 K/UL (ref 135–450)
PLATELET # BLD: 87 K/UL (ref 135–450)
PLATELET # BLD: 98 K/UL (ref 135–450)
PLATELET SLIDE REVIEW: ABNORMAL
PLATELET SLIDE REVIEW: ABNORMAL
PMV BLD AUTO: 10 FL (ref 5–10.5)
PMV BLD AUTO: 10.1 FL (ref 5–10.5)
PMV BLD AUTO: 10.1 FL (ref 5–10.5)
PMV BLD AUTO: 10.2 FL (ref 5–10.5)
PMV BLD AUTO: 11 FL (ref 5–10.5)
PMV BLD AUTO: 11 FL (ref 5–10.5)
PMV BLD AUTO: 11.7 FL (ref 5–10.5)
PMV BLD AUTO: 9.1 FL (ref 5–10.5)
PMV BLD AUTO: 9.1 FL (ref 5–10.5)
PMV BLD AUTO: 9.3 FL (ref 5–10.5)
PO2 ARTERIAL: 101 MMHG (ref 75–108)
PO2 ARTERIAL: 135 MMHG (ref 75–108)
PO2 ARTERIAL: 144 MMHG (ref 75–108)
PO2 ARTERIAL: 284 MMHG (ref 75–108)
PO2 ARTERIAL: 84.2 MMHG (ref 75–108)
POTASSIUM REFLEX MAGNESIUM: 3.7 MMOL/L (ref 3.5–5.1)
POTASSIUM REFLEX MAGNESIUM: 4.1 MMOL/L (ref 3.5–5.1)
POTASSIUM REFLEX MAGNESIUM: 5.6 MMOL/L (ref 3.5–5.1)
POTASSIUM SERPL-SCNC: 3.3 MMOL/L (ref 3.5–5.1)
POTASSIUM SERPL-SCNC: 3.8 MMOL/L (ref 3.5–5.1)
POTASSIUM SERPL-SCNC: 4 MMOL/L (ref 3.5–5.1)
POTASSIUM SERPL-SCNC: 4.1 MMOL/L (ref 3.5–5.1)
POTASSIUM SERPL-SCNC: 4.1 MMOL/L (ref 3.5–5.1)
POTASSIUM SERPL-SCNC: 4.2 MMOL/L (ref 3.5–5.1)
POTASSIUM SERPL-SCNC: 4.3 MMOL/L (ref 3.5–5.1)
POTASSIUM SERPL-SCNC: 4.3 MMOL/L (ref 3.5–5.1)
POTASSIUM SERPL-SCNC: 4.8 MMOL/L (ref 3.5–5.1)
POTASSIUM SERPL-SCNC: 5 MMOL/L (ref 3.5–5.1)
PRO-BNP: 6282 PG/ML (ref 0–449)
PRO-BNP: 8932 PG/ML (ref 0–449)
PRO-BNP: ABNORMAL PG/ML (ref 0–449)
PROCALCITONIN: 1.57 NG/ML (ref 0–0.15)
PROTEIN FLUID: 1.3 G/DL
PROTEIN UA: ABNORMAL MG/DL
PROTEIN UA: NEGATIVE MG/DL
PROTHROMBIN TIME: 11.7 SEC (ref 10–13.2)
PROTHROMBIN TIME: 12 SEC (ref 10–13.2)
PROTHROMBIN TIME: 12.3 SEC (ref 10–13.2)
PROTHROMBIN TIME: 12.3 SEC (ref 10–13.2)
PROTHROMBIN TIME: 12.7 SEC (ref 10–13.2)
PROTHROMBIN TIME: 13.2 SEC (ref 10–13.2)
PROTHROMBIN TIME: 14.3 SEC (ref 9.9–12.7)
RBC # BLD: 3.52 M/UL (ref 4.2–5.9)
RBC # BLD: 3.56 M/UL (ref 4.2–5.9)
RBC # BLD: 3.59 M/UL (ref 4.2–5.9)
RBC # BLD: 3.59 M/UL (ref 4.2–5.9)
RBC # BLD: 3.79 M/UL (ref 4.2–5.9)
RBC # BLD: 3.8 M/UL (ref 4.2–5.9)
RBC # BLD: 3.86 M/UL (ref 4.2–5.9)
RBC # BLD: 4.11 M/UL (ref 4.2–5.9)
RBC # BLD: 4.21 M/UL (ref 4.2–5.9)
RBC # BLD: 4.23 M/UL (ref 4.2–5.9)
RBC FLUID: <1000 /CUMM
RBC UA: 1 /HPF (ref 0–4)
RBC UA: 13 /HPF (ref 0–4)
REPORT: NORMAL
SEDIMENTATION RATE, ERYTHROCYTE: 13 MM/HR (ref 0–20)
SODIUM BLD-SCNC: 129 MMOL/L (ref 136–145)
SODIUM BLD-SCNC: 129 MMOL/L (ref 136–145)
SODIUM BLD-SCNC: 130 MMOL/L (ref 136–145)
SODIUM BLD-SCNC: 131 MMOL/L (ref 136–145)
SODIUM BLD-SCNC: 132 MMOL/L (ref 136–145)
SODIUM BLD-SCNC: 133 MMOL/L (ref 136–145)
SODIUM BLD-SCNC: 133 MMOL/L (ref 136–145)
SODIUM BLD-SCNC: 134 MMOL/L (ref 136–145)
SODIUM BLD-SCNC: 135 MMOL/L (ref 136–145)
SODIUM BLD-SCNC: 137 MMOL/L (ref 136–145)
SODIUM BLD-SCNC: 144 MMOL/L (ref 136–145)
SODIUM URINE: <20 MMOL/L
SPECIFIC GRAVITY UA: 1.01 (ref 1–1.03)
SPECIFIC GRAVITY UA: 1.01 (ref 1–1.03)
TCO2 ARTERIAL: 49.3 MMOL/L
TCO2 ARTERIAL: 50.6 MMOL/L
TCO2 ARTERIAL: 51.5 MMOL/L
TCO2 ARTERIAL: 51.8 MMOL/L
TCO2 ARTERIAL: 52.7 MMOL/L
TOTAL PROTEIN: 5 G/DL (ref 6.4–8.2)
TOTAL PROTEIN: 5.2 G/DL (ref 6.4–8.2)
TOTAL PROTEIN: 5.3 G/DL (ref 6.4–8.2)
TOTAL PROTEIN: 5.3 G/DL (ref 6.4–8.2)
TOTAL PROTEIN: 5.4 G/DL (ref 6.4–8.2)
TOTAL PROTEIN: 5.5 G/DL (ref 6.4–8.2)
TOTAL PROTEIN: 5.6 G/DL (ref 6.4–8.2)
TOTAL PROTEIN: 5.7 G/DL (ref 6.4–8.2)
TOTAL PROTEIN: 5.8 G/DL (ref 6.4–8.2)
TOTAL PROTEIN: 6 G/DL (ref 6.4–8.2)
TOTAL PROTEIN: 6.2 G/DL (ref 6.4–8.2)
TRIGL SERPL-MCNC: 92 MG/DL (ref 0–150)
TROPONIN: 0.03 NG/ML
TROPONIN: 0.07 NG/ML
TROPONIN: 0.11 NG/ML
TROPONIN: <0.01 NG/ML
URIC ACID, SERUM: 6.1 MG/DL (ref 3.5–7.2)
URINE CULTURE, ROUTINE: NORMAL
URINE CULTURE, ROUTINE: NORMAL
URINE REFLEX TO CULTURE: YES
URINE TYPE: ABNORMAL
URINE TYPE: ABNORMAL
UROBILINOGEN, URINE: 1 E.U./DL
UROBILINOGEN, URINE: 1 E.U./DL
VANCOMYCIN RANDOM: 17.6 UG/ML
VANCOMYCIN RANDOM: 17.6 UG/ML
VANCOMYCIN RANDOM: 17.7 UG/ML
VLDLC SERPL CALC-MCNC: 18 MG/DL
WBC # BLD: 3.6 K/UL (ref 4–11)
WBC # BLD: 4.9 K/UL (ref 4–11)
WBC # BLD: 5.2 K/UL (ref 4–11)
WBC # BLD: 5.5 K/UL (ref 4–11)
WBC # BLD: 5.6 K/UL (ref 4–11)
WBC # BLD: 5.9 K/UL (ref 4–11)
WBC # BLD: 6.3 K/UL (ref 4–11)
WBC # BLD: 6.3 K/UL (ref 4–11)
WBC # BLD: 7.1 K/UL (ref 4–11)
WBC # BLD: 8 K/UL (ref 4–11)
WBC UA: 10 /HPF (ref 0–5)
WBC UA: 12 /HPF (ref 0–5)

## 2021-01-01 PROCEDURE — 6360000002 HC RX W HCPCS: Performed by: INTERNAL MEDICINE

## 2021-01-01 PROCEDURE — 2500000003 HC RX 250 WO HCPCS: Performed by: RADIOLOGY

## 2021-01-01 PROCEDURE — 4040F PNEUMOC VAC/ADMIN/RCVD: CPT | Performed by: INTERNAL MEDICINE

## 2021-01-01 PROCEDURE — 1036F TOBACCO NON-USER: CPT | Performed by: NURSE PRACTITIONER

## 2021-01-01 PROCEDURE — 49083 ABD PARACENTESIS W/IMAGING: CPT

## 2021-01-01 PROCEDURE — 3017F COLORECTAL CA SCREEN DOC REV: CPT | Performed by: NURSE PRACTITIONER

## 2021-01-01 PROCEDURE — 93295 DEV INTERROG REMOTE 1/2/MLT: CPT | Performed by: INTERNAL MEDICINE

## 2021-01-01 PROCEDURE — 80061 LIPID PANEL: CPT

## 2021-01-01 PROCEDURE — 6370000000 HC RX 637 (ALT 250 FOR IP): Performed by: INTERNAL MEDICINE

## 2021-01-01 PROCEDURE — 94761 N-INVAS EAR/PLS OXIMETRY MLT: CPT

## 2021-01-01 PROCEDURE — 94003 VENT MGMT INPAT SUBQ DAY: CPT

## 2021-01-01 PROCEDURE — 99233 SBSQ HOSP IP/OBS HIGH 50: CPT | Performed by: INTERNAL MEDICINE

## 2021-01-01 PROCEDURE — 2500000003 HC RX 250 WO HCPCS: Performed by: INTERNAL MEDICINE

## 2021-01-01 PROCEDURE — 80202 ASSAY OF VANCOMYCIN: CPT

## 2021-01-01 PROCEDURE — 85610 PROTHROMBIN TIME: CPT

## 2021-01-01 PROCEDURE — 80076 HEPATIC FUNCTION PANEL: CPT

## 2021-01-01 PROCEDURE — 2500000003 HC RX 250 WO HCPCS: Performed by: EMERGENCY MEDICINE

## 2021-01-01 PROCEDURE — 82042 OTHER SOURCE ALBUMIN QUAN EA: CPT

## 2021-01-01 PROCEDURE — 88305 TISSUE EXAM BY PATHOLOGIST: CPT

## 2021-01-01 PROCEDURE — 93005 ELECTROCARDIOGRAM TRACING: CPT | Performed by: EMERGENCY MEDICINE

## 2021-01-01 PROCEDURE — 81001 URINALYSIS AUTO W/SCOPE: CPT

## 2021-01-01 PROCEDURE — 83880 ASSAY OF NATRIURETIC PEPTIDE: CPT

## 2021-01-01 PROCEDURE — 36415 COLL VENOUS BLD VENIPUNCTURE: CPT

## 2021-01-01 PROCEDURE — 83735 ASSAY OF MAGNESIUM: CPT

## 2021-01-01 PROCEDURE — APPNB15 APP NON BILLABLE TIME 0-15 MINS: Performed by: NURSE PRACTITIONER

## 2021-01-01 PROCEDURE — 7100000011 HC PHASE II RECOVERY - ADDTL 15 MIN

## 2021-01-01 PROCEDURE — 02HV33Z INSERTION OF INFUSION DEVICE INTO SUPERIOR VENA CAVA, PERCUTANEOUS APPROACH: ICD-10-PCS | Performed by: EMERGENCY MEDICINE

## 2021-01-01 PROCEDURE — 97165 OT EVAL LOW COMPLEX 30 MIN: CPT

## 2021-01-01 PROCEDURE — C1729 CATH, DRAINAGE: HCPCS

## 2021-01-01 PROCEDURE — 6370000000 HC RX 637 (ALT 250 FOR IP): Performed by: STUDENT IN AN ORGANIZED HEALTH CARE EDUCATION/TRAINING PROGRAM

## 2021-01-01 PROCEDURE — 2700000000 HC OXYGEN THERAPY PER DAY

## 2021-01-01 PROCEDURE — 2580000003 HC RX 258: Performed by: INTERNAL MEDICINE

## 2021-01-01 PROCEDURE — 2000000000 HC ICU R&B

## 2021-01-01 PROCEDURE — 7100000010 HC PHASE II RECOVERY - FIRST 15 MIN

## 2021-01-01 PROCEDURE — G8417 CALC BMI ABV UP PARAM F/U: HCPCS | Performed by: NURSE PRACTITIONER

## 2021-01-01 PROCEDURE — 96366 THER/PROPH/DIAG IV INF ADDON: CPT

## 2021-01-01 PROCEDURE — 87015 SPECIMEN INFECT AGNT CONCNTJ: CPT

## 2021-01-01 PROCEDURE — 99214 OFFICE O/P EST MOD 30 MIN: CPT | Performed by: NURSE PRACTITIONER

## 2021-01-01 PROCEDURE — 80053 COMPREHEN METABOLIC PANEL: CPT

## 2021-01-01 PROCEDURE — 71045 X-RAY EXAM CHEST 1 VIEW: CPT

## 2021-01-01 PROCEDURE — 31500 INSERT EMERGENCY AIRWAY: CPT

## 2021-01-01 PROCEDURE — 76870 US EXAM SCROTUM: CPT

## 2021-01-01 PROCEDURE — 99291 CRITICAL CARE FIRST HOUR: CPT | Performed by: INTERNAL MEDICINE

## 2021-01-01 PROCEDURE — 85730 THROMBOPLASTIN TIME PARTIAL: CPT

## 2021-01-01 PROCEDURE — 80048 BASIC METABOLIC PNL TOTAL CA: CPT

## 2021-01-01 PROCEDURE — 99232 SBSQ HOSP IP/OBS MODERATE 35: CPT | Performed by: NURSE PRACTITIONER

## 2021-01-01 PROCEDURE — 87086 URINE CULTURE/COLONY COUNT: CPT

## 2021-01-01 PROCEDURE — 4040F PNEUMOC VAC/ADMIN/RCVD: CPT | Performed by: NURSE PRACTITIONER

## 2021-01-01 PROCEDURE — 51702 INSERT TEMP BLADDER CATH: CPT

## 2021-01-01 PROCEDURE — 83036 HEMOGLOBIN GLYCOSYLATED A1C: CPT

## 2021-01-01 PROCEDURE — 99223 1ST HOSP IP/OBS HIGH 75: CPT | Performed by: INTERNAL MEDICINE

## 2021-01-01 PROCEDURE — 84100 ASSAY OF PHOSPHORUS: CPT

## 2021-01-01 PROCEDURE — 97161 PT EVAL LOW COMPLEX 20 MIN: CPT

## 2021-01-01 PROCEDURE — 36592 COLLECT BLOOD FROM PICC: CPT

## 2021-01-01 PROCEDURE — 87070 CULTURE OTHR SPECIMN AEROBIC: CPT

## 2021-01-01 PROCEDURE — 6370000000 HC RX 637 (ALT 250 FOR IP): Performed by: NURSE PRACTITIONER

## 2021-01-01 PROCEDURE — G8417 CALC BMI ABV UP PARAM F/U: HCPCS | Performed by: INTERNAL MEDICINE

## 2021-01-01 PROCEDURE — G8427 DOCREV CUR MEDS BY ELIG CLIN: HCPCS | Performed by: NURSE PRACTITIONER

## 2021-01-01 PROCEDURE — 93296 REM INTERROG EVL PM/IDS: CPT | Performed by: INTERNAL MEDICINE

## 2021-01-01 PROCEDURE — 82803 BLOOD GASES ANY COMBINATION: CPT

## 2021-01-01 PROCEDURE — 0W9G3ZZ DRAINAGE OF PERITONEAL CAVITY, PERCUTANEOUS APPROACH: ICD-10-PCS | Performed by: PHYSICIAN ASSISTANT

## 2021-01-01 PROCEDURE — 86140 C-REACTIVE PROTEIN: CPT

## 2021-01-01 PROCEDURE — 85025 COMPLETE CBC W/AUTO DIFF WBC: CPT

## 2021-01-01 PROCEDURE — 84484 ASSAY OF TROPONIN QUANT: CPT

## 2021-01-01 PROCEDURE — 1123F ACP DISCUSS/DSCN MKR DOCD: CPT | Performed by: NURSE PRACTITIONER

## 2021-01-01 PROCEDURE — 6360000002 HC RX W HCPCS

## 2021-01-01 PROCEDURE — G8484 FLU IMMUNIZE NO ADMIN: HCPCS | Performed by: NURSE PRACTITIONER

## 2021-01-01 PROCEDURE — G8484 FLU IMMUNIZE NO ADMIN: HCPCS | Performed by: INTERNAL MEDICINE

## 2021-01-01 PROCEDURE — 76705 ECHO EXAM OF ABDOMEN: CPT

## 2021-01-01 PROCEDURE — 87040 BLOOD CULTURE FOR BACTERIA: CPT

## 2021-01-01 PROCEDURE — 2060000000 HC ICU INTERMEDIATE R&B

## 2021-01-01 PROCEDURE — 1200000000 HC SEMI PRIVATE

## 2021-01-01 PROCEDURE — 86901 BLOOD TYPING SEROLOGIC RH(D): CPT

## 2021-01-01 PROCEDURE — 83605 ASSAY OF LACTIC ACID: CPT

## 2021-01-01 PROCEDURE — 1111F DSCHRG MED/CURRENT MED MERGE: CPT | Performed by: NURSE PRACTITIONER

## 2021-01-01 PROCEDURE — 96368 THER/DIAG CONCURRENT INF: CPT

## 2021-01-01 PROCEDURE — 87186 SC STD MICRODIL/AGAR DIL: CPT

## 2021-01-01 PROCEDURE — 87641 MR-STAPH DNA AMP PROBE: CPT

## 2021-01-01 PROCEDURE — 99285 EMERGENCY DEPT VISIT HI MDM: CPT

## 2021-01-01 PROCEDURE — 6360000002 HC RX W HCPCS: Performed by: EMERGENCY MEDICINE

## 2021-01-01 PROCEDURE — 99284 EMERGENCY DEPT VISIT MOD MDM: CPT

## 2021-01-01 PROCEDURE — 5A1955Z RESPIRATORY VENTILATION, GREATER THAN 96 CONSECUTIVE HOURS: ICD-10-PCS | Performed by: EMERGENCY MEDICINE

## 2021-01-01 PROCEDURE — 93010 ELECTROCARDIOGRAM REPORT: CPT | Performed by: INTERNAL MEDICINE

## 2021-01-01 PROCEDURE — G8428 CUR MEDS NOT DOCUMENT: HCPCS | Performed by: NURSE PRACTITIONER

## 2021-01-01 PROCEDURE — 87150 DNA/RNA AMPLIFIED PROBE: CPT

## 2021-01-01 PROCEDURE — 96374 THER/PROPH/DIAG INJ IV PUSH: CPT

## 2021-01-01 PROCEDURE — 83690 ASSAY OF LIPASE: CPT

## 2021-01-01 PROCEDURE — 88112 CYTOPATH CELL ENHANCE TECH: CPT

## 2021-01-01 PROCEDURE — 94760 N-INVAS EAR/PLS OXIMETRY 1: CPT

## 2021-01-01 PROCEDURE — 82150 ASSAY OF AMYLASE: CPT

## 2021-01-01 PROCEDURE — 99214 OFFICE O/P EST MOD 30 MIN: CPT | Performed by: INTERNAL MEDICINE

## 2021-01-01 PROCEDURE — 1123F ACP DISCUSS/DSCN MKR DOCD: CPT | Performed by: INTERNAL MEDICINE

## 2021-01-01 PROCEDURE — 99233 SBSQ HOSP IP/OBS HIGH 50: CPT | Performed by: NURSE PRACTITIONER

## 2021-01-01 PROCEDURE — 3017F COLORECTAL CA SCREEN DOC REV: CPT | Performed by: INTERNAL MEDICINE

## 2021-01-01 PROCEDURE — 85652 RBC SED RATE AUTOMATED: CPT

## 2021-01-01 PROCEDURE — 93282 PRGRMG EVAL IMPLANTABLE DFB: CPT | Performed by: INTERNAL MEDICINE

## 2021-01-01 PROCEDURE — 84300 ASSAY OF URINE SODIUM: CPT

## 2021-01-01 PROCEDURE — 86850 RBC ANTIBODY SCREEN: CPT

## 2021-01-01 PROCEDURE — 36556 INSERT NON-TUNNEL CV CATH: CPT

## 2021-01-01 PROCEDURE — 96365 THER/PROPH/DIAG IV INF INIT: CPT

## 2021-01-01 PROCEDURE — 99232 SBSQ HOSP IP/OBS MODERATE 35: CPT | Performed by: INTERNAL MEDICINE

## 2021-01-01 PROCEDURE — 84157 ASSAY OF PROTEIN OTHER: CPT

## 2021-01-01 PROCEDURE — G8427 DOCREV CUR MEDS BY ELIG CLIN: HCPCS | Performed by: INTERNAL MEDICINE

## 2021-01-01 PROCEDURE — 99291 CRITICAL CARE FIRST HOUR: CPT | Performed by: HOSPITALIST

## 2021-01-01 PROCEDURE — 1036F TOBACCO NON-USER: CPT | Performed by: INTERNAL MEDICINE

## 2021-01-01 PROCEDURE — 84550 ASSAY OF BLOOD/URIC ACID: CPT

## 2021-01-01 PROCEDURE — 94002 VENT MGMT INPAT INIT DAY: CPT

## 2021-01-01 PROCEDURE — 2580000003 HC RX 258: Performed by: EMERGENCY MEDICINE

## 2021-01-01 PROCEDURE — 0BH17EZ INSERTION OF ENDOTRACHEAL AIRWAY INTO TRACHEA, VIA NATURAL OR ARTIFICIAL OPENING: ICD-10-PCS | Performed by: EMERGENCY MEDICINE

## 2021-01-01 PROCEDURE — 84145 PROCALCITONIN (PCT): CPT

## 2021-01-01 PROCEDURE — 93306 TTE W/DOPPLER COMPLETE: CPT

## 2021-01-01 PROCEDURE — 82570 ASSAY OF URINE CREATININE: CPT

## 2021-01-01 PROCEDURE — 89051 BODY FLUID CELL COUNT: CPT

## 2021-01-01 PROCEDURE — 86900 BLOOD TYPING SEROLOGIC ABO: CPT

## 2021-01-01 PROCEDURE — 87077 CULTURE AEROBIC IDENTIFY: CPT

## 2021-01-01 PROCEDURE — 96376 TX/PRO/DX INJ SAME DRUG ADON: CPT

## 2021-01-01 PROCEDURE — 87205 SMEAR GRAM STAIN: CPT

## 2021-01-01 RX ORDER — PROMETHAZINE HYDROCHLORIDE 25 MG/1
12.5 TABLET ORAL EVERY 6 HOURS PRN
Status: DISCONTINUED | OUTPATIENT
Start: 2021-01-01 | End: 2021-01-01 | Stop reason: HOSPADM

## 2021-01-01 RX ORDER — LIDOCAINE HYDROCHLORIDE ANHYDROUS AND DEXTROSE MONOHYDRATE .4; 5 G/100ML; G/100ML
1 INJECTION, SOLUTION INTRAVENOUS CONTINUOUS
Status: DISCONTINUED | OUTPATIENT
Start: 2021-01-01 | End: 2021-01-01 | Stop reason: ALTCHOICE

## 2021-01-01 RX ORDER — MIDAZOLAM HYDROCHLORIDE 2 MG/2ML
5 INJECTION, SOLUTION INTRAMUSCULAR; INTRAVENOUS ONCE
Status: COMPLETED | OUTPATIENT
Start: 2021-01-01 | End: 2021-01-01

## 2021-01-01 RX ORDER — MIDODRINE HYDROCHLORIDE 5 MG/1
10 TABLET ORAL ONCE
Status: COMPLETED | OUTPATIENT
Start: 2021-01-01 | End: 2021-01-01

## 2021-01-01 RX ORDER — PROPOFOL 10 MG/ML
5-50 INJECTION, EMULSION INTRAVENOUS
Status: DISCONTINUED | OUTPATIENT
Start: 2021-01-01 | End: 2021-01-01

## 2021-01-01 RX ORDER — ACETAMINOPHEN 650 MG/1
650 SUPPOSITORY RECTAL EVERY 6 HOURS PRN
Status: DISCONTINUED | OUTPATIENT
Start: 2021-01-01 | End: 2021-01-01 | Stop reason: HOSPADM

## 2021-01-01 RX ORDER — LANOLIN ALCOHOL/MO/W.PET/CERES
400 CREAM (GRAM) TOPICAL ONCE
Status: COMPLETED | OUTPATIENT
Start: 2021-01-01 | End: 2021-01-01

## 2021-01-01 RX ORDER — FENTANYL CITRATE 50 UG/ML
100 INJECTION, SOLUTION INTRAMUSCULAR; INTRAVENOUS
Status: DISCONTINUED | OUTPATIENT
Start: 2021-01-01 | End: 2021-01-01

## 2021-01-01 RX ORDER — MORPHINE SULFATE 10 MG/ML
5 INJECTION, SOLUTION INTRAMUSCULAR; INTRAVENOUS
Status: DISCONTINUED | OUTPATIENT
Start: 2021-01-01 | End: 2021-01-01 | Stop reason: HOSPADM

## 2021-01-01 RX ORDER — SODIUM CHLORIDE 0.9 % (FLUSH) 0.9 %
5-40 SYRINGE (ML) INJECTION EVERY 12 HOURS SCHEDULED
Status: DISCONTINUED | OUTPATIENT
Start: 2021-01-01 | End: 2021-01-01 | Stop reason: HOSPADM

## 2021-01-01 RX ORDER — DEXTROSE MONOHYDRATE 50 MG/ML
100 INJECTION, SOLUTION INTRAVENOUS PRN
Status: DISCONTINUED | OUTPATIENT
Start: 2021-01-01 | End: 2021-01-01 | Stop reason: HOSPADM

## 2021-01-01 RX ORDER — ACETAMINOPHEN 325 MG/1
650 TABLET ORAL EVERY 4 HOURS PRN
Status: DISCONTINUED | OUTPATIENT
Start: 2021-01-01 | End: 2021-01-01 | Stop reason: HOSPADM

## 2021-01-01 RX ORDER — POLYVINYL ALCOHOL 14 MG/ML
1 SOLUTION/ DROPS OPHTHALMIC EVERY 4 HOURS
Status: DISCONTINUED | OUTPATIENT
Start: 2021-01-01 | End: 2021-01-01

## 2021-01-01 RX ORDER — TORSEMIDE 20 MG/1
20 TABLET ORAL 2 TIMES DAILY
Qty: 180 TABLET | Refills: 2 | Status: SHIPPED | OUTPATIENT
Start: 2021-01-01

## 2021-01-01 RX ORDER — SUCCINYLCHOLINE CHLORIDE 20 MG/ML
100 INJECTION INTRAMUSCULAR; INTRAVENOUS ONCE
Status: DISCONTINUED | OUTPATIENT
Start: 2021-01-01 | End: 2021-01-01

## 2021-01-01 RX ORDER — MIDAZOLAM HYDROCHLORIDE 2 MG/2ML
5 INJECTION, SOLUTION INTRAMUSCULAR; INTRAVENOUS ONCE
Status: DISCONTINUED | OUTPATIENT
Start: 2021-01-01 | End: 2021-01-01

## 2021-01-01 RX ORDER — FUROSEMIDE 10 MG/ML
80 INJECTION INTRAMUSCULAR; INTRAVENOUS ONCE
Status: COMPLETED | OUTPATIENT
Start: 2021-01-01 | End: 2021-01-01

## 2021-01-01 RX ORDER — DOBUTAMINE HYDROCHLORIDE 200 MG/100ML
2.5-4 INJECTION INTRAVENOUS CONTINUOUS
Status: DISCONTINUED | OUTPATIENT
Start: 2021-01-01 | End: 2021-01-01

## 2021-01-01 RX ORDER — SODIUM CHLORIDE 0.9 % (FLUSH) 0.9 %
10 SYRINGE (ML) INJECTION PRN
Status: DISCONTINUED | OUTPATIENT
Start: 2021-01-01 | End: 2021-01-01 | Stop reason: HOSPADM

## 2021-01-01 RX ORDER — NITROGLYCERIN 0.4 MG/1
0.4 TABLET SUBLINGUAL EVERY 5 MIN PRN
Status: DISCONTINUED | OUTPATIENT
Start: 2021-01-01 | End: 2021-01-01 | Stop reason: HOSPADM

## 2021-01-01 RX ORDER — MIDAZOLAM HYDROCHLORIDE 5 MG/ML
INJECTION INTRAMUSCULAR; INTRAVENOUS
Status: COMPLETED
Start: 2021-01-01 | End: 2021-01-01

## 2021-01-01 RX ORDER — FENTANYL CITRATE 50 UG/ML
25 INJECTION, SOLUTION INTRAMUSCULAR; INTRAVENOUS
Status: DISCONTINUED | OUTPATIENT
Start: 2021-01-01 | End: 2021-01-01

## 2021-01-01 RX ORDER — INSULIN LISPRO 100 [IU]/ML
0-12 INJECTION, SOLUTION INTRAVENOUS; SUBCUTANEOUS
Status: DISCONTINUED | OUTPATIENT
Start: 2021-01-01 | End: 2021-01-01 | Stop reason: HOSPADM

## 2021-01-01 RX ORDER — FUROSEMIDE 40 MG/1
40 TABLET ORAL DAILY
Status: DISCONTINUED | OUTPATIENT
Start: 2021-01-01 | End: 2021-01-01 | Stop reason: HOSPADM

## 2021-01-01 RX ORDER — ATORVASTATIN CALCIUM 80 MG/1
80 TABLET, FILM COATED ORAL DAILY
Status: DISCONTINUED | OUTPATIENT
Start: 2021-01-01 | End: 2021-01-01 | Stop reason: HOSPADM

## 2021-01-01 RX ORDER — HEPARIN SODIUM 10000 [USP'U]/100ML
5-30 INJECTION, SOLUTION INTRAVENOUS CONTINUOUS
Status: DISCONTINUED | OUTPATIENT
Start: 2021-01-01 | End: 2021-01-01 | Stop reason: ALTCHOICE

## 2021-01-01 RX ORDER — CLOPIDOGREL BISULFATE 75 MG/1
75 TABLET ORAL DAILY
Status: DISCONTINUED | OUTPATIENT
Start: 2021-01-01 | End: 2021-01-01

## 2021-01-01 RX ORDER — POLYETHYLENE GLYCOL 3350 17 G/17G
17 POWDER, FOR SOLUTION ORAL DAILY PRN
Status: DISCONTINUED | OUTPATIENT
Start: 2021-01-01 | End: 2021-01-01 | Stop reason: HOSPADM

## 2021-01-01 RX ORDER — NICOTINE POLACRILEX 4 MG
15 LOZENGE BUCCAL PRN
Status: DISCONTINUED | OUTPATIENT
Start: 2021-01-01 | End: 2021-01-01 | Stop reason: HOSPADM

## 2021-01-01 RX ORDER — FUROSEMIDE 40 MG/1
40 TABLET ORAL DAILY
Qty: 60 TABLET | Refills: 3 | Status: SHIPPED | OUTPATIENT
Start: 2021-01-01 | End: 2021-01-01 | Stop reason: ALTCHOICE

## 2021-01-01 RX ORDER — MAGNESIUM SULFATE IN WATER 40 MG/ML
2000 INJECTION, SOLUTION INTRAVENOUS PRN
Status: DISCONTINUED | OUTPATIENT
Start: 2021-01-01 | End: 2021-01-01 | Stop reason: HOSPADM

## 2021-01-01 RX ORDER — HEPARIN SODIUM 1000 [USP'U]/ML
2000 INJECTION, SOLUTION INTRAVENOUS; SUBCUTANEOUS PRN
Status: DISCONTINUED | OUTPATIENT
Start: 2021-01-01 | End: 2021-01-01 | Stop reason: ALTCHOICE

## 2021-01-01 RX ORDER — ATROPINE SULFATE 10 MG/ML
1 SOLUTION/ DROPS OPHTHALMIC PRN
Status: DISCONTINUED | OUTPATIENT
Start: 2021-01-01 | End: 2021-01-01 | Stop reason: HOSPADM

## 2021-01-01 RX ORDER — FUROSEMIDE 10 MG/ML
20 INJECTION INTRAMUSCULAR; INTRAVENOUS DAILY
Status: DISCONTINUED | OUTPATIENT
Start: 2021-01-01 | End: 2021-01-01

## 2021-01-01 RX ORDER — LIDOCAINE HYDROCHLORIDE 10 MG/ML
10 INJECTION, SOLUTION EPIDURAL; INFILTRATION; INTRACAUDAL; PERINEURAL ONCE
Status: COMPLETED | OUTPATIENT
Start: 2021-01-01 | End: 2021-01-01

## 2021-01-01 RX ORDER — ONDANSETRON 2 MG/ML
4 INJECTION INTRAMUSCULAR; INTRAVENOUS EVERY 6 HOURS PRN
Status: DISCONTINUED | OUTPATIENT
Start: 2021-01-01 | End: 2021-01-01 | Stop reason: HOSPADM

## 2021-01-01 RX ORDER — LANOLIN ALCOHOL/MO/W.PET/CERES
1000 CREAM (GRAM) TOPICAL DAILY
COMMUNITY

## 2021-01-01 RX ORDER — SODIUM CHLORIDE 0.9 % (FLUSH) 0.9 %
10 SYRINGE (ML) INJECTION EVERY 12 HOURS SCHEDULED
Status: DISCONTINUED | OUTPATIENT
Start: 2021-01-01 | End: 2021-01-01 | Stop reason: HOSPADM

## 2021-01-01 RX ORDER — HEPARIN SODIUM 5000 [USP'U]/ML
5000 INJECTION, SOLUTION INTRAVENOUS; SUBCUTANEOUS EVERY 8 HOURS SCHEDULED
Status: DISCONTINUED | OUTPATIENT
Start: 2021-01-01 | End: 2021-01-01

## 2021-01-01 RX ORDER — ETOMIDATE 2 MG/ML
20 INJECTION INTRAVENOUS ONCE
Status: DISCONTINUED | OUTPATIENT
Start: 2021-01-01 | End: 2021-01-01

## 2021-01-01 RX ORDER — 0.9 % SODIUM CHLORIDE 0.9 %
1000 INTRAVENOUS SOLUTION INTRAVENOUS ONCE
Status: COMPLETED | OUTPATIENT
Start: 2021-01-01 | End: 2021-01-01

## 2021-01-01 RX ORDER — ACETAMINOPHEN 325 MG/1
650 TABLET ORAL EVERY 6 HOURS PRN
Status: DISCONTINUED | OUTPATIENT
Start: 2021-01-01 | End: 2021-01-01 | Stop reason: HOSPADM

## 2021-01-01 RX ORDER — SPIRONOLACTONE 25 MG/1
12.5 TABLET ORAL DAILY
Status: DISCONTINUED | OUTPATIENT
Start: 2021-01-01 | End: 2021-01-01 | Stop reason: HOSPADM

## 2021-01-01 RX ORDER — CARVEDILOL 3.12 MG/1
3.12 TABLET ORAL 2 TIMES DAILY WITH MEALS
Status: DISCONTINUED | OUTPATIENT
Start: 2021-01-01 | End: 2021-01-01

## 2021-01-01 RX ORDER — MIDODRINE HYDROCHLORIDE 5 MG/1
5 TABLET ORAL
Status: DISCONTINUED | OUTPATIENT
Start: 2021-01-01 | End: 2021-01-01

## 2021-01-01 RX ORDER — TRAZODONE HYDROCHLORIDE 50 MG/1
TABLET ORAL
COMMUNITY
Start: 2020-01-01

## 2021-01-01 RX ORDER — ALLOPURINOL 100 MG/1
100 TABLET ORAL DAILY
Status: DISCONTINUED | OUTPATIENT
Start: 2021-01-01 | End: 2021-01-01 | Stop reason: HOSPADM

## 2021-01-01 RX ORDER — INSULIN LISPRO 100 [IU]/ML
0-12 INJECTION, SOLUTION INTRAVENOUS; SUBCUTANEOUS EVERY 4 HOURS
Status: DISCONTINUED | OUTPATIENT
Start: 2021-01-01 | End: 2021-01-01 | Stop reason: HOSPADM

## 2021-01-01 RX ORDER — SPIRONOLACTONE 25 MG/1
12.5 TABLET ORAL DAILY
Qty: 15 TABLET | Refills: 1 | Status: SHIPPED | OUTPATIENT
Start: 2021-01-01 | End: 2021-01-01 | Stop reason: SDUPTHER

## 2021-01-01 RX ORDER — LIDOCAINE HYDROCHLORIDE 10 MG/ML
5 INJECTION, SOLUTION EPIDURAL; INFILTRATION; INTRACAUDAL; PERINEURAL ONCE
Status: COMPLETED | OUTPATIENT
Start: 2021-01-01 | End: 2021-01-01

## 2021-01-01 RX ORDER — CLOPIDOGREL BISULFATE 75 MG/1
75 TABLET ORAL DAILY
Status: DISCONTINUED | OUTPATIENT
Start: 2021-01-01 | End: 2021-01-01 | Stop reason: HOSPADM

## 2021-01-01 RX ORDER — MINERAL OIL AND WHITE PETROLATUM 150; 830 MG/G; MG/G
OINTMENT OPHTHALMIC EVERY 4 HOURS
Status: DISCONTINUED | OUTPATIENT
Start: 2021-01-01 | End: 2021-01-01

## 2021-01-01 RX ORDER — LIDOCAINE HYDROCHLORIDE 10 MG/ML
20 INJECTION, SOLUTION EPIDURAL; INFILTRATION; INTRACAUDAL; PERINEURAL ONCE
Status: COMPLETED | OUTPATIENT
Start: 2021-01-01 | End: 2021-01-01

## 2021-01-01 RX ORDER — MIDODRINE HYDROCHLORIDE 5 MG/1
10 TABLET ORAL
Status: DISCONTINUED | OUTPATIENT
Start: 2021-01-01 | End: 2021-01-01 | Stop reason: HOSPADM

## 2021-01-01 RX ORDER — AMIODARONE HYDROCHLORIDE 50 MG/ML
INJECTION, SOLUTION INTRAVENOUS DAILY PRN
Status: COMPLETED | OUTPATIENT
Start: 2021-01-01 | End: 2021-01-01

## 2021-01-01 RX ORDER — SODIUM CHLORIDE 9 MG/ML
25 INJECTION, SOLUTION INTRAVENOUS PRN
Status: DISCONTINUED | OUTPATIENT
Start: 2021-01-01 | End: 2021-01-01 | Stop reason: HOSPADM

## 2021-01-01 RX ORDER — ASPIRIN 81 MG/1
81 TABLET ORAL DAILY
Status: DISCONTINUED | OUTPATIENT
Start: 2021-01-01 | End: 2021-01-01

## 2021-01-01 RX ORDER — LANOLIN ALCOHOL/MO/W.PET/CERES
400 CREAM (GRAM) TOPICAL 2 TIMES DAILY
Status: DISCONTINUED | OUTPATIENT
Start: 2021-01-01 | End: 2021-01-01 | Stop reason: HOSPADM

## 2021-01-01 RX ORDER — INSULIN LISPRO 100 [IU]/ML
0-6 INJECTION, SOLUTION INTRAVENOUS; SUBCUTANEOUS NIGHTLY
Status: DISCONTINUED | OUTPATIENT
Start: 2021-01-01 | End: 2021-01-01 | Stop reason: HOSPADM

## 2021-01-01 RX ORDER — TORSEMIDE 20 MG/1
20 TABLET ORAL 2 TIMES DAILY
Qty: 180 TABLET | Refills: 3 | Status: SHIPPED | OUTPATIENT
Start: 2021-01-01 | End: 2021-01-01 | Stop reason: SDUPTHER

## 2021-01-01 RX ORDER — CARVEDILOL 3.12 MG/1
3.12 TABLET ORAL 2 TIMES DAILY WITH MEALS
Status: DISCONTINUED | OUTPATIENT
Start: 2021-01-01 | End: 2021-01-01 | Stop reason: HOSPADM

## 2021-01-01 RX ORDER — SUCCINYLCHOLINE CHLORIDE 20 MG/ML
INJECTION INTRAMUSCULAR; INTRAVENOUS DAILY PRN
Status: COMPLETED | OUTPATIENT
Start: 2021-01-01 | End: 2021-01-01

## 2021-01-01 RX ORDER — ETOMIDATE 2 MG/ML
INJECTION INTRAVENOUS DAILY PRN
Status: COMPLETED | OUTPATIENT
Start: 2021-01-01 | End: 2021-01-01

## 2021-01-01 RX ORDER — DEXTROSE MONOHYDRATE 25 G/50ML
12.5 INJECTION, SOLUTION INTRAVENOUS PRN
Status: DISCONTINUED | OUTPATIENT
Start: 2021-01-01 | End: 2021-01-01 | Stop reason: HOSPADM

## 2021-01-01 RX ORDER — TORSEMIDE 20 MG/1
20 TABLET ORAL 2 TIMES DAILY
Qty: 60 TABLET | Refills: 0 | Status: SHIPPED | OUTPATIENT
Start: 2021-01-01 | End: 2021-01-01 | Stop reason: SDUPTHER

## 2021-01-01 RX ORDER — TRAZODONE HYDROCHLORIDE 50 MG/1
25 TABLET ORAL NIGHTLY PRN
Status: DISCONTINUED | OUTPATIENT
Start: 2021-01-01 | End: 2021-01-01 | Stop reason: HOSPADM

## 2021-01-01 RX ORDER — LACTOBACILLUS RHAMNOSUS GG 10B CELL
1 CAPSULE ORAL 2 TIMES DAILY WITH MEALS
Status: DISCONTINUED | OUTPATIENT
Start: 2021-01-01 | End: 2021-01-01

## 2021-01-01 RX ORDER — HEPARIN SODIUM 1000 [USP'U]/ML
4000 INJECTION, SOLUTION INTRAVENOUS; SUBCUTANEOUS PRN
Status: DISCONTINUED | OUTPATIENT
Start: 2021-01-01 | End: 2021-01-01 | Stop reason: ALTCHOICE

## 2021-01-01 RX ORDER — POTASSIUM CHLORIDE 20 MEQ/1
40 TABLET, EXTENDED RELEASE ORAL PRN
Status: DISCONTINUED | OUTPATIENT
Start: 2021-01-01 | End: 2021-01-01 | Stop reason: HOSPADM

## 2021-01-01 RX ORDER — HEPARIN SODIUM 1000 [USP'U]/ML
4000 INJECTION, SOLUTION INTRAVENOUS; SUBCUTANEOUS ONCE
Status: COMPLETED | OUTPATIENT
Start: 2021-01-01 | End: 2021-01-01

## 2021-01-01 RX ORDER — ALBUTEROL SULFATE 90 UG/1
2 AEROSOL, METERED RESPIRATORY (INHALATION) EVERY 4 HOURS PRN
Status: DISCONTINUED | OUTPATIENT
Start: 2021-01-01 | End: 2021-01-01 | Stop reason: HOSPADM

## 2021-01-01 RX ORDER — FAMOTIDINE 20 MG/1
20 TABLET, FILM COATED ORAL 2 TIMES DAILY
Status: DISCONTINUED | OUTPATIENT
Start: 2021-01-01 | End: 2021-01-01 | Stop reason: HOSPADM

## 2021-01-01 RX ORDER — ALLOPURINOL 300 MG/1
TABLET ORAL
COMMUNITY
Start: 2020-01-01

## 2021-01-01 RX ORDER — DOBUTAMINE HYDROCHLORIDE 200 MG/100ML
2.5-5 INJECTION INTRAVENOUS CONTINUOUS
Status: DISCONTINUED | OUTPATIENT
Start: 2021-01-01 | End: 2021-01-01

## 2021-01-01 RX ORDER — SODIUM CHLORIDE 9 MG/ML
INJECTION, SOLUTION INTRAVENOUS CONTINUOUS
Status: DISCONTINUED | OUTPATIENT
Start: 2021-01-01 | End: 2021-01-01

## 2021-01-01 RX ORDER — ATORVASTATIN CALCIUM 80 MG/1
80 TABLET, FILM COATED ORAL DAILY
Status: DISCONTINUED | OUTPATIENT
Start: 2021-01-01 | End: 2021-01-01

## 2021-01-01 RX ORDER — POTASSIUM CHLORIDE 7.45 MG/ML
10 INJECTION INTRAVENOUS PRN
Status: DISCONTINUED | OUTPATIENT
Start: 2021-01-01 | End: 2021-01-01 | Stop reason: HOSPADM

## 2021-01-01 RX ORDER — FUROSEMIDE 10 MG/ML
40 INJECTION INTRAMUSCULAR; INTRAVENOUS 2 TIMES DAILY
Status: DISCONTINUED | OUTPATIENT
Start: 2021-01-01 | End: 2021-01-01

## 2021-01-01 RX ORDER — POTASSIUM CHLORIDE 20 MEQ/1
40 TABLET, EXTENDED RELEASE ORAL EVERY 4 HOURS
Status: COMPLETED | OUTPATIENT
Start: 2021-01-01 | End: 2021-01-01

## 2021-01-01 RX ORDER — ASPIRIN 81 MG/1
81 TABLET ORAL DAILY
Status: DISCONTINUED | OUTPATIENT
Start: 2021-01-01 | End: 2021-01-01 | Stop reason: HOSPADM

## 2021-01-01 RX ORDER — EPINEPHRINE 1 MG/ML
INJECTION, SOLUTION, CONCENTRATE INTRAVENOUS DAILY PRN
Status: COMPLETED | OUTPATIENT
Start: 2021-01-01 | End: 2021-01-01

## 2021-01-01 RX ORDER — ONDANSETRON 4 MG/1
4 TABLET, ORALLY DISINTEGRATING ORAL EVERY 8 HOURS PRN
Status: DISCONTINUED | OUTPATIENT
Start: 2021-01-01 | End: 2021-01-01 | Stop reason: HOSPADM

## 2021-01-01 RX ORDER — DOCUSATE SODIUM 100 MG/1
100 CAPSULE, LIQUID FILLED ORAL 2 TIMES DAILY
Status: DISCONTINUED | OUTPATIENT
Start: 2021-01-01 | End: 2021-01-01 | Stop reason: HOSPADM

## 2021-01-01 RX ORDER — SPIRONOLACTONE 25 MG/1
12.5 TABLET ORAL DAILY
Qty: 15 TABLET | Refills: 1 | Status: SHIPPED | OUTPATIENT
Start: 2021-01-01

## 2021-01-01 RX ORDER — CHLORHEXIDINE GLUCONATE 0.12 MG/ML
15 RINSE ORAL 2 TIMES DAILY
Status: DISCONTINUED | OUTPATIENT
Start: 2021-01-01 | End: 2021-01-01

## 2021-01-01 RX ORDER — MIDODRINE HYDROCHLORIDE 10 MG/1
10 TABLET ORAL
Qty: 90 TABLET | Refills: 3 | Status: SHIPPED | OUTPATIENT
Start: 2021-01-01

## 2021-01-01 RX ORDER — TAMSULOSIN HYDROCHLORIDE 0.4 MG/1
0.4 CAPSULE ORAL NIGHTLY
Status: DISCONTINUED | OUTPATIENT
Start: 2021-01-01 | End: 2021-01-01 | Stop reason: HOSPADM

## 2021-01-01 RX ORDER — LORAZEPAM 2 MG/ML
1 INJECTION INTRAMUSCULAR
Status: DISCONTINUED | OUTPATIENT
Start: 2021-01-01 | End: 2021-01-01 | Stop reason: HOSPADM

## 2021-01-01 RX ORDER — SODIUM CHLORIDE 0.9 % (FLUSH) 0.9 %
5-40 SYRINGE (ML) INJECTION PRN
Status: DISCONTINUED | OUTPATIENT
Start: 2021-01-01 | End: 2021-01-01 | Stop reason: HOSPADM

## 2021-01-01 RX ADMIN — Medication 10 ML: at 08:11

## 2021-01-01 RX ADMIN — FUROSEMIDE 40 MG: 40 TABLET ORAL at 10:00

## 2021-01-01 RX ADMIN — Medication 10 ML: at 10:50

## 2021-01-01 RX ADMIN — MORPHINE SULFATE 5 MG: 10 INJECTION, SOLUTION INTRAMUSCULAR; INTRAVENOUS at 03:57

## 2021-01-01 RX ADMIN — DOBUTAMINE HYDROCHLORIDE 5 MCG/KG/MIN: 200 INJECTION INTRAVENOUS at 08:11

## 2021-01-01 RX ADMIN — ATORVASTATIN CALCIUM 80 MG: 80 TABLET, FILM COATED ORAL at 08:06

## 2021-01-01 RX ADMIN — LORAZEPAM 1 MG: 2 INJECTION INTRAMUSCULAR; INTRAVENOUS at 14:11

## 2021-01-01 RX ADMIN — Medication 75 MCG/HR: at 22:15

## 2021-01-01 RX ADMIN — CEFEPIME HYDROCHLORIDE 2000 MG: 2 INJECTION, POWDER, FOR SOLUTION INTRAVENOUS at 20:53

## 2021-01-01 RX ADMIN — LORAZEPAM 1 MG: 2 INJECTION INTRAMUSCULAR; INTRAVENOUS at 13:01

## 2021-01-01 RX ADMIN — LORAZEPAM 1 MG: 2 INJECTION INTRAMUSCULAR; INTRAVENOUS at 11:41

## 2021-01-01 RX ADMIN — TRAZODONE HYDROCHLORIDE 25 MG: 50 TABLET ORAL at 20:48

## 2021-01-01 RX ADMIN — MORPHINE SULFATE 5 MG: 10 INJECTION INTRAVENOUS at 13:07

## 2021-01-01 RX ADMIN — LORAZEPAM 1 MG: 2 INJECTION INTRAMUSCULAR; INTRAVENOUS at 20:30

## 2021-01-01 RX ADMIN — ACETAMINOPHEN 650 MG: 325 TABLET ORAL at 15:21

## 2021-01-01 RX ADMIN — TRAZODONE HYDROCHLORIDE 25 MG: 50 TABLET ORAL at 21:48

## 2021-01-01 RX ADMIN — EPINEPHRINE 14 MCG/MIN: 1 INJECTION PARENTERAL at 20:28

## 2021-01-01 RX ADMIN — Medication 1000 MG: at 17:34

## 2021-01-01 RX ADMIN — MORPHINE SULFATE 5 MG: 10 INJECTION INTRAVENOUS at 23:28

## 2021-01-01 RX ADMIN — FAMOTIDINE 20 MG: 20 TABLET, FILM COATED ORAL at 21:41

## 2021-01-01 RX ADMIN — DOBUTAMINE HYDROCHLORIDE 5 MCG/KG/MIN: 200 INJECTION INTRAVENOUS at 00:59

## 2021-01-01 RX ADMIN — LORAZEPAM 1 MG: 2 INJECTION INTRAMUSCULAR; INTRAVENOUS at 13:51

## 2021-01-01 RX ADMIN — MORPHINE SULFATE 5 MG: 10 INJECTION INTRAVENOUS at 20:58

## 2021-01-01 RX ADMIN — INSULIN LISPRO 2 UNITS: 100 INJECTION, SOLUTION INTRAVENOUS; SUBCUTANEOUS at 06:33

## 2021-01-01 RX ADMIN — INSULIN GLARGINE 10 UNITS: 100 INJECTION, SOLUTION SUBCUTANEOUS at 22:42

## 2021-01-01 RX ADMIN — HEPARIN SODIUM 5000 UNITS: 5000 INJECTION INTRAVENOUS; SUBCUTANEOUS at 06:33

## 2021-01-01 RX ADMIN — DOBUTAMINE HYDROCHLORIDE 5 MCG/KG/MIN: 200 INJECTION INTRAVENOUS at 08:03

## 2021-01-01 RX ADMIN — HEPARIN SODIUM 5000 UNITS: 5000 INJECTION INTRAVENOUS; SUBCUTANEOUS at 05:53

## 2021-01-01 RX ADMIN — LORAZEPAM 1 MG: 2 INJECTION INTRAMUSCULAR; INTRAVENOUS at 15:48

## 2021-01-01 RX ADMIN — INSULIN LISPRO 4 UNITS: 100 INJECTION, SOLUTION INTRAVENOUS; SUBCUTANEOUS at 04:22

## 2021-01-01 RX ADMIN — FAMOTIDINE 20 MG: 10 INJECTION, SOLUTION INTRAVENOUS at 22:00

## 2021-01-01 RX ADMIN — FAMOTIDINE 20 MG: 20 TABLET, FILM COATED ORAL at 22:30

## 2021-01-01 RX ADMIN — EPINEPHRINE 15 MCG/MIN: 1 INJECTION PARENTERAL at 14:08

## 2021-01-01 RX ADMIN — MIDODRINE HYDROCHLORIDE 5 MG: 5 TABLET ORAL at 18:15

## 2021-01-01 RX ADMIN — MAGNESIUM GLUCONATE 500 MG ORAL TABLET 400 MG: 500 TABLET ORAL at 17:08

## 2021-01-01 RX ADMIN — SODIUM CHLORIDE: 9 INJECTION, SOLUTION INTRAVENOUS at 23:29

## 2021-01-01 RX ADMIN — MIDODRINE HYDROCHLORIDE 5 MG: 5 TABLET ORAL at 13:22

## 2021-01-01 RX ADMIN — ATROPINE SULFATE 1 DROP: 10 SOLUTION/ DROPS OPHTHALMIC at 13:09

## 2021-01-01 RX ADMIN — HEPARIN SODIUM 5000 UNITS: 5000 INJECTION INTRAVENOUS; SUBCUTANEOUS at 13:57

## 2021-01-01 RX ADMIN — PROPOFOL 10 MCG/KG/MIN: 10 INJECTION, EMULSION INTRAVENOUS at 18:32

## 2021-01-01 RX ADMIN — ATROPINE SULFATE 1 DROP: 10 SOLUTION/ DROPS OPHTHALMIC at 19:34

## 2021-01-01 RX ADMIN — Medication 75 MCG/HR: at 23:42

## 2021-01-01 RX ADMIN — ATROPINE SULFATE 1 DROP: 10 SOLUTION/ DROPS OPHTHALMIC at 21:57

## 2021-01-01 RX ADMIN — EPINEPHRINE 1 MCG/MIN: 1 INJECTION PARENTERAL at 08:16

## 2021-01-01 RX ADMIN — INSULIN LISPRO 4 UNITS: 100 INJECTION, SOLUTION INTRAVENOUS; SUBCUTANEOUS at 22:04

## 2021-01-01 RX ADMIN — LORAZEPAM 1 MG: 2 INJECTION INTRAMUSCULAR; INTRAVENOUS at 22:28

## 2021-01-01 RX ADMIN — FAMOTIDINE 20 MG: 10 INJECTION, SOLUTION INTRAVENOUS at 08:03

## 2021-01-01 RX ADMIN — EPINEPHRINE 1 MCG/MIN: 1 INJECTION PARENTERAL at 22:43

## 2021-01-01 RX ADMIN — PROPOFOL 35 MCG/KG/MIN: 10 INJECTION, EMULSION INTRAVENOUS at 22:45

## 2021-01-01 RX ADMIN — INSULIN LISPRO 2 UNITS: 100 INJECTION, SOLUTION INTRAVENOUS; SUBCUTANEOUS at 16:16

## 2021-01-01 RX ADMIN — INSULIN LISPRO 4 UNITS: 100 INJECTION, SOLUTION INTRAVENOUS; SUBCUTANEOUS at 00:09

## 2021-01-01 RX ADMIN — CEFEPIME HYDROCHLORIDE 1000 MG: 1 INJECTION, POWDER, FOR SOLUTION INTRAMUSCULAR; INTRAVENOUS at 08:56

## 2021-01-01 RX ADMIN — DOBUTAMINE HYDROCHLORIDE 5 MCG/KG/MIN: 200 INJECTION INTRAVENOUS at 22:40

## 2021-01-01 RX ADMIN — MIDODRINE HYDROCHLORIDE 5 MG: 5 TABLET ORAL at 22:06

## 2021-01-01 RX ADMIN — Medication 81 MG: at 08:12

## 2021-01-01 RX ADMIN — FAMOTIDINE 20 MG: 20 TABLET, FILM COATED ORAL at 09:01

## 2021-01-01 RX ADMIN — Medication 1000 MG: at 22:40

## 2021-01-01 RX ADMIN — Medication 10 ML: at 20:05

## 2021-01-01 RX ADMIN — LORAZEPAM 1 MG: 2 INJECTION INTRAMUSCULAR; INTRAVENOUS at 02:50

## 2021-01-01 RX ADMIN — LORAZEPAM 1 MG: 2 INJECTION INTRAMUSCULAR; INTRAVENOUS at 21:51

## 2021-01-01 RX ADMIN — MIDODRINE HYDROCHLORIDE 5 MG: 5 TABLET ORAL at 17:34

## 2021-01-01 RX ADMIN — FUROSEMIDE 40 MG: 10 INJECTION, SOLUTION INTRAMUSCULAR; INTRAVENOUS at 10:54

## 2021-01-01 RX ADMIN — LORAZEPAM 1 MG: 2 INJECTION INTRAMUSCULAR; INTRAVENOUS at 01:31

## 2021-01-01 RX ADMIN — EPINEPHRINE 12 MCG/MIN: 1 INJECTION PARENTERAL at 02:39

## 2021-01-01 RX ADMIN — LIDOCAINE HYDROCHLORIDE 10 ML: 10 INJECTION, SOLUTION EPIDURAL; INFILTRATION; INTRACAUDAL; PERINEURAL at 13:36

## 2021-01-01 RX ADMIN — LORAZEPAM 1 MG: 2 INJECTION INTRAMUSCULAR; INTRAVENOUS at 16:20

## 2021-01-01 RX ADMIN — INSULIN LISPRO 2 UNITS: 100 INJECTION, SOLUTION INTRAVENOUS; SUBCUTANEOUS at 00:04

## 2021-01-01 RX ADMIN — TAMSULOSIN HYDROCHLORIDE 0.4 MG: 0.4 CAPSULE ORAL at 21:41

## 2021-01-01 RX ADMIN — Medication 81 MG: at 10:00

## 2021-01-01 RX ADMIN — SPIRONOLACTONE 12.5 MG: 25 TABLET ORAL at 09:59

## 2021-01-01 RX ADMIN — HEPARIN SODIUM 2000 UNITS: 1000 INJECTION INTRAVENOUS; SUBCUTANEOUS at 11:10

## 2021-01-01 RX ADMIN — LORAZEPAM 1 MG: 2 INJECTION INTRAMUSCULAR; INTRAVENOUS at 05:50

## 2021-01-01 RX ADMIN — Medication 50 MCG/HR: at 04:08

## 2021-01-01 RX ADMIN — INSULIN LISPRO 2 UNITS: 100 INJECTION, SOLUTION INTRAVENOUS; SUBCUTANEOUS at 08:13

## 2021-01-01 RX ADMIN — HEPARIN SODIUM 5000 UNITS: 5000 INJECTION INTRAVENOUS; SUBCUTANEOUS at 21:22

## 2021-01-01 RX ADMIN — SUCCINYLCHOLINE CHLORIDE 100 MG: 20 INJECTION, SOLUTION INTRAMUSCULAR; INTRAVENOUS at 17:00

## 2021-01-01 RX ADMIN — Medication 50 MCG/HR: at 04:39

## 2021-01-01 RX ADMIN — EPINEPHRINE 0.1 MG: 1 INJECTION, SOLUTION INTRAMUSCULAR; SUBCUTANEOUS at 16:59

## 2021-01-01 RX ADMIN — INSULIN GLARGINE 10 UNITS: 100 INJECTION, SOLUTION SUBCUTANEOUS at 21:41

## 2021-01-01 RX ADMIN — Medication 10 ML: at 08:56

## 2021-01-01 RX ADMIN — INSULIN GLARGINE 10 UNITS: 100 INJECTION, SOLUTION SUBCUTANEOUS at 22:30

## 2021-01-01 RX ADMIN — TRAZODONE HYDROCHLORIDE 25 MG: 50 TABLET ORAL at 22:24

## 2021-01-01 RX ADMIN — DOBUTAMINE HYDROCHLORIDE 5 MCG/KG/MIN: 200 INJECTION INTRAVENOUS at 04:57

## 2021-01-01 RX ADMIN — PROPOFOL 35 MCG/KG/MIN: 10 INJECTION, EMULSION INTRAVENOUS at 16:21

## 2021-01-01 RX ADMIN — CLOPIDOGREL BISULFATE 75 MG: 75 TABLET ORAL at 08:12

## 2021-01-01 RX ADMIN — CEFEPIME HYDROCHLORIDE 2000 MG: 2 INJECTION, POWDER, FOR SOLUTION INTRAVENOUS at 21:49

## 2021-01-01 RX ADMIN — PROPOFOL 20 MCG/KG/MIN: 10 INJECTION, EMULSION INTRAVENOUS at 23:59

## 2021-01-01 RX ADMIN — VANCOMYCIN HYDROCHLORIDE 750 MG: 750 INJECTION, POWDER, LYOPHILIZED, FOR SOLUTION INTRAVENOUS at 13:57

## 2021-01-01 RX ADMIN — ATROPINE SULFATE 1 DROP: 10 SOLUTION/ DROPS OPHTHALMIC at 05:50

## 2021-01-01 RX ADMIN — CARVEDILOL 3.12 MG: 3.12 TABLET, FILM COATED ORAL at 10:00

## 2021-01-01 RX ADMIN — CLOPIDOGREL BISULFATE 75 MG: 75 TABLET ORAL at 08:40

## 2021-01-01 RX ADMIN — FAMOTIDINE 20 MG: 20 TABLET, FILM COATED ORAL at 22:24

## 2021-01-01 RX ADMIN — INSULIN LISPRO 4 UNITS: 100 INJECTION, SOLUTION INTRAVENOUS; SUBCUTANEOUS at 12:22

## 2021-01-01 RX ADMIN — POTASSIUM CHLORIDE 40 MEQ: 1500 TABLET, EXTENDED RELEASE ORAL at 18:50

## 2021-01-01 RX ADMIN — FAMOTIDINE 20 MG: 10 INJECTION, SOLUTION INTRAVENOUS at 08:21

## 2021-01-01 RX ADMIN — EPINEPHRINE 6 MCG/MIN: 1 INJECTION PARENTERAL at 13:15

## 2021-01-01 RX ADMIN — DOBUTAMINE HYDROCHLORIDE 5 MCG/KG/MIN: 200 INJECTION INTRAVENOUS at 04:35

## 2021-01-01 RX ADMIN — LORAZEPAM 1 MG: 2 INJECTION INTRAMUSCULAR; INTRAVENOUS at 02:31

## 2021-01-01 RX ADMIN — HEPARIN SODIUM 12 UNITS/KG/HR: 10000 INJECTION, SOLUTION INTRAVENOUS at 21:58

## 2021-01-01 RX ADMIN — LORAZEPAM 1 MG: 2 INJECTION INTRAMUSCULAR; INTRAVENOUS at 10:22

## 2021-01-01 RX ADMIN — Medication 10 ML: at 21:41

## 2021-01-01 RX ADMIN — Medication 10 ML: at 20:45

## 2021-01-01 RX ADMIN — MORPHINE SULFATE 5 MG: 10 INJECTION INTRAVENOUS at 02:31

## 2021-01-01 RX ADMIN — AMIODARONE HYDROCHLORIDE 0.5 MG/MIN: 50 INJECTION, SOLUTION INTRAVENOUS at 23:40

## 2021-01-01 RX ADMIN — AMIODARONE HYDROCHLORIDE 150 MG: 50 INJECTION, SOLUTION INTRAVENOUS at 16:57

## 2021-01-01 RX ADMIN — ALLOPURINOL 100 MG: 100 TABLET ORAL at 10:01

## 2021-01-01 RX ADMIN — LIDOCAINE HYDROCHLORIDE 8 ML: 10 INJECTION, SOLUTION EPIDURAL; INFILTRATION; INTRACAUDAL; PERINEURAL at 08:30

## 2021-01-01 RX ADMIN — DOCUSATE SODIUM 100 MG: 100 CAPSULE, LIQUID FILLED ORAL at 22:24

## 2021-01-01 RX ADMIN — MIDODRINE HYDROCHLORIDE 5 MG: 5 TABLET ORAL at 09:01

## 2021-01-01 RX ADMIN — Medication 81 MG: at 09:00

## 2021-01-01 RX ADMIN — TAMSULOSIN HYDROCHLORIDE 0.4 MG: 0.4 CAPSULE ORAL at 22:24

## 2021-01-01 RX ADMIN — DOCUSATE SODIUM 100 MG: 100 CAPSULE, LIQUID FILLED ORAL at 20:44

## 2021-01-01 RX ADMIN — MIDODRINE HYDROCHLORIDE 5 MG: 5 TABLET ORAL at 12:08

## 2021-01-01 RX ADMIN — Medication 1000 MG: at 17:08

## 2021-01-01 RX ADMIN — TRAZODONE HYDROCHLORIDE 25 MG: 50 TABLET ORAL at 22:36

## 2021-01-01 RX ADMIN — INSULIN LISPRO 2 UNITS: 100 INJECTION, SOLUTION INTRAVENOUS; SUBCUTANEOUS at 21:22

## 2021-01-01 RX ADMIN — INSULIN LISPRO 2 UNITS: 100 INJECTION, SOLUTION INTRAVENOUS; SUBCUTANEOUS at 04:37

## 2021-01-01 RX ADMIN — INSULIN LISPRO 4 UNITS: 100 INJECTION, SOLUTION INTRAVENOUS; SUBCUTANEOUS at 08:49

## 2021-01-01 RX ADMIN — FAMOTIDINE 20 MG: 20 TABLET, FILM COATED ORAL at 20:44

## 2021-01-01 RX ADMIN — Medication 50 MCG/HR: at 05:24

## 2021-01-01 RX ADMIN — INSULIN LISPRO 2 UNITS: 100 INJECTION, SOLUTION INTRAVENOUS; SUBCUTANEOUS at 16:38

## 2021-01-01 RX ADMIN — ALLOPURINOL 100 MG: 100 TABLET ORAL at 09:01

## 2021-01-01 RX ADMIN — Medication 10 ML: at 22:03

## 2021-01-01 RX ADMIN — PROPOFOL 30 MCG/KG/MIN: 10 INJECTION, EMULSION INTRAVENOUS at 06:09

## 2021-01-01 RX ADMIN — FAMOTIDINE 20 MG: 20 TABLET, FILM COATED ORAL at 08:12

## 2021-01-01 RX ADMIN — LORAZEPAM 1 MG: 2 INJECTION INTRAMUSCULAR; INTRAVENOUS at 20:34

## 2021-01-01 RX ADMIN — MORPHINE SULFATE 5 MG: 10 INJECTION, SOLUTION INTRAMUSCULAR; INTRAVENOUS at 15:28

## 2021-01-01 RX ADMIN — LIDOCAINE HYDROCHLORIDE 10 ML: 10 INJECTION, SOLUTION EPIDURAL; INFILTRATION; INTRACAUDAL; PERINEURAL at 12:03

## 2021-01-01 RX ADMIN — MAGNESIUM GLUCONATE 500 MG ORAL TABLET 400 MG: 500 TABLET ORAL at 12:08

## 2021-01-01 RX ADMIN — INSULIN LISPRO 2 UNITS: 100 INJECTION, SOLUTION INTRAVENOUS; SUBCUTANEOUS at 23:58

## 2021-01-01 RX ADMIN — MAGNESIUM GLUCONATE 500 MG ORAL TABLET 400 MG: 500 TABLET ORAL at 09:01

## 2021-01-01 RX ADMIN — MORPHINE SULFATE 5 MG: 10 INJECTION, SOLUTION INTRAMUSCULAR; INTRAVENOUS at 19:33

## 2021-01-01 RX ADMIN — MIDODRINE HYDROCHLORIDE 10 MG: 5 TABLET ORAL at 10:01

## 2021-01-01 RX ADMIN — PROPOFOL 20 MCG/KG/MIN: 10 INJECTION, EMULSION INTRAVENOUS at 05:20

## 2021-01-01 RX ADMIN — INSULIN LISPRO 2 UNITS: 100 INJECTION, SOLUTION INTRAVENOUS; SUBCUTANEOUS at 12:09

## 2021-01-01 RX ADMIN — MIDAZOLAM 1 MG/HR: 5 INJECTION INTRAMUSCULAR; INTRAVENOUS at 18:04

## 2021-01-01 RX ADMIN — MAGNESIUM GLUCONATE 500 MG ORAL TABLET 400 MG: 500 TABLET ORAL at 20:44

## 2021-01-01 RX ADMIN — ATORVASTATIN CALCIUM 80 MG: 80 TABLET, FILM COATED ORAL at 08:03

## 2021-01-01 RX ADMIN — Medication 10 ML: at 22:36

## 2021-01-01 RX ADMIN — Medication 81 MG: at 08:40

## 2021-01-01 RX ADMIN — TAMSULOSIN HYDROCHLORIDE 0.4 MG: 0.4 CAPSULE ORAL at 20:44

## 2021-01-01 RX ADMIN — MORPHINE SULFATE 5 MG: 10 INJECTION, SOLUTION INTRAMUSCULAR; INTRAVENOUS at 15:48

## 2021-01-01 RX ADMIN — Medication 10 ML: at 21:51

## 2021-01-01 RX ADMIN — Medication 10 ML: at 22:30

## 2021-01-01 RX ADMIN — FAMOTIDINE 20 MG: 10 INJECTION, SOLUTION INTRAVENOUS at 08:11

## 2021-01-01 RX ADMIN — Medication 75 MCG/HR: at 06:30

## 2021-01-01 RX ADMIN — INSULIN LISPRO 8 UNITS: 100 INJECTION, SOLUTION INTRAVENOUS; SUBCUTANEOUS at 04:31

## 2021-01-01 RX ADMIN — ENOXAPARIN SODIUM 40 MG: 40 INJECTION SUBCUTANEOUS at 13:35

## 2021-01-01 RX ADMIN — INSULIN LISPRO 4 UNITS: 100 INJECTION, SOLUTION INTRAVENOUS; SUBCUTANEOUS at 16:14

## 2021-01-01 RX ADMIN — INSULIN LISPRO 1 UNITS: 100 INJECTION, SOLUTION INTRAVENOUS; SUBCUTANEOUS at 20:44

## 2021-01-01 RX ADMIN — DOBUTAMINE HYDROCHLORIDE 5 MCG/KG/MIN: 200 INJECTION INTRAVENOUS at 10:52

## 2021-01-01 RX ADMIN — SPIRONOLACTONE 12.5 MG: 25 TABLET ORAL at 08:12

## 2021-01-01 RX ADMIN — MORPHINE SULFATE 5 MG: 10 INJECTION, SOLUTION INTRAMUSCULAR; INTRAVENOUS at 14:10

## 2021-01-01 RX ADMIN — MORPHINE SULFATE 5 MG: 10 INJECTION, SOLUTION INTRAMUSCULAR; INTRAVENOUS at 21:50

## 2021-01-01 RX ADMIN — SODIUM CHLORIDE 25 ML: 9 INJECTION, SOLUTION INTRAVENOUS at 03:15

## 2021-01-01 RX ADMIN — INSULIN LISPRO 2 UNITS: 100 INJECTION, SOLUTION INTRAVENOUS; SUBCUTANEOUS at 09:06

## 2021-01-01 RX ADMIN — DOBUTAMINE HYDROCHLORIDE 2.5 MCG/KG/MIN: 200 INJECTION INTRAVENOUS at 09:41

## 2021-01-01 RX ADMIN — MIDODRINE HYDROCHLORIDE 5 MG: 5 TABLET ORAL at 17:08

## 2021-01-01 RX ADMIN — INSULIN LISPRO 2 UNITS: 100 INJECTION, SOLUTION INTRAVENOUS; SUBCUTANEOUS at 13:42

## 2021-01-01 RX ADMIN — SODIUM CHLORIDE 1000 ML: 9 INJECTION, SOLUTION INTRAVENOUS at 16:14

## 2021-01-01 RX ADMIN — PROPOFOL 30 MCG/KG/MIN: 10 INJECTION, EMULSION INTRAVENOUS at 15:15

## 2021-01-01 RX ADMIN — PROPOFOL 40 MCG/KG/MIN: 10 INJECTION, EMULSION INTRAVENOUS at 03:12

## 2021-01-01 RX ADMIN — MORPHINE SULFATE 5 MG: 10 INJECTION, SOLUTION INTRAMUSCULAR; INTRAVENOUS at 00:55

## 2021-01-01 RX ADMIN — Medication 10 ML: at 21:00

## 2021-01-01 RX ADMIN — EPINEPHRINE 15 MCG/MIN: 1 INJECTION PARENTERAL at 09:21

## 2021-01-01 RX ADMIN — Medication 12.5 MCG/HR: at 17:29

## 2021-01-01 RX ADMIN — FAMOTIDINE 20 MG: 10 INJECTION, SOLUTION INTRAVENOUS at 09:06

## 2021-01-01 RX ADMIN — Medication 10 ML: at 10:04

## 2021-01-01 RX ADMIN — LORAZEPAM 1 MG: 2 INJECTION INTRAMUSCULAR; INTRAVENOUS at 23:31

## 2021-01-01 RX ADMIN — MIDAZOLAM HYDROCHLORIDE 5 MG: 5 INJECTION, SOLUTION INTRAMUSCULAR; INTRAVENOUS at 18:34

## 2021-01-01 RX ADMIN — MIDODRINE HYDROCHLORIDE 10 MG: 5 TABLET ORAL at 12:21

## 2021-01-01 RX ADMIN — FUROSEMIDE 40 MG: 10 INJECTION, SOLUTION INTRAMUSCULAR; INTRAVENOUS at 08:13

## 2021-01-01 RX ADMIN — MAGNESIUM GLUCONATE 500 MG ORAL TABLET 400 MG: 500 TABLET ORAL at 10:01

## 2021-01-01 RX ADMIN — MORPHINE SULFATE 5 MG: 10 INJECTION INTRAVENOUS at 13:50

## 2021-01-01 RX ADMIN — Medication 40 ML: at 09:00

## 2021-01-01 RX ADMIN — Medication 1 CAPSULE: at 08:16

## 2021-01-01 RX ADMIN — CEFEPIME HYDROCHLORIDE 1000 MG: 1 INJECTION, POWDER, FOR SOLUTION INTRAMUSCULAR; INTRAVENOUS at 20:52

## 2021-01-01 RX ADMIN — Medication 50 MCG/HR: at 18:19

## 2021-01-01 RX ADMIN — Medication 10 ML: at 08:13

## 2021-01-01 RX ADMIN — ATROPINE SULFATE 1 DROP: 10 SOLUTION/ DROPS OPHTHALMIC at 20:26

## 2021-01-01 RX ADMIN — INSULIN LISPRO 2 UNITS: 100 INJECTION, SOLUTION INTRAVENOUS; SUBCUTANEOUS at 20:01

## 2021-01-01 RX ADMIN — FUROSEMIDE 80 MG: 10 INJECTION, SOLUTION INTRAMUSCULAR; INTRAVENOUS at 08:49

## 2021-01-01 RX ADMIN — AMIODARONE HYDROCHLORIDE 0.5 MG/MIN: 50 INJECTION, SOLUTION INTRAVENOUS at 02:18

## 2021-01-01 RX ADMIN — PROPOFOL 22 MCG/KG/MIN: 10 INJECTION, EMULSION INTRAVENOUS at 23:06

## 2021-01-01 RX ADMIN — POTASSIUM CHLORIDE 40 MEQ: 1500 TABLET, EXTENDED RELEASE ORAL at 14:36

## 2021-01-01 RX ADMIN — LORAZEPAM 1 MG: 2 INJECTION INTRAMUSCULAR; INTRAVENOUS at 18:37

## 2021-01-01 RX ADMIN — ATROPINE SULFATE 1 DROP: 10 SOLUTION/ DROPS OPHTHALMIC at 23:50

## 2021-01-01 RX ADMIN — DOCUSATE SODIUM 100 MG: 100 CAPSULE, LIQUID FILLED ORAL at 21:41

## 2021-01-01 RX ADMIN — MORPHINE SULFATE 5 MG: 10 INJECTION INTRAVENOUS at 16:34

## 2021-01-01 RX ADMIN — PROPOFOL 20 MCG/KG/MIN: 10 INJECTION, EMULSION INTRAVENOUS at 20:00

## 2021-01-01 RX ADMIN — EPINEPHRINE 8 MCG/MIN: 1 INJECTION PARENTERAL at 23:42

## 2021-01-01 RX ADMIN — MORPHINE SULFATE 5 MG: 10 INJECTION, SOLUTION INTRAMUSCULAR; INTRAVENOUS at 00:25

## 2021-01-01 RX ADMIN — VANCOMYCIN HYDROCHLORIDE 750 MG: 750 INJECTION, POWDER, LYOPHILIZED, FOR SOLUTION INTRAVENOUS at 12:15

## 2021-01-01 RX ADMIN — LORAZEPAM 1 MG: 2 INJECTION INTRAMUSCULAR; INTRAVENOUS at 13:02

## 2021-01-01 RX ADMIN — CLOPIDOGREL BISULFATE 75 MG: 75 TABLET ORAL at 08:06

## 2021-01-01 RX ADMIN — PROPOFOL 35 MCG/KG/MIN: 10 INJECTION, EMULSION INTRAVENOUS at 08:04

## 2021-01-01 RX ADMIN — ACETAMINOPHEN 650 MG: 325 TABLET ORAL at 10:21

## 2021-01-01 RX ADMIN — MORPHINE SULFATE 5 MG: 10 INJECTION, SOLUTION INTRAMUSCULAR; INTRAVENOUS at 18:37

## 2021-01-01 RX ADMIN — ATROPINE SULFATE 1 DROP: 10 SOLUTION/ DROPS OPHTHALMIC at 02:50

## 2021-01-01 RX ADMIN — DOBUTAMINE HYDROCHLORIDE 2.5 MCG/KG/MIN: 200 INJECTION INTRAVENOUS at 09:20

## 2021-01-01 RX ADMIN — ASPIRIN 81 MG: 81 TABLET, COATED ORAL at 08:03

## 2021-01-01 RX ADMIN — MORPHINE SULFATE 5 MG: 10 INJECTION INTRAVENOUS at 04:59

## 2021-01-01 RX ADMIN — SODIUM CHLORIDE 1000 ML: 9 INJECTION, SOLUTION INTRAVENOUS at 18:45

## 2021-01-01 RX ADMIN — LORAZEPAM 1 MG: 2 INJECTION INTRAMUSCULAR; INTRAVENOUS at 10:38

## 2021-01-01 RX ADMIN — SODIUM CHLORIDE 1000 ML: 9 INJECTION, SOLUTION INTRAVENOUS at 17:41

## 2021-01-01 RX ADMIN — ATORVASTATIN CALCIUM 80 MG: 80 TABLET, FILM COATED ORAL at 08:40

## 2021-01-01 RX ADMIN — HEPARIN SODIUM 5000 UNITS: 5000 INJECTION INTRAVENOUS; SUBCUTANEOUS at 13:47

## 2021-01-01 RX ADMIN — LORAZEPAM 1 MG: 2 INJECTION INTRAMUSCULAR; INTRAVENOUS at 05:00

## 2021-01-01 RX ADMIN — LORAZEPAM 1 MG: 2 INJECTION INTRAMUSCULAR; INTRAVENOUS at 23:27

## 2021-01-01 RX ADMIN — CARVEDILOL 3.12 MG: 3.12 TABLET, FILM COATED ORAL at 18:15

## 2021-01-01 RX ADMIN — CLOPIDOGREL BISULFATE 75 MG: 75 TABLET ORAL at 13:35

## 2021-01-01 RX ADMIN — Medication 1 CAPSULE: at 08:03

## 2021-01-01 RX ADMIN — ENOXAPARIN SODIUM 40 MG: 40 INJECTION SUBCUTANEOUS at 10:03

## 2021-01-01 RX ADMIN — ENOXAPARIN SODIUM 40 MG: 40 INJECTION SUBCUTANEOUS at 08:12

## 2021-01-01 RX ADMIN — FAMOTIDINE 20 MG: 20 TABLET, FILM COATED ORAL at 10:00

## 2021-01-01 RX ADMIN — INSULIN LISPRO 4 UNITS: 100 INJECTION, SOLUTION INTRAVENOUS; SUBCUTANEOUS at 20:00

## 2021-01-01 RX ADMIN — PROPOFOL 35 MCG/KG/MIN: 10 INJECTION, EMULSION INTRAVENOUS at 11:34

## 2021-01-01 RX ADMIN — CEFEPIME HYDROCHLORIDE 2000 MG: 2 INJECTION, POWDER, FOR SOLUTION INTRAVENOUS at 08:24

## 2021-01-01 RX ADMIN — EPINEPHRINE 2 MCG/MIN: 1 INJECTION PARENTERAL at 17:29

## 2021-01-01 RX ADMIN — TAMSULOSIN HYDROCHLORIDE 0.4 MG: 0.4 CAPSULE ORAL at 22:33

## 2021-01-01 RX ADMIN — MORPHINE SULFATE 5 MG: 10 INJECTION, SOLUTION INTRAMUSCULAR; INTRAVENOUS at 06:47

## 2021-01-01 RX ADMIN — VANCOMYCIN HYDROCHLORIDE 750 MG: 750 INJECTION, POWDER, LYOPHILIZED, FOR SOLUTION INTRAVENOUS at 16:12

## 2021-01-01 RX ADMIN — HEPARIN SODIUM 4000 UNITS: 1000 INJECTION INTRAVENOUS; SUBCUTANEOUS at 21:51

## 2021-01-01 RX ADMIN — MORPHINE SULFATE 5 MG: 10 INJECTION INTRAVENOUS at 17:48

## 2021-01-01 RX ADMIN — ALLOPURINOL 100 MG: 100 TABLET ORAL at 08:41

## 2021-01-01 RX ADMIN — AMIODARONE HYDROCHLORIDE 1 MG/MIN: 50 INJECTION, SOLUTION INTRAVENOUS at 17:43

## 2021-01-01 RX ADMIN — Medication 10 ML: at 17:35

## 2021-01-01 RX ADMIN — DOCUSATE SODIUM 100 MG: 100 CAPSULE, LIQUID FILLED ORAL at 08:12

## 2021-01-01 RX ADMIN — ATROPINE SULFATE 1 DROP: 10 SOLUTION/ DROPS OPHTHALMIC at 13:08

## 2021-01-01 RX ADMIN — MORPHINE SULFATE 5 MG: 10 INJECTION INTRAVENOUS at 20:34

## 2021-01-01 RX ADMIN — PROPOFOL 20 MCG/KG/MIN: 10 INJECTION, EMULSION INTRAVENOUS at 21:14

## 2021-01-01 RX ADMIN — ATROPINE SULFATE 1 DROP: 10 SOLUTION/ DROPS OPHTHALMIC at 11:00

## 2021-01-01 RX ADMIN — MAGNESIUM SULFATE HEPTAHYDRATE 2000 MG: 40 INJECTION, SOLUTION INTRAVENOUS at 09:09

## 2021-01-01 RX ADMIN — INSULIN GLARGINE 10 UNITS: 100 INJECTION, SOLUTION SUBCUTANEOUS at 20:45

## 2021-01-01 RX ADMIN — LORAZEPAM 1 MG: 2 INJECTION INTRAMUSCULAR; INTRAVENOUS at 00:25

## 2021-01-01 RX ADMIN — INSULIN LISPRO 1 UNITS: 100 INJECTION, SOLUTION INTRAVENOUS; SUBCUTANEOUS at 22:30

## 2021-01-01 RX ADMIN — MORPHINE SULFATE 5 MG: 10 INJECTION, SOLUTION INTRAMUSCULAR; INTRAVENOUS at 10:49

## 2021-01-01 RX ADMIN — SODIUM CHLORIDE: 9 INJECTION, SOLUTION INTRAVENOUS at 18:14

## 2021-01-01 RX ADMIN — LORAZEPAM 1 MG: 2 INJECTION INTRAMUSCULAR; INTRAVENOUS at 17:09

## 2021-01-01 RX ADMIN — LORAZEPAM 1 MG: 2 INJECTION INTRAMUSCULAR; INTRAVENOUS at 15:28

## 2021-01-01 RX ADMIN — VANCOMYCIN HYDROCHLORIDE 1000 MG: 1 INJECTION, POWDER, LYOPHILIZED, FOR SOLUTION INTRAVENOUS at 12:26

## 2021-01-01 RX ADMIN — INSULIN LISPRO 4 UNITS: 100 INJECTION, SOLUTION INTRAVENOUS; SUBCUTANEOUS at 22:06

## 2021-01-01 RX ADMIN — MAGNESIUM GLUCONATE 500 MG ORAL TABLET 400 MG: 500 TABLET ORAL at 21:41

## 2021-01-01 RX ADMIN — PROPOFOL 20 MCG/KG/MIN: 10 INJECTION, EMULSION INTRAVENOUS at 09:10

## 2021-01-01 RX ADMIN — Medication 10 ML: at 22:27

## 2021-01-01 RX ADMIN — LORAZEPAM 1 MG: 2 INJECTION INTRAMUSCULAR; INTRAVENOUS at 09:47

## 2021-01-01 RX ADMIN — HEPARIN SODIUM 14 UNITS/KG/HR: 10000 INJECTION, SOLUTION INTRAVENOUS at 23:00

## 2021-01-01 RX ADMIN — LORAZEPAM 1 MG: 2 INJECTION INTRAMUSCULAR; INTRAVENOUS at 21:28

## 2021-01-01 RX ADMIN — LORAZEPAM 1 MG: 2 INJECTION INTRAMUSCULAR; INTRAVENOUS at 10:50

## 2021-01-01 RX ADMIN — Medication 1 CAPSULE: at 00:38

## 2021-01-01 RX ADMIN — ATROPINE SULFATE 1 DROP: 10 SOLUTION/ DROPS OPHTHALMIC at 01:56

## 2021-01-01 RX ADMIN — LORAZEPAM 1 MG: 2 INJECTION INTRAMUSCULAR; INTRAVENOUS at 06:29

## 2021-01-01 RX ADMIN — FAMOTIDINE 20 MG: 20 TABLET, FILM COATED ORAL at 08:41

## 2021-01-01 RX ADMIN — MIDODRINE HYDROCHLORIDE 5 MG: 5 TABLET ORAL at 08:12

## 2021-01-01 RX ADMIN — FUROSEMIDE 40 MG: 10 INJECTION, SOLUTION INTRAMUSCULAR; INTRAVENOUS at 22:25

## 2021-01-01 RX ADMIN — ASPIRIN 81 MG: 81 TABLET, COATED ORAL at 08:06

## 2021-01-01 RX ADMIN — SODIUM CHLORIDE 1000 ML: 9 INJECTION, SOLUTION INTRAVENOUS at 09:19

## 2021-01-01 RX ADMIN — MORPHINE SULFATE 5 MG: 10 INJECTION INTRAVENOUS at 01:54

## 2021-01-01 RX ADMIN — LORAZEPAM 1 MG: 2 INJECTION INTRAMUSCULAR; INTRAVENOUS at 20:08

## 2021-01-01 RX ADMIN — INSULIN LISPRO 2 UNITS: 100 INJECTION, SOLUTION INTRAVENOUS; SUBCUTANEOUS at 04:25

## 2021-01-01 RX ADMIN — MORPHINE SULFATE 5 MG: 10 INJECTION INTRAVENOUS at 01:31

## 2021-01-01 RX ADMIN — DOCUSATE SODIUM 100 MG: 100 CAPSULE, LIQUID FILLED ORAL at 10:00

## 2021-01-01 RX ADMIN — INSULIN LISPRO 4 UNITS: 100 INJECTION, SOLUTION INTRAVENOUS; SUBCUTANEOUS at 08:36

## 2021-01-01 RX ADMIN — ALLOPURINOL 100 MG: 100 TABLET ORAL at 08:12

## 2021-01-01 RX ADMIN — HEPARIN SODIUM 14 UNITS/KG/HR: 10000 INJECTION, SOLUTION INTRAVENOUS at 01:30

## 2021-01-01 RX ADMIN — CLOPIDOGREL BISULFATE 75 MG: 75 TABLET ORAL at 10:01

## 2021-01-01 RX ADMIN — MORPHINE SULFATE 5 MG: 10 INJECTION INTRAVENOUS at 16:20

## 2021-01-01 RX ADMIN — Medication 10 ML: at 09:02

## 2021-01-01 RX ADMIN — FUROSEMIDE 40 MG: 10 INJECTION, SOLUTION INTRAMUSCULAR; INTRAVENOUS at 17:34

## 2021-01-01 RX ADMIN — CEFEPIME HYDROCHLORIDE 1000 MG: 1 INJECTION, POWDER, FOR SOLUTION INTRAMUSCULAR; INTRAVENOUS at 08:11

## 2021-01-01 RX ADMIN — INSULIN LISPRO 2 UNITS: 100 INJECTION, SOLUTION INTRAVENOUS; SUBCUTANEOUS at 17:15

## 2021-01-01 RX ADMIN — Medication 10 ML: at 20:31

## 2021-01-01 RX ADMIN — ATORVASTATIN CALCIUM 80 MG: 80 TABLET, FILM COATED ORAL at 08:12

## 2021-01-01 RX ADMIN — SPIRONOLACTONE 12.5 MG: 25 TABLET ORAL at 09:01

## 2021-01-01 RX ADMIN — EPINEPHRINE 11 MCG/MIN: 1 INJECTION PARENTERAL at 04:25

## 2021-01-01 RX ADMIN — Medication 50 MCG/HR: at 05:53

## 2021-01-01 RX ADMIN — ETOMIDATE 20 MG: 20 INJECTION, SOLUTION INTRAVENOUS at 17:00

## 2021-01-01 RX ADMIN — ATROPINE SULFATE 1 DROP: 10 SOLUTION/ DROPS OPHTHALMIC at 20:08

## 2021-01-01 RX ADMIN — LORAZEPAM 1 MG: 2 INJECTION INTRAMUSCULAR; INTRAVENOUS at 03:39

## 2021-01-01 RX ADMIN — Medication 50 MCG/HR: at 15:14

## 2021-01-01 RX ADMIN — LORAZEPAM 1 MG: 2 INJECTION INTRAMUSCULAR; INTRAVENOUS at 23:50

## 2021-01-01 RX ADMIN — HEPARIN SODIUM 5000 UNITS: 5000 INJECTION INTRAVENOUS; SUBCUTANEOUS at 22:03

## 2021-01-01 RX ADMIN — ENOXAPARIN SODIUM 40 MG: 40 INJECTION SUBCUTANEOUS at 08:39

## 2021-01-01 RX ADMIN — MORPHINE SULFATE 5 MG: 10 INJECTION INTRAVENOUS at 11:48

## 2021-01-01 RX ADMIN — FUROSEMIDE 40 MG: 10 INJECTION, SOLUTION INTRAMUSCULAR; INTRAVENOUS at 13:35

## 2021-01-01 RX ADMIN — LIDOCAINE HYDROCHLORIDE 1 MG/MIN: 4 INJECTION, SOLUTION INTRAVENOUS at 18:20

## 2021-01-01 RX ADMIN — ATORVASTATIN CALCIUM 80 MG: 80 TABLET, FILM COATED ORAL at 10:00

## 2021-01-01 RX ADMIN — Medication 40 ML: at 09:35

## 2021-01-01 RX ADMIN — FUROSEMIDE 40 MG: 40 TABLET ORAL at 18:15

## 2021-01-01 RX ADMIN — FENTANYL CITRATE 25 MCG: 50 INJECTION, SOLUTION INTRAMUSCULAR; INTRAVENOUS at 12:01

## 2021-01-01 RX ADMIN — LORAZEPAM 1 MG: 2 INJECTION INTRAMUSCULAR; INTRAVENOUS at 19:34

## 2021-01-01 RX ADMIN — INSULIN LISPRO 10 UNITS: 100 INJECTION, SOLUTION INTRAVENOUS; SUBCUTANEOUS at 00:39

## 2021-01-01 RX ADMIN — LIDOCAINE HYDROCHLORIDE 8 ML: 10 INJECTION, SOLUTION EPIDURAL; INFILTRATION; INTRACAUDAL; PERINEURAL at 14:37

## 2021-01-01 RX ADMIN — SODIUM CHLORIDE 950 ML: 9 INJECTION, SOLUTION INTRAVENOUS at 21:47

## 2021-01-01 RX ADMIN — LORAZEPAM 1 MG: 2 INJECTION INTRAMUSCULAR; INTRAVENOUS at 01:59

## 2021-01-01 RX ADMIN — MIDAZOLAM HYDROCHLORIDE 5 MG: 1 INJECTION, SOLUTION INTRAMUSCULAR; INTRAVENOUS at 17:18

## 2021-01-01 RX ADMIN — MIDODRINE HYDROCHLORIDE 5 MG: 5 TABLET ORAL at 13:37

## 2021-01-01 RX ADMIN — VANCOMYCIN HYDROCHLORIDE 1250 MG: 1 INJECTION, POWDER, LYOPHILIZED, FOR SOLUTION INTRAVENOUS at 23:01

## 2021-01-01 RX ADMIN — ATORVASTATIN CALCIUM 80 MG: 80 TABLET, FILM COATED ORAL at 09:01

## 2021-01-01 RX ADMIN — PROPOFOL 30 MCG/KG/MIN: 10 INJECTION, EMULSION INTRAVENOUS at 10:19

## 2021-01-01 RX ADMIN — FENTANYL CITRATE 25 MCG: 50 INJECTION, SOLUTION INTRAMUSCULAR; INTRAVENOUS at 09:48

## 2021-01-01 RX ADMIN — Medication 50 MCG/HR: at 17:05

## 2021-01-01 RX ADMIN — CLOPIDOGREL BISULFATE 75 MG: 75 TABLET ORAL at 08:03

## 2021-01-01 RX ADMIN — SODIUM CHLORIDE: 9 INJECTION, SOLUTION INTRAVENOUS at 05:38

## 2021-01-01 RX ADMIN — MORPHINE SULFATE 5 MG: 10 INJECTION, SOLUTION INTRAMUSCULAR; INTRAVENOUS at 21:27

## 2021-01-01 RX ADMIN — FAMOTIDINE 20 MG: 10 INJECTION, SOLUTION INTRAVENOUS at 08:06

## 2021-01-01 RX ADMIN — INSULIN LISPRO 4 UNITS: 100 INJECTION, SOLUTION INTRAVENOUS; SUBCUTANEOUS at 17:08

## 2021-01-01 RX ADMIN — MIDODRINE HYDROCHLORIDE 5 MG: 5 TABLET ORAL at 08:40

## 2021-01-01 RX ADMIN — LIDOCAINE HYDROCHLORIDE 1 MG/MIN: 4 INJECTION, SOLUTION INTRAVENOUS at 04:06

## 2021-01-01 RX ADMIN — Medication 10 ML: at 19:57

## 2021-01-01 RX ADMIN — DOCUSATE SODIUM 100 MG: 100 CAPSULE, LIQUID FILLED ORAL at 09:01

## 2021-01-01 RX ADMIN — INSULIN LISPRO 2 UNITS: 100 INJECTION, SOLUTION INTRAVENOUS; SUBCUTANEOUS at 12:26

## 2021-01-01 RX ADMIN — PROPOFOL 20 MCG/KG/MIN: 10 INJECTION, EMULSION INTRAVENOUS at 05:11

## 2021-01-01 RX ADMIN — INSULIN LISPRO 2 UNITS: 100 INJECTION, SOLUTION INTRAVENOUS; SUBCUTANEOUS at 00:23

## 2021-01-01 RX ADMIN — MORPHINE SULFATE 5 MG: 10 INJECTION INTRAVENOUS at 22:28

## 2021-01-01 RX ADMIN — MORPHINE SULFATE 5 MG: 10 INJECTION INTRAVENOUS at 14:27

## 2021-01-01 RX ADMIN — Medication 10 ML: at 08:23

## 2021-01-01 RX ADMIN — LIDOCAINE HYDROCHLORIDE 10 ML: 10 INJECTION, SOLUTION EPIDURAL; INFILTRATION; INTRACAUDAL; PERINEURAL at 13:43

## 2021-01-01 SDOH — HEALTH STABILITY: MENTAL HEALTH: HOW OFTEN DO YOU HAVE A DRINK CONTAINING ALCOHOL?: NOT ASKED

## 2021-01-01 ASSESSMENT — PAIN SCALES - GENERAL
PAINLEVEL_OUTOF10: 0
PAINLEVEL_OUTOF10: 0
PAINLEVEL_OUTOF10: 9
PAINLEVEL_OUTOF10: 0
PAINLEVEL_OUTOF10: 4
PAINLEVEL_OUTOF10: 4
PAINLEVEL_OUTOF10: 0
PAINLEVEL_OUTOF10: 3
PAINLEVEL_OUTOF10: 2
PAINLEVEL_OUTOF10: 0
PAINLEVEL_OUTOF10: 4
PAINLEVEL_OUTOF10: 8
PAINLEVEL_OUTOF10: 4
PAINLEVEL_OUTOF10: 7
PAINLEVEL_OUTOF10: 0
PAINLEVEL_OUTOF10: 5
PAINLEVEL_OUTOF10: 9
PAINLEVEL_OUTOF10: 0
PAINLEVEL_OUTOF10: 0
PAINLEVEL_OUTOF10: 8
PAINLEVEL_OUTOF10: 7
PAINLEVEL_OUTOF10: 9
PAINLEVEL_OUTOF10: 0
PAINLEVEL_OUTOF10: 0
PAINLEVEL_OUTOF10: 4
PAINLEVEL_OUTOF10: 0
PAINLEVEL_OUTOF10: 3
PAINLEVEL_OUTOF10: 0
PAINLEVEL_OUTOF10: 4
PAINLEVEL_OUTOF10: 0
PAINLEVEL_OUTOF10: 6
PAINLEVEL_OUTOF10: 0
PAINLEVEL_OUTOF10: 5
PAINLEVEL_OUTOF10: 7
PAINLEVEL_OUTOF10: 0
PAINLEVEL_OUTOF10: 5
PAINLEVEL_OUTOF10: 5
PAINLEVEL_OUTOF10: 0
PAINLEVEL_OUTOF10: 4
PAINLEVEL_OUTOF10: 0
PAINLEVEL_OUTOF10: 7
PAINLEVEL_OUTOF10: 0
PAINLEVEL_OUTOF10: 0
PAINLEVEL_OUTOF10: 4
PAINLEVEL_OUTOF10: 0
PAINLEVEL_OUTOF10: 0

## 2021-01-01 ASSESSMENT — PAIN DESCRIPTION - LOCATION
LOCATION: GROIN
LOCATION: GROIN
LOCATION: SCROTUM
LOCATION: SCROTUM

## 2021-01-01 ASSESSMENT — ENCOUNTER SYMPTOMS
SORE THROAT: 0
ABDOMINAL DISTENTION: 1
SHORTNESS OF BREATH: 0
CONSTIPATION: 1
NAUSEA: 0
EYE REDNESS: 0
EYE PAIN: 0
SINUS PAIN: 0
BACK PAIN: 1
COUGH: 0
WHEEZING: 0
ABDOMINAL PAIN: 0
DIARRHEA: 1
VOMITING: 0
RHINORRHEA: 0

## 2021-01-01 ASSESSMENT — PULMONARY FUNCTION TESTS
PIF_VALUE: 22
PIF_VALUE: 16
PIF_VALUE: 17
PIF_VALUE: 15
PIF_VALUE: 14
PIF_VALUE: 19
PIF_VALUE: 16
PIF_VALUE: 19
PIF_VALUE: 16
PIF_VALUE: 14
PIF_VALUE: 16
PIF_VALUE: 16
PIF_VALUE: 22
PIF_VALUE: 18
PIF_VALUE: 18
PIF_VALUE: 17
PIF_VALUE: 28
PIF_VALUE: 15
PIF_VALUE: 17
PIF_VALUE: 16
PIF_VALUE: 15
PIF_VALUE: 15
PIF_VALUE: 22
PIF_VALUE: 22
PIF_VALUE: 16
PIF_VALUE: 16
PIF_VALUE: 19

## 2021-01-01 ASSESSMENT — PAIN DESCRIPTION - PROGRESSION
CLINICAL_PROGRESSION: GRADUALLY IMPROVING
CLINICAL_PROGRESSION: GRADUALLY IMPROVING

## 2021-01-01 ASSESSMENT — PAIN DESCRIPTION - ORIENTATION
ORIENTATION: RIGHT;LEFT
ORIENTATION: RIGHT;LEFT

## 2021-01-01 ASSESSMENT — PAIN DESCRIPTION - PAIN TYPE
TYPE: ACUTE PAIN
TYPE: ACUTE PAIN

## 2021-01-01 ASSESSMENT — PAIN DESCRIPTION - ONSET: ONSET: ON-GOING

## 2021-01-01 ASSESSMENT — PAIN DESCRIPTION - DESCRIPTORS: DESCRIPTORS: ACHING

## 2021-01-01 ASSESSMENT — PAIN DESCRIPTION - FREQUENCY: FREQUENCY: CONTINUOUS

## 2021-01-12 NOTE — TELEPHONE ENCOUNTER
Spoke to Edgar Greenberg, pt was seen at the [place] by the surgeon  and evaluated for a hernia, he does not have a hernia. Edgar Greenberg states Dr. Roberth Burnham would like to speak to Dr. Natalia Faust the patient, [phone]. She believes he needs another paracentesis. Last was 12/28/20.

## 2021-01-12 NOTE — TELEPHONE ENCOUNTER
Pt wife calling asking to speak with MEKA RN regarding [facility] pls call to advise thank you

## 2021-01-13 NOTE — TELEPHONE ENCOUNTER
Please call the pt to schedule US Guided Paracentesis (this is done in IR), can be done at the patient's convenience ,  thanks!

## 2021-01-14 NOTE — TELEPHONE ENCOUNTER
Pt has been scheduled for paracentesis on 1/20/21 and they need him to hold his plavix and aspirin 5 days prior, beginning tomorrow 1/15/21. Please call pt to let him know that is ok.

## 2021-01-20 NOTE — PROGRESS NOTES
Pt arrived from radiology to phase 2, VSS, dressing on right lower abdomen dry, denies pain. Will discharge.

## 2021-01-20 NOTE — PROGRESS NOTES
Discharge instructions reviewed with pt and pts wife, both verbalized understanding. Pt discharged in wheelchair with all belongings to car by rossy FRY, pt tolerated well.

## 2021-02-04 NOTE — PATIENT INSTRUCTIONS
Begin low dose spironolactone : 25 mg tablet at 1/2 tablet once a day    Non-fasting labs after one week    US guided paracentesis : Wed, Feb 10th.  Arrive at 12 pm ; procedure at 1:00 pm  ~ok to hold aspirin and plavix 5 days prior to procedure    appt 4 weeks after procedure

## 2021-02-04 NOTE — PROGRESS NOTES
[facility]     Outpatient Follow Up Note    Annika Diego is 77 y.o. male who presents today with a history of HTN, hyperlipidemia, CAD s/p PTCA CX/OM '04 and s/p PTCA LAD '06, chronic RCA occlusion; NSVT, ischemic CM/EF 15-20% s/p ICD '15, EF 50-55% on echo '16, declined to 15-20% on echo Jan '20 and HFrEF. He presented to ER Oct '20 with complaints of chest pain and palpitations. He was found to have VT/Wide complex tachycardia and underwent a DCCV. He was also found to be in shock. Post cardioversion he became hypotensive and required central line access and epinephrine. His rate detection was adjusted for future therapies and his amiodarone was discontinued d/t cirrhosis. His other hx includes: liver tumor '18, cirrhosis, paracentesis, non-Hodgkin's / Diffuse Large B Cell Lymphoma  12/7/2020 underwent an ultrasound guided paracentesis which removed 4.5 liters of fluid    Interval hx:  12/7/20: paracentesis : 4.5 L  12/28/20 : paracentesis : 6.5 L   1/20/21: Paracentesis : 8 L     CHIEF COMPLAINT / HPI:  Follow Up secondary to calling in with c/o water retention. Subjective:   His fluid as came back. His belly is hard as a rock. It seems to start immediately after being tapped (he was tapped on a Wed and he started accumulating fluid by Saturday). His lasix was increased to 40 mg bid. He's taken the higher dose for about a week. He's seen no improvement. His wt is 191# and stable for 2-3 months. He has no SOB unless he climbs steps. He sleeps with 2 pillows for comfort. He denies PND. He has no swelling in his legs just belly and testicles. He denies nausea / cough. he denies significant chest pain. The patient is not experiencing palpitations or dizziness. These symptoms show no change since the last OV. His home BP has been ~ 90/60s at times 110/   With regard to medication therapy the patient has been compliant with prescribed regimen. They have tolerated therapy to date. Past Medical History:   Diagnosis Date    [facility]     Cirrhosis ([facility]     ETOH abuse    Diabetes mellitus ([facility]     History of abdominal paracentesis 2020    Hyperlipidemia     Hypertension     Lymphoma ([facility]     currently in remission    NSVT (nonsustained ventricular tachycardia) (HCC)     Sleep apnea     unable to tolerate CPAP    Syncope      Social History:    Social History     Tobacco Use   Smoking Status Former Smoker    Quit date: 1975    Years since quittin.0   Smokeless Tobacco Never Used     Current Medications:  Current Outpatient Medications   Medication Sig Dispense Refill    clopidogrel (PLAVIX) 75 MG tablet Take 1 tablet by mouth daily 90 tablet 1    carvedilol (COREG) 3.125 MG tablet Take 1 tablet by mouth 2 times daily 180 tablet 3    midodrine (PROAMATINE) 5 MG tablet Take 1 tablet by mouth 3 times daily (with meals) 90 tablet 3    tamsulosin (FLOMAX) 0.4 MG capsule Take 0.4 mg by mouth nightly      furosemide (LASIX) 40 MG tablet Take 40 mg by mouth 2 times daily       MAGNESIUM OXIDE 400 PO Take 400 mg by mouth 2 times daily       Calcium Carb-Cholecalciferol (CALCIUM 1000 + D PO) Take 1,000 mg by mouth 2 times daily      famotidine (PEPCID) 20 MG tablet Take 20 mg by mouth 2 times daily       aspirin 81 MG tablet Take 81 mg by mouth daily      vitamin D (CHOLECALCIFEROL) 1000 UNITS TABS tablet Take 1,000 Units by mouth daily      loperamide (IMODIUM) 2 MG capsule Take 2 mg by mouth 4 times daily as needed for Diarrhea      nitroGLYCERIN (NITROSTAT) 0.4 MG SL tablet Place 0.4 mg under the tongue every 5 minutes as needed for Chest pain up to max of 3 total doses. If no relief after 1 dose, call 911.  glipiZIDE (GLUCOTROL) 5 MG tablet Take 7.5 mg by mouth 2 times daily (before meals)       atorvastatin (LIPITOR) 80 MG tablet Take 80 mg by mouth daily. No current facility-administered medications for this visit. EXT: No edema, no calf tenderness. Pulses are present bilaterally. DATA:    Lab Results   Component Value Date     (H) 10/27/2020    AST 29 10/27/2020    ALKPHOS 137 (H) 10/27/2020    BILITOT 3.0 (H) 10/27/2020     Lab Results   Component Value Date    CREATININE 1.1 10/27/2020    BUN 12 10/27/2020     (L) 10/27/2020    K 4.2 10/27/2020    CL 97 (L) 10/27/2020    CO2 29 10/27/2020     No results found for: TSH, G2ZNIOH, B3DACQJ, THYROIDAB  Lab Results   Component Value Date    WBC 5.9 10/22/2020    HGB 13.0 (L) 10/22/2020    HCT 39.1 (L) 10/22/2020    MCV 93.6 10/22/2020    PLT 62 (L) 10/22/2020     No components found for: CHLPL  Lab Results   Component Value Date    TRIG 255 (H) 01/15/2010     Lab Results   Component Value Date    HDL 35 (L) 01/15/2010     Lab Results   Component Value Date    LDLCALC 147 (H) 01/15/2010     Lab Results   Component Value Date    LABVLDL 51 01/15/2010     LABS : 1/19/21:   Na+ 137 K+ 3.7 chl 100 CO2 29 BUN 14 creatinine 1.38 glu 108    Radiology Review:  Pertinent images / reports were reviewed as a part of this visit and reveals the following:    Paceart: 12/8/20: thoracic impedence showing decompensation since Sept '20      Echo: Dec '20:  Summary   -Left ventricular cavity size is normal.   -Overall left ventricular systolic function appears severely reduced.   -Ejection fraction is visually estimated to be <20%. -There is abnormal septal motion noted. -Cannot rule out other wall motion abnormalities. -Grade I diastolic dysfunction with normal LV filling pressures. E/e' =11.8.   -The left atrium is dilated. -The right atrium is severely dilated. -The aortic valve appears sclerotic but opens well. -Mild mitral regurgitation.   -Moderate tricuspid regurgitation.   -Right ventricle size is normal with reduced function. TAPSE = 1.3 cm. RVS velocity is 8.27 cm/s. -RVSP = 28 mmHG. Cannot rule out pleural effusion vs shadowing.     Echo: Jan '20:  Summary Dilated left ventricle. Normal wall thickness. Severely decreased left ventricular systolic function with an estimated EF   <20%. E/e'=14. Mild mitral regurgitation. Biatrial enlargement. Aortic valve appears sclerotic but opens adequately. No stenosis or   insufficiency. The right ventricle is enlarged and decreased in function. Moderate tricuspid regurgitation. PASP 37mmHg. Trivial pulmonic regurgitation. There is a trivial anterior pericardial effusion noted. There is fluid around the liver. IVC size is normal (<2.1 cm) but collapses < 50% with respiration consistent   with elevated RA pressure (8 mmHg). Last Stress Test: Oct '18:  Summary       No EKG evidence for ischemia with lexiscan       Reduced LV systolic function with EF of 44% (ectopy may affect accuracy)       There is normal isotope uptake at stress and rest. There is no evidence of    myocardial ischemia or scar.           Assessment:      Diagnosis Orders   1. Volume overload   ~recurrent abd / scrotal swelling   ~s/p paracentesis 1/20/21 for 8 L   ~no response to increased dose of lasix  2. Ischemic cardiomyopathy   ~LVF severely reduced by echo Dec '20; RA severely dilated   ~EF < 20% on echo from Jan  ~hx of s/p ICD '15 (followed by EP)  ~tolerating coreg low dose  ~[employer] interrogation : called for him to send a remote in (has not done as yet [by time of note closure today, 2/5/21])  ~wt stable     3. Coronary artery disease involving native heart without angina pectoris, unspecified vessel or lesion type   ~stable : offers no c/o angina  ~DAPT / BB / statin     4. Mixed hyperlipidemia   ~no recent profile available for review  ~ trig 125 HDL 44 LDL 80 on profile from March '19  ~[facility] by the [facility]        I had the opportunity to review the clinical symptoms and presentation of Deandre Lombardo (winsome/w MEKA).    Plan:     1. US guided paracentesis / therapeutic for ascites : 2/10 1pm 2. Begin low dose spironolactone 12.5 mg daily   BMP after one week  3. F/U in four weeks    Overall the patient is stable from CV standpoint    I have addresed the patient's cardiac risk factors and adjusted pharmacologic treatment as needed. In addition, I have reinforced the need for patient directed risk factor modification. Further evaluation will be based upon the patient's clinical course and testing results. All questions and concerns were addressed to the patient/wife. Alternatives to my treatment were discussed. The patient is not currently smoking: remote. The risks related to smoking were reviewed with the patient. Recommend maintaining a smoke-free lifestyle. Patient is on a beta-blocker  Patient is not on an ace-i/ARB : BP does not support ; creatinine elevated  Patient is on a statin    Dual Antiplatelet therapy has been recommended / prescribed for this patient. Education conducted on adverse reactions including bleeding was discussed. Daily weight, low sodium diet were discussed. Patient instructed to call the office with a weight gain: > 3 # over night or 5# in one week; swelling, SOB/orthopnea/PND    The patient verbalizes understanding not to stop medications without discussing with us. Discussed exercise: 30-60 minutes 7 days/week : has no routine  Discussed Low saturated fat/KATRINA diet. Doesn't monitor BS    Thank you for allowing to us to participate in the care of Tom Marie.     KIN López    Documentation of today's visit sent to PCP

## 2021-02-10 NOTE — PROGRESS NOTES
9600ml of fluid removed  Spoke to patients ANG chavez and advised her the amount of fluid removed and that patient was returning to the floor.

## 2021-02-10 NOTE — BRIEF OP NOTE
Brief Postoperative Note    Dacia Rodríguez  YOB: 1945  [id number]    Pre-operative Diagnosis: ascites    Post-operative Diagnosis: Same    Procedure: US-guided paracentesis    Anesthesia: Local    Surgeons: Bee Milian MD    Estimated Blood Loss: Less than 5 mL    Complications: None    Specimens: ascites drained    Findings: Successful US-guided paracentesis.     Electronically signed by Bee Milian MD on 2/10/2021 at 1:11 PM

## 2021-02-10 NOTE — PROGRESS NOTES
Patient/report received from [employer], a/o, chin to paracentesis site c/d/i, denies pain/needs, vss, call light in reach, will monitor,

## 2021-03-03 NOTE — TELEPHONE ENCOUNTER
Pt wife calling pt is retaining fluid again and needs to come in to get this drained off. Priti Cortez would like to talk to NPT about this.  pls call to advise Thank you

## 2021-03-03 NOTE — TELEPHONE ENCOUNTER
Called Nany Holder wife he has no sob no weight gain. He has stomach edema,and unbearable pressure in his testicles. He has ov with Les 3/16/21 she feels he volume overload. He has been going every other month to get fluid off.

## 2021-03-03 NOTE — TELEPHONE ENCOUNTER
Spoke with [facility] scheduling IR    Hold DAPT x 5 days  Procedure scheduled for 3/9 ; arrival 7 am, procedure 8 am    Pt aware
Bladder non-tender and non-distended.

## 2021-03-03 NOTE — PROGRESS NOTES
[facility]   Cardiac Follow Up     Referring Provider: Arben Villatoro     Chief Complaint   Patient presents with    3 Month Follow-Up     Patient return to office for 3 month follow up    Hypertension    Coronary Artery Disease    Hyperlipidemia        History of Present Illness:  Gómez Erazo is 77 y.o. male who presents today with a history of HTN, hyperlipidemia, CAD s/p PTCA CX/OM '04 and s/p PTCA LAD '06, chronic RCA occlusion; NSVT, ischemic CM/EF 15-20% s/p ICD '15, EF 50-55% on echo '16, declined to 15-20% on echo Jan '20 and HFrEF.       He presented to ER Oct '20 with complaints of chest pain and palpitations. He was found to have VT/Wide complex tachycardia and underwent a DCCV. He was also found to be in shock. Post cardioversion he became hypotensive and required central line access and epinephrine. His rate detection was adjusted for future therapies and his amiodarone was discontinued d/t cirrhosis.     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 He continues to feel as though the  fluid as came back,  seems to start almost immediately after being tapped. He has difficulty walking. He  denies significant chest pain. SOB is not worsening. Past Medical History:   has a past medical history of Cardiomyopathy ([address] Cirrhosis ([address] Diabetes mellitus ([address] History of abdominal paracentesis, Hyperlipidemia, Hypertension, Lymphoma ([address] NSVT (nonsustained ventricular tachycardia) ([address] Sleep apnea, and Syncope. Surgical History:   has a past surgical history that includes Coronary angioplasty with stent;  Cholecystectomy; laminectomy; Tonsillectomy and adenoidectomy; Cardiac defibrillator placement (6/23/15); toenail excision (07/21/2017); and Upper gastrointestinal endoscopy (N/A, 8/4/2020). Social History:   reports that he quit smoking about 46 years ago. His smoking use included pipe. He has a 64.00 pack-year smoking history. He has never used smokeless tobacco. He reports previous alcohol use. He reports that he does not use drugs. Family History:  family history includes Heart Disease in his father and mother. Home Medications:  Prior to Admission medications    Medication Sig Start Date End Date Taking?  Authorizing Provider   allopurinol (ZYLOPRIM) 300 MG tablet TAKE ONE-HALF TABLET BY MOUTH ONCE DAILY FOR GOUT 12/14/20  Yes Historical Provider, MD   vitamin B-12 (CYANOCOBALAMIN) 1000 MCG tablet Take 1,000 mcg by mouth daily   Yes Historical Provider, MD   Multiple Vitamins-Minerals (ONE-A-DAY MENS 50+ PO) Take 1 tablet by mouth daily   Yes Historical Provider, MD   spironolactone (ALDACTONE) 25 MG tablet Take 0.5 tablets by mouth daily 2/4/21  Yes KIN Lyle - CNP   clopidogrel (PLAVIX) 75 MG tablet Take 1 tablet by mouth daily 12/4/20  Yes Elroy Bloom MD   carvedilol (COREG) 3.125 MG tablet Take 1 tablet by mouth 2 times daily 12/4/20  Yes Elroy Bloom MD   midodrine (PROAMATINE) 5 MG tablet Take 1 tablet by mouth 3 times daily (with meals) 10/22/20  Yes Darryle Lily, MD   tamsulosin (FLOMAX) 0.4 MG capsule Take 0.4 mg by mouth nightly   Yes Historical Provider, MD   furosemide (LASIX) 40 MG tablet Take 40 mg by mouth 2 times daily  8/28/20  Yes Elroy Bloom MD   MAGNESIUM OXIDE 400 PO Take 400 mg by mouth 2 times daily    Yes Historical Provider, MD   Calcium Carb-Cholecalciferol (CALCIUM 1000 + D PO) Take 1,000 mg by mouth 2 times daily   Yes Historical Provider, MD   famotidine (PEPCID) 20 MG tablet Take 20 mg by mouth 2 times daily    Yes Historical Provider, MD   aspirin 81 MG tablet Take 81 mg by mouth daily   Yes Historical Provider, MD   vitamin D (CHOLECALCIFEROL) 1000 UNITS TABS tablet Take 1,000 Units by mouth daily   Yes Historical Provider, MD   loperamide (IMODIUM) 2 MG capsule Take 2 mg by mouth 4 times daily as needed for Diarrhea   Yes Historical Provider, MD   nitroGLYCERIN (NITROSTAT) 0.4 MG SL tablet Place 0.4 mg under the tongue every 5 minutes as needed for Chest pain up to max of 3 total doses. If no relief after 1 dose, call 911. Yes Historical Provider, MD   glipiZIDE (GLUCOTROL) 5 MG tablet Take 7.5 mg by mouth 2 times daily (before meals)    Yes Historical Provider, MD   atorvastatin (LIPITOR) 80 MG tablet Take 80 mg by mouth daily. Yes Historical Provider, MD   traZODone (DESYREL) 50 MG tablet TAKE ONE-HALF TABLET BY MOUTH BEDTIME 12/14/20   Historical Provider, MD        Allergies:  Cortisone, Morphine, Metformin, Metformin hcl, and Lisinopril     Review of Systems:   · Constitutional: there has been no unanticipated weight loss. There's been no change in energy level, sleep pattern, or activity level. · Eyes: No visual changes or diplopia. No scleral icterus. · ENT: No Headaches, hearing loss or vertigo. No mouth sores or sore throat. · Cardiovascular: Reviewed in HPI  · Respiratory: No cough or wheezing, no sputum production. No hematemesis. · Gastrointestinal: No abdominal pain, appetite loss, blood in stools. No change in bowel or bladder habits. · Genitourinary: No dysuria, trouble voiding, or hematuria. · Musculoskeletal:  No gait disturbance, weakness or joint complaints. · Integumentary: No rash or pruritis. · Neurological: No headache, diplopia, change in muscle strength, numbness or tingling. No change in gait, balance, coordination, mood, affect, memory, mentation, behavior. · Psychiatric: No anxiety, no depression. · Endocrine: No malaise, fatigue or temperature intolerance. No excessive thirst, fluid intake, or urination. No tremor.   · Hematologic/Lymphatic: No abnormal bruising or bleeding, blood clots or swollen lymph nodes. · Allergic/Immunologic: No nasal congestion or hives. Physical Examination:    Vitals:    03/16/21 1313   BP: 120/78   Pulse: 75   Resp: 19   SpO2: 96%        Wt Readings from Last 1 Encounters:   03/16/21 173 lb 4.8 oz (78.6 kg)       Constitutional and General Appearance:  Respiratory:  · Normal excursion and expansion without use of accessory muscles  · Resp Auscultation: Normal breath sounds without dullness  Cardiovascular:  · The apical impulses not displaced  · Heart tones are crisp and normal  · Cervical veins are not engorged  · The carotid upstroke is normal in amplitude and contour without delay or bruit  · Peripheral pulses are symmetrical and full  · There is no clubbing, cyanosis of the extremities. · No edema  · Femoral Arteries: 2+ and equal  · Pedal Pulses: 2+ and equal   Abdomen:  · No masses or tenderness  · Liver/Spleen: No Abnormalities Noted  Neurological/Psychiatric:  · Alert and oriented in all spheres  · Moves all extremities well  · Exhibits normal gait balance and coordination  · No abnormalities of mood, affect, memory, mentation, or behavior are noted      Assessment:     1. Volume overload              ~recurrent abd / scrotal swelling- no improvement              ~s/p paracentesis 2/23/21 for 9.6 L, then again 3/9/21          Will  plan for hospital admission on Friday   2. Ischemic cardiomyopathy   ~LVF severely reduced by echo Dec '20; RA severely dilated   ~EF < 20% on echo from Jan  ~hx of s/p ICD '15 (followed by EP)  ~tolerating coreg low dose     3. Coronary artery disease involving native heart without angina pectoris, unspecified vessel or lesion type   ~stable      4.  Mixed hyperlipidemia   ~Lipitor 80mg   ~ trig 125 HDL 44 LDL 80 on profile from March '19  ~followed by the [address] [place] [address]:  Plan for hospital admission on Friday- Come to the ED at 8am.   Will plan for IV

## 2021-03-03 NOTE — TELEPHONE ENCOUNTER
I spoke with pt: he started accumulating fluid again about 2 days after his last paracentesis. It starts in his testicles and works its way up. Its uncomfortable / painful.   He has no change in wt and ranges from: 204-208 #  He denies SOB    I've called IR to schedule a repeat paracentesis ; message left for LES

## 2021-03-09 NOTE — LETTER
[address] [facility] [phone]  [address] [phone]    Pacemaker/Defibrillator Clinic          03/09/21        [address] [place] [phone]        Dear [address] [place] [address]    This letter is to inform you that we received the transmission from your monitor at home that checks your implanted heart device. The next date your monitor will automatically transmit will be 6-14-21. If your report needs attention we will notify you. Your device and monitor are wireless and most transmit cellularly, but please periodically check your monitor is still plugged in to the electrical outlet. If you still use the telephone land line to send please ensure the connection to the phone tessa is secure. This will help to ensure successful automatic transmissions in the future. Also, the monitor needs to be close to you while sleeping at night. Please be aware that the remote device transmission sites are periodically monitored only during regular business hours during which simultaneous in-office device clinics are being run. If your transmission requires attention, we will contact you as soon as possible. Thank you.             [facility]

## 2021-03-09 NOTE — PROGRESS NOTES
We received remote transmission from patient's monitor at home. Transmission shows normal sensing and pacing function. Noted NSVT/ SVT. EP physician will review. See interrogation under cardiology tab in the [address] [place] [address] [address] field for more details. Optivol is within normal range.

## 2021-03-19 PROBLEM — I42.8 NONISCHEMIC CARDIOMYOPATHY (HCC): Status: ACTIVE | Noted: 2021-01-01

## 2021-03-19 NOTE — ACP (ADVANCE CARE PLANNING)
ADVANCED CARE PLANNING    Name:Miles Theodore       :  1945              MRN:  [id number]      Purpose of Encounter: Advanced care planning in light of hospitalization. Parties in attendance: :Matias Nolan MD, Family members: Patient's wife Parul Carlton at bedside. Decisional Capacity:Yes    Diagnosis: Active Problems:    Nonischemic cardiomyopathy (HCC)  Resolved Problems:    * No resolved hospital problems. *    Patients Medical Story: Patient being admitted for management of cardiomyopathy, cirrhosis with ascites. Goals of Care Determinations: Patient wishes to focus on comfort towards end-of-life. Plan: Will notify [facility] of change in care plan. Will look at further interventions as needed. Code Status: At this time patient wishes to be DNR-CCA. Patient reports that he has a living will which states DO NOT RESUSCITATE. Patient is not wish to undergo CPR, defibrillation, intubation. In case of cardiac arrest, patient will like to be kept comfortable. His wife Riya Mccloud is his power of . Time Spent with Patient: 18 minutes      Electronically signed by Libby Samano MD on 3/19/2021 at 1:08 PM  Thank you [facility] for the opportunity to be involved in this patient's care.

## 2021-03-19 NOTE — ED PROVIDER NOTES
[facility] Emergency Department      Pt Name: Sylvia Solis  MRN: [id number]  [place] 1945  Date of evaluation: 3/19/2021  Provider: Benigno Mccallum MD  CHIEF COMPLAINT  Chief Complaint   Patient presents with    Edema     pt states he is in from home per Dr. Liv Hughes to \"get fluid off\" pelvic area     HPI  Sylvia Solis is a 77 y.o. male who presents because of retained fluid. He has a history of severe cardiomyopathy and cirrhosis. He has required paracentesis multiple times in the past.  His last drainage was on Monday. He has persistent swelling in his scrotal area and lower abdomen with discomfort with sitting and walking. He is followed by Dr. Liv Hughes. He saw Dr. Liv Hughes in the office on Wednesday. Plan was for admission on Friday for IV Lasix. Patient denies any chest or abdominal pain. Denies any fever or chills. REVIEW OF SYSTEMS:  No fever, no chest pain, no leg swelling, no cough, normal urination Pertinent positives and negatives as per the HPI. All other review of systems reviewed and negative. Nursing notes reviewed.     PAST MEDICAL HISTORY  Past Medical History:   Diagnosis Date    Cardiomyopathy ([address]     [facility]     ETOH abuse    Diabetes mellitus ([facility] [address] [facility]     History of abdominal paracentesis 03/2020    Hyperlipidemia     Hypertension     Lymphoma ([facility] [address]     currently in remission    NSVT (nonsustained ventricular tachycardia) (HCC)     Sleep apnea     unable to tolerate CPAP    Syncope      SURGICAL HISTORY  Past Surgical History:   Procedure Laterality Date    CARDIAC DEFIBRILLATOR PLACEMENT  6/23/15    single chamber AICD, EPS inducible VT    CHOLECYSTECTOMY      CORONARY ANGIOPLASTY WITH STENT PLACEMENT      LAMINECTOMY      TOENAIL EXCISION  07/21/2017    TONSILLECTOMY AND ADENOIDECTOMY      UPPER GASTROINTESTINAL ENDOSCOPY N/A 8/4/2020    EGD performed by Sophie Raphael MD at [address]:  No current facility-administered medications on file prior to encounter. Current Outpatient Medications on File Prior to Encounter   Medication Sig Dispense Refill    allopurinol (ZYLOPRIM) 300 MG tablet TAKE ONE-HALF TABLET BY MOUTH ONCE DAILY FOR GOUT      traZODone (DESYREL) 50 MG tablet TAKE ONE-HALF TABLET BY MOUTH BEDTIME      vitamin B-12 (CYANOCOBALAMIN) 1000 MCG tablet Take 1,000 mcg by mouth daily      Multiple Vitamins-Minerals (ONE-A-DAY MENS 50+ PO) Take 1 tablet by mouth daily      spironolactone (ALDACTONE) 25 MG tablet Take 0.5 tablets by mouth daily 15 tablet 1    clopidogrel (PLAVIX) 75 MG tablet Take 1 tablet by mouth daily 90 tablet 1    carvedilol (COREG) 3.125 MG tablet Take 1 tablet by mouth 2 times daily 180 tablet 3    midodrine (PROAMATINE) 5 MG tablet Take 1 tablet by mouth 3 times daily (with meals) 90 tablet 3    tamsulosin (FLOMAX) 0.4 MG capsule Take 0.4 mg by mouth nightly      furosemide (LASIX) 40 MG tablet Take 40 mg by mouth 2 times daily       MAGNESIUM OXIDE 400 PO Take 400 mg by mouth 2 times daily       Calcium Carb-Cholecalciferol (CALCIUM 1000 + D PO) Take 1,000 mg by mouth 2 times daily      famotidine (PEPCID) 20 MG tablet Take 20 mg by mouth 2 times daily       aspirin 81 MG tablet Take 81 mg by mouth daily      vitamin D (CHOLECALCIFEROL) 1000 UNITS TABS tablet Take 1,000 Units by mouth daily      loperamide (IMODIUM) 2 MG capsule Take 2 mg by mouth 4 times daily as needed for Diarrhea      nitroGLYCERIN (NITROSTAT) 0.4 MG SL tablet Place 0.4 mg under the tongue every 5 minutes as needed for Chest pain up to max of 3 total doses. If no relief after 1 dose, call 911.  glipiZIDE (GLUCOTROL) 5 MG tablet Take 7.5 mg by mouth 2 times daily (before meals)       atorvastatin (LIPITOR) 80 MG tablet Take 80 mg by mouth daily.        ALLERGIES  Cortisone, Morphine, Metformin, Metformin hcl, and Lisinopril  FAMILY HISTORY  Family History   Problem Relation Age of Onset    Heart Disease Mother    Bruce Hecarolina Heart Disease Father      SOCIAL HISTORY:  Social History     Tobacco Use    Smoking status: Former Smoker     Packs/day: 4.00     Years: 16.00     Pack years: 64.00     Types: Pipe     Quit date: 1975     Years since quittin.1    Smokeless tobacco: Never Used   Substance Use Topics    Alcohol use: Not Currently     Comment: occ nothing to deink in 1 yr     Drug use: Never     IMMUNIZATIONS:  Noncontributory    PHYSICAL EXAM  VITAL SIGNS:  Blood pressure 98/68, pulse 66, temperature 98 °F (36.7 °C), temperature source Oral, resp. rate 15, height 5' 9.5\" (1.765 m), weight 173 lb (78.5 kg), SpO2 96 %. Constitutional:  77 y.o. male alert, cooperative, nontoxic  HENT:  Atraumatic, mucous membranes moist  Eyes:   Conjunctiva clear, no icterus  Neck:  Supple, no JVD, no signs of injury  Cardiovascular:  Irregular, no rubs  Thorax & Lungs:  No accessory muscle usage, decreased basilar BS  Abdomen:  Soft, mildly distended, bowel sounds present, NT  Back:  No deformity  Genitalia:  Edema present  Rectal:  Deferred  Extremities:  No cyanosis, minimal edema  Skin:  Warm, dry  Neurologic:  Alert, no slurred speech, no focal deficits noted  Psychiatric:  Affect appropriate    DIAGNOSTIC RESULTS:  Labs resulted at the time of this note reviewed.   Labs Reviewed   CBC WITH AUTO DIFFERENTIAL - Abnormal; Notable for the following components:       Result Value    RDW 17.6 (*)     Platelets 437 (*)     Lymphocytes Absolute 0.5 (*)     All other components within normal limits    Narrative:     Performed at:  [facility] [address], [place], [address]   Phone [phone]   COMPREHENSIVE METABOLIC PANEL W/ REFLEX TO MG FOR LOW K - Abnormal; Notable for the following components:    Sodium 130 (*)     Potassium reflex Magnesium 5.6 (*)     Chloride 98 (*)     Glucose 261 (*)     Total Protein 6.0 (*)     Albumin 2.9 (*)     Albumin/Globulin Ratio 0.9 (*)     AST 50 (*)     All other components within normal limits    Narrative:     Performed at:  [facility] [address] [place], [place]   Phone [phone]   URINE RT REFLEX TO CULTURE - Abnormal; Notable for the following components:    Leukocyte Esterase, Urine SMALL (*)     All other components within normal limits    Narrative:     Performed at:  [facility] [address] [place], [place]   Phone [phone]   BRAIN NATRIURETIC PEPTIDE - Abnormal; Notable for the following components:    Pro-BNP 8,932 (*)     All other components within normal limits    Narrative:     Performed at:  [facility] [address] [place]   Phone [phone] - Abnormal; Notable for the following components:    WBC, UA 12 (*)     All other components within normal limits    Narrative:     Performed at:  [facility] [address] [place], [place]   Phone [phone]   CULTURE, URINE   LIPASE    Narrative:     Performed at:  [facility] [address] [place], [place]   Phone [phone]   PROTIME-INR    Narrative:     Performed at:  [facility] [address], [place]   Phone [phone]   APTT    Narrative:     Performed at:  [facility] [address], [place]   Phone [phone]   TROPONIN    Narrative:     Performed at:  [facility] [address]   Phone [phone]     EKG:  Read by me in the absence of a cardiologist shows: Underlying sinus rhythm with first-degree AV block, multiple PVCs, left axis, low voltages, poor R wave progression, nonspecific ST-T wave abnormality, more ectopy than prior EKG from 17 October 2020    RADIOLOGY:    Plain x-rays were viewed by me:   XR CHEST PORTABLE   Final Result   Cardiomegaly with no significant pulmonary edema. ED COURSE:    Medications administered:  Medications   furosemide (LASIX) injection 80 mg (80 mg Intravenous Given 3/19/21 0849)     PROCEDURES:  None    CRITICAL CARE:  None    CONSULTATIONS:  Hospitalist, Dr Marianna Graham notified the ED of plan for patient    MEDICAL DECISION MAKING: Maddy Paris is a 77 y.o. male who presented because of fluid overload. He was sent in by his cardiologist for IV diuresis. He has been stable while monitored in the ED. He has some electrolyte abnormalities. I notified the hospitalist for admission. FINAL IMPRESSION:    1. Hypervolemia, unspecified hypervolemia type      (Please note that I used voice recognition software to generate this note.   Occasionally words are mistranscribed despite my efforts to edit errors.)        Radha Arthur MD  03/19/21 2565

## 2021-03-19 NOTE — H&P
HOSPITALISTS HISTORY AND PHYSICAL    3/19/2021 11:36 AM    Patient Information:  Boo Barrett is a 77 y.o. male [id number]  PCP:  [facility] (Tel: None )    Chief complaint:    Chief Complaint   Patient presents with    Edema     pt states he is in from home per Dr. Bree Menon to \"get fluid off\" pelvic area       History of Present Illness:  Med Walters is a 77 y.o.  male history of type II DM, hypertension, hyperlipidemia, cirrhosis, CAD, ischemic cardiomyopathy s/p AICD in 2015, lymphoma, NSVT, HOSSEIN, who presented to ED as per Dr. Gustavo Hobbs recommendation to get admitted. Patient has history of ischemic cardiomyopathy with LVEF less than 20%. Was seen by Dr. Bree Menon on 3/16. He recommended patient come to ED to get admitted for diuresis and possible paracentesis. Patient is accompanied by his wife at bedside today. Reports abdominal distention, scrotal edema and dyspnea on exertion. Patient denies chest pain, cough, pain abdomen, nausea or vomitings. In ED, patient was noted to have labile BPs with SBP's in the range  which is his baseline from underlying cirrhosis. Labs notable for serum sodium level 130, potassium 5.6.  proBNP level 8900. Urinalysis suggestive of UTI. Chest x-ray findings consistent with cardiomegaly. Patient received Lasix 80 mg IV in ED. Patient is being admitted with cardiology and nephrology consultations. History obtained from patient, patient's wife at bedside, nephrologist and ED provider. REVIEW OF SYSTEMS:   Constitutional: Negative for fever,chills or night sweats  ENT: Negative for rhinorrhea, epistaxis, hoarseness, sore throat.   Respiratory: Negative for wheezing, positive for dyspnea on exertion  Cardiovascular: Negative for chest pain, palpitations   Gastrointestinal: Negative for nausea, vomiting, diarrhea, positive for abdominal distention  Genitourinary: Negative for polyuria, dysuria, stool for scrotal edema  Hematologic/Lymphatic: Negative for bleeding tendency, easy bruising  Musculoskeletal: Negative for myalgias and arthralgias  Neurologic: Negative for confusion,dysarthria. Skin: Negative for itching,rash  Psychiatric: Negative for depression,anxiety, agitation. Endocrine: Negative for polydipsia,polyuria,heat /cold intolerance. Past Medical History:   has a past medical history of Cardiomyopathy ([facility] [address] Cirrhosis ([facility] [address] Diabetes mellitus ([facility] [address] History of abdominal paracentesis, Hyperlipidemia, Hypertension, Lymphoma ([facility] [address] NSVT (nonsustained ventricular tachycardia) ([facility] [address] Sleep apnea, and Syncope. Past Surgical History:   has a past surgical history that includes Coronary angioplasty with stent; Cholecystectomy; laminectomy; Tonsillectomy and adenoidectomy; Cardiac defibrillator placement (6/23/15); toenail excision (07/21/2017); and Upper gastrointestinal endoscopy (N/A, 8/4/2020). Medications:  No current facility-administered medications on file prior to encounter.       Current Outpatient Medications on File Prior to Encounter   Medication Sig Dispense Refill    allopurinol (ZYLOPRIM) 300 MG tablet TAKE ONE-HALF TABLET BY MOUTH ONCE DAILY FOR GOUT      traZODone (DESYREL) 50 MG tablet TAKE ONE-HALF TABLET BY MOUTH BEDTIME      vitamin B-12 (CYANOCOBALAMIN) 1000 MCG tablet Take 1,000 mcg by mouth daily      Multiple Vitamins-Minerals (ONE-A-DAY MENS 50+ PO) Take 1 tablet by mouth daily      spironolactone (ALDACTONE) 25 MG tablet Take 0.5 tablets by mouth daily 15 tablet 1    clopidogrel (PLAVIX) 75 MG tablet Take 1 tablet by mouth daily 90 tablet 1    carvedilol (COREG) 3.125 MG tablet Take 1 tablet by mouth 2 times daily 180 tablet 3    midodrine (PROAMATINE) 5 MG tablet Take 1 tablet by mouth 3 times daily (with meals) 90 tablet 3    tamsulosin (FLOMAX) 0.4 MG capsule Take 0.4 mg by mouth nightly      furosemide (LASIX) 40 MG tablet Take 40 mg by mouth 2 times daily       MAGNESIUM OXIDE 400 PO Take 400 mg by mouth 2 times daily       Calcium Carb-Cholecalciferol (CALCIUM 1000 + D PO) Take 1,000 mg by mouth 2 times daily      famotidine (PEPCID) 20 MG tablet Take 20 mg by mouth 2 times daily       aspirin 81 MG tablet Take 81 mg by mouth daily      vitamin D (CHOLECALCIFEROL) 1000 UNITS TABS tablet Take 1,000 Units by mouth daily      loperamide (IMODIUM) 2 MG capsule Take 2 mg by mouth 4 times daily as needed for Diarrhea      nitroGLYCERIN (NITROSTAT) 0.4 MG SL tablet Place 0.4 mg under the tongue every 5 minutes as needed for Chest pain up to max of 3 total doses. If no relief after 1 dose, call 911.  glipiZIDE (GLUCOTROL) 5 MG tablet Take 7.5 mg by mouth 2 times daily (before meals)       atorvastatin (LIPITOR) 80 MG tablet Take 80 mg by mouth daily. Allergies: Allergies   Allergen Reactions    Cortisone Other (See Comments)     Other reaction(s): Other (See Comments)  Hot flashes  Other reaction(s): Other (See Comments)  Hot flashes    Morphine Other (See Comments)     Agitation/violence  Tolerates Percocet (oxycodone/APAP)    Metformin     Metformin Hcl     Lisinopril Other (See Comments)     cough        Social History:  Patient Lives at home with family. reports that he quit smoking about 46 years ago. His smoking use included pipe. He has a 64.00 pack-year smoking history. He has never used smokeless tobacco. He reports previous alcohol use. He reports that he does not use drugs. Family History:  family history includes Heart Disease in his father and mother. Physical Exam:  /67   Pulse 68   Temp 98 °F (36.7 °C) (Oral)   Resp 21   Ht 5' 9.5\" (1.765 m)   Wt 173 lb (78.5 kg)   SpO2 95%   BMI 25.18 kg/m²     General appearance:  Appears comfortable. Well nourished  Eyes: Sclera clear, pupils equal  ENT: Moist mucus membranes, no thrush.  Trachea midline. Cardiovascular: Regular rhythm, normal S1, S2. No murmur, gallop, rub. No edema in lower extremities  Respiratory: Clear to auscultation bilaterally, no wheeze, good inspiratory effort  Gastrointestinal: Abdomen soft, non-tender, distended, normal bowel sounds  Musculoskeletal: No cyanosis in digits, neck supple  : Massive scrotal edema noted  Neurology: Cranial nerves grossly intact. Alert and oriented in time, place and person. No speech or motor deficits  Psychiatry: Appropriate affect. Not agitated  Skin: Warm, dry, normal turgor, no rash  Brisk capillary refill, peripheral pulses palpable     Labs:  CBC:   Lab Results   Component Value Date    WBC 5.5 03/19/2021    RBC 4.23 03/19/2021    HGB 13.6 03/19/2021    HCT 41.6 03/19/2021    MCV 98.5 03/19/2021    MCH 32.1 03/19/2021    MCHC 32.6 03/19/2021    RDW 17.6 03/19/2021     03/19/2021    MPV 10.0 03/19/2021     BMP:    Lab Results   Component Value Date     03/19/2021    K 5.6 03/19/2021    CL 98 03/19/2021    CO2 22 03/19/2021    BUN 14 03/19/2021    CREATININE 1.0 03/19/2021    CALCIUM 8.6 03/19/2021    GFRAA >60 03/19/2021    GFRAA >60 02/13/2010    LABGLOM >60 03/19/2021    GLUCOSE 261 03/19/2021     XR CHEST PORTABLE   Final Result   Cardiomegaly with no significant pulmonary edema. Chest Xray: Cardiomegaly, no CP process  EKG: Normal sinus rhythm, no acute ST-T wave changes  I visualized CXR images and EKG strips. Discussed case  with ED provider. Problem List  Active Problems:    * No active hospital problems. *  Resolved Problems:    * No resolved hospital problems. *        Assessment/Plan:     Ischemic cardiomyopathy status post AICD placement:  Clinically has ascites and scrotal edema. Underlying cirrhosis likely contributing as well. Initial troponin level negative. No acute ST-T wave changes on EKG. Chest x-ray shows cardiomegaly. Patient received Lasix 80 mg IV x1 in ED. Labile BPs.   Ordered Lasix 40 mg IV twice daily starting tomorrow. Requested for cardiology and nephrology consultations. On aspirin, statin, Coreg and Aldactone. Liver cirrhosis:  Likely secondary to alcohol abuse. Clinically has ascites. Multiple paracentesis in the past.  Monitor volume status with IV diuresis. No improvement, might benefit from paracentesis as per Dr. Yoana Carbajal recommendations. B-cell non-Hodgkin's lymphoma: In remission    Type II DM: Blood sugars elevated. Placed on Lantus 10 units subcu nightly, medium dose SSI to cover. Low-carb diet. Chronic hypotension. BPs labile. Resumed on midodrine 5 mg p.o. 3 times daily. On Coreg and Aldactone for cardiomyopathy, with holding parameters. Hyperlipidemia: Resumed on statin    UA suggestive of UTI: Placed on IV ceftriaxone. Urine C/S pending. DVT prophylaxis Lovenox subcu daily  Code status DNR CCA  Diet low carb, general diet  IV access peripheral IV  Salmon Catheterno    Admit as inpatient. I anticipate hospitalization spanning more than two midnights for investigation and treatment of the above medically necessary diagnoses. Please note that some part of this chart was generated using Dragon dictation software. Although every effort was made to ensure the accuracy of this automated transcription, some errors in transcription may have occurred inadvertently. If you may need any clarification, please do not hesitate to contact me through [facility].        Amandeep Murphy MD    3/19/2021 11:36 AM

## 2021-03-19 NOTE — PROGRESS NOTES
Assumed care for pt at this time. Pt states he would like to await spouse to answer admission questions. Vitals obtained. Pt alert and oriented x 4.

## 2021-03-19 NOTE — ED NOTES
Bed: 20  Expected date:   Expected time:   Means of arrival:   Comments:  Farzana Gardner RN  03/19/21 0857

## 2021-03-19 NOTE — CONSULTS
Nephrology Consult Note  [phone]  [phone]   ://[facility].        Reason for Consult: Hyperkalemia, Cirrhosis and fluid overload    HISTORY OF PRESENT ILLNESS:                The patient is a 77 y.o.male with significant past medical history of Cirrhosis of liver and h/o multiple paracenteses, last paracentesis was in Feb 2021, ETOH abuse, quit in 2020,  DM II, CAD/s/p PTCA/AICD 2015, Hypertension, Hyperlipidemia, Diffuse large B cell Lymphoma in remission, HOSSEIN on CPAP presented to ER with worsening abdominal and scrotal swelling. He was seen by Dr. Erick Cagle in the office and plan made to get to ER on 3/19 for IV diuresis and then paracentesis if no improvement. His last echocardiogram was in Dec 2020 which showed a LVEF of < 20%, grade 1 DD, severely dilated RA, moderate TR. He was last hospitalized in Oct 2020 with complaints of chest pain and palpitations. He was found to have VT/Wide complex tachycardia and underwent a DCCV, required vasopressor support. Hospital course complicated by YENNIFER, seen by our group in [facility], creatinine ranged from 2.4 on admission, improved to 1.1 on discharge. We are now consulted for further management  His creatinine at this time is 1  He is being diuresed with IV furosemide   Of note, takes furosemide, spironolactone, tamsulosin and midodrine at home. Pt seen and examined  Wife at the bed-side  Denies NSAID usage recently but has had used for several years  No urinary issues  Denies abdominal pain or nausea/emesis, has diarrhea alternating with constipation.       Past Medical History:        Diagnosis Date    Cardiomyopathy ([address]     Cirrhosis ([facility]     ETOH abuse    Diabetes mellitus ([facility]     History of abdominal paracentesis 03/2020    Hyperlipidemia     Hypertension     Lymphoma ([facility]     currently in remission    NSVT (nonsustained ventricular tachycardia) (HCC)     Sleep apnea     unable to tolerate CPAP    Syncope        Past Surgical History: Procedure Laterality Date    CARDIAC DEFIBRILLATOR PLACEMENT  6/23/15    single chamber AICD, EPS inducible VT    CHOLECYSTECTOMY      CORONARY ANGIOPLASTY WITH STENT PLACEMENT      LAMINECTOMY      TOENAIL EXCISION  07/21/2017    TONSILLECTOMY AND ADENOIDECTOMY      UPPER GASTROINTESTINAL ENDOSCOPY N/A 8/4/2020    EGD performed by Darren Cloud MD at [address] [place] [address]     No current facility-administered medications on file prior to encounter. Current Outpatient Medications on File Prior to Encounter   Medication Sig Dispense Refill    allopurinol (ZYLOPRIM) 300 MG tablet TAKE ONE-HALF TABLET BY MOUTH ONCE DAILY FOR GOUT      vitamin B-12 (CYANOCOBALAMIN) 1000 MCG tablet Take 1,000 mcg by mouth daily      Multiple Vitamins-Minerals (ONE-A-DAY MENS 50+ PO) Take 1 tablet by mouth daily      spironolactone (ALDACTONE) 25 MG tablet Take 0.5 tablets by mouth daily 15 tablet 1    clopidogrel (PLAVIX) 75 MG tablet Take 1 tablet by mouth daily 90 tablet 1    carvedilol (COREG) 3.125 MG tablet Take 1 tablet by mouth 2 times daily 180 tablet 3    midodrine (PROAMATINE) 5 MG tablet Take 1 tablet by mouth 3 times daily (with meals) 90 tablet 3    tamsulosin (FLOMAX) 0.4 MG capsule Take 0.4 mg by mouth nightly      furosemide (LASIX) 40 MG tablet Take 40 mg by mouth 2 times daily       MAGNESIUM OXIDE 400 PO Take 400 mg by mouth 2 times daily       Calcium Carb-Cholecalciferol (CALCIUM 1000 + D PO) Take 1,000 mg by mouth 2 times daily      famotidine (PEPCID) 20 MG tablet Take 20 mg by mouth 2 times daily       aspirin 81 MG tablet Take 81 mg by mouth daily      vitamin D (CHOLECALCIFEROL) 1000 UNITS TABS tablet Take 1,000 Units by mouth daily      glipiZIDE (GLUCOTROL) 5 MG tablet Take 7.5 mg by mouth 2 times daily (before meals)       atorvastatin (LIPITOR) 80 MG tablet Take 80 mg by mouth daily.       traZODone (DESYREL) 50 MG tablet TAKE ONE-HALF TABLET BY MOUTH BEDTIME      loperamide (IMODIUM) 2 MG capsule Take 2 mg by mouth 4 times daily as needed for Diarrhea      nitroGLYCERIN (NITROSTAT) 0.4 MG SL tablet Place 0.4 mg under the tongue every 5 minutes as needed for Chest pain up to max of 3 total doses. If no relief after 1 dose, call 911.          Allergies:  Cortisone, Morphine, Metformin, Metformin hcl, and Lisinopril    Social History:    Social History     Socioeconomic History    Marital status:      Spouse name: Not on file    Number of children: Not on file    Years of education: Not on file    Highest education level: Not on file   Occupational History    Not on file   Social Needs    Financial resource strain: Not on file    Food insecurity     Worry: Not on file     Inability: Not on file    Transportation needs     Medical: Not on file     Non-medical: Not on file   Tobacco Use    Smoking status: Former Smoker     Packs/day: 4.00     Years: 16.00     Pack years: 64.00     Types: Pipe     Quit date: 1975     Years since quittin.1    Smokeless tobacco: Never Used   Substance and Sexual Activity    Alcohol use: Not Currently     Comment: occ nothing to deink in 2020    Drug use: Never    Sexual activity: Not Currently     Partners: Female   Lifestyle    Physical activity     Days per week: Not on file     Minutes per session: Not on file    Stress: Not on file   Relationships    Social connections     Talks on phone: Not on file     Gets together: Not on file     Attends Buddhist service: Not on file     Active member of club or organization: Not on file     Attends meetings of clubs or organizations: Not on file     Relationship status: Not on file    Intimate partner violence     Fear of current or ex partner: Not on file     Emotionally abused: Not on file     Physically abused: Not on file     Forced sexual activity: Not on file   Other Topics Concern    Not on file   Social History Narrative    Not on file       Family History: Problem Relation Age of Onset    Heart Disease Mother     Heart Disease Father        Review of Systems   Constitutional: Positive for appetite change and fatigue. Negative for activity change, chills, diaphoresis and fever. HENT: Negative for ear discharge, ear pain, rhinorrhea, sinus pain and sore throat. Eyes: Negative for pain and redness. Respiratory: Negative for cough, shortness of breath and wheezing. Cardiovascular: Negative for chest pain, palpitations and leg swelling. Gastrointestinal: Positive for abdominal distention, constipation and diarrhea. Negative for abdominal pain, nausea and vomiting. Endocrine: Negative for polydipsia, polyphagia and polyuria. Genitourinary: Negative for dysuria, hematuria and urgency. Musculoskeletal: Positive for back pain. Negative for arthralgias and myalgias. Skin: Negative for pallor and rash. Neurological: Negative for dizziness, tremors, seizures and headaches. Psychiatric/Behavioral: Negative for confusion and hallucinations. PHYSICAL EXAM:      Vitals:    /70   Pulse 69   Temp 97.6 °F (36.4 °C) (Oral)   Resp 20   Ht 5' 9.5\" (1.765 m)   Wt 171 lb 3.2 oz (77.7 kg)   SpO2 97%   BMI 24.92 kg/m²   I/O last 3 completed shifts:  In: -   Out: 240 [Urine:240]  No intake/output data recorded. Physical Exam:  General : AAOx3, not in pain or respiratory distress, resting in bed  HEENT : normocephalic, atraumatic, mucosa moist, no palor or icterus  CVS: S1 S2 normal, regular rhythm, no murmurs or rubs. Lungs: Clear, no wheezing or crackles. Abd: abdomen distended, bowel sounds+, non-tender  Ext: No edema, no cyanosis  Skin: Warm. No rashes appreciated. :  scrotal swelling  Neuro: Alert and oriented x 3, nonfocal.  Joints: No erythema noted over joints.         DATA:    CBC with Differential:    Lab Results   Component Value Date    WBC 5.5 03/19/2021    RBC 4.23 03/19/2021    HGB 13.6 03/19/2021    HCT 41.6 03/19/2021    PLT 118 03/19/2021    MCV 98.5 03/19/2021    MCH 32.1 03/19/2021    MCHC 32.6 03/19/2021    RDW 17.6 03/19/2021    SEGSPCT 78.5 02/12/2010    LYMPHOPCT 8.8 03/19/2021    MONOPCT 8.1 03/19/2021    EOSPCT 1.3 02/12/2010    BASOPCT 0.8 03/19/2021    MONOSABS 0.4 03/19/2021    LYMPHSABS 0.5 03/19/2021    EOSABS 0.1 03/19/2021    BASOSABS 0.0 03/19/2021    DIFFTYPE Auto 02/12/2010     BMP:    Lab Results   Component Value Date     03/19/2021    K 5.6 03/19/2021    CL 98 03/19/2021    CO2 22 03/19/2021    BUN 14 03/19/2021    LABALBU 2.9 03/19/2021    CREATININE 1.0 03/19/2021    CALCIUM 8.6 03/19/2021    GFRAA >60 03/19/2021    GFRAA >60 02/13/2010    LABGLOM >60 03/19/2021    GLUCOSE 261 03/19/2021     Ionized Calcium:  No results found for: IONCA  Magnesium:    Lab Results   Component Value Date    MG 1.80 10/22/2020     Phosphorus:    Lab Results   Component Value Date    PHOS 2.7 10/22/2020     Last 3 Troponin:    Lab Results   Component Value Date    TROPONINI <0.01 03/19/2021    TROPONINI 0.38 10/19/2020    TROPONINI 0.42 10/19/2020       ASSESSMENT/PLAN:       Hyperkalemia   On a hemolysed sample  Can repeat K    CHF   Continue IV furosemide and spironolactone    Cirrhosis of liver, alcoholic  With portal HTN and h/o periodic paracentesis, last on 3/9/2021  Now with worsening abdominal distension and scrotal swelling  On diuretics but would not expect good response  May need paracentesis  Ok to continue diuretics for now      Thrombocytopenia  Better than base-line  Secondary to cirrhosis    CKD IIIa  Risk factors include DM II, Hypertension, prior AKIs, age  Creatinine currently at base-line  UA with no protein or blood      Thank you for allowing me to participate in the care of this patient. I will continue to follow along. Please call with questions.     Curtis Estrella MD.  Office Phone : [phone]  3/19/2021

## 2021-03-19 NOTE — ED NOTES
Bedside report given to RN; pt transported to floor by  tech, all belongings with pt.       Adiel Ravi RN  03/19/21 [id number]

## 2021-03-19 NOTE — ED NOTES
Patient ambulated to bathroom. Not experiencing diarrhea at this time.       Scott Prakash RN  03/19/21 [id number]

## 2021-03-20 NOTE — PROGRESS NOTES
Hospitalist Progress Note      [place] [facility]    Date of Admission: 3/19/2021    Chief Complaint: Abdominal distention and scrotal edema. Hospital Course: Mike Mckeon is a 77 y.o.  male history of type II DM, hypertension, hyperlipidemia, cirrhosis, CAD, ischemic cardiomyopathy s/p AICD in 2015, lymphoma, NSVT, HOSSEIN, who presented to ED as per Dr. Katerin Javier recommendation to get admitted. In ED, patient was noted to have labile BPs with SBP's in the range  which is his baseline from underlying cirrhosis. Labs notable for serum sodium level 130, potassium 5.6.  proBNP level 8900. Urinalysis suggestive of UTI. Chest x-ray findings consistent with cardiomegaly. Patient received Lasix 80 mg IV in ED. Patient is being admitted with cardiology, GI and nephrology consultations.      Subjective: Patient noted to be hypotensive overnight with systolic blood pressures in 70s. Seen by Dr. Pradeep Hebert today morning and plan is to start him on dobutamine drip. Meds adjusted cardiology to prevent further hypotension.     Medications:  Reviewed    Infusion Medications    DOBUTamine      dextrose       Scheduled Medications    allopurinol  100 mg Oral Daily    aspirin  81 mg Oral Daily    atorvastatin  80 mg Oral Daily    clopidogrel  75 mg Oral Daily    famotidine  20 mg Oral BID    midodrine  5 mg Oral TID WC    spironolactone  12.5 mg Oral Daily    tamsulosin  0.4 mg Oral Nightly    sodium chloride flush  10 mL Intravenous 2 times per day    enoxaparin  40 mg Subcutaneous Daily    furosemide  40 mg Intravenous BID    insulin glargine  10 Units Subcutaneous Nightly    insulin lispro  0-12 Units Subcutaneous TID WC    insulin lispro  0-6 Units Subcutaneous Nightly    cefTRIAXone (ROCEPHIN) IV  1,000 mg Intravenous Q24H     PRN Meds: traZODone, sodium chloride flush, promethazine **OR** ondansetron, polyethylene glycol, acetaminophen **OR** acetaminophen, nitroGLYCERIN, glucose, dextrose, glucagon (rDNA), dextrose      Intake/Output Summary (Last 24 hours) at 3/20/2021 0816  Last data filed at 3/20/2021 0010  Gross per 24 hour   Intake --   Output 440 ml   Net -440 ml       Physical Exam Performed:    BP (!) 78/43   Pulse 56   Temp 97.3 °F (36.3 °C) (Oral)   Resp 16   Ht 5' 9.5\" (1.765 m)   Wt 171 lb 12.8 oz (77.9 kg)   SpO2 98%   BMI 25.01 kg/m²     General appearance: Elderly  male, cachectic, not in distress  Eyes: Sclera clear, pupils equal  ENT: Moist mucus membranes, no thrush. Trachea midline. Cardiovascular: Regular rhythm, normal S1, S2. No murmur, gallop, rub. No edema in lower extremities  Respiratory: Clear to auscultation bilaterally, no wheeze, good inspiratory effort  Gastrointestinal: Abdomen soft, non-tender, distended, normal bowel sounds  Musculoskeletal: No cyanosis in digits, neck supple  : Massive scrotal edema noted  Neurology: Cranial nerves grossly intact. Alert and oriented in time, place and person. No speech or motor deficits  Psychiatry: Appropriate affect.  Not agitated  Skin: Warm, dry, normal turgor, no rash  Brisk capillary refill, peripheral pulses palpable          Labs:   Recent Labs     03/19/21  0825 03/20/21  0420   WBC 5.5 5.9   HGB 13.6 12.5*   HCT 41.6 37.3*   * 115*     Recent Labs     03/19/21  0825 03/19/21  1738 03/20/21  0420   * 135* 134*   K 5.6* 3.7 4.0   CL 98* 100 100   CO2 22 27 27   BUN 14 14 15   CREATININE 1.0 1.0 1.1   CALCIUM 8.6 8.5 8.4     Recent Labs     03/19/21  0825 03/20/21  0420   AST 50* 35   ALT 20 17   BILIDIR  --  <0.2   BILITOT 1.0 0.6   ALKPHOS 127 123     Recent Labs     03/19/21  0825   INR 1.09     Recent Labs     03/19/21  0825   TROPONINI <0.01       Urinalysis:      Lab Results   Component Value Date    NITRU Negative 03/19/2021    WBCUA 12 03/19/2021    BACTERIA 3+ 10/18/2020    RBCUA 1 03/19/2021    BLOODU Negative 03/19/2021    SPECGRAV 1.007 03/19/2021    GLUCOSEU Negative 03/19/2021    Kathleen Reed 994 NEGATIVE 02/12/2010       Radiology:  XR CHEST PORTABLE   Final Result   Cardiomegaly with no significant pulmonary edema. Assessment/Plan:    Ischemic cardiomyopathy status post AICD placement:  Clinically has ascites and scrotal edema. Underlying cirrhosis likely contributing as well. Limited 2D echo showed EF of 10%. Initial troponin level negative. No acute ST-T wave changes on EKG. Chest x-ray shows cardiomegaly. Patient received Lasix 80 mg IV x1 in ED.   currently has labile BPs. On Lasix 40 mg IV twice daily. Requested for cardiology and nephrology consultation. As per Dr. Aidee Penny, on 3/20, patient to be started on dobutamine drip which can be administered through peripheral IV, as per discussion with nursing staff. Taken off of Coreg by cardiology due to labile Bps.     Liver cirrhosis:  Likely secondary to alcohol abuse. Clinically has ascites. Multiple paracentesis in the past.  Monitor volume status with IV diuresis. No improvement, might benefit from paracentesis as per Dr. Lars Littlejohn recommendations.     B-cell non-Hodgkin's lymphoma: In remission     Type II DM: Blood sugars elevated. Placed on Lantus 10 units subcu nightly, medium dose SSI to cover. Low-carb diet.     Chronic hypotension. BPs labile. Resumed on midodrine 5 mg p.o. 3 times daily. On Coreg and Aldactone for cardiomyopathy, with holding parameters.     Hyperlipidemia: Resumed on statin     UA suggestive of UTI: Placed on IV ceftriaxone.   Urine C/S pending.      IV access: peripheral IV  Salmon Catheterno  DVT Prophylaxis: Lovenox subcu daily  Diet: DIET LOW SODIUM 2 GM; Carb Control: 3 carb choices (45 gms)/meal; 1500 ml  Code Status: DNR-CCA  PT/OT Eval Status: Pending  Dispo -pending acute issues, on dobutamine drip    Tara Cadena MD

## 2021-03-20 NOTE — PROGRESS NOTES
[address]   Progress Note  Cardiology      [facility]   Admission date:  3/19/2021  CC-f/up for acute right heart failure  Subjective:  He feels fine.  Complains of very uncomfortable crotal edema    Objective:  Medications/Labs all Reviewed     allopurinol  100 mg Oral Daily    aspirin  81 mg Oral Daily    atorvastatin  80 mg Oral Daily    carvedilol  3.125 mg Oral BID WC    clopidogrel  75 mg Oral Daily    famotidine  20 mg Oral BID    midodrine  5 mg Oral TID WC    spironolactone  12.5 mg Oral Daily    tamsulosin  0.4 mg Oral Nightly    sodium chloride flush  10 mL Intravenous 2 times per day    enoxaparin  40 mg Subcutaneous Daily    furosemide  40 mg Intravenous BID    insulin glargine  10 Units Subcutaneous Nightly    insulin lispro  0-12 Units Subcutaneous TID WC    insulin lispro  0-6 Units Subcutaneous Nightly    cefTRIAXone (ROCEPHIN) IV  1,000 mg Intravenous Q24H       BMP:   Lab Results   Component Value Date     03/20/2021    K 4.0 03/20/2021    K 3.7 03/19/2021     03/20/2021    CO2 27 03/20/2021    BUN 15 03/20/2021    CREATININE 1.1 03/20/2021    MG 1.70 03/20/2021     CBC:    Lab Results   Component Value Date    WBC 5.9 03/20/2021    RBC 3.80 03/20/2021    HGB 12.5 03/20/2021    HCT 37.3 03/20/2021    MCV 98.2 03/20/2021    RDW 17.3 03/20/2021     03/20/2021      PT/INR:    Lab Results   Component Value Date    INR 1.09 03/19/2021    PROTIME 12.7 03/19/2021     Cardiac Enzymes:    Lab Results   Component Value Date    CKTOTAL 65 02/12/2010    CKMB 0.78 01/15/2010     Lab Results   Component Value Date    TROPONINI <0.01 03/19/2021    TROPONINI 0.38 (H) 10/19/2020    TROPONINI 0.42 (H) 10/19/2020     BNP:  No results found for: BNP  FASTING LIPID PANEL:    Lab Results   Component Value Date    CHOL 233 01/15/2010    HDL 35 01/15/2010    TRIG 255 01/15/2010       Physical Examination:    BP (!) 82/48   Pulse 70   Temp 97.8 °F (36.6 °C) (Oral) Resp 18   Ht 5' 9.5\" (1.765 m)   Wt 171 lb 3.2 oz (77.7 kg)   SpO2 95%   BMI 24.92 kg/m²      Respiratory:  · Resp Assessment: Normal respiratory effort  · Resp Auscultation: Clear to auscultation bilaterally   Cardiovascular:  · Auscultation: regular rate and rhythm, normal S1S2, no murmur, rub or gallop  · Palpation:  Nl PMI  · JVP:  normal  · Extremities: marked scrotal edema  Abdomen:  · Soft, non-tender  · Normal bowel sounds  Extremities:  ·  No Cyanosis or Clubbing  Neurological/Psychiatric:  · Oriented to time, place, and person  · Non-anxious  Skin Warm and dry    Assessment:    Active Problems:    Nonischemic cardiomyopathy (HCC)  Plan:EF 10%    Chronic systolic heart failure with extremely uncomfortable profound scrotal edema      Plan:  1.  Brought into hospital to try to alleviate scrotal edema, will add dobutamine to see if that allows better diuresis  I have spent  35 minutes of face to face time with the patient with more than 50%  spent  counseling and coordinating care for Sadia Abdalla

## 2021-03-20 NOTE — PROGRESS NOTES
Assessment complete. VSS no SOB or any distress noted. Low BP midodrine given. Coreg held d/t low BP and HR.  no insulin per sliding scale. Lantus given, trazedone for sleep. POC discussed and agreed upon. Call light within reach, pt encouraged to call if any needs arise. No further requests at this time. Will continue to monitor.

## 2021-03-20 NOTE — PROGRESS NOTES
Nephrology Progress Note  [phone]  [phone]   [email or web address]    Patient:  Stacy Sterling   :     Brief HPI    The patient is a 77 y.o.male with significant past medical history of Cirrhosis of liver and h/o multiple paracenteses, last paracentesis was in 2021, ETOH abuse, quit in ,  DM II, CAD/s/p PTCA/AICD , Hypertension, Hyperlipidemia, Diffuse large B cell Lymphoma in remission, HOSSEIN on CPAP presented to ER with worsening abdominal and scrotal swelling. He was seen by Dr. Erick Cagle in the office and plan made to get to ER on 3/19 for IV diuresis and then paracentesis if no improvement. His last echocardiogram was in Dec 2020 which showed a LVEF of < 20%, grade 1 DD, severely dilated RA, moderate TR.      He was last hospitalized in Oct 2020 with complaints of chest pain and palpitations. He was found to have VT/Wide complex tachycardia and underwent a DCCV, required vasopressor support. Hospital course complicated by YENNIFER, seen by our group in [facility], creatinine ranged from 2.4 on admission, improved to 1.1 on discharge. We are now consulted for further management  His creatinine at this time is 1  He is being diuresed with IV furosemide   Of note, takes furosemide, spironolactone, tamsulosin and midodrine at home.        Subjective/Interval history    Pt seen and examined  Persistent scrotal swelling per pt  Constipation+  No nausea/emesis/abdominal pain    Review of Systems   No chest pains or sob  No fevers/chills    SHx:  No visitors at the bed-side    Meds:  Scheduled Meds:   allopurinol  100 mg Oral Daily    aspirin  81 mg Oral Daily    atorvastatin  80 mg Oral Daily    clopidogrel  75 mg Oral Daily    famotidine  20 mg Oral BID    midodrine  5 mg Oral TID WC    spironolactone  12.5 mg Oral Daily    tamsulosin  0.4 mg Oral Nightly    sodium chloride flush  10 mL Intravenous 2 times per day    enoxaparin  40 mg Subcutaneous Daily    furosemide  40 mg Intravenous BID  insulin glargine  10 Units Subcutaneous Nightly    insulin lispro  0-12 Units Subcutaneous TID     insulin lispro  0-6 Units Subcutaneous Nightly    cefTRIAXone (ROCEPHIN) IV  1,000 mg Intravenous Q24H     Continuous Infusions:   DOBUTamine 5 mcg/kg/min (03/20/21 1052)    dextrose       PRN Meds:.magnesium sulfate, traZODone, sodium chloride flush, promethazine **OR** ondansetron, polyethylene glycol, acetaminophen **OR** acetaminophen, nitroGLYCERIN, glucose, dextrose, glucagon (rDNA), dextrose      Vitals:  BP (!) 100/47 Comment: RN notified  Pulse 79   Temp 96.5 °F (35.8 °C) (Oral)   Resp 18   Ht 5' 9.5\" (1.765 m)   Wt 171 lb 12.8 oz (77.9 kg)   SpO2 94%   BMI 25.01 kg/m²       Physical Exam  General : AAOx3, not in pain or respiratory distress, resting in bed  HEENT : normocephalic, atraumatic, mucosa moist, no palor or icterus  CVS: S1 S2 normal, regular rhythm, no murmurs or rubs. Lungs: Clear, no wheezing or crackles. Abd: abdomen distended, bowel sounds+, non-tender  Ext: No edema, no cyanosis  Skin: Warm. No rashes appreciated.   :  scrotal swelling      Labs:  CBC with Differential:    Lab Results   Component Value Date    WBC 5.9 03/20/2021    RBC 3.80 03/20/2021    HGB 12.5 03/20/2021    HCT 37.3 03/20/2021     03/20/2021    MCV 98.2 03/20/2021    MCH 32.9 03/20/2021    MCHC 33.5 03/20/2021    RDW 17.3 03/20/2021    SEGSPCT 78.5 02/12/2010    LYMPHOPCT 10.9 03/20/2021    MONOPCT 11.9 03/20/2021    EOSPCT 1.3 02/12/2010    BASOPCT 0.7 03/20/2021    MONOSABS 0.7 03/20/2021    LYMPHSABS 0.6 03/20/2021    EOSABS 0.2 03/20/2021    BASOSABS 0.0 03/20/2021    DIFFTYPE Auto 02/12/2010     BMP:    Lab Results   Component Value Date     03/20/2021    K 4.0 03/20/2021    K 3.7 03/19/2021     03/20/2021    CO2 27 03/20/2021    BUN 15 03/20/2021    LABALBU 2.8 03/20/2021    CREATININE 1.1 03/20/2021    CALCIUM 8.4 03/20/2021    GFRAA >60 03/20/2021    GFRAA >60 02/13/2010 LABGLOM >60 03/20/2021    GLUCOSE 126 03/20/2021     Ionized Calcium:  No results found for: IONCA  Magnesium:    Lab Results   Component Value Date    MG 1.70 03/20/2021     Phosphorus:    Lab Results   Component Value Date    PHOS 2.7 10/22/2020     Last 3 Troponin:    Lab Results   Component Value Date    TROPONINI <0.01 03/19/2021    TROPONINI 0.38 10/19/2020    TROPONINI 0.42 10/19/2020     U/A:    Lab Results   Component Value Date    COLORU YELLOW 03/19/2021    PROTEINU Negative 03/19/2021    PHUR 7.5 03/19/2021    WBCUA 12 03/19/2021    RBCUA 1 03/19/2021    BACTERIA 3+ 10/18/2020    CLARITYU Clear 03/19/2021    SPECGRAV 1.007 03/19/2021    LEUKOCYTESUR SMALL 03/19/2021    UROBILINOGEN 1.0 03/19/2021    BILIRUBINUR Negative 03/19/2021    BILIRUBINUR NEGATIVE 02/12/2010    BLOODU Negative 03/19/2021    GLUCOSEU Negative 03/19/2021    GLUCOSEU NEGATIVE 02/12/2010    AMORPHOUS 3+ 10/18/2020       Assessment/Plan:    Hyperkalemia   On a hemolysed sample  Repeat k normal     CHF   Continue IV furosemide and spironolactone  On dobutamine gtt     Cirrhosis of liver, alcoholic  With portal HTN and h/o periodic paracentesis, last on 3/9/2021  Now with worsening abdominal distension and scrotal swelling  On diuretics but would not expect good response  May need paracentesis, possibly on Monday  Ok to continue diuretics for now     Hypomagnesemia  Replete po  May help with constipation    Hyponatremia mild  Secondary to cirrhosis/diuretics     Thrombocytopenia  Better than base-line  Secondary to cirrhosis     CKD IIIa  Risk factors include DM II, Hypertension, prior AKIs, age  Creatinine currently at base-line  UA with no protein or blood      Saul Tran MD.  3/20/2021  Office Phone : [phone]    Thank you for allowing us to participate in the care of this pt. I willcontinue to follow along. Please call with questions or concerns.

## 2021-03-20 NOTE — CONSULTS
Gastroenterology Consult Note                          Patient: Karen Mason  :   CSN#:      Date:  3/20/2021    Subjective:       History of Present Illness  Patient is a 77 y.o.  male admitted with Nonischemic cardiomyopathy ([facility] [address] [I42.8] who is seen in consult for cirrhosis. He is known to me from  cirrhosis in 3/2020 felt due to alcohol and cardiac as rest of liver w/u (-) for other causes. Course complicated mainly by ascites that was present at dx and now. Admitted with scrotal edema. He is getting monthly paracenteses as diuretics alone not controlling ascites. He denies melena, hematochezia, hematemesis, N/V. Some abd distension. No diarrhea or constipation. Denies alcohol use since  in 3/2020. Past Medical History:   Diagnosis Date    Cardiomyopathy ([facility]     Cirrhosis ([facility] [address]     ETOH abuse    [facility]     History of abdominal paracentesis 2020    Hyperlipidemia     Hypertension     Lymphoma ([facility] [address]     currently in remission    NSVT (nonsustained ventricular tachycardia) (HCC)     Sleep apnea     unable to tolerate CPAP    Syncope       Past Surgical History:   Procedure Laterality Date    CARDIAC DEFIBRILLATOR PLACEMENT  6/23/15    single chamber AICD, EPS inducible VT    CHOLECYSTECTOMY      CORONARY ANGIOPLASTY WITH STENT PLACEMENT      LAMINECTOMY      TOENAIL EXCISION  2017    TONSILLECTOMY AND ADENOIDECTOMY      UPPER GASTROINTESTINAL ENDOSCOPY N/A 2020    EGD performed by Elen Monroy MD at [address] [place] [address]      Past Endoscopic History: EGD : trace to small esophageal varices, moderate portal HTN gastropathy. Admission Meds  No current facility-administered medications on file prior to encounter.       Current Outpatient Medications on File Prior to Encounter   Medication Sig Dispense Refill    allopurinol (ZYLOPRIM) 300 MG tablet TAKE ONE-HALF TABLET BY MOUTH ONCE DAILY FOR GOUT      cough      Social   Social History     Tobacco Use    Smoking status: Former Smoker     Packs/day: 4.00     Years: 16.00     Pack years: 64.00     Types: Pipe     Quit date: 1975     Years since quittin.1    Smokeless tobacco: Never Used   Substance Use Topics    Alcohol use: Not Currently     Comment: occ nothing to deink in 2020        Family History   Problem Relation Age of Onset    Heart Disease Mother     Heart Disease Father       No family history of colon cancer, Crohn's disease, or ulcerative colitis. Review of Systems  Pertinent items are noted in HPI. Physical Exam  Blood pressure 99/63, pulse 90, temperature 97.5 °F (36.4 °C), temperature source Oral, resp. rate 18, height 5' 9.5\" (1.765 m), weight 171 lb 12.8 oz (77.9 kg), SpO2 95 %. General appearance: alert, cooperative, no distress, appears stated age  Eyes: Anicteric  Head: Normocephalic, without obvious abnormality  Lungs: clear to auscultation bilaterally, Normal Effort  Heart: regular rate and rhythm, normal S1 and S2, no murmurs or rubs  Abdomen: soft, non-tender. Bowel sounds normal. No masses,  no organomegaly. Extremities: atraumatic, no cyanosis or edema  Skin: warm and dry, no jaundice  Neuro: Grossly intact, A&OX3      Data Review:    Recent Labs     21  0825 21  0420   WBC 5.5 5.9   HGB 13.6 12.5*   HCT 41.6 37.3*   MCV 98.5 98.2   * 115*     Recent Labs     21  0825 21  1738 21  0420   * 135* 134*   K 5.6* 3.7 4.0   CL 98* 100 100   CO2 22 27 27   BUN 14 14 15   CREATININE 1.0 1.0 1.1     Recent Labs     21  0825 21  0420   AST 50* 35   ALT 20 17   BILIDIR  --  <0.2   BILITOT 1.0 0.6   ALKPHOS 127 123     Recent Labs     21  0825   LIPASE 20.0     Recent Labs     21  0825   PROTIME 12.7   INR 1.09     No results for input(s): PTT in the last 72 hours. No results for input(s): OCCULTBLD in the last 72 hours.     Imaging Studies:

## 2021-03-20 NOTE — CONSULTS
[address]   cardiology consult    Referring Provider: [facility]     Chief Complaint   Patient presents with    Edema     pt states he is in from home per Dr. Loly John to \"get fluid off\" pelvic area    admitted for right sided CHF in setting of chronic systolic heart failure and extremely uncomfortable scrotal edema . History of Present Illness:  Annika Diego is 77 y.o. male who presents today with a history of HTN, hyperlipidemia, CAD s/p PTCA CX/OM '04 and s/p PTCA LAD '06, chronic RCA occlusion; NSVT, ischemic CM/EF 15-20% s/p ICD '15, EF 50-55% on echo '16, declined to 15-20% on echo Jan '20 and HFrEF.       He presented to ER Oct '20 with complaints of chest pain and palpitations. He was found to have VT/Wide complex tachycardia and underwent a DCCV. He was also found to be in shock. Post cardioversion he became hypotensive and required central line access and epinephrine. His rate detection was adjusted for future therapies and his amiodarone was discontinued d/t cirrhosis.     His other hx includes: liver tumor '18, cirrhosis, paracentesis, non-Hodgkin's / Diffuse Large B Cell Lymphoma. His chronic CHF worsened considerably after chemotherapy for lymphoma that seemed to involve liver       Interval hx:  12/7/20: paracentesis : 4.5 L  12/28/20 : paracentesis : 6.5 L   1/20/21: Paracentesis : 8 L   2/10/21: Paracentesis : 9.6 L   3/9/21 Paracentesis : 5.5 L     Today he complains that his scrotum remains swollen even after his recent paracentesis. He continues to feel as though the  fluid has come back,  seems to start almost immediately after being tapped. He has difficulty walking. He  denies significant chest pain. SOB is not worsening.      Past Medical History:   has a past medical history of Cardiomyopathy ([address] Cirrhosis ([address] Diabetes mellitus ([address] History of abdominal paracentesis, Hyperlipidemia, Hypertension, Lymphoma ([address] NSVT (nonsustained ventricular tachycardia) ([facility] [address] Sleep apnea, and Syncope. Surgical History:   has a past surgical history that includes Coronary angioplasty with stent; Cholecystectomy; laminectomy; Tonsillectomy and adenoidectomy; Cardiac defibrillator placement (6/23/15); toenail excision (07/21/2017); and Upper gastrointestinal endoscopy (N/A, 8/4/2020). Social History:   reports that he quit smoking about 46 years ago. His smoking use included pipe. He has a 64.00 pack-year smoking history. He has never used smokeless tobacco. He reports previous alcohol use. He reports that he does not use drugs. Family History:  family history includes Heart Disease in his father and mother. Home Medications:  Prior to Admission medications    Medication Sig Start Date End Date Taking?  Authorizing Provider   allopurinol (ZYLOPRIM) 300 MG tablet TAKE ONE-HALF TABLET BY MOUTH ONCE DAILY FOR GOUT 12/14/20  Yes Historical Provider, MD   vitamin B-12 (CYANOCOBALAMIN) 1000 MCG tablet Take 1,000 mcg by mouth daily   Yes Historical Provider, MD   Multiple Vitamins-Minerals (ONE-A-DAY MENS 50+ PO) Take 1 tablet by mouth daily   Yes Historical Provider, MD   spironolactone (ALDACTONE) 25 MG tablet Take 0.5 tablets by mouth daily 2/4/21  Yes KIN Ruiz CNP   clopidogrel (PLAVIX) 75 MG tablet Take 1 tablet by mouth daily 12/4/20  Yes Justine Barbosa MD   carvedilol (COREG) 3.125 MG tablet Take 1 tablet by mouth 2 times daily 12/4/20  Yes Justine Barbosa MD   midodrine (PROAMATINE) 5 MG tablet Take 1 tablet by mouth 3 times daily (with meals) 10/22/20  Yes Ramo Tabares MD   tamsulosin (FLOMAX) 0.4 MG capsule Take 0.4 mg by mouth nightly   Yes Historical Provider, MD   furosemide (LASIX) 40 MG tablet Take 40 mg by mouth 2 times daily  8/28/20  Yes Justine Barbosa MD   MAGNESIUM OXIDE 400 PO Take 400 mg by mouth 2 times daily    Yes Historical Provider, MD   Calcium Carb-Cholecalciferol (CALCIUM 1000 + D PO) Take 1,000 mg by mouth 2 times daily   Yes Historical Provider, MD   famotidine (PEPCID) 20 MG tablet Take 20 mg by mouth 2 times daily    Yes Historical Provider, MD   aspirin 81 MG tablet Take 81 mg by mouth daily   Yes Historical Provider, MD   vitamin D (CHOLECALCIFEROL) 1000 UNITS TABS tablet Take 1,000 Units by mouth daily   Yes Historical Provider, MD   glipiZIDE (GLUCOTROL) 5 MG tablet Take 7.5 mg by mouth 2 times daily (before meals)    Yes Historical Provider, MD   atorvastatin (LIPITOR) 80 MG tablet Take 80 mg by mouth daily. Yes Historical Provider, MD   traZODone (DESYREL) 50 MG tablet TAKE ONE-HALF TABLET BY MOUTH BEDTIME 12/14/20   Historical Provider, MD   loperamide (IMODIUM) 2 MG capsule Take 2 mg by mouth 4 times daily as needed for Diarrhea    Historical Provider, MD   nitroGLYCERIN (NITROSTAT) 0.4 MG SL tablet Place 0.4 mg under the tongue every 5 minutes as needed for Chest pain up to max of 3 total doses. If no relief after 1 dose, call 911. Historical Provider, MD        Allergies:  Cortisone, Morphine, Metformin, Metformin hcl, and Lisinopril     Review of Systems:   · Constitutional: there has been no unanticipated weight loss. There's been no change in energy level, sleep pattern, or activity level. · Eyes: No visual changes or diplopia. No scleral icterus. · ENT: No Headaches, hearing loss or vertigo. No mouth sores or sore throat. · Cardiovascular: Reviewed in HPI  · Respiratory: No cough or wheezing, no sputum production. No hematemesis. · Gastrointestinal: No abdominal pain, appetite loss, blood in stools. No change in bowel or bladder habits. · Genitourinary: No dysuria, trouble voiding, or hematuria. · Musculoskeletal:  No gait disturbance, weakness or joint complaints. · Integumentary: No rash or pruritis. · Neurological: No headache, diplopia, change in muscle strength, numbness or tingling. No change in gait, balance, coordination, mood, affect, memory, mentation, behavior.   · Psychiatric: No anxiety, no depression. · Endocrine: No malaise, fatigue or temperature intolerance. No excessive thirst, fluid intake, or urination. No tremor. · Hematologic/Lymphatic: No abnormal bruising or bleeding, blood clots or swollen lymph nodes. · Allergic/Immunologic: No nasal congestion or hives. Physical Examination:    Vitals:    03/19/21 1330   BP: 109/70   Pulse: 69   Resp: 20   Temp: 97.6 °F (36.4 °C)   SpO2: 97%        Wt Readings from Last 1 Encounters:   03/19/21 171 lb 3.2 oz (77.7 kg)       Constitutional and General Appearance:NAD  Skin:good turgor,intact without lesions  HEENT: EOMI ,normal  Neck:no JVD  Respiratory:  · Normal excursion and expansion without use of accessory muscles  · Resp Auscultation: Normal breath sounds without dullness  Cardiovascular:  · The apical impulses not displaced  · Heart tones are crisp and normal,S3 gallop  · Cervical veins are not engorged  · The carotid upstroke is normal in amplitude and contour without delay or bruit  · Peripheral pulses are symmetrical and full  · There is no clubbing, cyanosis of the extremities. Marked scrotal edema. likely abdominal ascites  · Femoral Arteries: 2+ and equal  · Pedal Pulses: 2+ and equal   Abdomen:  · No masses or tenderness  · Liver/Spleen: No Abnormalities Noted  Neurological/Psychiatric:  · Alert and oriented in all spheres  · Moves all extremities well  · Exhibits normal gait balance and coordination  · No abnormalities of mood, affect, memory, mentation, or behavior are noted      Assessment:     1. Volume overload              ~recurrent abd / scrotal swelling- no improvement              ~s/p paracentesis 2/23/21 for 9.6 L, then again 3/9/21          Will  plan for hospital admission today for iv lasix  2. Ischemic cardiomyopathy   ~LVF severely reduced by echo Dec '20; RA severely dilated   ~EF < 20% on echo from Jan  ~hx of s/p ICD '15 (followed by EP)  ~tolerating coreg low dose     3.  Coronary artery disease involving native heart without angina pectoris, unspecified vessel or lesion type   ~stable      4. Mixed hyperlipidemia   ~Lipitor 80mg   ~ trig 125 HDL 44 LDL 80 on profile from March '19  ~followed by the [address] [place] [address]:  Agree with admission. Will plan for IV lasix to reduce scrotal swelling, consider paracentesis if no relief with Lasix. I appreciate the opportunity of cooperating in the care of this individual.    Jose Flaherty. Yuki Castañeda M.D., [facility]    Patient's problem list, medications, allergies, past medical, surgical, social and family histories were reviewed and updated as appropriate.

## 2021-03-20 NOTE — PROGRESS NOTES
Patient care taken over from [facility], vss, alert and oriented, family at bedside, will continue to monitor.  Raina Baez

## 2021-03-21 NOTE — FLOWSHEET NOTE
Notified MD \"See that PRN IV Mg ordered. Saw a note from nephrology saying to replete Mg PO r/t fluid restriction. Is this something we can switch? \"

## 2021-03-21 NOTE — PROGRESS NOTES
Intravenous 2 times per day    enoxaparin  40 mg Subcutaneous Daily    furosemide  40 mg Intravenous BID    insulin glargine  10 Units Subcutaneous Nightly    insulin lispro  0-12 Units Subcutaneous TID WC    insulin lispro  0-6 Units Subcutaneous Nightly    cefTRIAXone (ROCEPHIN) IV  1,000 mg Intravenous Q24H     Continuous Infusions:   DOBUTamine 5 mcg/kg/min (03/21/21 0811)    dextrose       PRN Meds:.potassium chloride **OR** potassium alternative oral replacement **OR** potassium chloride, magnesium sulfate, traZODone, sodium chloride flush, promethazine **OR** ondansetron, polyethylene glycol, acetaminophen **OR** acetaminophen, nitroGLYCERIN, glucose, dextrose, glucagon (rDNA), dextrose      Vitals:  /67   Pulse 89   Temp 98.2 °F (36.8 °C) (Oral)   Resp 16   Ht 5' 9.5\" (1.765 m)   Wt 172 lb 3.2 oz (78.1 kg)   SpO2 96%   BMI 25.06 kg/m²       Physical Exam  General : AAOx3, not in pain or respiratory distress  HEENT : normocephalic, atraumatic, mucosa moist, no palor or icterus  CVS: S1 S2 normal, regular rhythm, no murmurs or rubs. Lungs: Clear, no wheezing or crackles. Abd: abdomen distended, bowel sounds+, non-tender  Ext: No edema, no cyanosis  Skin: Warm. No rashes appreciated.   :  scrotal swelling      Labs:  CBC with Differential:    Lab Results   Component Value Date    WBC 5.9 03/20/2021    RBC 3.80 03/20/2021    HGB 12.5 03/20/2021    HCT 37.3 03/20/2021     03/20/2021    MCV 98.2 03/20/2021    MCH 32.9 03/20/2021    MCHC 33.5 03/20/2021    RDW 17.3 03/20/2021    SEGSPCT 78.5 02/12/2010    LYMPHOPCT 10.9 03/20/2021    MONOPCT 11.9 03/20/2021    EOSPCT 1.3 02/12/2010    BASOPCT 0.7 03/20/2021    MONOSABS 0.7 03/20/2021    LYMPHSABS 0.6 03/20/2021    EOSABS 0.2 03/20/2021    BASOSABS 0.0 03/20/2021    DIFFTYPE Auto 02/12/2010     BMP:    Lab Results   Component Value Date     03/21/2021    K 3.3 03/21/2021    K 3.7 03/19/2021     03/21/2021    CO2 27 03/21/2021    BUN 15 03/21/2021    LABALBU 3.1 03/21/2021    CREATININE 1.0 03/21/2021    CALCIUM 8.2 03/21/2021    GFRAA >60 03/21/2021    GFRAA >60 02/13/2010    LABGLOM >60 03/21/2021    GLUCOSE 113 03/21/2021     Ionized Calcium:  No results found for: IONCA  Magnesium:    Lab Results   Component Value Date    MG 1.60 03/21/2021     Phosphorus:    Lab Results   Component Value Date    PHOS 2.7 10/22/2020     Last 3 Troponin:    Lab Results   Component Value Date    TROPONINI <0.01 03/19/2021    TROPONINI 0.38 10/19/2020    TROPONINI 0.42 10/19/2020     U/A:    Lab Results   Component Value Date    COLORU YELLOW 03/19/2021    PROTEINU Negative 03/19/2021    PHUR 7.5 03/19/2021    WBCUA 12 03/19/2021    RBCUA 1 03/19/2021    BACTERIA 3+ 10/18/2020    CLARITYU Clear 03/19/2021    SPECGRAV 1.007 03/19/2021    LEUKOCYTESUR SMALL 03/19/2021    UROBILINOGEN 1.0 03/19/2021    BILIRUBINUR Negative 03/19/2021    BILIRUBINUR NEGATIVE 02/12/2010    BLOODU Negative 03/19/2021    GLUCOSEU Negative 03/19/2021    GLUCOSEU NEGATIVE 02/12/2010    AMORPHOUS 3+ 10/18/2020       Assessment/Plan:    Hypokalemia  Replete PO  Replete Mag     CHF   Continue IV furosemide and spironolactone  On dobutamine gtt     Cirrhosis of liver, alcoholic  With portal HTN and h/o periodic paracentesis, last on 3/9/2021  Now with worsening abdominal distension and scrotal swelling  On diuretics but would not expect good response  May need paracentesis, possibly on Monday  Ok to continue diuretics for now     Hypomagnesemia  Replete po  May help with constipation    Hyponatremia mild  Secondary to cirrhosis/diuretics     Thrombocytopenia  Better than base-line  Secondary to cirrhosis     CKD IIIa  Risk factors include DM II, Hypertension, prior AKIs, age  Creatinine currently at base-line  UA with no protein or blood      Curtis Estrella MD.  3/21/2021  Office Phone : [phone]    Thank you for allowing us to participate in the care of this pt.  I willcontinue to follow along. Please call with questions or concerns.

## 2021-03-21 NOTE — PROGRESS NOTES
Hospitalist Progress Note      PCP: [facility]    Date of Admission: 3/19/2021    Chief Complaint: Abdominal distention and scrotal edema. Hospital Course: Roe Rod is a 77 y.o.  male history of type II DM, hypertension, hyperlipidemia, cirrhosis, CAD, ischemic cardiomyopathy s/p AICD in 2015, lymphoma, NSVT, HOSSEIN, who presented to ED as per Dr. Yemi Daniel recommendation to get admitted. In ED, patient was noted to have labile BPs with SBP's in the range  which is his baseline from underlying cirrhosis. Labs notable for serum sodium level 130, potassium 5.6.  proBNP level 8900. Urinalysis suggestive of UTI. Chest x-ray findings consistent with cardiomegaly. Patient received Lasix 80 mg IV in ED. Patient is being admitted with cardiology, GI and nephrology consultations.      Subjective: Remains on dobutamine drip. BPs in acceptable range. Tolerating diuretics. However urine output of 840 mL documented in chart, as opposed to patient reporting urine output more than that.     Medications:  Reviewed    Infusion Medications    DOBUTamine 5 mcg/kg/min (03/21/21 6592)    dextrose       Scheduled Medications    docusate sodium  100 mg Oral BID    allopurinol  100 mg Oral Daily    aspirin  81 mg Oral Daily    atorvastatin  80 mg Oral Daily    clopidogrel  75 mg Oral Daily    famotidine  20 mg Oral BID    midodrine  5 mg Oral TID WC    spironolactone  12.5 mg Oral Daily    tamsulosin  0.4 mg Oral Nightly    sodium chloride flush  10 mL Intravenous 2 times per day    enoxaparin  40 mg Subcutaneous Daily    furosemide  40 mg Intravenous BID    insulin glargine  10 Units Subcutaneous Nightly    insulin lispro  0-12 Units Subcutaneous TID WC    insulin lispro  0-6 Units Subcutaneous Nightly    cefTRIAXone (ROCEPHIN) IV  1,000 mg Intravenous Q24H     PRN Meds: magnesium sulfate, traZODone, sodium chloride flush, promethazine **OR** ondansetron, polyethylene glycol, acetaminophen **OR** acetaminophen, nitroGLYCERIN, glucose, dextrose, glucagon (rDNA), dextrose      Intake/Output Summary (Last 24 hours) at 3/21/2021 0943  Last data filed at 3/21/2021 0849  Gross per 24 hour   Intake 660 ml   Output 890 ml   Net -230 ml       Physical Exam Performed:    /61   Pulse 99   Temp 97.6 °F (36.4 °C) (Oral)   Resp 14   Ht 5' 9.5\" (1.765 m)   Wt 172 lb 3.2 oz (78.1 kg)   SpO2 94%   BMI 25.06 kg/m²     General appearance: Elderly  male, cachectic, not in distress  Cardiovascular: Regular rhythm, normal S1, S2. No murmur, gallop, rub. No edema in lower extremities  Respiratory: Clear to auscultation bilaterally, no wheeze, good inspiratory effort  Gastrointestinal: Abdomen soft, non-tender, distended, normal bowel sounds  Musculoskeletal: No cyanosis in digits, neck supple  : Massive scrotal edema noted  Neurology: Cranial nerves grossly intact. Alert and oriented in time, place and person. No speech or motor deficits  Psychiatry: Appropriate affect.  Not agitated  Skin: Warm, dry, normal turgor, no rash  Brisk capillary refill, peripheral pulses palpable          Labs:   Recent Labs     03/19/21  0825 03/20/21  0420   WBC 5.5 5.9   HGB 13.6 12.5*   HCT 41.6 37.3*   * 115*     Recent Labs     03/19/21  1738 03/20/21  0420 03/21/21  0421   * 134* 137   K 3.7 4.0 3.3*    100 102   CO2 27 27 27   BUN 14 15 15   CREATININE 1.0 1.1 1.0   CALCIUM 8.5 8.4 8.2*     Recent Labs     03/19/21  0825 03/20/21  0420 03/21/21  0421   AST 50* 35 21   ALT 20 17 16   BILIDIR  --  <0.2 <0.2   BILITOT 1.0 0.6 0.6   ALKPHOS 127 123 121     Recent Labs     03/19/21  0825   INR 1.09     Recent Labs     03/19/21  0825   TROPONINI <0.01       Urinalysis:      Lab Results   Component Value Date    NITRU Negative 03/19/2021    WBCUA 12 03/19/2021    BACTERIA 3+ 10/18/2020    RBCUA 1 03/19/2021    BLOODU Negative 03/19/2021    SPECGRAV 1.007 03/19/2021    GLUCOSEU Negative 03/19/2021    Kathleen Reed 994 NEGATIVE 02/12/2010       Radiology:  XR CHEST PORTABLE   Final Result   Cardiomegaly with no significant pulmonary edema. IR US GUIDED PARACENTESIS    (Results Pending)       Assessment/Plan:    Ischemic cardiomyopathy status post AICD placement:  Clinically has ascites and scrotal edema. Underlying cirrhosis likely contributing as well. Limited 2D echo showed EF of 10%. Initial troponin level negative. No acute ST-T wave changes on EKG. Chest x-ray shows cardiomegaly. Patient received Lasix 80 mg IV x1 in ED.   currently has labile BPs. On Lasix 40 mg IV twice daily. Requested for cardiology and nephrology consultation. As per Dr. Sandra Paul, on 3/20, patient to be started on dobutamine drip which can be administered through peripheral IV, as per discussion with nursing staff. Taken off of Coreg by cardiology due to labile Bps.      Liver cirrhosis:  Likely secondary to alcohol abuse. Clinically has ascites. Multiple paracentesis in the past.  Monitor volume status with IV diuresis. No improvement, might benefit from paracentesis as per Dr. Ángel Rainey recommendations. ESR was consulted. Seen by Dr. Werner Cardoza. Scheduled to undergo ultrasound-guided paracentesis on Monday. Scrotal elevation and rest.     B-cell non-Hodgkin's lymphoma: In remission     Type II DM: Blood sugars elevated. Placed on Lantus 10 units subcu nightly, medium dose SSI to cover. Low-carb diet.     Chronic hypotension. BPs better on Dobutamine drip. Resumed on midodrine 5 mg p.o. 3 times daily. Off of Coreg as per cardiology. on Aldactone for cardiomyopathy, with holding parameters.     Hyperlipidemia: Resumed on statin     UA suggestive of UTI: Placed on IV ceftriaxone.   Urine C/S pending.      IV access: peripheral IV  Salmon Catheterno  DVT Prophylaxis: Lovenox subcu daily  Diet: DIET LOW SODIUM 2 GM; Carb Control: 3 carb choices (45 gms)/meal; 1500 ml  Code Status: DNR-CCA  PT/OT Eval Status: Pending  Dispo -pending acute issues, on dobutamine drip, ultrasound-guided paracentesis on Monday    Lorenza Aranda MD

## 2021-03-21 NOTE — PROGRESS NOTES
[address]   Progress Note  Cardiology      [facility]   Admission date:  3/19/2021  CC-f/up for acute right heart failure  Subjective:  He feels fine.  Complains of very uncomfortable scrotal edema    Objective:  Medications/Labs all Reviewed     magnesium oxide  400 mg Oral BID    docusate sodium  100 mg Oral BID    allopurinol  100 mg Oral Daily    aspirin  81 mg Oral Daily    atorvastatin  80 mg Oral Daily    clopidogrel  75 mg Oral Daily    famotidine  20 mg Oral BID    midodrine  5 mg Oral TID WC    spironolactone  12.5 mg Oral Daily    tamsulosin  0.4 mg Oral Nightly    sodium chloride flush  10 mL Intravenous 2 times per day    enoxaparin  40 mg Subcutaneous Daily    furosemide  40 mg Intravenous BID    insulin glargine  10 Units Subcutaneous Nightly    insulin lispro  0-12 Units Subcutaneous TID WC    insulin lispro  0-6 Units Subcutaneous Nightly    cefTRIAXone (ROCEPHIN) IV  1,000 mg Intravenous Q24H       BMP:   Lab Results   Component Value Date     03/21/2021    K 3.3 03/21/2021    K 3.7 03/19/2021     03/21/2021    CO2 27 03/21/2021    BUN 15 03/21/2021    CREATININE 1.0 03/21/2021    MG 1.60 03/21/2021     CBC:    Lab Results   Component Value Date    WBC 5.9 03/20/2021    RBC 3.80 03/20/2021    HGB 12.5 03/20/2021    HCT 37.3 03/20/2021    MCV 98.2 03/20/2021    RDW 17.3 03/20/2021     03/20/2021      PT/INR:    Lab Results   Component Value Date    INR 1.09 03/19/2021    PROTIME 12.7 03/19/2021     Cardiac Enzymes:    Lab Results   Component Value Date    CKTOTAL 65 02/12/2010    CKMB 0.78 01/15/2010     Lab Results   Component Value Date    TROPONINI <0.01 03/19/2021    TROPONINI 0.38 (H) 10/19/2020    TROPONINI 0.42 (H) 10/19/2020     BNP:  No results found for: BNP  FASTING LIPID PANEL:    Lab Results   Component Value Date    CHOL 105 03/20/2021    HDL 38 03/20/2021    HDL 35 01/15/2010    TRIG 92 03/20/2021       Physical Examination:    BP 102/61   Pulse 99   Temp 97.6 °F (36.4 °C) (Oral)   Resp 14   Ht 5' 9.5\" (1.765 m)   Wt 172 lb 3.2 oz (78.1 kg)   SpO2 94%   BMI 25.06 kg/m²      Respiratory:  · Resp Assessment: Normal respiratory effort  · Resp Auscultation: Clear to auscultation bilaterally   Cardiovascular:  · Auscultation: regular rate and rhythm, normal S1S2, no murmur, rub or gallop  · Palpation:  Nl PMI  · JVP:  normal  · Extremities: marked scrotal edema  Abdomen:  · Soft, non-tender  · Normal bowel sounds  Extremities:  ·  No Cyanosis or Clubbing  Neurological/Psychiatric:  · Oriented to time, place, and person  · Non-anxious  Skin Warm and dry    Assessment:    Active Problems:    Nonischemic cardiomyopathy (HCC)  Plan:EF 10%    Chronic systolic heart failure with extremely uncomfortable profound scrotal edema      Plan:  1. Continue dobutamine  2. Paracentesis tomorrow  3.  Try to transition to outpt tomorrow  I have spent  35 minutes of face to face time with the patient with more than 50%  spent  counseling and coordinating care for Joseph South

## 2021-03-21 NOTE — FLOWSHEET NOTE
03/21/21 0300   Vital Signs   BP (!) 83/33   BP Location Right upper arm   MAP (mmHg) (!) 46   Patient Position Turns self   Level of Consciousness Alert (0)   Patient Currently in Pain Denies   Pain Assessment   Pain Level 0   MT notified this RN that BP was low. Entered room to assess pt. Pt stated that he felt \"fine\". Reassessed pt's BP manually and it was now 90/65. Will continue to monitor.

## 2021-03-21 NOTE — PROGRESS NOTES
Gastroenterology Progress Note            Imani Hughes is a 77 y.o. male patient. Active Problems:    Nonischemic cardiomyopathy (HCC)  Resolved Problems:    * No resolved hospital problems. *      SUBJECTIVE:  Still c/o scrotal pain and swelling. Abd less distended. ROS:    Gastrointestinal ROS: no abdominal pain, change in bowel habits, or black or bloody stools. Cardiovascular ROS: negative  Respiratory ROS: negative    Physical    VITALS:  /61   Pulse 99   Temp 97.6 °F (36.4 °C) (Oral)   Resp 14   Ht 5' 9.5\" (1.765 m)   Wt 172 lb 3.2 oz (78.1 kg)   SpO2 94%   BMI 25.06 kg/m²   TEMPERATURE:  Current - Temp: 97.6 °F (36.4 °C); Max - Temp  Av °F (36.1 °C)  Min: 96 °F (35.6 °C)  Max: 97.6 °F (36.4 °C)    NAD  RRR, Nl s1s2  Lungs CTA Bilaterally, normal effort  Abdomen soft, ND, NT, no HSM, Bowel sounds normal.    Data    Data Review:    Recent Labs     21  0825 21  0420   WBC 5.5 5.9   HGB 13.6 12.5*   HCT 41.6 37.3*   MCV 98.5 98.2   * 115*     Recent Labs     21  1738 21  0420 21  0421   * 134* 137   K 3.7 4.0 3.3*    100 102   CO2 27 27 27   BUN 14 15 15   CREATININE 1.0 1.1 1.0     Recent Labs     21  0825 21  0420 21  0421   AST 50* 35 21   ALT 20 17 16   BILIDIR  --  <0.2 <0.2   BILITOT 1.0 0.6 0.6   ALKPHOS 127 123 121     Recent Labs     21  0825   LIPASE 20.0     Recent Labs     21  0825   PROTIME 12.7   INR 1.09     No results for input(s): PTT in the last 72 hours. 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 Main c/o is scrotal edema. Agree with traction/elevation of scrotum and fluid removal with diuresis/paracentesis. Will plan repeat paracentesis Monday to this end. Otherwise liver panel is normal.  Diuretics and inotropic meds per Cardiology.     Scrotal enlargement - mildly tender, but very uncomfortable.   Agree with traction/elevation of scrotum and fluid removal with diuresis/paracentesis. Will get US scrotum to r/o other process. PLAN  :  1) Diuretics per Cards. 2) Schedule US-guided paracentesis Monday. 3) Scrotal elevation and bedrest.  4) Will get scrotal US in AM as well.   5) 2 gm Na diet    Dian [facility], [address] [place] [address]  3/21/2021

## 2021-03-21 NOTE — FLOWSHEET NOTE
03/21/21 0330   Vital Signs   Temp 97.3 °F (36.3 °C)   Temp Source Oral   Pulse (!) 44   Heart Rate Source Monitor   Resp 16   /60   BP Location Right upper arm   MAP (mmHg) 71   Patient Position Turns self   Level of Consciousness Alert (0)   MEWS Score 2   Patient Currently in Pain Denies   reassessment completed. Pt resting in bed. No immediate response to voice. No c/o of pain. Being closely monitored with bedside monitor and at the desk. Will continue to monitor.

## 2021-03-22 NOTE — PROGRESS NOTES
follow-up. Take a copy of this screening test to your primary care physician on your next office visit. Interpretation:      0 -   7: It is unlikely that you are abnormally sleepy. 8 -   9: You have an average amount of daytime sleepiness. 10 - 15: You may be excessively sleepy depending on the situation. You may want to consider seeking medical attention. 16 - 24: You are excessively sleepy and should consider seeking medical attention.       Electronically signed by Caleb Linton RCP on 3/22/2021 at 5:10 PM

## 2021-03-22 NOTE — CONSULTS
[address] [facility] 1945    History:  Past Medical History:   Diagnosis Date    Cardiomyopathy ([facility]     Cirrhosis ([facility]     ETOH abuse    Diabetes mellitus ([facility]     History of abdominal paracentesis 03/2020    Hyperlipidemia     Hypertension     Lymphoma ([facility]     currently in remission    NSVT (nonsustained ventricular tachycardia) (HCC)     Sleep apnea     unable to tolerate CPAP    Syncope        ECHO:     Conclusions      Summary   -Left ventricular cavity size is normal.   -Overall left ventricular systolic function appears severely reduced.   -Ejection fraction is visually estimated to be <20%. -There is abnormal septal motion noted. -Cannot rule out other wall motion abnormalities. -Grade I diastolic dysfunction with normal LV filling pressures. E/e' =   11.8.   -The left atrium is dilated. -The right atrium is severely dilated. -The aortic valve appears sclerotic but opens well. -Mild mitral regurgitation.   -Moderate tricuspid regurgitation.   -Right ventricle size is normal with reduced function. TAPSE = 1.3 cm. RVS   velocity is 8.27 cm/s. -RVSP = 28 mmHG. Cannot rule out pleural effusion vs shadowing.       Signature      ------------------------------------------------------------------   Electronically signed by Radha Waldrop MD, [address] (Interpreting   physician) on 12/07/2020 at 02:37 PM   ------------------------------------------------------------------      ACE/ARB: Allergy to lisinopril   BB: Beta blocker on hold due to hypotension   Aldosterone Antagonist: Spironolactone 12.5 mg bid    History of sleep apnea:No   Equipment used at home: None  Sullivan Screen ordered: Yes    DM History: Yes  DM medications: Insulin glargine 10 units nightly, insulin lispro sliding scale tid with meals and nigtly    Last Hospital Admission: 10/1720 for Ventricular tachycardia  Code Status: DNR-CCA  Discharge plans:Patient to return home, lives independently with wife    Family Present: Wife    Mr. Dumont was seen for fluid overload. He has a history of systolic heart failure and cirrhosis of the liver contributing to fluid overload. He goes in monthly for a paracentesis since October. Admitted from Dr. Emi Calvo office for abdominal swelling, scrotal edema and dyspnea on exertion with stair climbing. His weight was staying the same at 174-175 lbs. He weighs every other day. His wife cooks with salt and he adds salt to his food. Reinforced a low sodium diet and the importance of such. Ideas to aid in  compliance given. Has a lot of dietary indiscretion such as malloy and sausage and turkey sandwiches. He goes over 64 ounces of fluid a day. Remains alcohol and smoke free. Ideas to help with activity instructed. Patient provided with both written and verbal education on CHF signs/ symptoms, causes, discharge medications, daily weights, low sodium diet, activity, and follow-up. Pt to call if gains 3 pounds in one day or 5 pounds in one week. Mutually agreed upon goals were discussed such as calling the MD as soon as they recognize symptoms and weight gain, maintaining his proper diet, taking medications as prescribed, joining rehab when able. Patient provided with CHF Zone Management tool and CHF symptoms magnet. Discussed importance of lifestyle changes: daily weights, low sodium and fluid restriction diet     PATIENT/CAREGIVER TEACHING:    Level of patient/caregiver understanding able to:   [ x] Verbalize understanding [ ] Demonstrate understanding [ ] Teach back   [ ] Needs reinforcement [ ] Other:       Time spent teachin minutes    1. WEIGHT: Admit Weight: 173 lb (78.5 kg)      Today  Weight: 171 lb 12.8 oz (77.9 kg)   2. I/O     Intake/Output Summary (Last 24 hours) at 3/22/2021 6567  Last data filed at 3/22/2021 0420  Gross per 24 hour   Intake 730 ml   Output 800 ml   Net -70 ml       Recommendations:   1.  Patient will need a follow up appointment within seven days of discharge  2. Educate further on fluid restriction 48 oz- 64 oz during inpatient stay so he can understand how to measure intake at home. 3. Continue to educate on S/S.   4. Emphasize daily weights, diet, and knowing when and who to call  5. Provided patient with CHF Resource Line for questions and concerns.          Jennifer Pierce 3/22/2021 3:28 PM

## 2021-03-22 NOTE — PROGRESS NOTES
The [place] Sleepiness Scale       The [place] Sleepiness Scale is widely used in the field of sleep medicine as a subjective measure of a patient's sleepiness. The test is a list of eight situations in which you rate your tendency to become sleepy on a scale of 0, no chance to 3, high chance of dozing. Your score is based on a scale of 0 to 24. The scale estimates whether you are experiencing excessive sleepiness that possibly requires medical attention. How Sleepy Are You? How sleepy are you to doze off or fall asleep in the following situations? You should rate your chances of dozing off, not just feeling tired. Even if you have not done some of these things recently try to determine how they would have affected you.  For each situation, decide whether or not you would have:     0 = No chance of dozing 1 = Slight chance of dozing   2 = Moderate chance of  dozing 3 = High change of dozing       Situation                                                                                     Chance of Dozing    Sitting and reading  0 =  [x]  1 =    [] 2 =    [] 3 =    []    Watching TV  0 =  []  1 =    [] 2 =    [] 3 =    [x]      Sitting inactive in public place (e.g., a theater or a meeting)  0 =  []  1 =    [] 2 =    [x] 3 =    []    As a passenger in a car for an hour without a break          0  =  []  1 =    [] 2 =    [x] 3 =    []    Lying down to rest in the afternoon when circumstances permit    0 =  []  1 =    [] 2 =    [x] 3 =    []    Sitting and talking to someone  0 =  [x]  1 =    [] 2 =    [] 3 =    []      Sitting quietly after a lunch without alcohol  0 =  []  1 =    [x] 2 =    [] 3 =    []    In a car, while stopped for a few minutes in traffic                                                                      0 =  [x]  1 =    [] 2 =    [] 3 =    []    Total Score = 10    If your total score is 10 or greater, you are experiencing excessive sleepiness and should consider seeking a medical follow-up. Take a copy of this screening test to your primary care physician on your next office visit. Interpretation:      0 -   7: It is unlikely that you are abnormally sleepy. 8 -   9: You have an average amount of daytime sleepiness. 10 - 15: You may be excessively sleepy depending on the situation. You may want to consider seeking medical attention. 16 - 24: You are excessively sleepy and should consider seeking medical attention.       Electronically signed by Denisse Johnson RCP on 3/22/2021 at 5:17 PM

## 2021-03-22 NOTE — PROGRESS NOTES
[facility]   Cardiology Progress Note     Date: 3/22/2021  Admit Date: 3/19/2021     Reason for consultation:   Chief Complaint:   Chief Complaint   Patient presents with    Edema     pt states he is in from home per Dr. Ness Bai to \"get fluid off\" pelvic area       History of Present Illness: History obtained from patient and medical record. Dacia Rodríguez is a 77 y.o. male HTN, hyperlipidemia, CAD s/p PTCA CX/OM '04 and s/p PTCA LAD '06, chronic RCA occlusion; NSVT, ischemic CM/EF 15-20% s/p ICD '15, EF 50-55% on echo '16, declined to 15-20% on echo Jan '20 and HFrEF.  His other hx includes: liver tumor '18, cirrhosis, paracentesis, non-Hodgkin's / Diffuse Large B Cell Lymphoma. His chronic CHF worsened considerably after chemotherapy for lymphoma that seemed to involve liver. Pt presented to the hospital with complaints of scrotum edema even after recent paracentesis. Admitted for IV diuresis and dobutamine to allow for better diuresis. Interval Hx: Today, he is continues to complain of scrotal edema. He reports that improved initially but the swelling came right back. Ventricular ectopy on monitor, decrease dobutamine to 2.5mcg/kg/min. Plan for paracentesis today. Scrotum US pending. Patient seen and examined. Clinical notes reviewed. Telemetry reviewed. No new complaints today. No major events overnight. Denies having chest pain, palpitations, shortness of breath, orthopnea/PND, cough, or dizziness at the time of this visit.       Past Medical History:  Past Medical History:   Diagnosis Date    Cardiomyopathy ([address]     Cirrhosis ([address]     ETOH abuse    Diabetes mellitus ([address]     History of abdominal paracentesis 03/2020    Hyperlipidemia     Hypertension     Lymphoma ([address]     currently in remission    NSVT (nonsustained ventricular tachycardia) (HCC)     Sleep apnea     unable to tolerate CPAP    Syncope         Past Surgical History:    has a past surgical history that Continuous Infusions:   DOBUTamine 5 mcg/kg/min (03/22/21 8814)    dextrose       PRN Meds:potassium chloride **OR** potassium alternative oral replacement **OR** potassium chloride, magnesium sulfate, traZODone, sodium chloride flush, promethazine **OR** ondansetron, polyethylene glycol, acetaminophen **OR** acetaminophen, nitroGLYCERIN, glucose, dextrose, glucagon (rDNA), dextrose     Review of Systems:  · Constitutional: Negative for fever, night sweats, chills, weight changes  · Skin: Negative for rash, pruritus, bleeding, blood clots, or bruising    · HEENT: Negative for vision changes, ringing in the ears, dysphagia, or swollen lymph nodes  · Respiratory: Reviewed in HPI  · Cardiovascular: Reviewed in HPI  · Gastrointestinal: Negative for abdominal pain, N/V/D, constipation, or black/tarry stools  · Genito-Urinary: Negative for dysuria, incontinence, or hematuria  · Musculoskeletal: Negative for joint swelling, muscle pain, or injuries  · Neurological/Psych: Negative for confusion, seizures, headaches, or TIA-like symptoms. No anxiety or depression. Physical Examination:  Vitals:    03/22/21 0830   BP: (!) 96/53   Pulse:    Resp:    Temp:    SpO2:       In: 730 [P.O.:720; I.V.:10]  Out: 800    Wt Readings from Last 3 Encounters:   03/22/21 171 lb 12.8 oz (77.9 kg)   03/16/21 173 lb 4.8 oz (78.6 kg)   02/04/21 191 lb 11.2 oz (87 kg)       Intake/Output Summary (Last 24 hours) at 3/22/2021 0858  Last data filed at 3/22/2021 3571  Gross per 24 hour   Intake 1088 ml   Output 875 ml   Net 213 ml       Telemetry: Personally Reviewed  - Sinus rhythm with PVCs, NSVT  · Constitutional: Cooperative and in no apparent distress, and appears well nourished  · Skin: Warm and pink; no pallor, cyanosis, clubbing, or bruising   · HEENT: Symmetric and normocephalic. PERRL, EOM intact. Conjunctiva pink with clear sclera. Mucus membranes moist.   · Cardiovascular: Regular rate and rhythm.  S1/S2 present without murmurs, no rubs or gallops. Peripheral pulses 2+, capillary refill < 3 seconds. No elevation of JVP. No peripheral edema  · Respiratory: Respirations symmetric and unlabored. Lungs clear to auscultation bilaterally, no wheezing, crackles, or rhonchi  · Gastrointestinal: Abdomen soft and rounded. Bowel sounds active without tenderness or masses. +scrotal swelling  · Musculoskeletal: Bilateral upper and lower extremity strength with some weakness   · Neurologic/Psych: Awake and orientated to person, place and time. Calm affect, appropriate mood    Pertinent labs, diagnostic, device, and imaging results reviewed as a part of this visit    Labs:    BMP:   Recent Labs     03/20/21 0420 03/21/21 0421 03/22/21 0421   * 137 133*   K 4.0 3.3* 4.0    102 101   CO2 27 27 26   BUN 15 15 12   CREATININE 1.1 1.0 1.0   MG 1.70* 1.60* 1.80     Estimated Creatinine Clearance: 66 mL/min (based on SCr of 1 mg/dL).    CBC:   Recent Labs     03/20/21 0420 03/22/21 0421   WBC 5.9 5.2   HGB 12.5* 12.4*   HCT 37.3* 37.5*   MCV 98.2 97.3   * 105*     Thyroid: No results found for: TSH, V7XOBTT, P0EPFAA, THYROIDAB  Lipids:   Lab Results   Component Value Date    CHOL 105 03/20/2021    HDL 38 03/20/2021    HDL 35 01/15/2010    TRIG 92 03/20/2021     LFTS:   Lab Results   Component Value Date    ALT 14 03/22/2021    AST 18 03/22/2021    ALKPHOS 107 03/22/2021    PROT 5.4 03/22/2021    PROT 7.7 02/12/2010    AGRATIO 0.9 03/19/2021    BILITOT 0.6 03/22/2021     Cardiac Enzymes:   Lab Results   Component Value Date    CKTOTAL 65 02/12/2010    CKMB 0.78 01/15/2010    TROPONINI <0.01 03/19/2021    TROPONINI 0.38 10/19/2020    TROPONINI 0.42 10/19/2020     Coags:   Lab Results   Component Value Date    PROTIME 12.7 03/19/2021    PROTIME 10.4 01/13/2010    INR 1.09 03/19/2021     ECG: 3/19/21  Sinus bradycardia  Left axis deviation  Septal infarct , age undetermined  Possible Lateral infarct , age undetermined  Inferior infarct , age undetermined  Premature ventricular complexes    ECHO:  12/7/20  Summary   -Left ventricular cavity size is normal.   -Overall left ventricular systolic function appears severely reduced.   -Ejection fraction is visually estimated to be <20%. -There is abnormal septal motion noted. -Cannot rule out other wall motion abnormalities. -Grade I diastolic dysfunction with normal LV filling pressures. E/e' =   11.8.   -The left atrium is dilated. -The right atrium is severely dilated. -The aortic valve appears sclerotic but opens well. -Mild mitral regurgitation.   -Moderate tricuspid regurgitation.   -Right ventricle size is normal with reduced function. TAPSE = 1.3 cm. RVS   velocity is 8.27 cm/s. -RVSP = 28 mmHG. Cannot rule out pleural effusion vs shadowing. Stress Test: 10/2018  Summary       No EKG evidence for ischemia with lexiscan       Reduced LV systolic function with EF of 44% (ectopy may affect accuracy)       There is normal isotope uptake at stress and rest. There is no evidence of    myocardial ischemia or scar. Last Angiogram: 8/2012   Cath with patent LAD ,marginal and CFX stents. Chronic RCA occlusion.  EF 40% with LVEDP 20    Problem List:   Patient Active Problem List    Diagnosis Date Noted    Mixed hyperlipidemia 08/02/2011     Priority: High    Essential hypertension, benign 08/02/2011     Priority: High    Coronary atherosclerosis of native coronary artery 08/02/2011     Priority: High    Cardiomyopathy ([address] 08/02/2011     Priority: High    Nonischemic cardiomyopathy ([address] 03/19/2021    Hypotension 10/17/2020    Acute respiratory failure with hypoxia ([address] 10/17/2020    Acute renal failure superimposed on stage 3 chronic kidney disease ([address] 10/17/2020    YENNIFER (acute kidney injury) ([address]     Diffuse large B-cell lymphoma of solid organ excluding spleen ([address] 03/02/2020    Ascites 02/29/2020    Anasarca 02/28/2020    Tachycardia 02/01/2019    V-tach ([address] 06/23/2015    AICD (automatic cardioverter/defibrillator) present 06/23/2015    Ischemic cardiomyopathy     Syncope     Coronary artery disease     Lightheaded 03/31/2015    Chest pain 08/23/2012    DMII (diabetes mellitus, type 2) ([facility] 08/02/2011        Assessment and Plan:     Nonischemic cardiomyopathy / CHF   ~ last echo 12/2020: EF <20%  ~ initially some improvement in scrotal edema but pt reports it is worse again   ~ continue diuresis and dobutamine at decreased dose d/t ventricular ectopy. Cirrhosis   ~ hx of periotic paracentesis   ~ now with worsening Scrotal edema   ~ plan for paracentesis today per GI     CKD   ~ per nephrology     Multiple medical conditions with risk of decompensation. All pertinent information and plan of care discussed with the physician. All questions and concerns were addressed to the patient. Alternatives to my treatment were discussed. I have discussed the above stated plan with patient and the nurse. The patient verbalized understanding and agreed with the plan. Thank you for allowing to us to participate in the care of Joseph South.     Tessa SANDERSON-CNP  [address]   Office: [phone]

## 2021-03-22 NOTE — PROGRESS NOTES
Gastroenterology Progress Note            Lucía Yee is a 77 y.o. male patient. Active Problems:    Nonischemic cardiomyopathy (HCC)  Resolved Problems:    * No resolved hospital problems. *      SUBJECTIVE:  Still c/o scrotal pain and swelling. Abd less distended s/p para. ROS:    Gastrointestinal ROS: no abdominal pain, change in bowel habits, or black or bloody stools. Cardiovascular ROS: negative  Respiratory ROS: negative    Physical    VITALS:  /66   Pulse 75   Temp 98.1 °F (36.7 °C)   Resp 16   Ht 5' 9.5\" (1.765 m)   Wt 171 lb 12.8 oz (77.9 kg)   SpO2 96%   BMI 25.01 kg/m²   TEMPERATURE:  Current - Temp: 98.1 °F (36.7 °C); Max - Temp  Av.8 °F (36.6 °C)  Min: 97.1 °F (36.2 °C)  Max: 98.1 °F (36.7 °C)    NAD  RRR, Nl s1s2  Lungs CTA Bilaterally, normal effort  Abdomen soft, ND, NT, no HSM, Bowel sounds normal.    Data    Data Review:    Recent Labs     21   WBC 5.9 5.2   HGB 12.5* 12.4*   HCT 37.3* 37.5*   MCV 98.2 97.3   * 105*     Recent Labs     21  04221   * 137 133*   K 4.0 3.3* 4.0    102 101   CO2 27 27 26   BUN 15 15 12   CREATININE 1.1 1.0 1.0     Recent Labs     21   AST 35 21 18   ALT 17 16 14   BILIDIR <0.2 <0.2 <0.2   BILITOT 0.6 0.6 0.6   ALKPHOS 123 121 107     No results for input(s): LIPASE, AMYLASE in the last 72 hours. No results for input(s): PROTIME, INR in the last 72 hours. No results for input(s): PTT in the last 72 hours. 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 Main c/o is scrotal edema. Agree with traction/elevation of scrotum and fluid removal with diuresis/paracentesis. s/p paracentesis 3/22 with 3.3 L fluid removed. Cell count neg for neutrocytic ascites.    Otherwise liver panel is normal.  Diuretics and inotropic meds per Cardiology.     Scrotal enlargement - mildly tender, but very uncomfortable. Agree with traction/elevation of scrotum and fluid removal with diuresis/paracentesis. Will get US scrotum c/w bilat hydroceles due to ascites. PLAN  :  1) Diuretics per Cards. 2) f/u ascitic fluid culture, albumin, protein  3) Scrotal elevation and bedrest.  4) 2 gm Na diet    Discussed with Dr. Zeenat Salvador, RAFA  [facility]  I have personally performed a face to face diagnostic evaluation on this patient. I have interviewed and examined the patient and I agree with the findings and recommended plan of care. In summary, my findings and plan are the following: As above, feels better after paracentesis and feels scrotum improved afterward. US shows hydrocele from ascites, but no other masses/disease. Agree with scrotal traction/elevation and diuresis. Can repeat paracentesis needed -- weekly to monthly. He may f/u with me in office in 3 months. Will follow from a distance. Please call with questions.     Diamond Padgett, [address] [place] [address]  3/22/2021

## 2021-03-22 NOTE — PROGRESS NOTES
Pt seen again after paracentesis. Feels better. Stop dobutamine, change to PO lasix, restart coreg. Noted urology consult. Pt is ok for discharge from a cardiology standpoint once cleared by other specialties. Discussed with bedside nurse.     Deirdre Mcnally, APRN-CNP

## 2021-03-22 NOTE — PROGRESS NOTES
Nephrology Progress Note  [phone]  [phone]   [email or web address]    Patient:  Tom Nielsen   :     Brief HPI   75 y.o.male with significant past medical history of Cirrhosis of liver and h/o multiple paracenteses, last paracentesis was in 2021, ETOH abuse, quit in ,  DM II, CAD/s/p PTCA/AICD , Hypertension, Hyperlipidemia, Diffuse large B cell Lymphoma in remission, HOSSEIN on CPAP presented to ER with worsening abdominal and scrotal swelling. He was seen by Dr. Alphonse Vaz in the office and plan made to get to ER on 3/19 for IV diuresis and then paracentesis if no improvement. His last echocardiogram was in Dec 2020 which showed a LVEF of < 20%, grade 1 DD, severely dilated RA, moderate TR.      He was last hospitalized in Oct 2020 with complaints of chest pain and palpitations. He was found to have VT/Wide complex tachycardia and underwent a DCCV, required vasopressor support. Hospital course complicated by YENNIFER, seen by our group in [facility], creatinine ranged from 2.4 on admission, improved to 1.1 on discharge. We are now consulted for further management  His creatinine at this time is 1  He is being diuresed with IV furosemide   Of note, takes furosemide, spironolactone, tamsulosin and midodrine at home. Interval History (Chart/Data reviewed): Scr is stable; reports he is unsure which meds he takes at home but his wife helps him. He notes taking his diuetics regularly and following fluid restrict. Cardiology weaning off inotropes, remains on IV lasix. ROS/  (+) SOB Better, scrotal edema better with sling, edema is better. (-) n/v/d/f/c/cp//sob. Updated at bedside: Patient, no family members  Past medical, family, and social histories were reviewed as previously documented. Updates were made as necessary. Review of Systems ROS with pertinent positives and negatives listed in interval history.            Meds:  Scheduled Meds:   magnesium oxide  400 mg Oral BID    docusate sodium  100 mg Oral BID    allopurinol  100 mg Oral Daily    aspirin  81 mg Oral Daily    atorvastatin  80 mg Oral Daily    clopidogrel  75 mg Oral Daily    famotidine  20 mg Oral BID    midodrine  5 mg Oral TID     spironolactone  12.5 mg Oral Daily    tamsulosin  0.4 mg Oral Nightly    sodium chloride flush  10 mL Intravenous 2 times per day    enoxaparin  40 mg Subcutaneous Daily    furosemide  40 mg Intravenous BID    insulin glargine  10 Units Subcutaneous Nightly    insulin lispro  0-12 Units Subcutaneous TID     insulin lispro  0-6 Units Subcutaneous Nightly    cefTRIAXone (ROCEPHIN) IV  1,000 mg Intravenous Q24H     Continuous Infusions:   DOBUTamine 5 mcg/kg/min (03/22/21 0457)    dextrose       PRN Meds:.potassium chloride **OR** potassium alternative oral replacement **OR** potassium chloride, magnesium sulfate, traZODone, sodium chloride flush, promethazine **OR** ondansetron, polyethylene glycol, acetaminophen **OR** acetaminophen, nitroGLYCERIN, glucose, dextrose, glucagon (rDNA), dextrose      Vitals:  BP 99/61   Pulse 68   Temp 97.8 °F (36.6 °C) (Oral)   Resp 18   Ht 5' 9.5\" (1.765 m)   Wt 172 lb 3.2 oz (78.1 kg)   SpO2 95%   BMI 25.06 kg/m²       General : AAOx3, not in pain or respiratory distress  HEENT : normocephalic, atraumatic, mucosa moist, no palor or icterus  CVS: S1 S2 normal, regular rhythm, no murmurs or rubs. Lungs: Clear, no wheezing or crackles. Abd: abdomen distended, bowel sounds+, non-tender  Ext: No edema, no cyanosis  Skin: Warm. No rashes appreciated.       Labs:  CBC with Differential:    Lab Results   Component Value Date    WBC 5.2 03/22/2021    RBC 3.86 03/22/2021    HGB 12.4 03/22/2021    HCT 37.5 03/22/2021     03/22/2021    MCV 97.3 03/22/2021    MCH 32.2 03/22/2021    MCHC 33.1 03/22/2021    RDW 17.0 03/22/2021    SEGSPCT 78.5 02/12/2010    LYMPHOPCT 9.4 03/22/2021    MONOPCT 9.9 03/22/2021    EOSPCT 1.3 02/12/2010    BASOPCT 0.6 03/22/2021    MONOSABS 0.5 03/22/2021    LYMPHSABS 0.5 03/22/2021    EOSABS 0.1 03/22/2021    BASOSABS 0.0 03/22/2021    DIFFTYPE Auto 02/12/2010     BMP:    Lab Results   Component Value Date     03/22/2021    K 4.0 03/22/2021    K 3.7 03/19/2021     03/22/2021    CO2 26 03/22/2021    BUN 12 03/22/2021    LABALBU 2.9 03/22/2021    CREATININE 1.0 03/22/2021    CALCIUM 8.1 03/22/2021    GFRAA >60 03/22/2021    GFRAA >60 02/13/2010    LABGLOM >60 03/22/2021    GLUCOSE 91 03/22/2021     Ionized Calcium:  No results found for: IONCA  Magnesium:    Lab Results   Component Value Date    MG 1.80 03/22/2021     Phosphorus:    Lab Results   Component Value Date    PHOS 2.7 10/22/2020     Last 3 Troponin:    Lab Results   Component Value Date    TROPONINI <0.01 03/19/2021    TROPONINI 0.38 10/19/2020    TROPONINI 0.42 10/19/2020     U/A:    Lab Results   Component Value Date    COLORU YELLOW 03/19/2021    PROTEINU Negative 03/19/2021    PHUR 7.5 03/19/2021    WBCUA 12 03/19/2021    RBCUA 1 03/19/2021    BACTERIA 3+ 10/18/2020    CLARITYU Clear 03/19/2021    SPECGRAV 1.007 03/19/2021    LEUKOCYTESUR SMALL 03/19/2021    UROBILINOGEN 1.0 03/19/2021    BILIRUBINUR Negative 03/19/2021    BILIRUBINUR NEGATIVE 02/12/2010    BLOODU Negative 03/19/2021    GLUCOSEU Negative 03/19/2021    GLUCOSEU NEGATIVE 02/12/2010    AMORPHOUS 3+ 10/18/2020       Assessment/Plan:    Hypokalemia; resolved; daily mg     CHF EF < 20 %; cardiology following; admitted form Dr. Bev Howell office  Continue IV furosemide and spironolactone  Inotrope being weaned per cardiology; was on dobumatmine     Cirrhosis of liver, alcoholic  With portal HTN and h/o periodic paracentesis, last on 3/9/2021  Now with worsening abdominal distension and scrotal swelling  On diuretics but would not expect good response  S/p para 3/22/21 3.3 L removed  Ok to continue diuretics for now     Hypomagnesemia  May help with constipation    Hyponatremia mild  Secondary to cirrhosis/CHF/increased ADH release/spironolactone     Thrombocytopenia  Better than base-line  Secondary to cirrhosis     CKD IIIa  Risk factors include DM II, Hypertension, prior AKIs, age  Creatinine currently at base-line  UA with no protein or blood      Recommendations:   S/p removal of 3.3L on para; reports breathing better/more mobile   Scr is back to 1.0; electrolytes stable  AM Mg    Please call if we can help in any way, but likely to sign off tomorrow; renal function/electrolytes stabilized. Appears below previous weights    Wt Readings from Last 10 Encounters:   03/22/21 171 lb 12.8 oz (77.9 kg)   03/16/21 173 lb 4.8 oz (78.6 kg)   02/04/21 191 lb 11.2 oz (87 kg)   12/28/20 199 lb 1 oz (90.3 kg)   12/08/20 188 lb (85.3 kg)   12/04/20 194 lb 3.2 oz (88.1 kg)   11/16/20 202 lb (91.6 kg)   10/27/20 200 lb (90.7 kg)   10/22/20 193 lb 9 oz (87.8 kg)   08/28/20 186 lb 6.4 oz (84.6 kg)   ]  Thank you for allowing us to participate in the care of this patient. Please call with any questions.   Signed:  Kanika Rodríguez M.D.   [facility]  Office Number: [phone]  Fax Number: [phone]  3/22/2021, 10:27 PM

## 2021-03-22 NOTE — BRIEF OP NOTE
Brief Postoperative Note    Joseph South  YOB: 1945  [id number]    Pre-operative Diagnosis: Ascites    Post-operative Diagnosis: Same    Procedure: US Guided Paracentesis    Anesthesia: Local    Surgeons/Assistants: SHEELA PEREZ PA-C    Estimated Blood Loss: less than 5 mL    Complications: None    Specimens: Was Obtained: clear and yellow Ascitic Fluid    Findings: Technically successful US guided paracentesis    Electronically signed by Minnie Goodson PA-C on 3/22/2021 at 11:56 AM

## 2021-03-22 NOTE — CONSULTS
Palliative Care:    a 77 y.o.  male history of type II DM, hypertension, hyperlipidemia, cirrhosis, CAD, ischemic cardiomyopathy s/p AICD in 2015, lymphoma, NSVT, HOSSEIN, who presented to ED as per Dr. Homer Orourke recommendation to get admitted. Patient has history of ischemic cardiomyopathy with LVEF less than 20%. Was seen by Dr. Alphonse Vaz on 3/16. He recommended patient come to ED to get admitted for diuresis and possible paracentesis. Patient is accompanied by his wife at bedside today. Reports abdominal distention, scrotal edema and dyspnea on exertion. Patient denies chest pain, cough, pain abdomen, nausea or vomitings.     In ED, patient was noted to have labile BPs with SBP's in the range  which is his baseline from underlying cirrhosis. Labs notable for serum sodium level 130, potassium 5.6.  proBNP level 8900. Urinalysis suggestive of UTI. Chest x-ray findings consistent with cardiomegaly. Patient received Lasix 80 mg IV in ED. Patient is being admitted with cardiology and nephrology consultations. Admitted 3/19/21 and Palliative consult ordered. BMP:         Recent Labs     03/20/21  0420 03/21/21  0421 03/22/21  0421   * 137 133*   K 4.0 3.3* 4.0    102 101   CO2 27 27 26   BUN 15 15 12   CREATININE 1.1 1.0 1.0   MG 1.70* 1.60* 1.80      Estimated Creatinine Clearance: 66 mL/min (based on SCr of 1 mg/dL). CBC:        Recent Labs     03/20/21 0420 03/22/21 0421   WBC 5.9 5.2   HGB 12.5* 12.4*   HCT 37.3* 37.5*   MCV 98.2 97.3   * 105*      Past Medical History:   has a past medical history of Cardiomyopathy ([facility] Cirrhosis ([facility] [facility] Diabetes mellitus ([facility] [facility] History of abdominal paracentesis, Hyperlipidemia, Hypertension, Lymphoma ([facility] NSVT (nonsustained ventricular tachycardia) ([facility] [facility] Sleep apnea, and Syncope. Past Surgical History:   has a past surgical history that includes Coronary angioplasty with stent; Cholecystectomy; laminectomy;  Tonsillectomy and pain today. Scrotum remains slightly swollen. Education on palliative ways to reduce swelling provided to patient/wife Nahomy Knapp. Agreeable to wear support strap when up ambulating. Discussed ACP/Code Status. DNR-CCA. Brochure provided to wife per her request. Wife listed as [facility]. Discussed Yazidism. Has [place] and agreeable for Chaplains spiritual support. Patient hopes to return to home today. Denies offer for Palliative to follow. States he does receive 100% [place] benefits and they may provide such services. Patient then told his story of being in [facility]. Has then since retired and spent 41 years as an  working with [employer]. Supportive wife at bedside. No order changes. Will continue to follow as needed. Jessica Adame

## 2021-03-22 NOTE — DISCHARGE SUMMARY
The [place] Sleepiness Scale       The [place] Sleepiness Scale is widely used in the field of sleep medicine as a subjective measure of a patient's sleepiness. The test is a list of eight situations in which you rate your tendency to become sleepy on a scale of 0, no chance to 3, high chance of dozing. Your score is based on a scale of 0 to 24. The scale estimates whether you are experiencing excessive sleepiness that possibly requires medical attention. How Sleepy Are You? How sleepy are you to doze off or fall asleep in the following situations? You should rate your chances of dozing off, not just feeling tired. Even if you have not done some of these things recently try to determine how they would have affected you.  For each situation, decide whether or not you would have:     0 = No chance of dozing 1 = Slight chance of dozing   2 = Moderate chance of  dozing 3 = High change of dozing       Situation                                                                                     Chance of Dozing    Sitting and reading  0 =  [x]  1 =    [] 2 =    [] 3 =    []    Watching TV  0 =  []  1 =    [] 2 =    [] 3 =    [x]      Sitting inactive in public place (e.g., a theater or a meeting)  0 =  []  1 =    [] 2 =    [x] 3 =    []    As a passenger in a car for an hour without a break          0  =  []  1 =    [] 2 =    [x] 3 =    []    Lying down to rest in the afternoon when circumstances permit    0 =  []  1 =    [] 2 =    [x] 3 =    []    Sitting and talking to someone  0 =  [x]  1 =    [] 2 =    [] 3 =    []      Sitting quietly after a lunch without alcohol  0 =  []  1 =    [x] 2 =    [] 3 =    []    In a car, while stopped for a few minutes in traffic                                                                      0 =  [x]  1 =    [] 2 =    [] 3 =    []    Total Score = 10    If your total score is 10 or greater, you are experiencing excessive sleepiness and should consider seeking a medical follow-up. Take a copy of this screening test to your primary care physician on your next office visit. Interpretation:      0 -   7: It is unlikely that you are abnormally sleepy. 8 -   9: You have an average amount of daytime sleepiness. 10 - 15: You may be excessively sleepy depending on the situation. You may want to consider seeking medical attention. 16 - 24: You are excessively sleepy and should consider seeking medical attention.       Electronically signed by Di Gifford RCP on 3/22/2021 at 5:17 PM

## 2021-03-22 NOTE — PROGRESS NOTES
Hospitalist Progress Note      PCP: [facility] [place]    Date of Admission: 3/19/2021    Chief Complaint: Abdominal distention and scrotal edema. Hospital Course: Roe Rod is a 77 y.o.  male history of type II DM, hypertension, hyperlipidemia, cirrhosis, CAD, ischemic cardiomyopathy s/p AICD in 2015, lymphoma, NSVT, HOSSEIN, who presented to ED as per Dr. Yemi Daniel recommendation to get admitted. In ED, patient was noted to have labile BPs with SBP's in the range  which is his baseline from underlying cirrhosis. Labs notable for serum sodium level 130, potassium 5.6.  proBNP level 8900. Urinalysis suggestive of UTI. Chest x-ray findings consistent with cardiomegaly. Patient received Lasix 80 mg IV in ED.   Patient is being admitted with cardiology, GI and nephrology consultations.      Subjective:   Had some nonsustained runs of V. tach on telemetry  Patient asymptomatic  Breathing improved  Dobutamine and Lasix drip have been reduced to half per cardiology  Still complains of scrotal pain particularly on the left side  No chest pain  No abdominal pain    Medications:  Reviewed    Infusion Medications    DOBUTamine 2.5 mcg/kg/min (03/22/21 0868)    dextrose       Scheduled Medications    magnesium oxide  400 mg Oral BID    docusate sodium  100 mg Oral BID    allopurinol  100 mg Oral Daily    aspirin  81 mg Oral Daily    atorvastatin  80 mg Oral Daily    clopidogrel  75 mg Oral Daily    famotidine  20 mg Oral BID    midodrine  5 mg Oral TID WC    spironolactone  12.5 mg Oral Daily    tamsulosin  0.4 mg Oral Nightly    sodium chloride flush  10 mL Intravenous 2 times per day    enoxaparin  40 mg Subcutaneous Daily    furosemide  40 mg Intravenous BID    insulin glargine  10 Units Subcutaneous Nightly    insulin lispro  0-12 Units Subcutaneous TID     insulin lispro  0-6 Units Subcutaneous Nightly    cefTRIAXone (ROCEPHIN) IV  1,000 mg Intravenous Q24H     PRN Meds: potassium chloride **OR** potassium alternative oral replacement **OR** potassium chloride, magnesium sulfate, traZODone, sodium chloride flush, promethazine **OR** ondansetron, polyethylene glycol, acetaminophen **OR** acetaminophen, nitroGLYCERIN, glucose, dextrose, glucagon (rDNA), dextrose      Intake/Output Summary (Last 24 hours) at 3/22/2021 1443  Last data filed at 3/22/2021 0420  Gross per 24 hour   Intake 730 ml   Output 800 ml   Net -70 ml       Physical Exam Performed:    /66   Pulse 75   Temp 98.1 °F (36.7 °C)   Resp 16   Ht 5' 9.5\" (1.765 m)   Wt 171 lb 12.8 oz (77.9 kg)   SpO2 96%   BMI 25.01 kg/m²     General appearance: Elderly  male, cachectic, not in distress  Cardiovascular: Regular rhythm, normal S1, S2. No murmur, gallop, rub. No edema in lower extremities  Respiratory: Clear to auscultation bilaterally, no wheeze, good inspiratory effort  Gastrointestinal: Abdomen soft, non-tender, distended, normal bowel sounds  Musculoskeletal: No cyanosis in digits, neck supple  : Massive scrotal edema noted tenderness to deep palpation on the left scrotum  Neurology: Cranial nerves grossly intact. Alert and oriented in time, place and person. No speech or motor deficits  Psychiatry: Appropriate affect. Not agitated  Skin: Warm, dry, normal turgor, no rash  Brisk capillary refill, peripheral pulses palpable          Labs:   Recent Labs     03/20/21 0420 03/22/21 0421   WBC 5.9 5.2   HGB 12.5* 12.4*   HCT 37.3* 37.5*   * 105*     Recent Labs     03/20/21 0420 03/21/21 0421 03/22/21 0421   * 137 133*   K 4.0 3.3* 4.0    102 101   CO2 27 27 26   BUN 15 15 12   CREATININE 1.1 1.0 1.0   CALCIUM 8.4 8.2* 8.1*     Recent Labs     03/20/21 0420 03/21/21 0421 03/22/21 0421   AST 35 21 18   ALT 17 16 14   BILIDIR <0.2 <0.2 <0.2   BILITOT 0.6 0.6 0.6   ALKPHOS 123 121 107     No results for input(s): INR in the last 72 hours.   No results for input(s): CKTOTAL, TROPONINI in the last 72 hours. Urinalysis:      Lab Results   Component Value Date    NITRU Negative 03/19/2021    WBCUA 12 03/19/2021    BACTERIA 3+ 10/18/2020    RBCUA 1 03/19/2021    BLOODU Negative 03/19/2021    SPECGRAV 1.007 03/19/2021    GLUCOSEU Negative 03/19/2021    GLUCOSEU NEGATIVE 02/12/2010       Radiology:  US SCROTUM AND TESTICLES   Final Result   Large right and moderate to large left hydroceles, likely related to the   patient's ascites. No intra testicular mass or torsion. IR US GUIDED PARACENTESIS   Final Result   Successful ultrasound guided paracentesis. XR CHEST PORTABLE   Final Result   Cardiomegaly with no significant pulmonary edema. Assessment/Plan:    52-year-old admitted to the hospital with abdominal distention    Acute on chronic severe systolic heart failure  Last ejection fraction 10%  S/p AICD  Chest x-ray showed cardiomegaly  Currently on IV Lasix 40 IV twice daily  Last 24 hours even balance  Seen by cardiology  Dobutamine drip decreased to half  Coreg has been held per cardiology    Cirrhosis of the liver with portal hypertension decompensated with ascites  S/p paracentesis 3.3 L  Seen by GI    Left scrotal pain  Suspect secondary to scrotal edema  Ultrasound only showed bilateral hydroceles  Urology consult  Scrotal elevation    Abnormal urine analysis  Urine cultures negative  Discontinue IV ceftriaxone    B-cell non-Hodgkin's lymphoma: In remission     Type II DM: Blood sugars elevated. Placed on Lantus 10 units subcu nightly, medium dose SSI to cover. Low-carb diet.     Chronic hypotension. BPs better on Dobutamine drip. Resumed on midodrine 5 mg p.o. 3 times daily. Off of Coreg as per cardiology.  on Aldactone for cardiomyopathy, with holding parameters.     Hyperlipidemia: Resumed on statin          IV access: peripheral IV  Salmon Catheterno  DVT Prophylaxis: Lovenox subcu daily  Diet: DIET LOW SODIUM 2 GM; Carb Control: 3 carb

## 2021-03-22 NOTE — PROGRESS NOTES
3300ml of fluid removed  Spoke to patients RN 3T and advised her the amount of fluid removed and that patient was returning to the floor.

## 2021-03-22 NOTE — PLAN OF CARE
Problem: Falls - Risk of:  Goal: Will remain free from falls  Description: Will remain free from falls  3/21/2021 2158 by Charise Bence, RN  Outcome: Ongoing  Note: Call light within reach, bed in lowest position, non-skid socks in place, and pt educated to use call light for assistance. Problem: Pain:  Goal: Pain level will decrease  Description: Pain level will decrease  3/21/2021 2158 by Charise Bence, RN  Note: Pt denies any pain at this time.

## 2021-03-23 NOTE — DISCHARGE INSTR - DIET
 Good nutrition is important when healing from an illness, injury, or surgery. Follow any nutrition recommendations given to you during your hospital stay.  If you were given an oral nutrition supplement while in the hospital, continue to take this supplement at home. You can take it with meals, in-between meals, and/or before bedtime. These supplements can be purchased at most local grocery stores, pharmacies, and chain super-stores.  If you have any questions about your diet or nutrition, call the hospital and ask for the dietitian. Please follow a low sodium diet. Record daily weights. Notify your doctor if you have a weight gain 2 pounds in a day, or 3-5 pounds in 1 week. Notify your doctor for increased shortness of breath, or swelling in legs or feet. If you have any more questions or concerns please call the Heart Failure Hotline at [phone]. Please follow a low sodium diet. Record daily weights. Notify your doctor if you have a weight gain 2 pounds in a day, or 3-5 pounds in 1 week. Notify your doctor for increased shortness of breath, or swelling in legs or feet. If you have any more questions or concerns please call the Heart Failure Hotline at [phone].

## 2021-03-23 NOTE — PROGRESS NOTES
[facility]   Cardiology Progress Note     Date: 3/23/2021  Admit Date: 3/19/2021     Reason for consultation:   Chief Complaint:   Chief Complaint   Patient presents with    Edema     pt states he is in from home per Dr. Aidee Penny to \"get fluid off\" pelvic area       History of Present Illness: History obtained from patient and medical record. Michael Schuler is a 77 y.o. male HTN, hyperlipidemia, CAD s/p PTCA CX/OM '04 and s/p PTCA LAD '06, chronic RCA occlusion; NSVT, ischemic CM/EF 15-20% s/p ICD '15, EF 50-55% on echo '16, declined to 15-20% on echo Jan '20 and HFrEF.  His other hx includes: liver tumor '18, cirrhosis, paracentesis, non-Hodgkin's / Diffuse Large B Cell Lymphoma. His chronic CHF worsened considerably after chemotherapy for lymphoma that seemed to involve liver. Pt presented to the hospital with complaints of scrotum edema even after recent paracentesis. Admitted for IV diuresis and dobutamine to allow for better diuresis. Interval Hx: Today, he is s/p paracentetics and drainage of scrotum. Feeling better though he does notice some fluid build up back in abd/ scrotum. He is hoping to go home today. Patient seen and examined. Clinical notes reviewed. Telemetry reviewed. No new complaints today. No major events overnight. Denies having chest pain, palpitations, shortness of breath, orthopnea/PND, cough, or dizziness at the time of this visit. Past Medical History:  Past Medical History:   Diagnosis Date    Cardiomyopathy ([address]     Cirrhosis ([address]     ETOH abuse    Diabetes mellitus ([address]     History of abdominal paracentesis 03/2020    Hyperlipidemia     Hypertension     Lymphoma ([address]     currently in remission    NSVT (nonsustained ventricular tachycardia) (HCC)     Sleep apnea     unable to tolerate CPAP    Syncope         Past Surgical History:    has a past surgical history that includes Coronary angioplasty with stent;  Cholecystectomy; laminectomy; Tonsillectomy and adenoidectomy; Cardiac defibrillator placement (6/23/15); toenail excision (07/21/2017); and Upper gastrointestinal endoscopy (N/A, 8/4/2020). Social History:  Reviewed. reports that he quit smoking about 46 years ago. His smoking use included pipe. He has a 64.00 pack-year smoking history. He has never used smokeless tobacco. He reports previous alcohol use. He reports that he does not use drugs. Allergies: Allergies   Allergen Reactions    Cortisone Other (See Comments)     Other reaction(s): Other (See Comments)  Hot flashes  Other reaction(s): Other (See Comments)  Hot flashes    Morphine Other (See Comments)     Agitation/violence  Tolerates Percocet (oxycodone/APAP)    Metformin Other (See Comments)     Violent behavior    Metformin Hcl Other (See Comments)     Violent behavior    Lisinopril Other (See Comments)     cough       Family History:  Reviewed. family history includes Heart Disease in his father and mother. Denies family history of sudden cardiac death, arrhythmia, premature CAD    Home Meds:  Prior to Visit Medications    Medication Sig Taking?  Authorizing Provider   furosemide (LASIX) 40 MG tablet Take 1 tablet by mouth daily Yes Sandeep Valadez MD   allopurinol (ZYLOPRIM) 300 MG tablet TAKE ONE-HALF TABLET BY MOUTH ONCE DAILY FOR GOUT Yes Historical Provider, MD   vitamin B-12 (CYANOCOBALAMIN) 1000 MCG tablet Take 1,000 mcg by mouth daily Yes Historical Provider, MD   Multiple Vitamins-Minerals (ONE-A-DAY MENS 50+ PO) Take 1 tablet by mouth daily Yes Historical Provider, MD   spironolactone (ALDACTONE) 25 MG tablet Take 0.5 tablets by mouth daily Yes Haseeb Poplin, APRN - CNP   clopidogrel (PLAVIX) 75 MG tablet Take 1 tablet by mouth daily Yes Mikaela Munoz MD   carvedilol (COREG) 3.125 MG tablet Take 1 tablet by mouth 2 times daily Yes Mikaela Munoz MD   midodrine (PROAMATINE) 5 MG tablet Take 1 tablet by mouth 3 times daily (with meals) Yes Liat Henley, MD   tamsulosin (FLOMAX) 0.4 MG capsule Take 0.4 mg by mouth nightly Yes Historical Provider, MD   MAGNESIUM OXIDE 400 PO Take 400 mg by mouth 2 times daily  Yes Historical Provider, MD   Calcium Carb-Cholecalciferol (CALCIUM 1000 + D PO) Take 1,000 mg by mouth 2 times daily Yes Historical Provider, MD   famotidine (PEPCID) 20 MG tablet Take 20 mg by mouth 2 times daily  Yes Historical Provider, MD   aspirin 81 MG tablet Take 81 mg by mouth daily Yes Historical Provider, MD   vitamin D (CHOLECALCIFEROL) 1000 UNITS TABS tablet Take 1,000 Units by mouth daily Yes Historical Provider, MD   glipiZIDE (GLUCOTROL) 5 MG tablet Take 7.5 mg by mouth 2 times daily (before meals)  Yes Historical Provider, MD   atorvastatin (LIPITOR) 80 MG tablet Take 80 mg by mouth daily. Yes Historical Provider, MD   traZODone (DESYREL) 50 MG tablet TAKE ONE-HALF TABLET BY MOUTH BEDTIME  Historical Provider, MD   loperamide (IMODIUM) 2 MG capsule Take 2 mg by mouth 4 times daily as needed for Diarrhea  Historical Provider, MD   nitroGLYCERIN (NITROSTAT) 0.4 MG SL tablet Place 0.4 mg under the tongue every 5 minutes as needed for Chest pain up to max of 3 total doses. If no relief after 1 dose, call 911.   Historical Provider, MD        Scheduled Meds:   midodrine  10 mg Oral TID     carvedilol  3.125 mg Oral BID     furosemide  40 mg Oral Daily    magnesium oxide  400 mg Oral BID    docusate sodium  100 mg Oral BID    allopurinol  100 mg Oral Daily    aspirin  81 mg Oral Daily    atorvastatin  80 mg Oral Daily    clopidogrel  75 mg Oral Daily    famotidine  20 mg Oral BID    spironolactone  12.5 mg Oral Daily    tamsulosin  0.4 mg Oral Nightly    sodium chloride flush  10 mL Intravenous 2 times per day    enoxaparin  40 mg Subcutaneous Daily    insulin glargine  10 Units Subcutaneous Nightly    insulin lispro  0-12 Units Subcutaneous TID     insulin lispro  0-6 Units Subcutaneous Nightly     Continuous Infusions:   dextrose       PRN Meds:potassium chloride **OR** potassium alternative oral replacement **OR** potassium chloride, magnesium sulfate, traZODone, sodium chloride flush, promethazine **OR** ondansetron, polyethylene glycol, acetaminophen **OR** acetaminophen, nitroGLYCERIN, glucose, dextrose, glucagon (rDNA), dextrose     Review of Systems:  · Constitutional: Negative for fever, night sweats, chills, weight changes  · Skin: Negative for rash, pruritus, bleeding, blood clots, or bruising    · HEENT: Negative for vision changes, ringing in the ears, dysphagia, or swollen lymph nodes  · Respiratory: Reviewed in HPI  · Cardiovascular: Reviewed in HPI  · Gastrointestinal: Negative for abdominal pain, N/V/D, constipation, or black/tarry stools  · Genito-Urinary: Negative for dysuria, incontinence, or hematuria  · Musculoskeletal: Negative for joint swelling, muscle pain, or injuries  · Neurological/Psych: Negative for confusion, seizures, headaches, or TIA-like symptoms. No anxiety or depression. Physical Examination:  Vitals:    03/23/21 0945   BP: (!) 92/57   Pulse: 61   Resp: 18   Temp: 97.1 °F (36.2 °C)   SpO2: 95%      In: 840 [P.O.:840]  Out: 675    Wt Readings from Last 3 Encounters:   03/23/21 160 lb 4.8 oz (72.7 kg)   03/16/21 173 lb 4.8 oz (78.6 kg)   02/04/21 191 lb 11.2 oz (87 kg)       Intake/Output Summary (Last 24 hours) at 3/23/2021 1107  Last data filed at 3/23/2021 0945  Gross per 24 hour   Intake 1320 ml   Output 875 ml   Net 445 ml       Telemetry: Personally Reviewed  - Sinus rhythm with frequent PVCs  · Constitutional: Cooperative and in no apparent distress, and appears well nourished  · Skin: Warm and pink; no pallor, cyanosis, clubbing, or bruising   · HEENT: Symmetric and normocephalic. PERRL, EOM intact. Conjunctiva pink with clear sclera. Mucus membranes moist.   · Cardiovascular: Regular rate and rhythm. S1/S2 present without murmurs, no rubs or gallops.  Peripheral pulses 2+, capillary refill < 3 seconds. No elevation of JVP. No peripheral edema  · Respiratory: Respirations symmetric and unlabored. Lungs clear to auscultation bilaterally, no wheezing, crackles, or rhonchi  · Gastrointestinal: Abdomen soft and rounded. Bowel sounds active without tenderness or masses. +scrotal swelling  · Musculoskeletal: Bilateral upper and lower extremity strength with some weakness   · Neurologic/Psych: Awake and orientated to person, place and time. Calm affect, appropriate mood    Pertinent labs, diagnostic, device, and imaging results reviewed as a part of this visit    Labs:    BMP:   Recent Labs     03/21/21 0421 03/22/21 0421 03/23/21 0422    133* 134*   K 3.3* 4.0 4.3    101 101   CO2 27 26 27   BUN 15 12 12   CREATININE 1.0 1.0 1.1   MG 1.60* 1.80 1.90     Estimated Creatinine Clearance: 60 mL/min (based on SCr of 1.1 mg/dL).    CBC:   Recent Labs     03/22/21 0421 03/23/21 0422   WBC 5.2 6.3   HGB 12.4* 13.4*   HCT 37.5* 40.3*   MCV 97.3 98.0   * 119*     Thyroid: No results found for: TSH, E1RLHLU, V7ZLVAP, THYROIDAB  Lipids:   Lab Results   Component Value Date    CHOL 105 03/20/2021    HDL 38 03/20/2021    HDL 35 01/15/2010    TRIG 92 03/20/2021     LFTS:   Lab Results   Component Value Date    ALT 18 03/23/2021    AST 22 03/23/2021    ALKPHOS 121 03/23/2021    PROT 5.8 03/23/2021    PROT 7.7 02/12/2010    AGRATIO 0.9 03/19/2021    BILITOT 0.8 03/23/2021     Cardiac Enzymes:   Lab Results   Component Value Date    CKTOTAL 65 02/12/2010    CKMB 0.78 01/15/2010    TROPONINI <0.01 03/19/2021    TROPONINI 0.38 10/19/2020    TROPONINI 0.42 10/19/2020     Coags:   Lab Results   Component Value Date    PROTIME 12.7 03/19/2021    PROTIME 10.4 01/13/2010    INR 1.09 03/19/2021     ECG: 3/19/21  Sinus bradycardia  Left axis deviation  Septal infarct , age undetermined  Possible Lateral infarct , age undetermined  Inferior infarct , age undetermined  Premature ventricular complexes    ECHO:  12/7/20  Summary   -Left ventricular cavity size is normal.   -Overall left ventricular systolic function appears severely reduced.   -Ejection fraction is visually estimated to be <20%. -There is abnormal septal motion noted. -Cannot rule out other wall motion abnormalities. -Grade I diastolic dysfunction with normal LV filling pressures. E/e' =   11.8.   -The left atrium is dilated. -The right atrium is severely dilated. -The aortic valve appears sclerotic but opens well. -Mild mitral regurgitation.   -Moderate tricuspid regurgitation.   -Right ventricle size is normal with reduced function. TAPSE = 1.3 cm. RVS   velocity is 8.27 cm/s. -RVSP = 28 mmHG. Cannot rule out pleural effusion vs shadowing. Stress Test: 10/2018  Summary       No EKG evidence for ischemia with lexiscan       Reduced LV systolic function with EF of 44% (ectopy may affect accuracy)       There is normal isotope uptake at stress and rest. There is no evidence of    myocardial ischemia or scar. Last Angiogram: 8/2012   Cath with patent LAD ,marginal and CFX stents. Chronic RCA occlusion.  EF 40% with LVEDP 20    Problem List:   Patient Active Problem List    Diagnosis Date Noted    Mixed hyperlipidemia 08/02/2011     Priority: High    Essential hypertension, benign 08/02/2011     Priority: High    Coronary atherosclerosis of native coronary artery 08/02/2011     Priority: High    Cardiomyopathy ([address] 08/02/2011     Priority: High    Nonischemic cardiomyopathy ([address] 03/19/2021    Hypotension 10/17/2020    Acute respiratory failure with hypoxia ([address] 10/17/2020    Acute renal failure superimposed on stage 3 chronic kidney disease ([address] 10/17/2020    YENNIFER (acute kidney injury) ([address]     Diffuse large B-cell lymphoma of solid organ excluding spleen ([address] 03/02/2020    Ascites 02/29/2020    Anasarca 02/28/2020    Tachycardia 02/01/2019    V-tach ([address] 06/23/2015    AICD (automatic cardioverter/defibrillator) present 06/23/2015    Ischemic cardiomyopathy     Syncope     Coronary artery disease     Lightheaded 03/31/2015    Chest pain 08/23/2012    DMII (diabetes mellitus, type 2) ([facility] 08/02/2011        Assessment and Plan:     Nonischemic cardiomyopathy / CHF   ~ last echo 12/2020: EF <20%  ~ s/p paracentesis and scrotum fluid removal yesterday  ~ dobutamine stopped and IV lasix changed to PO. Restarted low dose coreg. Cirrhosis   ~ hx of periotic paracentesis   ~ admitted with worsening Scrotal edema   ~ s/p paracentesis and scrotum fluid removal yesterday    ~ scrotal US c/w hydroceles d/t ascites. recs for scrotal traction and elevation    CKD   ~ per nephrology     PVCs   ~ frequent and asymptomatic, improved some since yesterday   ~ off dobutamine   ~ restarted coreg last night  ~ monitor     Pt's SBP in / (asymptomatic). Increase midodrine to 10 TID. Plan to discharge with lasix 40mg daily. Educated on home BP monitoring and bring log to f/u visit. Plan for close 1 week follow up in office. Call with questions or concerns. Multiple medical conditions with risk of decompensation. All pertinent information and plan of care discussed with the physician. All questions and concerns were addressed to the patient. Alternatives to my treatment were discussed. I have discussed the above stated plan with patient and the nurse. The patient verbalized understanding and agreed with the plan. Thank you for allowing to us to participate in the care of Siddharth Adrian.     Jonelle Borrego APRN-CNP  [address]   Office: [phone]

## 2021-03-23 NOTE — PROGRESS NOTES
Physical Therapy    Facility/Department: [address]  Initial Assessment/Discharge Summary    NAME: Deepali Britt  : [id number]  MRN: [id number]    Date of Service: 3/23/2021    Discharge Recommendations:      PT Equipment Recommendations  Equipment Needed: No    Assessment   Assessment: Patient independent with functional mobility. No further acute PT needs  Decision Making: Low Complexity  Clinical Presentation: stable  PT Education: PT Role;Plan of Care  Patient Education: d/c recommendations - verbalized understanding  Barriers to Learning: hearing  REQUIRES PT FOLLOW UP: No  Activity Tolerance  Activity Tolerance: Patient Tolerated treatment well       Patient Diagnosis(es): The encounter diagnosis was Hypervolemia, unspecified hypervolemia type. has a past medical history of Cardiomyopathy ([facility] [address] Cirrhosis ([facility] [address] Diabetes mellitus ([facility] [address] History of abdominal paracentesis, Hyperlipidemia, Hypertension, Lymphoma ([facility] [address] NSVT (nonsustained ventricular tachycardia) ([facility] [address] Sleep apnea, and Syncope.   has a past surgical history that includes Coronary angioplasty with stent; Cholecystectomy; laminectomy; Tonsillectomy and adenoidectomy; Cardiac defibrillator placement (6/23/15); toenail excision (2017); and Upper gastrointestinal endoscopy (N/A, 2020). Restrictions  Restrictions/Precautions  Restrictions/Precautions: Fall Risk(medium fall risk)  Required Braces or Orthoses?: No  Position Activity Restriction  Other position/activity restrictions: Deepali Britt is a 77 y.o. male who presents because of retained fluid. He has a history of severe cardiomyopathy and cirrhosis. He has required paracentesis multiple times in the past.  His last drainage was on Monday. He has persistent swelling in his scrotal area and lower abdomen with discomfort with sitting and walking. He is followed by Dr. Ai Timmons. He saw Dr. Ai Timmons in the office on Wednesday.   Plan was for admission on Friday for IV Lasix  Vision/Hearing  Vision: Impaired  Vision Exceptions: Wears glasses for reading  Hearing: Exceptions to [facility]  Hearing Exceptions: Hard of hearing/hearing concerns;Bilateral hearing aid     Subjective  General  Chart Reviewed: Yes  Family / Caregiver Present: Yes(spouse)  Diagnosis: cardiomyopathy  Follows Commands: Within Functional Limits  General Comment  Comments: seated EOB upon arrival  Subjective  Subjective: Reported 4/10 scrotal pain -attributed to swelling. Agreeable to therapy. Federica Raffy to go home this date. Pain Screening  Patient Currently in Pain: Yes  Pain Assessment  Pain Assessment: 0-10  Pain Level: 4  Pain Location: Scrotum  Vital Signs  Patient Currently in Pain: Yes       Orientation  Orientation  Overall Orientation Status: Within Functional Limits  Social/Functional History  Social/Functional History  Lives With: Spouse  Type of Home: House  Home Layout: 1/2 bath on main level, Bed/Bath upstairs, Two level  Home Access: Stairs to enter with rails  Entrance Stairs - Number of Steps: 6 KORIN  Bathroom Shower/Tub: Walk-in shower  Bathroom Toilet: Standard  Bathroom Equipment: Grab bars in shower, Shower chair, Hand-held shower  Home Equipment: Quad cane  ADL Assistance: Independent  Homemaking Responsibilities: No(spouse performs)  Ambulation Assistance: Independent(with SBQC)  Transfer Assistance: Independent  Active : Yes  Mode of Transportation: Car  Additional Comments: denies recent falls    Objective          AROM RLE (degrees)  RLE AROM: WFL  AROM LLE (degrees)  LLE AROM : WFL  Strength RLE  Strength RLE: WFL  Strength LLE  Strength LLE: WFL     Sensation  Overall Sensation Status: WFL     Transfers  Sit to Stand: Independent  Stand to sit:  Independent  Ambulation  Ambulation?: Yes  Ambulation 1  Surface: level tile  Device: Single point cane  Assistance: Modified Independent  Quality of Gait: steady gait, decreased [place]  Distance: 150'  Stairs/Curb  Stairs?: No(declined) Balance  Sitting - Static: Good  Sitting - Dynamic: Good  Standing - Static: Good  Standing - Dynamic: Good        Plan   Plan  Times per week: D/C acute PT  Safety Devices  Type of devices:  All fall risk precautions in place, Call light within reach, Left in bed, Nurse notified  Restraints  Initially in place: No    AM-PAC Score  AM-PAC Inpatient Mobility Raw Score : 24 (03/23/21 1001)  AM-PAC Inpatient T-Scale Score : 61.14 (03/23/21 1001)  Mobility Inpatient CMS 0-100% Score: 0 (03/23/21 1001)  Mobility Inpatient CMS G-Code Modifier : CH (03/23/21 1001)          Goals  Short term goals  Time Frame for Short term goals: No acute PT goals       Therapy Time   Individual Concurrent Group Co-treatment   Time In 0843         Time Out 0900         Minutes 17         Timed Code Treatment Minutes: 0 Minutes       Wale Schreiber, PT  Thanks, Wale Schreiber, PT, DPT [id number]

## 2021-03-23 NOTE — DISCHARGE SUMMARY
Patient: Joseph South     Gender: male  : 1945   Age: 77 y.o. MRN: [id number]    Admitting Physician: Bijan Hill MD  Discharge Physician: Keyana Gr MD     Code Status: DNR-CCA     Admit Date: 3/19/2021   Discharge Date:  3/22/2021    Disposition:  Home    Discharge Diagnoses:  Acute on chronic severe systolic heart failure  Cirrhosis of the liver with portal hypertension with ascites s/p paracentesis  Left scrotal pain contrary to edema  Active Hospital Problems    Diagnosis Date Noted    Nonischemic cardiomyopathy ([address] [I42.8] 2021       Follow-up appointments:  one week    Outpatient to do list: none     Condition at Discharge:  [address] Course:   56-year-old admitted to the hospital with abdominal distention     Acute on chronic severe systolic heart failure  Last ejection fraction 10%  S/p AICD  Chest x-ray showed cardiomegaly  And was started on dobutamine and diuresed with IV Lasix once he was euvolemic dobutamine was stopped he was changed over to oral Lasix per cardiology       Cirrhosis of the liver with portal hypertension decompensated with ascites  S/p paracentesis 3.3 L  Seen by GI     Left scrotal pain  Suspect secondary to scrotal edema  Ultrasound only showed bilateral hydroceles  Urology consulted  Scrotal elevation     Abnormal urine analysis  Urine cultures negative  Discontinued IV ceftriaxone     B-cell non-Hodgkin's lymphoma: In remission     Type II DM:   Blood sugars elevated.  Placed on Lantus 10 units subcu nightly, medium dose SSI to cover.  Low-carb diet.       Hyperlipidemia: Resumed on statin    Discharge Medications:   Current Discharge Medication List        Current Discharge Medication List      CONTINUE these medications which have CHANGED    Details   furosemide (LASIX) 40 MG tablet Take 1 tablet by mouth daily  Qty: 60 tablet, Refills: 3      midodrine (PROAMATINE) 10 MG tablet Take 1 tablet by mouth 3 times daily (with meals)  Qty: 90 tablet, appearance:  NAD  HEENT:   Normal cephalic, atraumatic, moist mucous membranes, no oropharyngeal erythema or exudate  Heart[de-identified] Normal s1/s2, RRR, no murmurs, gallops, or rubs. no leg edema  Lungs:  Normal respiratory effort. Clear to auscultation, bilaterally without Rales/Wheezes/Rhonchi. Abdomen: Soft, non-tender, non-distended, bowel sounds present, no masses  Musculoskeletal:  No clubbing, no cyanosis, *  Neurologic:  Neurovascularly intact without any focal sensory/motor deficits. Cranial nerves: II-XII intact, grossly non-focal.    Labs: For convenience and continuity at follow-up the following most recent labs are provided:    Lab Results   Component Value Date    WBC 6.3 03/23/2021    HGB 13.4 03/23/2021    HCT 40.3 03/23/2021    MCV 98.0 03/23/2021     03/23/2021     03/23/2021    K 4.3 03/23/2021    K 3.7 03/19/2021     03/23/2021    CO2 27 03/23/2021    BUN 12 03/23/2021    CREATININE 1.1 03/23/2021    CALCIUM 8.5 03/23/2021    PHOS 2.7 10/22/2020    ALKPHOS 121 03/23/2021    ALT 18 03/23/2021    AST 22 03/23/2021    BILITOT 0.8 03/23/2021    BILIDIR 0.3 03/23/2021    LABALBU 3.2 03/23/2021    LDLCALC 49 03/20/2021    TRIG 92 03/20/2021     Lab Results   Component Value Date    INR 1.09 03/19/2021    INR 1.03 03/09/2021    INR 1.06 02/10/2021           The patient was seen and examined on day of discharge and this discharge summary is in conjunction with any daily progress note from day of discharge. Time Spent on discharge is 45 minutes  in the examination, evaluation, counseling and review of medications and discharge plan. Note that greater  than 30 minutes was spent in preparing discharge papers, discussing discharge with patient, medication review, etc.       Signed:    Laron Escobar MD   3/23/2021      Thank you [facility] for the opportunity to be involved in this patient's care.  If you have any questions or concerns please feel free to contact me

## 2021-03-23 NOTE — PROGRESS NOTES
Occupational Therapy   Occupational Therapy Initial Assessment/Discharge Summary    Date: 3/23/2021   Patient Name: Barbie Christiansen  MRN: [id number]     : 1945    Date of Service: 3/23/2021    Discharge Recommendations:  Barbie Christiansen scored a 24/24 on the -[facility] ADL Inpatient form. At this time, no further OT is recommended upon discharge due to pt is at his baseline level of occupational function. Recommend patient returns to prior setting. OT Equipment Recommendations  Equipment Needed: No    Assessment   Assessment: Pt is at his baseline level of occupational function. He is Mod I with functional mobility and ADLs. No further OT needs. Decision Making: Low Complexity  History: Pt 78 yo, lives w/wife, 2-story home, I ADLs, wife performand IADLs, ambulates w/Hurry Cane, no falls. PMH: DM, HTN, HLD, lymphoma, in remission, cor angioplasty w/stent  Exam: ROM, MMT, 6 clicks, no performance deficits  Assistance / Modification: None  OT Education: OT Role  Patient Education: D/C recommendation. Pt may need reinforcement d/t hearing deficit. Barriers to Learning: Hearing  REQUIRES OT FOLLOW UP: No  Activity Tolerance  Activity Tolerance: Patient Tolerated treatment well  Safety Devices  Safety Devices in place: Yes  Type of devices: Call light within reach;Nurse notified;Gait belt(Wife present; pt seated on EOB)           Patient Diagnosis(es): The encounter diagnosis was Hypervolemia, unspecified hypervolemia type. has a past medical history of Cardiomyopathy ([address] Cirrhosis ([address] Diabetes mellitus ([address] History of abdominal paracentesis, Hyperlipidemia, Hypertension, Lymphoma ([address] NSVT (nonsustained ventricular tachycardia) ([address] Sleep apnea, and Syncope.   has a past surgical history that includes Coronary angioplasty with stent; Cholecystectomy; laminectomy;  Tonsillectomy and adenoidectomy; Cardiac defibrillator placement (6/23/15); toenail excision (2017); and Upper gastrointestinal endoscopy (N/A, 8/4/2020). Restrictions  Restrictions/Precautions  Restrictions/Precautions: Fall Risk(medium fall risk)  Required Braces or Orthoses?: No  Position Activity Restriction  Other position/activity restrictions: Soha Soto is a 77 y.o. male who presents because of retained fluid. He has a history of severe cardiomyopathy and cirrhosis. He has required paracentesis multiple times in the past.  His last drainage was on Monday. He has persistent swelling in his scrotal area and lower abdomen with discomfort with sitting and walking. He is followed by Dr. Angus Luong. He saw Dr. Angus Luong in the office on Wednesday.   Plan was for admission on Friday for IV Lasix    Subjective   General  Chart Reviewed: Yes  Response to previous treatment: Patient with no complaints from previous session  Family / Caregiver Present: Yes(Wife)  Diagnosis: Nonischemic Cardiomyopath  Subjective  Subjective: Pt on EOB upon arrival and agreeable to eval. Pt reports 4/10 pain level  Patient Currently in Pain: Yes  Pain Assessment  Pain Assessment: 0-10  Pain Level: 4  Pain Type: Acute pain  Pain Location: Groin  Pain Orientation: Right;Left  Pain Frequency: Continuous  Pain Onset: On-going  Clinical Progression: Gradually improving  Pre Treatment Pain Screening  Pain at present: 4  Intervention List: Patient able to continue with treatment  Vital Signs  Temp: 97.1 °F (36.2 °C)  Temp Source: Oral  Pulse: 61  Heart Rate Source: Monitor  Resp: 18  BP: (!) 92/57  BP Location: Left upper arm  MAP (mmHg): 69  Patient Position: Semi fowlers  Level of Consciousness: Alert (0)  MEWS Score: 2  Patient Currently in Pain: Yes  Height and Weight  Weight: 160 lb 4.8 oz (72.7 kg)  Weight Method: Bedside scale  BMI (Calculated): 23.4  Oxygen Therapy  SpO2: 95 %  Pulse Oximeter Device Mode: Intermittent  Pulse Oximeter Device Location: Finger  O2 Device: None (Room air)  Social/Functional History  Social/Functional History  Lives With: Spouse  Type of Home: House  Home Layout: 1/2 bath on main level, Bed/Bath upstairs, Two level  Home Access: Stairs to enter with rails  Entrance Stairs - Number of Steps: 6 KORIN  Bathroom Shower/Tub: Walk-in shower  Bathroom Toilet: Standard  Bathroom Equipment: Grab bars in shower, Shower chair, Hand-held shower  Home Equipment: (Hurry Cane)  ADL Assistance: Independent  Homemaking Responsibilities: No(spouse performs)  Ambulation Assistance: Independent(with SBQC)  Transfer Assistance: Independent  Active : Yes  Mode of Transportation: Car  Additional Comments: denies recent falls       Objective   Vision: Impaired  Vision Exceptions: Wears glasses for reading  Hearing: Exceptions to [place]  Hearing Exceptions: Hard of hearing/hearing concerns;Bilateral hearing aid    Orientation  Overall Orientation Status: Within Normal Limits     Balance  Sitting Balance: Independent  Standing Balance: Modified independent (w/SPC)  Standing Balance  Time: ~2-3 Minutes  Activity: functional mobility in lim  Functional Mobility  Functional - Mobility Device: Cane(One point cane)  Activity: Other  Assist Level: Modified independent   Functional Mobility Comments: Ambulated down the lim and back approx 70 ft, no LOB  ADL  LE Dressing: Independent(Socks, I standing balance)  Additional Comments: Pt declined need for ADLs. Tone RUE  RUE Tone: Normotonic  Tone LUE  LUE Tone: Normotonic  Coordination  Movements Are Fluid And Coordinated: Yes     Bed mobility  Comment: Pt sitting EOB I; not assessed. Transfers  Stand Step Transfers: Modified independent(SPC)  Sit to stand: Independent  Stand to sit: Independent  Vision - Basic Assessment  Prior Vision: Wears glasses only for reading  Visual History: No significant visual history  Patient Visual Report: No visual complaint reported.   Cognition  Overall Cognitive Status: WNL  Perception  Overall Perceptual Status: WFL     Sensation  Overall Sensation Status: WFL        LUE AROM (degrees)  LUE AROM : WFL  Left Hand AROM (degrees)  Left Hand AROM: WFL  RUE AROM (degrees)  RUE AROM : WFL  Right Hand AROM (degrees)  Right Hand AROM: WFL  LUE Strength  Gross LUE Strength: WFL(elbow, shoulder 4+)  L Hand General: 4+/5  RUE Strength  Gross RUE Strength: WFL(elbow, shoulder 4+)  R Hand General: 4+/5                   Plan   Plan  Plan Comment: D/C acute OT    G-Code     OutComes Score                                                  AM-PAC Score        AM-[facility] Inpatient Daily Activity Raw Score: 24 (03/23/21 0931)  AM-PAC Inpatient ADL T-Scale Score : 57.54 (03/23/21 0931)  ADL Inpatient CMS 0-100% Score: 0 (03/23/21 0931)  ADL Inpatient CMS G-Code Modifier : [facility] (03/23/21 0931)    Goals: None          Therapy Time   Individual Concurrent Group Co-treatment   Time In 0843         Time Out 0900         Minutes 17            Timed Code Treatment Minutes:  0 Minutes    Total Treatment Minutes:  16 min    C/ [address], OT   Sang Starr., OTR/L, [id number]

## 2021-03-23 NOTE — PROGRESS NOTES
Data- discharge order received, pt verbalized agreement to discharge, disposition to previous residence, no needs for HHC/DME. Action- discharge instructions prepared and given to patient, pt verbalized understanding. Medication information packet given r/t NEW and/or CHANGED prescriptions emphasizing name/purpose/side effects, pt verbalized understanding. Discharge instruction summary: Diet- low sodium, carb controlled, 1500 ml fluid restriction, Activity- as tolerated, Primary Care Physician as follows: [facility] None f/u appointment with in one week, written prescriptions given to patient. Response- Pt belongings gathered, IV removed. Disposition is home (no HHC/DME needs), transported with belongings, taken to lobby via w/c w/ spouse, no complications.

## 2021-03-23 NOTE — PLAN OF CARE
Problem: Falls - Risk of:  Goal: Will remain free from falls  Description: Will remain free from falls  3/23/2021 1017 by Candance Sabal, RN  Outcome: Ongoing  Note: Pt remains free from falls at this time. Pt is low fall risk, ambulates to bathroom without ambulatory aid, gait is steady and pt tolerates ambulation well. Pt calls appropriately. Bed is locked and lowest position, call light in reach, bedside table with belongings in reach. Pt denies needs at this time, will continue to monitor.     3/23/2021 0040 by Charise Bence, RN  Outcome: Ongoing  Goal: Absence of physical injury  Description: Absence of physical injury  Outcome: Ongoing

## 2021-03-23 NOTE — CONSULTS
Urology Consult Note  [facility]     Patient: Enzo Monroy MRN: [id number]  Room/Bed: 6XB-2851/7758-06   YOB: 1945  Age/Sex: 77 y.o.male  Admission Date: 3/19/2021     Date of Service:  3/23/2021    Consulting Provider: KIRSTEN Woods  Admitting/Requesting Physician: Jerrod Holman MD  Primary Care Physician: [facility] [place]    Reason for Consult: Scrotal Edema, Bilateral Hydrocele    ASSESSMENT/PLAN     76 yo M with hx fo DM, CHF , CAD, ischemic cardiomyopathy s/p AICD, cirrhosis, presented to ED by Dr. Carlos Underwood for labile blood pressure. Admitted as a CHF exacerbation with cirrhosis and ascites. Consulted for scrotal edema. On exam patient has large right hydrocele and scrotal swelling. Tender to palpation. Scrotal US showing bilateral hydroceles, larger on the right. Recommendations:  -Scrotal pain and edema likely secondary to CHF exacerbation and ascites. Pt is now s/p paracentesis with 3.3L output and feeling much better.  -Scrotal elevation   -Continued diuresis per hospital team as needed   -Nothing to do with bilat hydroceles unless very uncomfortable or painful    All patient questions were answered. He understands the plan as listed above. HISTORY     Chief Complaint:   Chief Complaint   Patient presents with    Edema     pt states he is in from home per Dr. Carlos Underwood to \"get fluid off\" pelvic area       History of Present Illness: Enzo Monroy is a 77 y.o. male with . Onset of symptoms was days ago with improving course since that time. Symptoms are aggravated by ascites from liver cirrhosis. Symptoms improved with paracentesis and lasix. Associated symptoms include scrotal pain. Patient also reports scrotal swelling. He has tried the following treatments: paracentesis and lasix.     Past Medical History:  He has a past medical history of Cardiomyopathy ([address] Cirrhosis ([address] Diabetes mellitus ([address] History of abdominal paracentesis (03/2020), Hyperlipidemia, Hypertension, Lymphoma ([facility] NS[facility] (nonsustained ventricular tachycardia) [facility] Sleep apnea, and Syncope. Hospital Problem List:  Active Problems:    Nonischemic cardiomyopathy ([facility]  Resolved Problems:    * No resolved hospital problems. *      Past Surgical History:  He has a past surgical history that includes Coronary angioplasty with stent; Cholecystectomy; laminectomy; Tonsillectomy and adenoidectomy; Cardiac defibrillator placement (6/23/15); toenail excision (07/21/2017); and Upper gastrointestinal endoscopy (N/A, 8/4/2020). Social History:  He reports that he quit smoking about 46 years ago. His smoking use included pipe. He has a 64.00 pack-year smoking history. He has never used smokeless tobacco. He reports previous alcohol use. He reports that he does not use drugs. Family History:  family history includes Heart Disease in his father and mother. Allergies: Allergies   Allergen Reactions    Cortisone Other (See Comments)     Other reaction(s): Other (See Comments)  Hot flashes  Other reaction(s):  Other (See Comments)  Hot flashes    Morphine Other (See Comments)     Agitation/violence  Tolerates Percocet (oxycodone/APAP)    Metformin Other (See Comments)     Violent behavior    Metformin Hcl Other (See Comments)     Violent behavior    Lisinopril Other (See Comments)     cough       Medications:  Scheduled Meds:   midodrine  10 mg Oral TID WC    carvedilol  3.125 mg Oral BID WC    furosemide  40 mg Oral Daily    magnesium oxide  400 mg Oral BID    docusate sodium  100 mg Oral BID    allopurinol  100 mg Oral Daily    aspirin  81 mg Oral Daily    atorvastatin  80 mg Oral Daily    clopidogrel  75 mg Oral Daily    famotidine  20 mg Oral BID    spironolactone  12.5 mg Oral Daily    tamsulosin  0.4 mg Oral Nightly    sodium chloride flush  10 mL Intravenous 2 times per day    enoxaparin  40 mg Subcutaneous Daily    insulin glargine  10 Units Subcutaneous Nightly    insulin lispro  0-12 Units Subcutaneous TID     insulin lispro  0-6 Units Subcutaneous Nightly     Continuous Infusions:   dextrose       PRN Meds:potassium chloride **OR** potassium alternative oral replacement **OR** potassium chloride, magnesium sulfate, traZODone, sodium chloride flush, promethazine **OR** ondansetron, polyethylene glycol, acetaminophen **OR** acetaminophen, nitroGLYCERIN, glucose, dextrose, glucagon (rDNA), dextrose    Review of Systems:  Pertinent positives/negatives reviewed in HPI. All other systems reviewed and negative, unless noted below. Constitutional: Negative  Genitourinary: see HPI  HEENT: Negative   Cardiovascular: Negative   Respiratory: Negative   Gastrointestinal: Negative   Musculoskeletal: Negative   Neurological: Negative   Psychiatric: Negative   Integumentary: Negative     PHYSICAL EXAM     Vitals:    03/23/21 0410   BP: (!) 94/59   Pulse: 61   Resp: 18   Temp: 97.2 °F (36.2 °C)   SpO2: 97%     CONSTITUTIONAL: The patient is well nourished/developed, with no distress noted. NEUROLOGICAL/PSYCHIATRIC: Oriented to place and time, normal affected noted. NECK: The neck is symmetrical and supple, with no masses noted. CARDIOVASCULAR: Regular rate and rhythm, no evidence of swelling noted. RESPIRATORY: Normal respiratory effort with no wheezing noted. ABDOMEN: Abdomen soft, non-tender, non-distended. No enlarged liver or spleen. No hernias noted. Stool occult blood not indicated. SKIN: Skin appears normal.  LYMPHATICS: No adenopathy noted. CVA: No CVA tenderness bilaterally. GENITOURINARY: The penis is without rash or lesions and meatus with expected size and location. The scrotum appears normal. Bilateral testicles appears to be of normal size and location. No masses or tenderness noted of testicles or epididymis. HEIDE: Deferred. [Sphincter with good tone. Prostate is of normal size, smooth and with benign consistency.  The seminal vesicles are not palpable.]    Ins/Outs:    Intake/Output Summary (Last 24 hours) at 3/23/2021 0842  Last data filed at 3/23/2021 0000  Gross per 24 hour   Intake 1320 ml   Output 975 ml   Net 345 ml       LABS     CBC   Lab Results   Component Value Date    WBC 6.3 03/23/2021    RBC 4.11 03/23/2021    HGB 13.4 03/23/2021    HCT 40.3 03/23/2021    MCV 98.0 03/23/2021    MCH 32.6 03/23/2021    MCHC 33.3 03/23/2021    RDW 17.3 03/23/2021     03/23/2021    MPV 9.1 03/23/2021     BMP   Lab Results   Component Value Date     03/23/2021    K 4.3 03/23/2021    K 3.7 03/19/2021     03/23/2021    CO2 27 03/23/2021    BUN 12 03/23/2021    CREATININE 1.1 03/23/2021    GLUCOSE 105 03/23/2021    CALCIUM 8.5 03/23/2021     Urinalysis:   Lab Results   Component Value Date    COLORU YELLOW 03/19/2021    GLUCOSEU Negative 03/19/2021    GLUCOSEU NEGATIVE 02/12/2010    BLOODU Negative 03/19/2021    NITRU Negative 03/19/2021    LEUKOCYTESUR SMALL 03/19/2021     Urine culture: No results for input(s): LABURIN in the last 72 hours. PSA: No results found for: PSA      IMAGING     Us Scrotum And Testicles    Result Date: 3/22/2021  EXAMINATION: ULTRASOUND OF THE SCROTUM/TESTICLES WITH COLOR DOPPLER FLOW EVALUATION 3/22/2021 COMPARISON: CT abdomen and pelvis February 28, 2020. HISTORY: ORDERING SYSTEM PROVIDED HISTORY: Scrotal enlargement and pain, r/o mass, free fluid or torsion TECHNOLOGIST PROVIDED HISTORY: Reason for exam:->Scrotal enlargement and pain, r/o mass, free fluid or torsion FINDINGS: Measurements: Right testicle: 5.4 x 4.5 x 3.1 cm Left testicle: 3.8 x 3.9 x 2 cm Right: Grey scale: The right testicle demonstrates normal homogeneous echotexture without focal lesion. No evidence of testicular microlithiasis. Doppler Evaluation:  There is normal arterial and venous Doppler flow within the testicle. Scrotal Sac: There is a large right-sided hydrocele containing some debris.  Epididymis:  Not well visualized due to technical factors. Left: Grey scale: The left testicle demonstrates normal homogeneous echotexture without focal lesion. No evidence of testicular microlithiasis. Doppler Evaluation:  There is normal arterial and venous Doppler flow within the testicle. Scrotal Sac: There is a moderate to large left-sided hydrocele Epididymis:  Not well visualized due to technical factors. Large right and moderate to large left hydroceles, likely related to the patient's ascites. No intra testicular mass or torsion. Xr Chest Portable    Result Date: 3/19/2021  EXAMINATION: ONE XRAY VIEW OF THE CHEST 3/19/2021 8:37 am COMPARISON: 10/17/2020 radiograph HISTORY: ORDERING SYSTEM PROVIDED HISTORY: fluid retention TECHNOLOGIST PROVIDED HISTORY: Reason for exam:->fluid retention Reason for Exam: Edema (pt states he is in from home per Dr. Cherry Rogers to \"get fluid off\" pelvic area) Acuity: Unknown Type of Exam: Unknown FINDINGS: ICD stable on the left. Severe cardiomegaly. Pulmonary vascular markings are normal.  Lungs are clear. No significant skeletal finding. Cardiomegaly with no significant pulmonary edema. Ir Us Guided Paracentesis    Result Date: 3/22/2021  PROCEDURE: ULTRASOUND GUIDED PARACENTESIS 3/22/2021 HISTORY: ORDERING SYSTEM PROVIDED HISTORY: therapeutic for ascites and scrotal edema TECHNOLOGIST PROVIDED HISTORY: OK for Monday Reason for exam:->therapeutic for ascites and scrotal edema TECHNIQUE: Informed consent was obtained after a detailed explanation of the procedure including risks, benefits, and alternatives. Universal protocol was followed. The right abdomen was prepped and draped in sterile fashion and local anesthesia was achieved with lidocaine. A 5 Western Magi Yueh needle sheath was advanced under ultrasound guidance into ascites and paracentesis was performed. The patient tolerated the procedure well.  Procedure was performed by Alexsandra Layne was present or immediately available during the entire procedure. FINDINGS: A total of 3300 cc clear yellow fluid was removed. Successful ultrasound guided paracentesis. Ir Us Guided Paracentesis    Result Date: 3/9/2021  PROCEDURE: ULTRASOUND GUIDED PARACENTESIS 3/9/2021 HISTORY: ORDERING SYSTEM PROVIDED HISTORY: Other hypervolemia TECHNOLOGIST PROVIDED HISTORY: Reason for exam:->recurrent fluid TECHNIQUE: Informed consent was obtained after a detailed explanation of the procedure including risks, benefits, and alternatives. Universal protocol was followed. The right abdomen was prepped and draped in sterile fashion and local anesthesia was achieved with lidocaine. A 5 [place] needle sheath was advanced under ultrasound guidance into ascites and paracentesis was performed. The patient tolerated the procedure well. EBL: Less than 5 cc FINDINGS: A total of 550 cc clear yellow fluid was removed. Successful ultrasound guided paracentesis.             Electronically signed by: Venita CURTIS 3/23/2021   [facility]  Office Contact: [phone]

## 2021-03-24 NOTE — CARE COORDINATION
[facility] [facility] Transitions Initial Follow Up Call    Call within 2 business days of discharge: Yes    Patient: Tamiko Alex Patient : [id number]   MRN: [id number]  Reason for Admission:   Discharge Date: 3/23/21 RARS: Readmission Risk Score: 24      Last Discharge [address] [address]       Complaint Diagnosis Description Type Department Provider    3/19/21 Edema Hypervolemia, unspecified hypervolemia type ED to Hosp-Admission (Discharged) (ADMITTED) [facility]T Julio Olguin MD; Dominique Seaman. .. Non-face-to-face services provided:  Obtained and reviewed discharge summary and/or continuity of care documents      Was this an external facility discharge? No Discharge Facility:     Challenges to be reviewed by the provider   Additional needs identified to be addressed with provider No  none             Method of communication with provider : none    Discussed COVID-19 related testing which was not done at this time. Test results were not done. Patient informed of results, if available? No    Advance Care Planning:   Does patient have an Advance Directive:  reviewed and current. Was this a readmission? No  Patient stated reason for admission: edema  Patients top risk factors for readmission: medical condition    Care Transition Nurse (CTN) contacted the patient by telephone to perform post hospital discharge assessment. Verified name and  with patient as identifiers. Provided introduction to self, and explanation of the CTN role. CTN reviewed discharge instructions, medical action plan and red flags with patient who verbalized understanding. Patient given an opportunity to ask questions and does not have any further questions or concerns at this time. Were discharge instructions available to patient? Yes. Reviewed appropriate site of care based on symptoms and resources available to patient including: When to call 911.  The patient agrees to contact the PCP office for questions related to their healthcare. Medication reconciliation was performed with patient, who verbalizes understanding of administration of home medications. Advised obtaining a 90-day supply of all daily and as-needed medications. Covid Risk Education    Patient has following risk factors of: diabetes and chronic kidney disease. Education provided regarding infection prevention, and signs and symptoms of COVID-19 and when to seek medical attention with patient who verbalized understanding. Discussed exposure protocols and quarantine From [facility]: Are you at higher risk for severe illness?   and given an opportunity for questions and concerns. The patient agrees to contact the COVID-19 hotline [phone] or PCP office for questions related to COVID-19. For more information on steps you can take to protect yourself, see CDC's How to Protect Yourself     Was patient discharged with a pulse oximeter? No Discussed and confirmed pulse oximeter discharge instructions and when to notify provider or seek emergency care. Discussed follow-up appointments. If no appointment was previously scheduled, appointment scheduling offered: Yes. Is follow up appointment scheduled within 7 days of discharge? Yes  Non-[facility] follow up appointment(s): no    Pt states doing well, no issues or concerns. Pt states this is the best he has felt in a long time. Denies SOB, CP,swelling. Has [facility] nurse at [facility] this morning and has reviewed all his meds. F/U appts listed below. Pt does not have a [facility] PCP therefore no further CTN f/u calls will be made however CTN provided contact information for future needs.     Follow Up  Future Appointments   Date Time Provider Jamal Mendoza   3/29/2021  3:00 PM KIN Hicks - CNP  Cardio TAT[facility]   6/14/2021  9:30 AM SCHEDULE, [facility] Cardio [facility] Schreiber RN

## 2021-03-25 NOTE — TELEPHONE ENCOUNTER
[address] [place] [address] 1945    ASSESSMENT:   1. Baseline weight: 160 lbs  2. Current weight: 161 lbs  3. Patient weighing daily: Yes  4. Symptoms: scrotal edema  5. Patient knows who to call with symptoms: Yes  6. Diet history:      Patient states sodium limitation is : 3000 mg      Patient states fluid limitation is 64 oz  Patient following diet as instructed: Yes  7. Medication history: taking medications as instructed Yes; medication side effects noted No  8. Patient is involved in exercise program: Yes; trying to stay active at home  9. Patient knows date and time of follow up appointment: Yes  10. Patient has transportation to appointments: Yes    RECOMMENDATIONS:   1. Medication: taking as prescribed-- new rx getting mailed from [place]  2. Diet: no added salt diet \"I haven't had any salt\"  3. MD/ Clinic appointment: 3/29 with Wilton Manzano NP  4. Other:  Patient doing okay. Scrotum is swollen again. He is wearing sling but not having any improvement. Encouraged patient to call with any concerns.

## 2021-03-29 NOTE — PROGRESS NOTES
[facility]     Outpatient Follow Up Note    CHIEF COMPLAINT / HPI: Hospital Follow Up secondary to volume overload    Hospital record has been reviewed  Hospital Course progressed as follows per discharge summary: Elective admission for IV lasix     Acute on chronic severe systolic heart failure  Cirrhosis of the liver with portal hypertension with ascites s/p paracentesis  Left scrotal pain contrary to edema    admitted to the hospital with abdominal distention     Acute on chronic severe systolic heart failure  Last ejection fraction 10%  S/p AICD  Chest x-ray showed cardiomegaly  And was started on dobutamine and diuresed with IV Lasix once he was euvolemic dobutamine was stopped he was changed over to oral Lasix per cardiology     Cirrhosis of the liver with portal hypertension decompensated with ascites  S/p paracentesis 3.3 L  Seen by GI     Left scrotal pain  Suspect secondary to scrotal edema  Ultrasound only showed bilateral hydroceles  Urology consulted  Scrotal elevation     Hyperlipidemia: Resumed on statin       Rhea Fuentes is 77 y.o. male who presents today for a routine follow up after a recent hospitalization related to the above mentioned issues. Subjective:   Since the time of discharge, the patient admits their symptoms have improved. He denies significant chest pain. He c/o discomfort in his testicles, no better since discharge. Laying on his left side, they go down and up when laying on his Rt side. His belly is starting to fill up again. His wt had been 160# at discharge to 162.2# last home weight. He was OOT over the weekend so no wts were done. There is no SOB/MCCLELLAN. The patient is not experiencing palpitations. His lasix was adjusted from bid to qd and midodrine increased to 10 mg tid  His home BP is running ~ 90/40s. He has no light headedness    These symptoms are stable over the last many days.    With regard to medication therapy the patient has been compliant with prescribed regimen. They have tolerated therapy to date.      Procedures:   20: paracentesis : 4.5 L  20 : paracentesis : 6.5 L   21: Paracentesis : 8 L   2/10/21: Paracentesis : 9.6 L   3/9/21 Paracentesis : 5.5 L   3/22/21: paracentesis : 3.3 L    Past Medical History:   Diagnosis Date    [facility] [address]     [facility]     ETOH abuse    Diabetes [facility]     History of abdominal paracentesis 2020    Hyperlipidemia     Hypertension     Lymph[facility]     currently in remission    NSVT (nonsustained ventricular tachycardia) (HCC)     Sleep apnea     unable to tolerate CPAP    Syncope      Social History:    Social History     Tobacco Use   Smoking Status Former Smoker    Packs/day: 4.00    Years: 16.00    Pack years: 64.00    Types: Pipe    Quit date: 1975    Years since quittin.2   Smokeless Tobacco Never Used     Current Medications:  Current Outpatient Medications   Medication Sig Dispense Refill    furosemide (LASIX) 40 MG tablet Take 1 tablet by mouth daily 60 tablet 3    midodrine (PROAMATINE) 10 MG tablet Take 1 tablet by mouth 3 times daily (with meals) 90 tablet 3    allopurinol (ZYLOPRIM) 300 MG tablet TAKE ONE-HALF TABLET BY MOUTH ONCE DAILY FOR GOUT      traZODone (DESYREL) 50 MG tablet TAKE ONE-HALF TABLET BY MOUTH BEDTIME      vitamin B-12 (CYANOCOBALAMIN) 1000 MCG tablet Take 1,000 mcg by mouth daily      Multiple Vitamins-Minerals (ONE-A-DAY MENS 50+ PO) Take 1 tablet by mouth daily      spironolactone (ALDACTONE) 25 MG tablet Take 0.5 tablets by mouth daily 15 tablet 1    clopidogrel (PLAVIX) 75 MG tablet Take 1 tablet by mouth daily 90 tablet 1    carvedilol (COREG) 3.125 MG tablet Take 1 tablet by mouth 2 times daily 180 tablet 3    tamsulosin (FLOMAX) 0.4 MG capsule Take 0.4 mg by mouth nightly      MAGNESIUM OXIDE 400 PO Take 400 mg by mouth 2 times daily       Calcium Carb-Cholecalciferol (CALCIUM 1000 + D PO) Take 1,000 mg by mouth 2 times daily      famotidine (PEPCID) 20 MG tablet Take 20 mg by mouth 2 times daily       aspirin 81 MG tablet Take 81 mg by mouth daily      vitamin D (CHOLECALCIFEROL) 1000 UNITS TABS tablet Take 1,000 Units by mouth daily      loperamide (IMODIUM) 2 MG capsule Take 2 mg by mouth 4 times daily as needed for Diarrhea      nitroGLYCERIN (NITROSTAT) 0.4 MG SL tablet Place 0.4 mg under the tongue every 5 minutes as needed for Chest pain up to max of 3 total doses. If no relief after 1 dose, call 911.  glipiZIDE (GLUCOTROL) 5 MG tablet Take 7.5 mg by mouth 2 times daily (before meals)       atorvastatin (LIPITOR) 80 MG tablet Take 80 mg by mouth daily. No current facility-administered medications for this visit. REVIEW OF SYSTEMS:   CONSTITUTIONAL: No major weight gain or loss, fatigue, weakness, night sweats or fever. There's been no change in energy level, sleep pattern, or activity level. HEENT: No new vision difficulties or ringing in the ears. RESPIRATORY: No new SOB, PND, orthopnea or cough. CARDIOVASCULAR: See HPI  GI: No nausea, vomiting, diarrhea, constipation, abdominal pain or changes in bowel habits. : No urinary frequency, urgency, incontinence hematuria or dysuria. SKIN: No cyanosis or skin lesions. MUSCULOSKELETAL: No new muscle or joint pain. NEUROLOGICAL: No syncope or TIA-like symptoms.   PSYCHIATRIC: No anxiety, pain, insomnia or depression    Objective:   PHYSICAL EXAM:        Vitals:    03/29/21 1504 03/29/21 1542   BP: (!) 90/54 (!) 80/50   Site: Right Upper Arm Right Upper Arm   Position: Sitting    Cuff Size: Large Adult Medium Adult   Pulse: 62    SpO2: 98%    Weight: 170 lb 3.2 oz (77.2 kg)    Height: 5' 9\" (1.753 m)        VITALS:  BP (!) 90/54 (Site: Right Upper Arm, Position: Sitting, Cuff Size: Large Adult)   Pulse 62   Ht 5' 9\" (1.753 m)   Wt 170 lb 3.2 oz (77.2 kg)   SpO2 98%   BMI 25.13 kg/m²     CONSTITUTIONAL: Cooperative, no apparent distress, and appears well nourished / developed  NEUROLOGIC:  Awake and orientated to person, place and time. PSYCH: Calm affect. SKIN: Warm and dry. HEENT: Sclera non-icteric, normocephalic, neck supple, no elevation of JVP, normal carotid pulses with no bruits and thyroid normal size. LUNGS:  No increased work of breathing and clear to auscultation, no crackles or wheezing. CARDIOVASCULAR:  Regular rate 56 and rhythm with no murmurs, gallops, rubs, or abnormal heart sounds, normal PMI. The apical impulses not displaced. Heart tones are crisp and normal                                                                                            Cervical veins are not engorged                 JVP less than 8 cm H2O                                                                              The carotid upstroke is normal in amplitude and contour without delay or bruit    ABDOMEN:  Normal bowel sounds, non-distended (lower abd slightly firm) and non-tender to palpation   EXT: No edema, no calf tenderness. Pulses are present bilaterally.     DATA:    Lab Results   Component Value Date    ALT 18 03/23/2021    AST 22 03/23/2021    ALKPHOS 121 03/23/2021    BILITOT 0.8 03/23/2021     Lab Results   Component Value Date    CREATININE 1.1 03/23/2021    BUN 12 03/23/2021     (L) 03/23/2021    K 4.3 03/23/2021     03/23/2021    CO2 27 03/23/2021     No results found for: TSH, G3UCVXZ, W1FXFFP, THYROIDAB  Lab Results   Component Value Date    WBC 6.3 03/23/2021    HGB 13.4 (L) 03/23/2021    HCT 40.3 (L) 03/23/2021    MCV 98.0 03/23/2021     (L) 03/23/2021     No components found for: CHLPL  Lab Results   Component Value Date    TRIG 92 03/20/2021    TRIG 255 (H) 01/15/2010     Lab Results   Component Value Date    HDL 38 (L) 03/20/2021    HDL 35 (L) 01/15/2010     Lab Results   Component Value Date    LDLCALC 49 03/20/2021    LDLCALC 147 (H) 01/15/2010     Lab Results   Component Value Date    LABVLDL 18 03/20/2021    LABVLDL 51 01/15/2010     Radiology Review:  Pertinent images / reports were reviewed as a part of this visit and reveals the following:    CT abd/pelvis: 2/28/20:     Impression   Cirrhotic liver morphology.  Large volume ascites.  Diffuse anasarca.       Extensive diverticulosis of the large bowel, but without CT evidence of   diverticulitis.       Large duodenal diverticulum, but without CT evidence of duodenal   diverticulitis. Echo: Dec '20:  Summary   -Left ventricular cavity size is normal.   -Overall left ventricular systolic function appears severely reduced.   -Ejection fraction is visually estimated to be <20%. -There is abnormal septal motion noted. -Cannot rule out other wall motion abnormalities. -Grade I diastolic dysfunction with normal LV filling pressures. E/e' = 11.8.   -The left atrium is dilated. -The right atrium is severely dilated. -The aortic valve appears sclerotic but opens well. -Mild mitral regurgitation.   -Moderate tricuspid regurgitation.   -Right ventricle size is normal with reduced function. TAPSE = 1.3 cm. RVS velocity is 8.27 cm/s. -RVSP = 28 mmHG. Cannot rule out pleural effusion vs shadowing. paceart transmission: 3/10/21: NSVT brief; one episode of SVT      CXR: 3/19/21:  FINDINGS:   ICD stable on the left.  Severe cardiomegaly.  Pulmonary vascular markings   are normal.  Lungs are clear.  No significant skeletal finding.           Impression   Cardiomegaly with no significant pulmonary edema. US scrotum : 3/22/21:      Impression   Large right and moderate to large left hydroceles, likely related to the   patient's ascites.         Procedure: 3/22/21:   US guided paracentesis:  FINDINGS:   A total of 3300 cc clear yellow fluid was removed.           Impression   Successful ultrasound guided paracentesis       Assessment:      Diagnosis Orders   1.  Chronic systolic congestive heart failure ([address]   ~offers no c/o SOB  ~continues to have swelling scrotum and developing lower abd fullness  ~thoracic impedence : reflects compensation  ~lasix 40 mg daily / spironolactone 12.5 mg daily    2. Ischemic cardiomyopathy   ~EF 10%  ~s/p ICD '15    3. Atherosclerosis of native coronary artery of native heart without angina pectoris   ~offers no c/o angina  ~hx of PTCA CX/OM '04 ; PTCA LAD '06.  RCA  ~carvedilol / atorvastatin / DAPT        Patient  is stable since hospital discharge (LES updated with status). Plan:  Continue present management   ~ he will start measuring his abd girth and call with change in size/fullness   F/U with urology as planned for scrotal edema   F/U in four weeks     I have addressed the patient's cardiac risk factors and adjusted pharmacologic treatment as needed. In addition, I have reinforced the need for patient directed risk factor modification. Further evaluation will be based upon the patient's clinical course and testing results. All questions and concerns were addressed to the patient/family. Alternatives to  treatment were discussed. The patient  currently  is not smoking. The risks related to smoking were reviewed with the patient. Recommend maintaining a smoke-free lifestyle. Daily weight, low sodium diet were discussed. Patient instructed to call the office with a weight gain: > 3 # over night or 5# in one week; swelling, SOB/orthopnea/PND    Dual Antiplatelet therapy has been recommended / prescribed for this patient. Education conducted on adverse reactions including bleeding was discussed. The patient verbalizes understanding. Pt is on a BB  Pt is not on an ace-i/ARB : BP does not support  Pt is on a statin       diet discussed  Exercise program discussed : physical therapy recommended     Thank you for allowing to us to participate in the care of Jose E King.       [address]  Documentation of today's visit sent to PCP

## 2021-04-05 NOTE — TELEPHONE ENCOUNTER
Pt calling he wants to talk to LES Rn's he needs an appt to get the \"water drawn off of him\". States he's full of fluid.  Pls call to advise Thank you

## 2021-04-05 NOTE — TELEPHONE ENCOUNTER
Called and spoke with patient he states that he currently weighs 170 lb's. He denies any sob nor chest pains or any other cardiac sx's. He states that his body is miserable. Please advise thanks.

## 2021-04-21 NOTE — PROGRESS NOTES
Patient in phase 2. BP SBP 89. Patient states that's normal for him and denies any dizziness. All other VSS. Provided with po fluids.  And provided with d/c instructions

## 2021-04-21 NOTE — TELEPHONE ENCOUNTER
Asking that LES order and arrange for him to get fluid drained from his abdomen . His stomach is as big as it has ever been so he would like to have this done asap . Please call to schedule .

## 2021-04-21 NOTE — PROGRESS NOTES
5300ml of fluid removed  Spoke to patients ANG chavez and advised her the amount of fluid removed and that patient was returning to the floor.

## 2021-04-21 NOTE — BRIEF OP NOTE
Brief Postoperative Note    Mayito Muro  YOB: 1945  [id number]    Pre-operative Diagnosis: Ascites    Post-operative Diagnosis: Same    Procedure: US Guided Paracentesis    Anesthesia: Local    Surgeons/Assistants: SHEELA PEREZ    Estimated Blood Loss: less than 5 mL    Complications: None    Specimens: Was Obtained: clear Ascitic Fluid    Findings: Technically successful US guided paracentesis,    Electronically signed by Melanie Carmona PA-C on 4/21/2021 at 1:26 PM

## 2021-04-26 NOTE — PATIENT INSTRUCTIONS
Stop furosemide and begin torsemide / demadex 20 mg twice a day    Labs next week : check your kidney function     appt in four weeks

## 2021-04-26 NOTE — PROGRESS NOTES
[facility]     Outpatient Follow Up Note    Luan Steele is 77 y.o. male who presents today with a history of HTN, hyperlipidemia, CAD s/p PTCA CX/OM '04 and s/p PTCA LAD '06, chronic RCA occlusion; NSVT, ischemic CM/EF 15-20% s/p ICD '15, EF 50-55% on echo '16, declined to 15-20% on echo Jan '20 and HFrEF. Oct '20 with complaints of chest pain and palpitations. He was found to have VT/Wide complex tachycardia and underwent a DCCV. He was also found to be in shock. Post cardioversion he became hypotensive and required central line access and epinephrine. His rate detection was adjusted for future therapies and his amiodarone was discontinued d/t cirrhosis. 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 He's up 4# / one week as weighed 170# after his last tap last week. His belly is not full but getting there. He still has scrotal edema and unchanged after taps. He tries keeping himself elevated. He'd forgotten to measure his abd girth. He denies significant chest pain. There is no SOB/MCCLELLAN. The patient denies orthopnea/PND. The patient does not have swelling. The patient is not experiencing palpitations or dizziness. His home BP runs ~ 90/60 at 101/70. These symptoms have improved since the last paracentesis one week ago.    With regard to medication therapy the patient has been compliant with prescribed regimen. They have tolerated therapy to date.      Past Medical History:   Diagnosis Date    Cardiomyopathy ([facility] [address]     Cirrhosis ([facility] [address]     ETOH abuse    Diabetes mellitus ([facility] [address]     History of abdominal paracentesis 2020    Hyperlipidemia     Hypertension     [facility]     currently in remission    NSVT (nonsustained ventricular tachycardia) (HCC)     Sleep apnea     unable to tolerate CPAP    Syncope      Social History:    Social History     Tobacco Use   Smoking Status Former Smoker    Packs/day: 4.00    Years: 16.00    Pack years: 64.00    Types: Pipe    Quit date: 1975    Years since quittin.2   Smokeless Tobacco Never Used     Current Medications:  Current Outpatient Medications   Medication Sig Dispense Refill    furosemide (LASIX) 40 MG tablet Take 1 tablet by mouth daily 60 tablet 3    midodrine (PROAMATINE) 10 MG tablet Take 1 tablet by mouth 3 times daily (with meals) 90 tablet 3    allopurinol (ZYLOPRIM) 300 MG tablet TAKE ONE-HALF TABLET BY MOUTH ONCE DAILY FOR GOUT      traZODone (DESYREL) 50 MG tablet TAKE ONE-HALF TABLET BY MOUTH BEDTIME      vitamin B-12 (CYANOCOBALAMIN) 1000 MCG tablet Take 1,000 mcg by mouth daily      Multiple Vitamins-Minerals (ONE-A-DAY MENS 50+ PO) Take 1 tablet by mouth daily      spironolactone (ALDACTONE) 25 MG tablet Take 0.5 tablets by mouth daily 15 tablet 1    clopidogrel (PLAVIX) 75 MG tablet Take 1 tablet by mouth daily 90 tablet 1    carvedilol (COREG) 3.125 MG tablet Take 1 tablet by mouth 2 times daily 180 tablet 3    tamsulosin (FLOMAX) 0.4 MG capsule Take 0.4 mg by mouth nightly      MAGNESIUM OXIDE 400 PO Take 400 mg by mouth 2 times daily       Calcium Carb-Cholecalciferol (CALCIUM 1000 + D PO) Take 1,000 mg by mouth 2 times daily      famotidine (PEPCID) 20 MG tablet Take 20 mg by mouth 2 times daily       aspirin 81 MG tablet Take 81 mg by mouth daily      vitamin D (CHOLECALCIFEROL) 1000 UNITS TABS tablet Take 1,000 Units by mouth daily      loperamide (IMODIUM) 2 MG capsule Take 2 mg by mouth 4 times daily as needed for Diarrhea      nitroGLYCERIN (NITROSTAT) 0.4 MG SL tablet Place 0.4 mg under the tongue every 5 minutes as needed for Chest pain up to max of 3 total doses. If no relief after 1 dose, call 911.  glipiZIDE (GLUCOTROL) 5 MG tablet Take 7.5 mg by mouth 2 times daily (before meals)       atorvastatin (LIPITOR) 80 MG tablet Take 80 mg by mouth daily. No current facility-administered medications for this visit. REVIEW OF SYSTEMS:    CONSTITUTIONAL: + major weight gain or loss, fatigue, weakness, night sweats or fever. HEENT: No new vision difficulties or ringing in the ears. RESPIRATORY: No new SOB, PND, orthopnea or cough. CARDIOVASCULAR: See HPI  GI: No nausea, vomiting, diarrhea, constipation, abdominal pain or changes in bowel habits. : No urinary frequency, urgency, incontinence hematuria or dysuria. SKIN: No cyanosis or skin lesions. MUSCULOSKELETAL: No new muscle or joint pain. NEUROLOGICAL: No syncope or TIA-like symptoms. PSYCHIATRIC: No anxiety, pain, insomnia or depression    Objective:   PHYSICAL EXAM:       Vitals:    04/26/21 1335 04/26/21 1402   BP: 90/60 90/60   Site: Right Upper Arm    Position: Sitting    Cuff Size: Large Adult    Pulse: 82    SpO2: (!) 83%    Weight: 180 lb 8 oz (81.9 kg)    Height: 5' 9\" (1.753 m)         VITALS:  BP 90/60 (Site: Right Upper Arm, Position: Sitting, Cuff Size: Large Adult)   Pulse 82   Ht 5' 9\" (1.753 m)   Wt 180 lb 8 oz (81.9 kg)   SpO2 (!) 83%   BMI 26.66 kg/m²   CONSTITUTIONAL: Cooperative, no apparent distress, and appears well nourished / developed  NEUROLOGIC:  Awake and orientated to person, place and time. PSYCH: Calm affect. SKIN: Warm and dry. HEENT: Sclera non-icteric, normocephalic, neck supple, no elevation of JVP, normal carotid pulses with no bruits and thyroid normal size.   LUNGS:  No increased work of dysfunction with normal LV filling pressures. E/e' =11.8.   -The left atrium is dilated.   -The right atrium is severely dilated.   -The aortic valve appears sclerotic but opens well.   -Mild mitral regurgitation.   -Moderate tricuspid regurgitation.   -Right ventricle size is normal with reduced function. TAPSE = 1.3 cm. RVS velocity is 8.27 cm/s.   -RVSP = 28 mmHG.   Cannot rule out pleural effusion vs shadowing      Assessment:      Diagnosis Orders   1. Chronic systolic congestive heart failure (HCC)   ~stable with symptoms ; optival thoracic impedence trend shows volume overload  ~crackles RLL , diminished LLL to exam   ~lasix 40 mg daily / spironolactone 12.5 mg daily   ~intolerant to lisinopril with cough  ~wt fluctuates with paracentesis : up ~ 10# over one month by office scales; 4# gain in one week by home report. Has not been measuring abd girth  ~serial paracentesis every few weeks     2. Ischemic cardiomyopathy   ~severely reduced LVF  ~EF < 20% on echo from Jan  ~hx of s/p ICD '15 (followed by EP) ; hx of NSVT    3. Coronary artery disease involving native coronary artery of native heart without angina pectoris   ~stable : offers no c/o CP  ~s/p PTCA CX/OM '04 ; s/p PTCA LAD '06 ;  RCA      I had the opportunity to review the clinical symptoms and presentation of Chantal Kruger. Plan:     1. Stop furosemide and begin torsemide 20 mg bid (d/w LES)   BMP after one week of diuretic change  2. F/U in four weeks    Overall the patient is stable from CV standpoint    I have addresed the patient's cardiac risk factors and adjusted pharmacologic treatment as needed. In addition, I have reinforced the need for patient directed risk factor modification. Further evaluation will be based upon the patient's clinical course and testing results. All questions and concerns were addressed to the patient/family. Alternatives to my treatment were discussed. The patient is not currently smoking.  The risks

## 2021-05-14 NOTE — LETTER
[address] [address] [address] [address]  Phone: [phone]  Fax: [phone]    Erik Levy MD        September 26, 2018     Anna Art MD  [address] [address]    Patient: David Benavides  MR Number: [id number]  YOB: 1945  Date of Visit: 9/24/2018    Dear Dr. Anna Art:    Below are the relevant portions of my assessment and plan of care. [employer]   Cardiac Follow-up    Referring Provider:  Anna Art MD     Chief Complaint   Patient presents with    3 Month Follow-Up    Coronary Artery Disease    Cardiomyopathy     AICD    Hypertension    Hyperlipidemia     upon standing    Dizziness    Other     tumor on the liver, possible pressure on the artery      History of Present Illness:   . His cardiac history includes  known cardiomyopathy, hypertension, hyperlipidemia, and diabetes. He has cut back on alcohol consumption and does not smoke. In 2006 he had a Taxus stent in his LAD. He also has been treated for dilated cardiomyopathy with EF 40-45%. 2011 stress echo was negative for ischemia, resting EF of 45%, with exercise, his EF went up to 55%. 6/2015 patient receive a ICD implantation per Dr. Kellie Ayers. At his last visit to our office in July 2018, he c/o positional dizziness, admitting to very little fluid intake; he was not orthostatic. My CNP at that time advised him to drink more fluids and to only take HCTZ on a prn basis for swelling. Recently had near syncopal episode while volunteering in [place]. Ultimately transferred to [facility]. Diagnosed with a liver tumor. I spoke with a colorectal surgeon Dr Ana Lilia Kay and now referred to Yg Massey at [address] [place] [address], Ryley Bower states he feels OK overall; his wife is with him for the visit. He denies exertional chest pain, MCCLELLAN/PND, palpitations, light-headedness, edema.  He is scheduled to see Dr Yg Massey for evaluation of a liver mass later today at [facility]; it was initially diagnosed 9/8/18 by a surgeon in [facility] (Beverly Villafuerte was in [place] at the time for a [address] [facility]). Past Medical History:   has a past medical history of Cardiomyopathy ([address] Diabetes mellitus ([facility] Hyperlipidemia; Hypertension; NSVT (nonsustained ventricular tachycardia) ([address] and Syncope. Surgical History:   has a past surgical history that includes Coronary angioplasty with stent; Cholecystectomy; laminectomy; Tonsillectomy and adenoidectomy; Cardiac defibrillator placement (6/23/15); and toenail excision (07/21/2017). Social History:   reports that he quit smoking about 43 years ago. He has never used smokeless tobacco. He reports that he drinks alcohol. He reports that he does not use drugs. Family History:  family history includes Heart Disease in his father and mother. Current Outpatient Prescriptions   Medication Sig Dispense Refill    hydrochlorothiazide (HYDRODIURIL) 25 MG tablet Take 0.5 tablets by mouth daily as needed (daily prn) 30 tablet 3    carvedilol (COREG) 25 MG tablet Take 1 tablet by mouth 2 times daily (with meals) 30 tablet 5    aspirin 81 MG tablet Take 81 mg by mouth daily      vitamin D (CHOLECALCIFEROL) 1000 UNITS TABS tablet Take 1,000 Units by mouth daily      loperamide (IMODIUM) 2 MG capsule Take 2 mg by mouth 4 times daily as needed for Diarrhea      nitroGLYCERIN (NITROSTAT) 0.4 MG SL tablet Place 0.4 mg under the tongue every 5 minutes as needed for Chest pain up to max of 3 total doses. If no relief after 1 dose, call 911.       ranitidine (ZANTAC) 300 MG tablet Take 300 mg by mouth nightly      rOPINIRole (REQUIP) 0.25 MG tablet Take 0.25 mg by mouth 3 times daily      prazosin (MINIPRESS) 2 MG capsule Take 2 mg by mouth nightly      amLODIPine (NORVASC) 5 MG tablet Take 1 tablet by mouth daily (Patient taking differently: Take 10 mg by mouth daily ) 30 tablet 3    MAGNESIUM PO Take 450 mg by mouth 2 times daily  Calcium Carb-Cholecalciferol (CALCIUM 1000 + D PO) Take 1,000 Units by mouth 2 times daily      omeprazole (PRILOSEC) 20 MG capsule Take 1 capsule by mouth 2 times daily 30 capsule 3    glipiZIDE (GLUCOTROL) 5 MG tablet Take 5 mg by mouth 2 times daily (before meals)      cyclobenzaprine (FLEXERIL) 10 MG tablet Take 10 mg by mouth every 6 hours as needed for Muscle spasms.  atorvastatin (LIPITOR) 80 MG tablet Take 80 mg by mouth daily.  clopidogrel (PLAVIX) 75 MG tablet Take 75 mg by mouth daily. No current facility-administered medications for this visit. Allergies:  Cortisone; Metformin hcl; and Morphine     Review of Systems:   · Constitutional: there has been no unanticipated weight loss. There's been no change in energy level, sleep pattern, or activity level. · Eyes: No visual changes or diplopia. No scleral icterus. · ENT: No Headaches, hearing loss or vertigo. No mouth sores or sore throat. · Cardiovascular: Reviewed in HPI  · Respiratory: No cough or wheezing, no sputum production. No hematemesis. · Gastrointestinal: No abdominal pain, appetite loss, blood in stools. No change in bowel or bladder habits. · Genitourinary: No dysuria, trouble voiding, or hematuria. · Musculoskeletal:  No gait disturbance, weakness or joint complaints. · Integumentary: No rash or pruritis. · Neurological: No headache, diplopia, change in muscle strength, numbness or tingling. No change in gait, balance, coordination, mood, affect, memory, mentation, behavior. · Psychiatric: No anxiety, no depression. · Endocrine: No malaise, fatigue or temperature intolerance. No excessive thirst, fluid intake, or urination. No tremor. · Hematologic/Lymphatic: No abnormal bruising or bleeding, blood clots or swollen lymph nodes. · Allergic/Immunologic: No nasal congestion or hives.     Physical Examination:       Blood pressure 94/60, pulse 53, height 5' 9\" (1.753 m), weight 221 lb 12.8 Statement Selected moderate assist (50% patients effort)

## 2021-05-17 NOTE — BRIEF OP NOTE
Brief Postoperative Note    Elton Spencer  YOB: 1945  [id number]    Pre-operative Diagnosis: Ascites    Post-operative Diagnosis: Same    Procedure: US Guided Paracentesis    Anesthesia: Local    Surgeons/Assistants: SHEELA PEREZ PA-C    Estimated Blood Loss: less than 5 mL    Complications: None    Specimens: Was Not Obtained: No Ascitic fluid appreciated on abdominal US    Findings: No drainable ascites found on limited abdominal US    Electronically signed by Darcia Spatz, PA-C on 5/17/2021 at 8:17 AM

## 2021-05-17 NOTE — PROGRESS NOTES
Patient here in phase 2. VSS.patient denies any needs. Getting dressed at time. Wife in waiting area.

## 2021-05-18 NOTE — TELEPHONE ENCOUNTER
[place] OK'd the move to Monday the 24th at 8:30. Mr. Stollloy Edgar agrees to come in at that time.

## 2021-05-18 NOTE — TELEPHONE ENCOUNTER
I spoke to Mr. Gill re his labs (results basket); Harvey Centeno will review on her return next week. He said he went in for paracentesis recently, but was not done because he had \"no fluid. \" His testicles remain very swollen. He has OV 5/26/21. He asked to move to [facility] or Tue. He was invited to a [facility] in [place] on the 26th along with his \"brothers\" whom he has not seen in a few years.

## 2021-05-24 NOTE — PROGRESS NOTES
[address]     Outpatient Follow Up Note    Elton Spencer is 77 y.o. male who presents today with a history of HTN, hyperlipidemia, CAD s/p PTCA CX/OM '04 and s/p PTCA LAD '06, chronic RCA occlusion; NSVT, ischemic CM/EF 15-20% s/p ICD '15, EF 50-55% on echo '16, declined to 15-20% on echo Jan '20 and HFrEF. Oct '20 with complaints of chest pain and palpitations. He was found to have VT/Wide complex tachycardia and underwent a DCCV. He was also found to be in shock. Post cardioversion he became hypotensive and required central line access and epinephrine. His rate detection was adjusted for future therapies and his amiodarone was discontinued d/t cirrhosis. 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 His wt is b/w 168-71#. His BP has been in the 90s to low 100s. His oxgen is good. His testicles are swollen but not like they were. His belly isn't as tight as normal.    He denies significant chest pain. There is no SOB/MCCLELLAN. The patient denies orthopnea/PND. The patient is not experiencing palpitations or dizziness. These symptoms have improved since the last OV. With regard to medication therapy the patient has been compliant with prescribed regimen. They have tolerated therapy to date.      Past Medical History:   Diagnosis Date    Cardiomyopathy [facility])     Cirrhosis ([facility] [address]     ETOH abuse    Diabetes mellitus ([facility] [address]     History of abdominal paracentesis 2020    Hyperlipidemia     Hypertension     Lymphoma ([facility]     currently in remission    NSVT (nonsustained ventricular tachycardia) (HCC)     Sleep apnea     unable to tolerate CPAP    Syncope      Social History:    Social History     Tobacco Use   Smoking Status Former Smoker    Packs/day: 4.00    Years: 16.00    Pack years: 64.00    Types: Pipe    Quit date: 1975    Years since quittin.3   Smokeless Tobacco Never Used     Current Medications:  Current Outpatient Medications   Medication Sig Dispense Refill    insulin glargine (LANTUS;BASAGLAR) 100 UNIT/ML injection pen INJECT 16 UNITS UNDER THE SKIN AT BEDTIME      torsemide (DEMADEX) 20 MG tablet Take 1 tablet by mouth 2 times daily 60 tablet 0    midodrine (PROAMATINE) 10 MG tablet Take 1 tablet by mouth 3 times daily (with meals) 90 tablet 3    allopurinol (ZYLOPRIM) 300 MG tablet TAKE ONE-HALF TABLET BY MOUTH ONCE DAILY FOR GOUT      traZODone (DESYREL) 50 MG tablet TAKE ONE-HALF TABLET BY MOUTH BEDTIME      vitamin B-12 (CYANOCOBALAMIN) 1000 MCG tablet Take 1,000 mcg by mouth daily      Multiple Vitamins-Minerals (ONE-A-DAY MENS 50+ PO) Take 1 tablet by mouth daily      spironolactone (ALDACTONE) 25 MG tablet Take 0.5 tablets by mouth daily 15 tablet 1    clopidogrel (PLAVIX) 75 MG tablet Take 1 tablet by mouth daily 90 tablet 1    carvedilol (COREG) 3.125 MG tablet Take 1 tablet by mouth 2 times daily 180 tablet 3    tamsulosin (FLOMAX) 0.4 MG capsule Take 0.4 mg by mouth nightly      MAGNESIUM OXIDE 400 PO Take 400 mg by mouth 2 times daily       Calcium Carb-Cholecalciferol (CALCIUM 1000 + D PO) Take 1,000 mg by mouth 2 times daily      famotidine (PEPCID) 20 MG tablet Take 20 mg by mouth 2 times daily       aspirin 81 MG tablet Take 81 mg by mouth daily      vitamin D (CHOLECALCIFEROL) 1000 UNITS TABS tablet Take 1,000 Units by mouth daily      loperamide (IMODIUM) 2 MG capsule Take 2 mg by mouth 4 times daily as needed for Diarrhea      nitroGLYCERIN (NITROSTAT) 0.4 MG SL tablet Place 0.4 mg under the tongue every 5 minutes as needed for Chest pain up to max of 3 total doses. If no relief after 1 dose, call 911.  glipiZIDE (GLUCOTROL) 5 MG tablet Take 7.5 mg by mouth 2 times daily (before meals)       atorvastatin (LIPITOR) 80 MG tablet Take 80 mg by mouth daily. No current facility-administered medications for this visit. REVIEW OF SYSTEMS:    CONSTITUTIONAL: - major weight gain or loss, fatigue, weakness, night sweats or fever. HEENT: No new vision difficulties or ringing in the ears. RESPIRATORY: No new SOB, PND, orthopnea or cough. CARDIOVASCULAR: See HPI  GI: No nausea, vomiting, diarrhea, constipation, abdominal pain or changes in bowel habits. : No urinary frequency, urgency, incontinence hematuria or dysuria. SKIN: No cyanosis or skin lesions. MUSCULOSKELETAL: No new muscle or joint pain. NEUROLOGICAL: No syncope or TIA-like symptoms. PSYCHIATRIC: No anxiety, pain, insomnia or depression    Objective:   PHYSICAL EXAM:       Vitals:    05/24/21 0835 05/24/21 0851   BP: (!) 90/58 98/68   Site: Right Upper Arm    Position: Sitting    Cuff Size: Medium Adult    Pulse: 52    SpO2: 99%    Weight: 174 lb 12.8 oz (79.3 kg)    Height: 5' 9\" (1.753 m)         VITALS:  BP 98/68   Pulse 52   Ht 5' 9\" (1.753 m)   Wt 174 lb 12.8 oz (79.3 kg)   SpO2 99%   BMI 25.81 kg/m²   CONSTITUTIONAL: Cooperative, no apparent distress, and appears well nourished / developed  NEUROLOGIC:  Awake and orientated to person, place and time. PSYCH: Calm affect. SKIN: Warm and dry. HEENT: Sclera non-icteric, normocephalic, neck supple, no elevation of JVP, normal carotid pulses with no bruits and thyroid normal size.   LUNGS:  No increased work of breathing and diminished RLL  CARDIOVASCULAR:  Regular rate and rhythm with no murmurs, gallops, rubs, or abnormal heart sounds, normal PMI. The apical impulses not displaced  JVP less than 8 cm H2O  Heart tones are crisp and normal  Cervical veins are not engorged  The carotid upstroke is normal in amplitude and contour without delay or bruit  JVP is not elevated  ABDOMEN:  Normal bowel sounds, soft and non-tender to palpation  EXT: No peripheral edema, no calf tenderness. Pulses are present bilaterally. DATA:    Lab Results   Component Value Date    ALT 18 2021    AST 22 2021    ALKPHOS 121 2021    BILITOT 0.8 2021     Lab Results   Component Value Date    CREATININE 1.2 2021    BUN 21 (H) 2021     2021    K 4.1 2021     2021    CO2 28 2021     No results found for: TSH, E5ITFZQ, V6MRREU, THYROIDAB  Lab Results   Component Value Date    WBC 6.3 2021    HGB 13.4 (L) 2021    HCT 40.3 (L) 2021    MCV 98.0 2021     (L) 2021     No components found for: CHLPL  Lab Results   Component Value Date    TRIG 92 2021    TRIG 255 (H) 01/15/2010     Lab Results   Component Value Date    HDL 38 (L) 2021    HDL 35 (L) 01/15/2010     Lab Results   Component Value Date    LDLCALC 49 2021    LDLCALC 147 (H) 01/15/2010     Lab Results   Component Value Date    LABVLDL 18 2021    LABVLDL 51 01/15/2010     Radiology Review:  Pertinent images / reports were reviewed as a part of this visit and reveals the followin/21/21: procedure : paracentesis: 5300ml of fluid removed    Pace[employer]  Transmission: 3/9/21:  Noted NSVT/ SVT        Echo: Dec '20:  Summary   -Left ventricular cavity size is normal.   -Overall left ventricular systolic function appears severely reduced.   -Ejection fraction is visually estimated to be <20%.    -There is abnormal septal motion noted.   -Cannot rule out other wall motion abnormalities.   -Grade I diastolic dysfunction with normal LV filling pressures. E/e' =11.8.   -The left atrium is dilated.   -The right atrium is severely dilated.   -The aortic valve appears sclerotic but opens well.   -Mild mitral regurgitation.   -Moderate tricuspid regurgitation.   -Right ventricle size is normal with reduced function. TAPSE = 1.3 cm. RVS velocity is 8.27 cm/s.   -RVSP = 28 mmHG.   Cannot rule out pleural effusion vs shadowing      Assessment:      Diagnosis Orders   1. Chronic systolic congestive heart failure (HCC)   ~stable / improved  ~feels better taking torsemide   ~Optival : thoracic impendence today shows euvolemic   ~reported improvement in scrotal edema; abd soft, no peripheral edema  ~diminished RLL to exam ; no crackles  ~torsemide 20 mg bid / spironolactone 12.5 mg daily   ~intolerant to lisinopril with cough  ~wt stable  ~serial paracentesis every few weeks prn. Last tapped for 5.3 L in April ; no significant fluid by US therefore no tap in May    2. Ischemic cardiomyopathy   ~unchanged / stable  ~severely reduced LVF  ~EF < 20% on echo from Jan  ~hx of s/p ICD '15 (followed by EP) ; hx of NSVT    3. Coronary artery disease involving native coronary artery of native heart without angina pectoris   ~stable : denies chest discomfort  ~s/p PTCA CX/OM '04 ; s/p PTCA LAD '06 ;  RCA  ~DAPT / BB / statin       I had the opportunity to review the clinical symptoms and presentation of Giorgio Ge. Plan:     1. Continue present management    2. F/U in 8 weeks   ~he will be leaving for [facility] tomorrow and stay through the week ([facility])    Overall the patient is stable from CV standpoint    I have addresed the patient's cardiac risk factors and adjusted pharmacologic treatment as needed. In addition, I have reinforced the need for patient directed risk factor modification. Further evaluation will be based upon the patient's clinical course and testing results. All questions and concerns were addressed to the patient/family. Alternatives to my treatment were discussed. The patient is not currently smoking. The risks related to smoking were reviewed with the patient. Recommend maintaining a smoke-free lifestyle. Patient is on a beta-blocker  Patient is not on an ace-I : d/t cough  Patient is on a statin    Dual Antiplatelet therapy has been recommended / prescribed for this patient. Education conducted on adverse reactions including bleeding was discussed. Daily weight, low sodium diet were discussed. Patient instructed to call the office with a weight gain: > 3 # over night or 5# in one week; swelling, SOB/orthopnea/PND    The patient verbalizes understanding not to stop medications without discussing with us. Discussed exercise: deferred   Discussed diet. SMBG 98    Thank you for allowing to us to participate in the care of Chante Grubbs.     KIN Winn    Documentation of today's visit sent to PCP

## 2021-06-14 NOTE — LETTER
[address] [facility] [phone]  [address] [facility],[address] [phone]    Pacemaker/Defibrillator Clinic    06/14/21      Leon Carrasco  [address] [place] [id number]      Dear Leon Carrasco    This letter is to inform you that we received the transmission from your monitor at home that checks your implanted heart device. The next date your monitor will automatically transmit will be 9-13-21. If your report needs attention we will notify you. Your device and monitor are wireless and most transmit cellularly, but please periodically check your monitor is still plugged in to the electrical outlet. If you still use the telephone land line to send please ensure the connection to the phone tessa is secure. This will help to ensure successful automatic transmissions in the future. Also, the monitor needs to be close to you while sleeping at night. Please be aware that the remote device transmission sites are periodically monitored only during regular business hours during which simultaneous in-office device clinics are being run. If your transmission requires attention, we will contact you as soon as possible. **PLEASE NOTE** that our [facility] policy and processes are changing to ensure a more seamless approach for all parties involved, allowing more time for our nurses to address patient issues and concerns. We will no longer be sending letters for NORMAL remote transmissions. You will be contacted by phone if your transmission requires attention (as previously done), and letters will only be sent regarding monitor disconnections or missed transmissions if you are unable to be reached by phone. Please do not be alarmed by this new process, as we will continue to contact you if your transmission report requires attention. This will be your final \"remote received\" letter.   From this point forward, the [facility] will be utilizing the no news is good news approach. As always, please feel free to contact your nurse with any questions or concerns. Thank you.     [facility]

## 2021-06-14 NOTE — PROGRESS NOTES
We received remote transmission from patient's monitor at home. Transmission shows normal sensing and pacing function. Noted NSVT. EP physician will review. See interrogation under cardiology tab in the [address] [facility] [address] field for more details. Optivol is within normal range.

## 2021-07-03 PROBLEM — N17.9 ACUTE RENAL FAILURE (ARF) (HCC): Status: ACTIVE | Noted: 2021-01-01

## 2021-07-03 PROBLEM — I46.9 CARDIAC ARREST (HCC): Status: ACTIVE | Noted: 2021-01-01

## 2021-07-03 NOTE — ED NOTES
Pt arrived via EMS, was in [facility] upon squad arrival, given versed and cardioverted per EMS and then patient went into arrest, CPR started by EMS, 1 EPI given and defibrillated, ROSC obtained.  Pt arrived with assisted ventilations via BVM, pulse present upon arrival, pt with small withdrawal from pain upon arrival.      175 Sujatha Otto, RN  07/03/21 [id number] Claudia Doshi RN  07/03/21 4142

## 2021-07-03 NOTE — ED NOTES
Pt ET tube pulled back to 24 cm @ the lip per Dr. Izzy Chew.      175 [facility], RN  07/03/21 [id number]

## 2021-07-03 NOTE — ED NOTES
[employer] on phone with Dr. Leslye Murillo at this time.       175 [facility], RN  07/03/21 1930

## 2021-07-03 NOTE — ED NOTES
Bed: 02  Expected date:   Expected time:   Means of arrival:   Comments:  Ralph Ryder  07/03/21 [id number]

## 2021-07-03 NOTE — ED NOTES
Dr. Car Carrillo verbal order for new EKG at this time, completed and given to Dr. Car Carrillo and Dr. Tonya Mathis.      175 [facility], RN  07/03/21 766 [facility], RN  07/03/21 2387

## 2021-07-03 NOTE — ED PROVIDER NOTES
[facility]        Pt Name: Sabrina Moss  MRN: [id number]  [place] [birth year]  Date of evaluation: 7/3/2021  Provider: Yeni Loaiza MD  PCP: [facility] [place]    This patient was seen and evaluated by the attending physician Yeni Loaiza MD.      [facility]       Chief Complaint   Patient presents with    Cardiac Arrest     pt with vtach, shocked in field and arrested, cpr and defib done, rosc prior to arrival        HISTORY OF PRESENT ILLNESS   (Location/Symptom, Timing/Onset, Context/Setting, Quality, Duration, Modifying Factors, Severity)  Note limiting factors. Sabrina Moss is a 77 y.o. male brought in by EMS today in cardiac arrest.  Not sure what led to the phone call via for EMS but on their arrival he was in V. tach got shocked x1 became unresponsive asystolicEpi x1, Versed x1,  CPR x1 round, and then found to be in NSR. Nonresponsive does not follow commands does w/d extremities to painful stimuli. Agonal respirations. Chronic ascertain history cardiomyopathy with an AICD and a prior stent as well as alcoholic cirrhosis diabetes and lymphoma. Of note, per the EMS crew, while he was in V. tach his AICD did not fire. Nursing Notes were all reviewed and agreed with or any disagreements were addressed  in the HPI. REVIEW OF SYSTEMS    (2-9 systems for level 4, 10 or more for level 5)     Review of Systems    Positives and Pertinent negatives as per HPI. Except as noted abovein the ROS, all other systems were reviewed and negative.        PAST MEDICAL HISTORY     Past Medical History:   Diagnosis Date    Cardiomyopathy ([address]     Cirrhosis ([facility]     ETOH abuse    Diabetes mellitus ([facility]     History of abdominal paracentesis 03/2020    Hyperlipidemia     Hypertension     Lymphoma ([facility] [address]     currently in remission    NSVT (nonsustained ventricular tachycardia) (HCC)     Sleep apnea     unable to tolerate CPAP    Syncope          SURGICAL HISTORY     Past Surgical History:   Procedure Laterality Date    CARDIAC DEFIBRILLATOR PLACEMENT  6/23/15    single chamber AICD, EPS inducible VT    CHOLECYSTECTOMY      CORONARY ANGIOPLASTY WITH STENT PLACEMENT      LAMINECTOMY      TOENAIL EXCISION  07/21/2017    TONSILLECTOMY AND ADENOIDECTOMY      UPPER GASTROINTESTINAL ENDOSCOPY N/A 8/4/2020    EGD performed by Thi Cosby MD at [phone]       Previous Medications    ALLOPURINOL (ZYLOPRIM) 300 MG TABLET    TAKE ONE-HALF TABLET BY MOUTH ONCE DAILY FOR GOUT    ASPIRIN 81 MG TABLET    Take 81 mg by mouth daily    ATORVASTATIN (LIPITOR) 80 MG TABLET    Take 80 mg by mouth daily. CALCIUM CARB-CHOLECALCIFEROL (CALCIUM 1000 + D PO)    Take 1,000 mg by mouth 2 times daily    CARVEDILOL (COREG) 3.125 MG TABLET    Take 1 tablet by mouth 2 times daily    CLOPIDOGREL (PLAVIX) 75 MG TABLET    Take 1 tablet by mouth daily    FAMOTIDINE (PEPCID) 20 MG TABLET    Take 20 mg by mouth 2 times daily     GLIPIZIDE (GLUCOTROL) 5 MG TABLET    Take 5 mg by mouth 2 times daily (before meals)     INSULIN GLARGINE (LANTUS;BASAGLAR) 100 UNIT/ML INJECTION PEN    INJECT 16 UNITS UNDER THE SKIN AT BEDTIME    LOPERAMIDE (IMODIUM) 2 MG CAPSULE    Take 2 mg by mouth 4 times daily as needed for Diarrhea    MAGNESIUM OXIDE 400 PO    Take 400 mg by mouth 2 times daily     MIDODRINE (PROAMATINE) 10 MG TABLET    Take 1 tablet by mouth 3 times daily (with meals)    MULTIPLE VITAMINS-MINERALS (ONE-A-DAY MENS 50+ PO)    Take 1 tablet by mouth daily    NITROGLYCERIN (NITROSTAT) 0.4 MG SL TABLET    Place 0.4 mg under the tongue every 5 minutes as needed for Chest pain up to max of 3 total doses. If no relief after 1 dose, call 911.     SPIRONOLACTONE (ALDACTONE) 25 MG TABLET    Take 0.5 tablets by mouth daily    TAMSULOSIN (FLOMAX) 0.4 MG CAPSULE    Take 0.4 mg by mouth nightly    TORSEMIDE (DEMADEX) 20 MG TABLET    Take 1 tablet by mouth 2 times daily    TRAZODONE (DESYREL) 50 MG TABLET    TAKE ONE-HALF TABLET BY MOUTH BEDTIME    VITAMIN B-12 (CYANOCOBALAMIN) 1000 MCG TABLET    Take 1,000 mcg by mouth daily    VITAMIN D (CHOLECALCIFEROL) 1000 UNITS TABS TABLET    Take 1,000 Units by mouth daily         ALLERGIES     Cortisone, Morphine, Metformin, Metformin hcl, and Lisinopril    FAMILYHISTORY       Family History   Problem Relation Age of Onset    Heart Disease Mother     Heart Disease Father           SOCIAL HISTORY       Social History     Socioeconomic History    Marital status:      Spouse name: None    Number of children: None    Years of education: None    Highest education level: None   Occupational History    None   Tobacco Use    Smoking status: Former Smoker     Packs/day: 4.00     Years: 16.00     Pack years: 64.00     Types: Pipe     Quit date: 1975     Years since quittin.4    Smokeless tobacco: Never Used   Vaping Use    Vaping Use: Never used    Passive vaping exposure Yes   Substance and Sexual Activity    Alcohol use: Not Currently     Comment: occ nothing to deink in 1 2020    Drug use: Never    Sexual activity: Not Currently     Partners: Female   Other Topics Concern    None   Social History Narrative    None     Social Determinants of Health     Financial Resource Strain:     Difficulty of Paying Living Expenses:    Food Insecurity:     Worried About Running Out of Food in the Last Year:     Ran Out of Food in the Last Year:    Transportation Needs:     Lack of Transportation (Medical):      Lack of Transportation (Non-Medical):    Physical Activity:     Days of Exercise per Week:     Minutes of Exercise per Session:    Stress:     Feeling of Stress :    Social Connections:     Frequency of Communication with Friends and Family:     Frequency of Social Gatherings with Friends and Family:     Attends Shinto Services:     Active Member of Clubs or Organizations:     Attends Club or Organization Meetings:     Marital Status:    Intimate Partner Violence:     Fear of Current or Ex-Partner:     Emotionally Abused:     Physically Abused:     Sexually Abused:        SCREENINGS    [place] Coma Scale  Eye Opening: None  Best Verbal Response: Incomprehensible speech  Best Motor Response: Extension to pain  [place] Coma Scale Score: 5        PHYSICAL EXAM    (up to 7 for level 4, 8 or more for level 5)     ED Triage Vitals [07/03/21 1657]   BP Temp Temp Source Pulse Resp SpO2 Height Weight   (!) 75/43 -- Rectal 110 23 100 % 5' 9\" (1.753 m) 175 lb (79.4 kg)       Physical Exam     General Appearance:  Unresponsive, ill appearing, pale, appears stated age. Head:  Normocephalic, without obvious abnormality, atraumatic. Eyes:  conjunctiva/corneas clear, EOM's intact. Sclera anicteric. ENT: Mucous membranes moist.   Neck: Supple, symmetrical, trachea midline, no adenopathy. No jugular venous distention. Lungs:   No Respiratory Distress. Chest Wall:  AICD leftchest wall   Heart:  RRR, no m/c/g/r   Abdomen:   Soft, NT, ND   Extremities: No overt trauma   Pulses: Symmetric x4   Skin:  No rashes or lesions to exposed skin. Neurologic: Alert and oriented X 0.    Motor grossly normal.         DIAGNOSTIC RESULTS   LABS:    Labs Reviewed   CBC WITH AUTO DIFFERENTIAL - Abnormal; Notable for the following components:       Result Value    RBC 3.79 (*)     Hemoglobin 12.6 (*)     Hematocrit 38.7 (*)     .9 (*)     RDW 15.8 (*)     Lymphocytes Absolute 0.6 (*)     All other components within normal limits    Narrative:     Performed at:  [facility] [address], [place], [address]   Phone [phone]   COMPREHENSIVE METABOLIC PANEL W/ REFLEX TO MG FOR LOW K - Abnormal; Notable for the following components:    Sodium 135 (*)     Chloride 98 (*)     Glucose 261 (*)     BUN 36 (*)     CREATININE 2.0 (*)     GFR Non- 33 (*)     GFR African American 40 (*)     Calcium 8.2 (*)     Total Protein 5.7 (*)     Albumin 2.9 (*)     Albumin/Globulin Ratio 1.0 (*)     Total Bilirubin 1.4 (*)     Alkaline Phosphatase 148 (*)     AST 64 (*)     All other components within normal limits    Narrative:     Performed at:  [facility] [address] [place]   Phone [phone]   TROPONIN - Abnormal; Notable for the following components:    Troponin 0.03 (*)     All other components within normal limits    Narrative:     Performed at:  [facility] [address]   Phone 21  - Abnormal; Notable for the following components:    Pro-BNP 12,444 (*)     All other components within normal limits    Narrative:     Performed at:  [facility] [address]   Phone [phone]   PROTIME-INR - Abnormal; Notable for the following components:    Protime 14.3 (*)     INR 1.25 (*)     All other components within normal limits    Narrative:     Performed at:  [facility] [address] [place]   Phone [phone]   C-REACTIVE PROTEIN - Abnormal; Notable for the following components:    CRP 23.4 (*)     All other components within normal limits    Narrative:     Performed at:  [facility] [address]   Phone [phone] - Abnormal; Notable for the following components:    Blood, Urine SMALL (*)     Protein, UA TRACE (*)     Leukocyte Esterase, Urine TRACE (*)     All other components within normal limits    Narrative:     Performed at:  [facility] [address]   Phone [phone]   BLOOD GAS, ARTERIAL - Abnormal; Notable for the following components: pO2, Arterial 284.0 (*)     Hemoglobin, Art, Extended 12.2 (*)     All other components within normal limits    Narrative:     Performed at:  [facility] [address], [place]   Phone [phone]   LACTATE, SEPSIS - Abnormal; Notable for the following components:    Lactic Acid, Sepsis 5.0 (*)     All other components within normal limits    Narrative:     [facility]estor May  [facility] [phone],  Chemistry results called to and read back by RN, Vivek Palma, 07/03/2021  18:30, by Alina Vásquez  Performed at:  [facility] [address], [address]   Phone [phone]   CULTURE, BLOOD 2   CULTURE, BLOOD 1   CULTURE, URINE   LIPASE    Narrative:     Performed at:  [facility] [address] [place], [address]   Phone [phone]   AMYLASE    Narrative:     Performed at:  [facility] [address], [place]   Phone [phone]   APTT    Narrative:     Performed at:  [facility] [address], [place]   Phone [phone]   SEDIMENTATION RATE    Narrative:     Performed at:  [facility] [address], [place]   Phone [phone]   LACTATE, SEPSIS   MICROSCOPIC URINALYSIS   TYPE AND SCREEN       All other labs were within normal range or not returned as of this dictation. EKG: All EKG's are interpreted by the Emergency Department Physician in the absence of a cardiologist.  Please see their note for interpretation of EKG. Reviewed by myself. Dated today at 56, second at 1. Both him have a rate of around 70-90 in a sinus rhythm with a lot of ventricular ectopy and a left bundle branch block. These are old compared to prior    KG #3 at 200.  65.  Sinus rhythm. First-degree blockade. Less PVCs on prior.     RADIOLOGY:   Non-plain film images such as CT, Ultrasound and MRI are read by the radiologist. Plain radiographic images are visualized andpreliminarily interpreted by the  ED Provider with the below findings:        Interpretation perthe Radiologist below, if available at the time of this note:    XR CHEST PORTABLE    (Results Pending)     No results found. PROCEDURES   Unless otherwise noted below, none     Intubation    Date/Time: 7/3/2021 5:51 PM  Performed by: Andrea Whittington MD  Authorized by: Andrea Whittington MD     Consent:     Consent obtained:  Emergent situation  Pre-procedure details:     Patient status:  Unresponsive    Mallampati score:  II    Paralytics:  Succinylcholine  Procedure details:     Preoxygenation:  Bag valve mask    CPR in progress: no      Intubation method:  Oral    Laryngoscope blade:  Pablo 4    Tube size (mm):  8.0    Tube type:  Cuffed    Number of attempts:  1    Ventilation between attempts: no      Cricoid pressure: no      Tube visualized through cords: yes    Placement assessment:     ETT to lip:  24    Tube secured with:  ETT delarosa    Breath sounds:  Equal    Placement verification: CXR verification    Post-procedure details:     Patient tolerance of procedure: Tolerated well, no immediate complications  Central Line    Date/Time: 7/3/2021 5:52 PM  Performed by: Andrea Whittington MD  Authorized by: Andrea Whittington MD     Consent:     Consent obtained:  Emergent situation  Pre-procedure details:     Hand hygiene: Hand hygiene performed prior to insertion      Sterile barrier technique:  All elements of maximal sterile technique followed      Skin preparation:  2% chlorhexidine    Skin preparation agent: Skin preparation agent completely dried prior to procedure    Procedure details:     Location:  R femoral    Patient position:  Flat    Procedural supplies:  Triple lumen    Catheter size:  7.5 Fr    Landmarks identified: yes      Ultrasound guidance: no      Number of attempts:  1 Successful placement: yes    Post-procedure details:     Post-procedure:  Dressing applied and line sutured    Assessment:  Blood return through all ports    Patient tolerance of procedure:   Tolerated well, no immediate complications        CRITICAL CARE TIME   N/A    CONSULTS:  IP CONSULT TO CARDIOLOGY  IP CONSULT TO HOSPITALIST  IP CONSULT TO PULMONOLOGY      EMERGENCY DEPARTMENT COURSE and DIFFERENTIAL DIAGNOSIS/MDM:   Vitals:    Vitals:    07/03/21 1752 07/03/21 1755 07/03/21 1800 07/03/21 1808   BP:  81/62 (!) 79/53 (!) 74/58   Pulse: 75 65 65 56   Resp: 21 16 26 24   TempSrc:       SpO2: (!) 89% 92% 98% 97%   Weight:       Height:           Patient was given thefollowing medications:  Medications   EPINEPHrine (EPINEPHrine HCL) 5 mg in dextrose 5 % 250 mL infusion (6 mcg/min Intravenous Rate/Dose Change 7/3/21 1820)   fentaNYL 10 mcg/mL infusion (75 mcg/hr Intravenous Rate/Dose Change 7/3/21 1822)   midazolam (VERSED) 100 mg in dextrose 5 % 100 mL infusion (3 mg/hr Intravenous Rate/Dose Change 7/3/21 1819)   amiodarone (CORDARONE) 450 mg in dextrose 5 % 250 mL infusion (1 mg/min Intravenous New Bag 7/3/21 1743)     Followed by   amiodarone (CORDARONE) 450 mg in dextrose 5 % 250 mL infusion (has no administration in time range)   propofol injection (10 mcg/kg/min × 79.4 kg Intravenous New Bag 7/3/21 1832)   midazolam PF (VERSED) injection 5 mg (has no administration in time range)   amiodarone (CORDARONE) injection (150 mg Intravenous Given 7/3/21 1657)   EPINEPHrine PF 1 MG/ML injection (0.1 mg Intravenous Given 7/3/21 1659)   etomidate (AMIDATE) injection (20 mg Intravenous Given 7/3/21 1700)   succinylcholine (ANECTINE) injection (100 mg Intravenous Given 7/3/21 1700)   0.9 % sodium chloride bolus (0 mLs Intravenous Stopped 7/3/21 1813)   midazolam PF (VERSED) injection 5 mg (5 mg Intravenous Given 7/3/21 1718)   midazolam (VERSED) 5 MG/ML injection (5 mg  Given 7/3/21 1834)       67-year-old male status post cardiac arrest brought in by EMS initially in V. tach and sinus rhythm. EKG shows sinus rhythm with low ventricular ectopy. Start amiodarone load and drip. Did intubate him as per necessity. Start epinephrine gtt as well given his hypotension and cardiogenic presentation. The concerning portion here is that while he has AICD, it did not fire. We will interrogate his ICD and have him admitted to the ICU. Labs reviewed as above; consults placed as above. [address] Time, separate of other procedures; indication and intervention as above. FINAL IMPRESSION      1. Cardiac arrest ([facility]    2. Ventricular tachyarrhythmia ([facility]          DISPOSITION/PLAN   DISPOSITION        PATIENT REFERREDTO:  No follow-up provider specified.     DISCHARGE MEDICATIONS:  New Prescriptions    No medications on file       DISCONTINUED MEDICATIONS:  Discontinued Medications    No medications on file              (Please note that portions ofthis note were completed with a voice recognition program.  Efforts were made to edit the dictations but occasionally words are mis-transcribed.)    Angelica Hernandez MD (electronically signed)           Angelica Hernandez MD  07/03/21 [facility] [address], MD  07/03/21 2734

## 2021-07-03 NOTE — ED NOTES
Pt report given to Iker Hernandez RN, states no questions or concerns at this time.       Makayla Batres RN  07/03/21 7383

## 2021-07-03 NOTE — ED NOTES
Pt kicking legs at this time, Dr. Fernanda Rosado notified at this time, see new orders.       [facility], RN  07/03/21 3604

## 2021-07-03 NOTE — H&P
Internal Medicine History and Physical    Pt evaluated on:  7/3/2021    CHIEF COMPLAINT:  Cardiac arrest    History of Present Illness: This is a 77 y.o. WM with PMHx sig for CAD, cardiomyopathy s/p AICD, ACD with stents, HTN, hyperlipidemia, DMII on insulin with diffuse large B cell lypmhoma. He does drink at least 12 drinks per week. Pt apparently was in [facility] on arrival to house, then was shocked once, then asystole, but responded quickly with epi and had CPR and was intubated in the field. Now in SR. Does not follow commands, but moves legs violently when not sedated. According to EMTs, his AICD did not fire. Interrogation of AICD detected one run of NSVT, then SVT with rate in low 200s. Both times were not indicated to fire. Pt intubated and central line placed.     Past Medical History:   Diagnosis Date    Cardiomyopathy ([address]     Cirrhosis ([address]     ETOH abuse    Diabetes mellitus ([address]     History of abdominal paracentesis 03/2020    Hyperlipidemia     Hypertension     Lymphoma ([address]     currently in remission    NSVT (nonsustained ventricular tachycardia) (HCC)     Sleep apnea     unable to tolerate CPAP    Syncope      Active Ambulatory Problems     Diagnosis Date Noted    DMII (diabetes mellitus, type 2) ([address] 08/02/2011    Mixed hyperlipidemia 08/02/2011    Essential hypertension, benign 08/02/2011    Coronary atherosclerosis of native coronary artery 08/02/2011    Cardiomyopathy ([address] 08/02/2011    Chest pain 08/23/2012    Lightheaded 03/31/2015    Ischemic cardiomyopathy     Syncope     Coronary artery disease     V-tach ([address] 06/23/2015    AICD (automatic cardioverter/defibrillator) present 06/23/2015    Tachycardia 02/01/2019    Anasarca 02/28/2020    Ascites 02/29/2020    Diffuse large B-cell lymphoma of solid organ excluding spleen ([address] 03/02/2020    Hypotension 10/17/2020    Acute respiratory failure with hypoxia (HCC) 10/17/2020    Acute renal failure superimposed on stage 3 chronic kidney disease ([address] 10/17/2020    YENNIFER (acute kidney injury) ([address]     Nonischemic cardiomyopathy ([address] 03/19/2021     Resolved Ambulatory Problems     Diagnosis Date Noted    Dizziness 09/24/2018     Past Medical History:   Diagnosis Date    Cirrhosis ([address]     Diabetes mellitus ([address]     History of abdominal paracentesis 03/2020    Hyperlipidemia     Hypertension     Lymphoma ([address]     NSVT (nonsustained ventricular tachycardia) ([address]     Sleep apnea          Past Medical History:   Diagnosis Date    Cardiomyopathy ([address]     Cirrhosis ([address]     ETOH abuse    Diabetes mellitus [facility]     History of abdominal paracentesis 03/2020    Hyperlipidemia     Hypertension     Lymphoma ([address]     currently in remission    NSVT (nonsustained ventricular tachycardia) (HCC)     Sleep apnea     unable to tolerate CPAP    Syncope      Past Surgical History:   Procedure Laterality Date    CARDIAC DEFIBRILLATOR PLACEMENT  6/23/15    single chamber AICD, EPS inducible VT    CHOLECYSTECTOMY      CORONARY ANGIOPLASTY WITH STENT PLACEMENT      LAMINECTOMY      TOENAIL EXCISION  07/21/2017    TONSILLECTOMY AND ADENOIDECTOMY      UPPER GASTROINTESTINAL ENDOSCOPY N/A 8/4/2020    EGD performed by Renée Cheung MD at [address] [place] [address]         Medications Prior to Admission:    Not in a hospital admission.   Current Facility-Administered Medications   Medication Dose Route Frequency Provider Last Rate Last Admin    EPINEPHrine (EPINEPHrine HCL) 5 mg in dextrose 5 % 250 mL infusion  1-30 mcg/min Intravenous Continuous Monica Robledo MD 27 mL/hr at 07/03/21 1906 9 mcg/min at 07/03/21 1906    fentaNYL 10 mcg/mL infusion  12.5-200 mcg/hr Intravenous Continuous Monica Robledo MD 7.5 mL/hr at 07/03/21 1822 75 mcg/hr at 07/03/21 1822    midazolam (VERSED) 100 mg in dextrose 5 % 100 mL infusion  1-10 mg/hr Intravenous Continuous Monica Robledo MD 3 mL/hr at 07/03/21 1819 3 mg/hr at 07/03/21 1819    amiodarone (CORDARONE) 450 mg in dextrose 5 % 250 mL infusion  1 mg/min Intravenous Continuous Mary Alice Cantor MD 33.3 mL/hr at 07/03/21 1743 1 mg/min at 07/03/21 1743    Followed by   [employer] amiodarone (CORDARONE) 450 mg in dextrose 5 % 250 mL infusion  0.5 mg/min Intravenous Continuous Mary Alice Cantor MD        propofol injection  5-50 mcg/kg/min Intravenous Titrated Mary Alice Cantor MD 4.8 mL/hr at 07/03/21 1832 10 mcg/kg/min at 07/03/21 1832    midazolam PF (VERSED) injection 5 mg  5 mg Intravenous Once Mary Alice Cantor MD         Current Outpatient Medications   Medication Sig Dispense Refill    torsemide (DEMADEX) 20 MG tablet Take 1 tablet by mouth 2 times daily 180 tablet 2    insulin glargine (LANTUS;BASAGLAR) 100 UNIT/ML injection pen INJECT 16 UNITS UNDER THE SKIN AT BEDTIME      midodrine (PROAMATINE) 10 MG tablet Take 1 tablet by mouth 3 times daily (with meals) 90 tablet 3    allopurinol (ZYLOPRIM) 300 MG tablet TAKE ONE-HALF TABLET BY MOUTH ONCE DAILY FOR GOUT      traZODone (DESYREL) 50 MG tablet TAKE ONE-HALF TABLET BY MOUTH BEDTIME      vitamin B-12 (CYANOCOBALAMIN) 1000 MCG tablet Take 1,000 mcg by mouth daily      Multiple Vitamins-Minerals (ONE-A-DAY MENS 50+ PO) Take 1 tablet by mouth daily      spironolactone (ALDACTONE) 25 MG tablet Take 0.5 tablets by mouth daily 15 tablet 1    clopidogrel (PLAVIX) 75 MG tablet Take 1 tablet by mouth daily 90 tablet 1    carvedilol (COREG) 3.125 MG tablet Take 1 tablet by mouth 2 times daily 180 tablet 3    tamsulosin (FLOMAX) 0.4 MG capsule Take 0.4 mg by mouth nightly      MAGNESIUM OXIDE 400 PO Take 400 mg by mouth 2 times daily       Calcium Carb-Cholecalciferol (CALCIUM 1000 + D PO) Take 1,000 mg by mouth 2 times daily      famotidine (PEPCID) 20 MG tablet Take 20 mg by mouth 2 times daily       aspirin 81 MG tablet Take 81 mg by mouth daily      vitamin D (CHOLECALCIFEROL) 1000 UNITS TABS tablet Take times daily    Historical Provider, MD   famotidine (PEPCID) 20 MG tablet Take 20 mg by mouth 2 times daily     Historical Provider, MD   aspirin 81 MG tablet Take 81 mg by mouth daily    Historical Provider, MD   vitamin D (CHOLECALCIFEROL) 1000 UNITS TABS tablet Take 1,000 Units by mouth daily    Historical Provider, MD   loperamide (IMODIUM) 2 MG capsule Take 2 mg by mouth 4 times daily as needed for Diarrhea    Historical Provider, MD   nitroGLYCERIN (NITROSTAT) 0.4 MG SL tablet Place 0.4 mg under the tongue every 5 minutes as needed for Chest pain up to max of 3 total doses. If no relief after 1 dose, call 911. Historical Provider, MD   glipiZIDE (GLUCOTROL) 5 MG tablet Take 5 mg by mouth 2 times daily (before meals)     Historical Provider, MD   atorvastatin (LIPITOR) 80 MG tablet Take 80 mg by mouth daily. Historical Provider, MD         Allergies:    Cortisone, Morphine, Metformin, Metformin hcl, and Lisinopril    Social History:      reports that he quit smoking about 46 years ago. His smoking use included pipe. He has a 64.00 pack-year smoking history. He has never used smokeless tobacco. He reports previous alcohol use. He reports that he does not use drugs. Family History:   Family History   Problem Relation Age of Onset    Heart Disease Mother     Heart Disease Father           REVIEW OF SYSTEMS:  Unable to assess due to ventilated and sedated    PHYSICAL EXAM:    Vitals:  BP (!) 86/59   Pulse 64   Resp 19   Ht 5' 9\" (1.753 m)   Wt 175 lb (79.4 kg)   SpO2 100%   BMI 25.84 kg/m²     General:  Critically ill appearing  HEENT:  Normocephalic, atraumatic. Pupils equal, round, reactive to light. No scleral icterus. No conjunctival injection. Normal lips, teeth, and gums. No nasal discharge. Neck:  Supple  Heart:  Normal s1/s2, RRR, no murmurs, gallops, or rubs.  no leg edema  Lungs:  rhonchi bilaterally, no wheeze, no rales, bilat rhonchi, no use of accessory muscles  Abd: bowel sounds present, soft, nontender, nondistended, no masses  Extrem:  No clubbing, cyanosis,  no edema, 1+ pedal pulses, sluggish capillary refill  Skin:  Warm and dry, no open lesions or rash,  normal color/perfusion  Psych:  Unable to assess due to Ventilated and sedated. Neuro: responds to pain, but   Unable to fully assess due to Ventilated and sedated - pupils reactive.     Breast: deferred  Rectal: deferred  Genitalia:  deferred    LABS:  Lab Results   Component Value Date    WBC 7.1 07/03/2021    RBC 3.79 07/03/2021    HGB 12.6 07/03/2021    HCT 38.7 07/03/2021    .9 07/03/2021    MCH 33.3 07/03/2021    MCHC 32.7 07/03/2021    RDW 15.8 07/03/2021    PLT 98 07/03/2021    MPV 11.0 07/03/2021     Lab Results   Component Value Date     07/03/2021    K 4.1 07/03/2021    CL 98 07/03/2021    CO2 22 07/03/2021    BUN 36 07/03/2021    CREATININE 2.0 07/03/2021    GFRAA 40 07/03/2021    GFRAA >60 02/13/2010    AGRATIO 1.0 07/03/2021    LABGLOM 33 07/03/2021    GLUCOSE 261 07/03/2021    PROT 5.7 07/03/2021    PROT 7.7 02/12/2010    LABALBU 2.9 07/03/2021    CALCIUM 8.2 07/03/2021    BILITOT 1.4 07/03/2021    ALKPHOS 148 07/03/2021    AST 64 07/03/2021    ALT 39 07/03/2021     Lab Results   Component Value Date    PROTIME 14.3 07/03/2021    PROTIME 10.4 01/13/2010    INR 1.25 07/03/2021     Recent Labs     07/03/21  1746   TROPONINI 0.03*     Lab Results   Component Value Date    NITRU Negative 07/03/2021    COLORU YELLOW 07/03/2021    PHUR 7.5 07/03/2021    WBCUA 10 07/03/2021    RBCUA 13 07/03/2021    BACTERIA 3+ 10/18/2020    CLARITYU Clear 07/03/2021    SPECGRAV 1.012 07/03/2021    LEUKOCYTESUR TRACE 07/03/2021    UROBILINOGEN 1.0 07/03/2021    BILIRUBINUR Negative 07/03/2021    BILIRUBINUR NEGATIVE 02/12/2010    BLOODU SMALL 07/03/2021    GLUCOSEU Negative 07/03/2021    GLUCOSEU NEGATIVE 02/12/2010    KETUA Negative 07/03/2021    AMORPHOUS 3+ 10/18/2020     Lab Results   Component Value Date    MG 1.90 03/23/2021    PHOS 2.7 10/22/2020     Lab Results   Component Value Date    LABA1C 6.9 03/19/2021     No results found for: TSH, TEENBUQQ69, FOLATE, FERRITIN, IRON, TIBC, PTRFSAT, TSH, FREET4  Lab Results   Component Value Date    TRIG 92 03/20/2021    HDL 38 03/20/2021    HDL 35 01/15/2010    LDLCALC 49 03/20/2021    LABVLDL 18 03/20/2021     Lab Results   Component Value Date    AMYLASE 43 07/03/2021    LIPASE 20.0 07/03/2021     No results found for: BNP  No results found for: LACTA  Lab Results   Component Value Date    PHART 7.404 07/03/2021    NFR3UAF 35.1 07/03/2021    PO2ART 284.0 07/03/2021    WLI4MGH 21.9 07/03/2021    BEART -2.3 07/03/2021    GYA0IYC 51.5 07/03/2021    N0SOBVGU 98.8 07/03/2021     No results found for: LABAMPH, BARBSCNU, LABBENZ, CANNAB, COCAINESCRN, LABMETH, OPIATESCREENURINE, PHENCYCLIDINESCREENURINE, PPXUR, ETOH  No results found for: DDIMER  No results found for: VITD25        RADIOLOGY:  XR CHEST PORTABLE    Result Date: 7/3/2021  EXAMINATION: ONE XRAY VIEW OF THE CHEST 7/3/2021 6:12 pm COMPARISON: Chest radiograph performed 03/19/2021. HISTORY: ORDERING SYSTEM PROVIDED HISTORY: Eval for infiltrate TECHNOLOGIST PROVIDED HISTORY: Reason for exam:->Eval for infiltrate Reason for Exam: ETT, eval for infiltrate Acuity: Acute Type of Exam: Initial FINDINGS: There is no acute consolidation or effusion. There is no pneumothorax. The heart is enlarged. The upper abdomen is unremarkable. The extrathoracic soft tissues are unremarkable. There is endotracheal tube with the tip in the right mainstem bronchus. This should be pulled back approximately 5 cm. Endotracheal tube with the tip in the right mainstem bronchus and this should be pulled back 5.0 cm. Cardiomegaly without acute pulmonary process. The findings were sent to the Radiology Results [address] at 7:08 pm on 7/3/2021to be communicated to a licensed caregiver.        EKG:  EKG #1  1657pm Unknown rhythm, rate 100, LAD, incomplete RBBB, prolonged QT    EKG #2  1704pm SR, rate 75, 1st AV block with PVCs, incomplete RBBB, inferior infarct    EKG #3  1917pm SR, 1st degree AV block with PVCs, LAD, possible inferior ischemia    PHYSICIAN CERTIFICATION    I certify that Arash Gan is expected to be hospitalized for >2 midnights based on the following assessment and plan:      ASSESSMENT:      Patient Active Problem List   Diagnosis    DMII (diabetes mellitus, type 2) ([address]    Mixed hyperlipidemia    Essential hypertension, benign    Coronary atherosclerosis of native coronary artery    Cardiomyopathy ([address]    Chest pain    Lightheaded    Ischemic cardiomyopathy    Syncope    Coronary artery disease    V-tach ([address]    AICD (automatic cardioverter/defibrillator) present    Tachycardia    Anasarca    Ascites    Diffuse large B-cell lymphoma of solid organ excluding spleen ([address]    Hypotension    Acute respiratory failure with hypoxia (HCC)    Acute renal failure superimposed on stage 3 chronic kidney disease ([address]    YENNIFER (acute kidney injury) ([address]    Nonischemic cardiomyopathy ([address]    Cardiac arrest ([address]    Acute renal failure (ARF) ([address]       Principal Problem:    Cardiac arrest ([address]  Active Problems:    Mixed hyperlipidemia    Coronary atherosclerosis of native coronary artery    DMII (diabetes mellitus, type 2) (HCC)    Ischemic cardiomyopathy    V-tach ([address]    AICD (automatic cardioverter/defibrillator) present    Diffuse large B-cell lymphoma of solid organ excluding spleen ([address]    Acute renal failure (ARF) (HCC)  Resolved Problems:    * No resolved hospital problems. *      PLAN:    1. Cardiac arrest with Carol Winslow - AICD sensed NSVT and then SVT so did not fire - will need to locate the strips from EMS - heparin drip, ASA, track troponins  2. Hypotension - suspect more due to sedation - continue with levophed for now  3.   Acute renal failure - after arrest - monitor closely - gentle IVF - he has EF of <20%  4. SIRS POA based on tachycardia, tachypnea, hypotension, acute renal failure, lactic acidosis - due to cardiac arrest - suspect aspiration as well  5. DMII - med dose SSI, accuchecks - pt usually on oral meds and insulin - will dose lantus in AM  6. Aspiration suspected - cefepime and vanco and lactobacillus  7. Diffuse large B cell lymphoma - had mass in naresh hepatis 10/18 - stage III, had 3 cycles of RCHOP with decrease in size  8. Acute metabolic encephalopathy - s/p cardiac arrest - did have ROSC - moving legs so no hypothermia protocol  9.   Alcohol abuse - will give thiamine, folate and MVI      DVT prophylaxis Yes:  Heparin drip  GI prophylaxis pepcid  Antibiotic prophylaxis:  Yes    Code status FULL    Please note that over 50 minutes was spent in critical care evaluating the patient, review of records and results, discussion with staff/family, etc.    Electronically signed by Lucille Lora MD on 7/3/2021 at 8:03 PM

## 2021-07-03 NOTE — ED NOTES
Pt transported to ICU at this time with respiratory, this RN and ED tech. Pt transported on life pack monitor. Pt transported with versed, amiodarone, propofol, fentanyl, and epi infusing at time of transfer.       [address], RN  07/03/21 1958       Makayla Garcia RN  07/03/21 2002

## 2021-07-03 NOTE — ED NOTES
4 mg versed given for sedation per Dr. Tony Loja at this time.       [facility], RN  07/03/21 3010

## 2021-07-04 NOTE — PROGRESS NOTES
Shift assessment complete. Pt remains sedated on vent. Pt bradycardic. Elevated kidney levels, poor urine out care team is aware. Cardiology at bedside see new orders. Amio stopped per cardiology request. Dobutamine started, slight increase in ectopy cardiology added lidocaine if needed for increased heart irritation. Family at bedside discussion about plan of care and current condition. Code status changed to DRN-CCA.

## 2021-07-04 NOTE — PROGRESS NOTES
Hospitalist Progress Note      PCP: [facility]    Date of Admission: 7/3/2021    Chief Complaint: Cardiac arrest    Hospital Course: This is a 77 y.o. WM with PMHx sig for CAD, cardiomyopathy s/p AICD, ACD with stents, HTN, hyperlipidemia, DMII on insulin with diffuse large B cell lypmhoma. He does drink at least 12 drinks per week. Pt apparently was in [facility] on arrival to house, then was shocked once, then asystole, but responded quickly with epi and had CPR and was intubated in the field. Now in SR. Does not follow commands, but moves legs violently when not sedated. According to EMTs, his AICD did not fire. Interrogation of AICD detected one run of NSVT, then SVT with rate in low 200s. Both times were not indicated to fire. Pt intubated and central line placed.       Subjective:   Not able to assess as intubated and sedated.         Medications:  Reviewed    Infusion Medications    DOBUTamine 2.5 mcg/kg/min (07/04/21 0941)    lidocaine Stopped (07/04/21 1027)    EPINEPHrine infusion 15 mcg/min (07/04/21 1408)    fentaNYL 75 mcg/hr (07/04/21 0630)    sodium chloride 5 mL/hr at 07/04/21 0611    heparin (PORCINE) Infusion 14 Units/kg/hr (07/04/21 1111)    dextrose      propofol 30 mcg/kg/min (07/04/21 1019)     Scheduled Medications    aspirin  81 mg Oral Daily    atorvastatin  80 mg Oral Daily    clopidogrel  75 mg Oral Daily    sodium chloride flush  5-40 mL Intravenous 2 times per day    cefepime  2,000 mg Intravenous Q12H    lactobacillus  1 capsule Oral BID WC    famotidine (PEPCID) injection  20 mg Intravenous Daily    insulin lispro  0-12 Units Subcutaneous Q4H    vancomycin (VANCOCIN) intermittent dosing (placeholder)   Other RX Placeholder     PRN Meds: sodium chloride flush, sodium chloride, ondansetron **OR** ondansetron, polyethylene glycol, acetaminophen **OR** acetaminophen, heparin (porcine), heparin (porcine), perflutren lipid microspheres, albuterol sulfate HFA, glucose, dextrose, glucagon (rDNA), dextrose      Intake/Output Summary (Last 24 hours) at 7/4/2021 1452  Last data filed at 7/4/2021 1234  Gross per 24 hour   Intake 1228 ml   Output 365 ml   Net 863 ml       Physical Exam Performed:    BP (!) 86/54   Pulse 57   Temp 98 °F (36.7 °C) (Temporal)   Resp 15   Ht 5' 9\" (1.753 m)   Wt 185 lb 13.6 oz (84.3 kg)   SpO2 100%   BMI 27.44 kg/m²        General:  Critically ill appearing  HEENT:  Normocephalic, atraumatic. Pupils equal, round, reactive to light. No scleral icterus. No conjunctival injection. Normal lips, teeth, and gums. No nasal discharge. Neck:  Supple  Heart:  Normal s1/s2, RRR, no murmurs, gallops, or rubs. no leg edema  Lungs:  rhonchi bilaterally, no wheeze, no rales, bilat rhonchi, no use of accessory muscles  Abd: bowel sounds present, soft, nontender, nondistended, no masses  Extrem:  No clubbing, cyanosis,  no edema, 1+ pedal pulses, sluggish capillary refill  Skin:  Warm and dry, no open lesions or rash,  normal color/perfusion  Psych:  Unable to assess due to Ventilated and sedated. Neuro: responds to pain, but   Unable to fully assess due to Ventilated and sedated - pupils reactive.       Labs:   Recent Labs     07/03/21 1746 07/04/21 0425   WBC 7.1 8.0   HGB 12.6* 13.9   HCT 38.7* 42.6   PLT 98* 130*     Recent Labs     07/03/21 1746 07/04/21 0425   * 133*   K 4.1 4.8   CL 98* 95*   CO2 22 23   BUN 36* 44*   CREATININE 2.0* 2.5*   CALCIUM 8.2* 8.4     Recent Labs     07/03/21 1746   AST 64*   ALT 39   BILITOT 1.4*   ALKPHOS 148*     Recent Labs     07/03/21 1746   INR 1.25*     Recent Labs     07/03/21 1746 07/03/21 2043 07/04/21  0029   TROPONINI 0.03* 0.07* 0.11*       Urinalysis:      Lab Results   Component Value Date    NITRU Negative 07/03/2021    WBCUA 10 07/03/2021    BACTERIA 3+ 10/18/2020    RBCUA 13 07/03/2021    BLOODU SMALL 07/03/2021    SPECGRAV 1.012 07/03/2021    GLUCOSEU Negative 07/03/2021    GLUCOSEU NEGATIVE 02/12/2010       Radiology:  XR CHEST PORTABLE   Final Result   Endotracheal tube is low. Retraction of 3 cm is recommended. Additional   supportive devices are normal positions. Left basilar opacity, atelectasis versus pneumonia. Stable cardiomegaly. XR CHEST PORTABLE   Final Result   Supportive devices project in normal positions. Left retrocardiac area is under penetrated. Possible atelectasis or   consolidation. Stable enlargement of the cardiac silhouette. XR CHEST PORTABLE   Final Result   Interval retraction of endotracheal tube with satisfactory location. XR CHEST PORTABLE   Final Result   Addendum 1 of 1   ADDENDUM:   Critical results were called by Dr. Sarai Rivera DO to Dr. Nany Mares On    7/3/2021   at 20:53. Final   Right mainstem bronchus inhibition. This should be retracted by 3 cm. Other findings as described. XR CHEST PORTABLE   Final Result   Endotracheal tube with the tip in the right mainstem bronchus and this should   be pulled back 5.0 cm. Cardiomegaly without acute pulmonary process. The findings were sent to the Radiology Results [address] at 7:08   pm on 7/3/2021to be communicated to a licensed caregiver. XR CHEST PORTABLE    (Results Pending)           Assessment/Plan:    Active Hospital Problems    Diagnosis     Coronary atherosclerosis of native coronary artery [I25.10]      Priority: High    Mixed hyperlipidemia [E78.2]      Priority: High    Cardiac arrest ([facility] [I46.9]     Acute renal failure (ARF) (HCC) [N17.9]     Diffuse large B-cell lymphoma of solid organ excluding spleen (HCC) [C83.39]     V-tach ([facility] [I47.2]     AICD (automatic cardioverter/defibrillator) present [Z95.810]     Ischemic cardiomyopathy [I25.5]     DMII (diabetes mellitus, type 2) ([facility] [E11.9]        1.   Cardiac arrest with Venita Coles - AICD sensed NSVT and then SVT so did not fire - will need to locate the strips from EMS - heparin drip, ASA, track troponin, cards consulted and no plan for intervention at this time   2. Hypotension - suspect more due to sedation, of levophed and on dobuatime  3. Acute renal failure - after arrest - monitor closely - gentle IVF - he has EF of <20% cr 2.5 today  4. SIRS POA based on tachycardia, tachypnea, hypotension, acute renal failure, lactic acidosis - due to cardiac arrest - suspect aspiration as well  5. DMII - med dose SSI, accuchecks - pt usually on oral meds and insulin - will dose lantus in AM  6. Aspiration suspected - cefepime and vanco and lactobacillus  7. Diffuse large B cell lymphoma - had mass in naresh hepatis 10/18 - stage III, had 3 cycles of RCHOP with decrease in size  8. Acute metabolic encephalopathy - s/p cardiac arrest - did have ROSC - moving legs so no hypothermia protocol  9. Alcohol abuse - will give thiamine, folate and MVI        DVT Prophylaxis: heparin gtt  Diet: Diet NPO  Code Status: DNR-CCA    PT/OT Eval Status: when appropriate     Dispo - patient remains in ICU  He is vented and sedated.    He is very ill and might not make it out of the hospital.    Alvin Flores MD

## 2021-07-04 NOTE — PROGRESS NOTES
07/03/21 2120   ETT (adult)   Placement Date: 07/03/21   Type: Cuffed  Tube Size: 8 mm   Secured at 22 cm  (withdrawn from 25 based on C-XR result)   Measured From [address] [place] [address] [address] [employer] tube delarosa   Site Condition Dry

## 2021-07-04 NOTE — PROGRESS NOTES
4 Eyes Skin Assessment     NAME:  Brandi Frye  YOB: 1945  MEDICAL RECORD NUMBER:  [id number]    The patient is being assess for  Admission    I agree that 2 RN's have performed a thorough Head to Toe Skin Assessment on the patient. ALL assessment sites listed below have been assessed. Areas assessed by both nurses:    Head, Face, Ears, Shoulders, Back, Chest, Arms, Elbows, Hands, Sacrum. Buttock, Coccyx, Ischium and Legs. Feet and Heels        Does the Patient have a Wound? Yes wound(s) were present on assessment.  LDA wound assessment was Initiated and completed        Anand Prevention initiated:  Yes   Wound Care Orders initiated:  No    Pressure Injury (Stage 3,4, Unstageable, DTI, NWPT, and Complex wounds) if present place consult order under [de-identified] No    New and Established Ostomies if present place consult order under : No      Nurse 1 eSignature: Electronically signed by Lissy Reece RN on 7/3/21 at 11:13 PM EDT    **SHARE this note so that the co-signing nurse is able to place an eSignature**    Nurse 2 eSignature: {Esignature:[id number]}

## 2021-07-04 NOTE — PROGRESS NOTES
Clinical Pharmacy Note: Pharmacy to Dose Vancomycin    Dickson Carvalho is a 77 y.o. male started on Vancomycin for aspiration PNA; consult received from Dr. Stephanie Sterling to manage therapy. Also receiving the following antibiotics: Cefepime. Goal Trough Level: 15-20 mcg/mL    Assessment/Plan:  Initiate vancomycin 1250 mg IV x1 dose followed by intermittent dosing based on levels. A vancomycin random level has been ordered for tomorrow AM.  Changes in regimen will be determined based on culture results, renal function, and clinical response. Pharmacy will continue to monitor and adjust regimen as necessary. Allergies:  Cortisone, Morphine, Metformin, Metformin hcl, and Lisinopril         Recent Labs     07/03/21  1746   CREATININE 2.0*       Recent Labs     07/03/21  1746   WBC 7.1       Ht Readings from Last 1 Encounters:   07/03/21 5' 9\" (1.753 m)        Wt Readings from Last 1 Encounters:   07/03/21 185 lb 13.6 oz (84.3 kg)         Estimated Creatinine Clearance: 32 mL/min (A) (based on SCr of 2 mg/dL (H)).       Thank you for the consult,    Corwin JacobsenD  PGY-1 Pharmacy Resident  [id number]

## 2021-07-04 NOTE — CONSULTS
Cardiovascular Consultation     Attending Physician: Eugene Hernandez MD    PATIENT: Leonides Olvera  : 1945  MRN: [id number]    Reason for Consultation:   Chief Complaint   Patient presents with    Cardiac Arrest     pt with vtach, shocked in field and arrested, cpr and defib done, rosc prior to arrival      History of present illness:   Mr. Leonides Olvera is a 77 y.o. male patient well known to Dr. Israel Puentes, who presented from home by EMS following wife witnessing syncopal episode. She states that he called her over from next door and when she walked in he went to the ground. She called 911. Wesley Koehler was reported to have had shockable rhythm in the field with ROSC achieved following shock and brief CPR. His device reportedly did not fire. His wife states that she recalls his device being reprogrammed for sensing issues last year and he has continued to follow closely with Edgardo Pérez and Bernard. She reports him to be in remission with last chemotherapy 2 years ago. She reports him to have required as often as monthly paracentesis in recent months, with most recent in 2021. (No ascitic fluid on  US). Patient seen in ICU on mechanical ventilation with wife at bedside.      Medical History:      Diagnosis Date    Cardiomyopathy ([address]     Cirrhosis ([address]     ETOH abuse    Diabetes mellitus ([address]     History of abdominal paracentesis 2020    Hyperlipidemia     Hypertension     [facility]     currently in remission    NSVT (nonsustained ventricular tachycardia) (HCC)     Sleep apnea     unable to tolerate CPAP    Syncope        Surgical History:      Procedure Laterality Date    CARDIAC DEFIBRILLATOR PLACEMENT  6/23/15    single chamber AICD, EPS inducible VT    CHOLECYSTECTOMY      CORONARY ANGIOPLASTY WITH STENT PLACEMENT      LAMINECTOMY      TOENAIL EXCISION  2017    TONSILLECTOMY AND ADENOIDECTOMY      UPPER GASTROINTESTINAL ENDOSCOPY N/A 2020    EGD performed by Virgilio Rubinstein, MD at [address] History:  Social History     Socioeconomic History    Marital status:      Spouse name: Not on file    Number of children: Not on file    Years of education: Not on file    Highest education level: Not on file   Occupational History    Not on file   Tobacco Use    Smoking status: Former Smoker     Packs/day: 4.00     Years: 16.00     Pack years: 64.00     Types: Pipe     Quit date: 1975     Years since quittin.4    Smokeless tobacco: Never Used   Vaping Use    Vaping Use: Never used    Passive vaping exposure Yes   Substance and Sexual Activity    Alcohol use: Not Currently     Comment: occ nothing to deink in 2020    Drug use: Never    Sexual activity: Not Currently     Partners: Female   Other Topics Concern    Not on file   Social History Narrative    Not on file     Social Determinants of Health     Financial Resource Strain:     Difficulty of Paying Living Expenses:    Food Insecurity:     Worried About Running Out of Food in the Last Year:     Ran Out of Food in the Last Year:    Transportation Needs:     Lack of Transportation (Medical):  Lack of Transportation (Non-Medical):    Physical Activity:     Days of Exercise per Week:     Minutes of Exercise per Session:    Stress:     Feeling of Stress :    Social Connections:     Frequency of Communication with Friends and Family:     Frequency of Social Gatherings with Friends and Family:     Attends Sikhism Services:     Active Member of Clubs or Organizations:     Attends Club or Organization Meetings:     Marital Status:    Intimate Partner Violence:     Fear of Current or Ex-Partner:     Emotionally Abused:     Physically Abused:     Sexually Abused:         Family History:  No evidence for sudden cardiac death or premature CAD.       Problem Relation Age of Onset    Heart Disease Mother     Heart Disease Father Medications:  0.9 % sodium chloride bolus, Once  DOBUTamine (DOBUTREX) 500 mg in dextrose 5 % 250 mL infusion, Continuous  lidocaine (CARDIAC INFUSION) 2 g in dextrose 5% 500 mL infusion, Continuous  EPINEPHrine (EPINEPHrine HCL) 5 mg in dextrose 5 % 250 mL infusion, Continuous  fentaNYL 10 mcg/mL infusion, Continuous  aspirin tablet 81 mg, Daily  atorvastatin (LIPITOR) tablet 80 mg, Daily  clopidogrel (PLAVIX) tablet 75 mg, Daily  sodium chloride flush 0.9 % injection 5-40 mL, 2 times per day  sodium chloride flush 0.9 % injection 5-40 mL, PRN  0.9 % sodium chloride infusion, PRN  ondansetron (ZOFRAN-ODT) disintegrating tablet 4 mg, Q8H PRN   Or  ondansetron (ZOFRAN) injection 4 mg, Q6H PRN  polyethylene glycol (GLYCOLAX) packet 17 g, Daily PRN  acetaminophen (TYLENOL) tablet 650 mg, Q6H PRN   Or  acetaminophen (TYLENOL) suppository 650 mg, Q6H PRN  cefepime (MAXIPIME) 2000 mg IVPB minibag, Q12H  lactobacillus (CULTURELLE) capsule 1 capsule, BID WC  heparin (porcine) injection 4,000 Units, PRN  heparin (porcine) injection 2,000 Units, PRN  heparin 25,000 units in dextrose 5% 250 mL (premix) infusion, Continuous  perflutren lipid microspheres (DEFINITY) injection 1.65 mg, ONCE PRN  famotidine (PEPCID) injection 20 mg, Daily  albuterol sulfate  (90 Base) MCG/ACT inhaler 2 puff, Q4H PRN  insulin lispro (1 Unit Dial) 0-12 Units, Q4H  glucose (GLUTOSE) 40 % oral gel 15 g, PRN  dextrose 50 % IV solution, PRN  glucagon (rDNA) injection 1 mg, PRN  dextrose 5 % solution, PRN  propofol injection, Titrated  vancomycin (VANCOCIN) intermittent dosing (placeholder), RX Placeholder        Allergies:  Cortisone, Morphine, Metformin, Metformin hcl, and Lisinopril     Review of Systems:   [x]Full ROS obtained and negative except as mentioned in HPI    Physical Examination:    BP 84/68   Pulse 59   Temp 98.2 °F (36.8 °C) (Temporal)   Resp 15   Ht 5' 9\" (1.753 m)   Wt 185 lb 13.6 oz (84.3 kg)   SpO2 100%   BMI 27.44 kg/m²   Wt Readings from Last 3 Encounters:   21 185 lb 13.6 oz (84.3 kg)   21 174 lb 12.8 oz (79.3 kg)   21 171 lb 1.6 oz (77.6 kg)       GENERAL: intubated, sedated    SKIN: Warm and dry, without lesions  HEENT: Normocephalic, atraumatic, Sclera non-icteric, mucous membranes moist  NECK: supple, JVP normal, thyroid not enlarged   CARDIAC: Normal PMI, bradycardic, regular rhythm, normal S1S2, no murmur, rub  RESPIRATORY: mechanically ventilated, clear to auscultation bilaterally  EXTREMITIES: No cyanosis, clubbing or edema, palpable pulses bilaterally   GASTROINTESTINAL:  soft, non-distended    Labs:  Lab Review   Lab Results   Component Value Date     2021    K 4.8 2021    K 4.1 2021    CL 95 2021    CO2 23 2021    BUN 44 2021    CREATININE 2.5 2021    GLUCOSE 336 2021    CALCIUM 8.4 2021     Lab Results   Component Value Date    CKTOTAL 65 2010    CKMB 0.78 01/15/2010    TROPONINI 0.11 2021     Lab Results   Component Value Date    WBC 8.0 2021    HGB 13.9 2021    HCT 42.6 2021    .2 2021     2021     Lab Results   Component Value Date    CHOL 105 2021    TRIG 92 2021    HDL 38 2021    HDL 35 01/15/2010       Imaging:  I have reviewed the below testing personally:    EK/3/21  Sinus rhythm with 1st degree A-V block with occasional Premature ventricular complexes  Left axis deviation  Low voltage QRS  Inferior infarct , age undetermined  Cannot rule out Anteroseptal infarct , age undetermined  T wave abnormality, consider lateral ischemia     ECHO: 2020   -Left ventricular cavity size is normal.   -Overall left ventricular systolic function appears severely reduced.   -Ejection fraction is visually estimated to be <20%. -There is abnormal septal motion noted. -Cannot rule out other wall motion abnormalities.    -Grade I diastolic dysfunction with normal LV filling pressures. E/e' =   11.8.   -The left atrium is dilated. -The right atrium is severely dilated. -The aortic valve appears sclerotic but opens well. -Mild mitral regurgitation.   -Moderate tricuspid regurgitation.   -Right ventricle size is normal with reduced function. TAPSE = 1.3 cm. RVS   velocity is 8.27 cm/s. -RVSP = 28 mmHG. Cannot rule out pleural effusion vs shadowing. STRESS TEST:   2018         No EKG evidence for ischemia with lexiscan       Reduced LV systolic function with EF of 44% (ectopy may affect accuracy)       There is normal isotope uptake at stress and rest. There is no evidence of    myocardial ischemia or scar. SR 50s     Impression/Recommendations    Mr. Ugo Lobato is a 77 y.o. male patient of Edgardo Oglesby and Edgar Wagner:      OOH Arrest: VT/VF reported   Hypoxic respiratory failure, ventilator dependent   Shock, most consistent with cardiogenic   CAD: LCX/OM (2004)/ LAD (2006) drug eluting stents patent by 2012 [facility], RCA  (Nonischemic SPECT 2018)  Severe Ischemic Cardiomyopathy, with AICD 7748  Chronic systolic CHF (LVEF <78% 35/7845 TTE)  YENNIFER  Hypertension  Hyperlipidemia  Diabetes mellitus, insulin dependent   HOSSEIN  NonHodgkin's Lymphoma   ETOH Cirrhosis   ACEI Intolerance        Hospitalization for cardiogenic shock with YENNIFER in 10/2020 following cardioversion for recurrent VT. AICD rate detection was adjusted and his Amiodarone was discontinued d/t cirrhosis. No further invasive recommendations or measures at that time (poor candidate for cardiac catheterization and/or VT ablation). No ST elevations on post ROSC ECG or ongoing ventricular arrhythmias this admission. No immediate indications for cardiac catheterization. Amiodarone gtt has been stopped. Will consider Lidocaine gtt if recurrent VT. Will ask EP to see for further device and antiarrhythmic recs.      Discussed goals of care with  Mk Freddie wife and primary hospitalist  Kory Flores at bedside. Discussed end stage cardiomyopathy with baseline LVEF <20%, multisystem organ failure as well as low likelihood for meaningful recovery or quality of life as Courtney Yost had reportedly stated he would not want life sustaining support. She has expressed a DNR-CCA status. Will monitor on inotrope, antiarrhythmic and Heparin. Critical care time spent in direct management of this patient was 45 minutes, exclusive of separately documented procedures. Time spent includes but was not limited to directly managing the unstable patient, reviewing diagnostics, speaking with medical staff, and developing a treatment plan. Thank you for allowing me to participate in the care of your patient. Please do not hesitate to call.      Carleen Nassar DO, [facility]  Interventional Cardiology     o: [phone]  [address] [place] [address]., [address], [facility]

## 2021-07-04 NOTE — CONSULTS
P Pulmonary and Critical Care   Consult Note      Reason for Consult: Status post cardiac arrest, respiratory failure  Requesting Physician: Dr. Fischer :   [facility] / HPI:                The patient is a 77 y.o. male with significant past medical history of:      Diagnosis Date    [facility]omy[facility]     [facility]     ETOH abuse    Diabetes mellitus ([facility]     History of abdominal paracentesis 03/2020    Hyperlipidemia     Hypertension     Lymphoma ([facility]     currently in remission    NSVT (nonsustained ventricular tachycardia) (HCC)     Sleep apnea     unable to tolerate CPAP    Syncope      Patient was brought from home by the life squad with V. tach arrest.  Did have asystole in the squad which responded to epi and a brief period of CPR. He was intubated in the field. He remains intubated and on mechanical ventilation today. He did have an AICD which did not activate during the event. Patient is known to have cardiomyopathy with an ejection fraction around 20%.       Past Surgical History:        Procedure Laterality Date    CARDIAC DEFIBRILLATOR PLACEMENT  6/23/15    single chamber AICD, EPS inducible VT    CHOLECYSTECTOMY      CORONARY ANGIOPLASTY WITH STENT PLACEMENT      LAMINECTOMY      TOENAIL EXCISION  07/21/2017    TONSILLECTOMY AND ADENOIDECTOMY      UPPER GASTROINTESTINAL ENDOSCOPY N/A 8/4/2020    EGD performed by Anna High MD at [address] [place] [address]     Current Medications:    Current Facility-Administered Medications: 0.9 % sodium chloride bolus, 1,000 mL, Intravenous, Once  DOBUTamine (DOBUTREX) 500 mg in dextrose 5 % 250 mL infusion, 2.5-40 mcg/kg/min, Intravenous, Continuous  lidocaine (CARDIAC INFUSION) 2 g in dextrose 5% 500 mL infusion, 1 mg/min, Intravenous, Continuous  EPINEPHrine (EPINEPHrine HCL) 5 mg in dextrose 5 % 250 mL infusion, 1-30 mcg/min, Intravenous, Continuous  fentaNYL 10 mcg/mL infusion, 12.5-200 mcg/hr, Intravenous, Continuous  aspirin tablet 81 mg, 81 mg, Oral, Daily  atorvastatin (LIPITOR) tablet 80 mg, 80 mg, Oral, Daily  clopidogrel (PLAVIX) tablet 75 mg, 75 mg, Oral, Daily  sodium chloride flush 0.9 % injection 5-40 mL, 5-40 mL, Intravenous, 2 times per day  sodium chloride flush 0.9 % injection 5-40 mL, 5-40 mL, Intravenous, PRN  0.9 % sodium chloride infusion, 25 mL, Intravenous, PRN  ondansetron (ZOFRAN-ODT) disintegrating tablet 4 mg, 4 mg, Oral, Q8H PRN **OR** ondansetron (ZOFRAN) injection 4 mg, 4 mg, Intravenous, Q6H PRN  polyethylene glycol (GLYCOLAX) packet 17 g, 17 g, Oral, Daily PRN  acetaminophen (TYLENOL) tablet 650 mg, 650 mg, Oral, Q6H PRN **OR** acetaminophen (TYLENOL) suppository 650 mg, 650 mg, Rectal, Q6H PRN  cefepime (MAXIPIME) 2000 mg IVPB minibag, 2,000 mg, Intravenous, Q12H  lactobacillus (CULTURELLE) capsule 1 capsule, 1 capsule, Oral, BID WC  heparin (porcine) injection 4,000 Units, 4,000 Units, Intravenous, PRN  heparin (porcine) injection 2,000 Units, 2,000 Units, Intravenous, PRN  heparin 25,000 units in dextrose 5% 250 mL (premix) infusion, 5-30 Units/kg/hr, Intravenous, Continuous  perflutren lipid microspheres (DEFINITY) injection 1.65 mg, 1.5 mL, Intravenous, ONCE PRN  famotidine (PEPCID) injection 20 mg, 20 mg, Intravenous, Daily  albuterol sulfate  (90 Base) MCG/ACT inhaler 2 puff, 2 puff, Inhalation, Q4H PRN  insulin lispro (1 Unit Dial) 0-12 Units, 0-12 Units, Subcutaneous, Q4H  glucose (GLUTOSE) 40 % oral gel 15 g, 15 g, Oral, PRN  dextrose 50 % IV solution, 12.5 g, Intravenous, PRN  glucagon (rDNA) injection 1 mg, 1 mg, Intramuscular, PRN  dextrose 5 % solution, 100 mL/hr, Intravenous, PRN  propofol injection, 5-50 mcg/kg/min, Intravenous, Titrated  vancomycin (VANCOCIN) intermittent dosing (placeholder), , Other, RX Placeholder    Allergies   Allergen Reactions    Cortisone Other (See Comments)     Other reaction(s): Other (See Comments)  Hot flashes  Other reaction(s):  Other (See Comments)  Hot flashes    Morphine Other (See Comments)     Agitation/violence  Tolerates Percocet (oxycodone/APAP)    Metformin Other (See Comments)     Violent behavior    Metformin Hcl Other (See Comments)     Violent behavior    Lisinopril Other (See Comments)     cough       Social History:    TOBACCO:   reports that he quit smoking about 46 years ago. His smoking use included pipe. He has a 64.00 pack-year smoking history. He has never used smokeless tobacco.  ETOH:   reports previous alcohol use. Patient currently lives independently  Environmental/chemical exposure: None known    Family History:       Problem Relation Age of Onset    Heart Disease Mother     Heart Disease Father      REVIEW OF SYSTEMS:    CHAZ is unobtainable due to his critical illness. Objective:   PHYSICAL EXAM:      VITALS:  BP (!) 87/59   Pulse 57   Temp 98.2 °F (36.8 °C) (Temporal)   Resp 16   Ht 5' 9\" (1.753 m)   Wt 185 lb 13.6 oz (84.3 kg)   SpO2 100%   BMI 27.44 kg/m²      24HR INTAKE/OUTPUT:      Intake/Output Summary (Last 24 hours) at 7/4/2021 1029  Last data filed at 7/4/2021 9104  Gross per 24 hour   Intake 1228 ml   Output 350 ml   Net 878 ml     CONSTITUTIONAL: Sedated on mechanical ventilation  NECK:  Supple, symmetrical, trachea midline, no adenopathy, thyroid symmetric, not enlarged and no tenderness, skin normal  LUNGS:  no increased work of breathing and clear to auscultation. No accessory muscle use  CARDIOVASCULAR: S1 and S2, no edema and no JVD  ABDOMEN:  normal bowel sounds, non-distended and no masses palpated, and no tenderness to palpation. No hepatospleenomegaly  LYMPHADENOPATHY:  no axillary or supraclavicular adenopathy. No cervical adnenopathy  PSYCHIATRIC: Sedated on mechanical ventilation  MUSCULOSKELETAL: No obvious misalignment or effusion of the joints. No clubbing, cyanosis of the digits. SKIN:  normal skin color, texture, turgor and no redness, warmth, or swelling.  No palpable nodules    DATA:    Old records have been reviewed    CBC:  Recent Labs     07/03/21  1746 07/04/21  0425   WBC 7.1 8.0   RBC 3.79* 4.21   HGB 12.6* 13.9   HCT 38.7* 42.6   PLT 98* 130*   .9* 101.2*   MCH 33.3 33.1   MCHC 32.7 32.7   RDW 15.8* 16.3*      BMP:  Recent Labs     07/03/21 1746 07/04/21  0425   * 133*   K 4.1 4.8   CL 98* 95*   CO2 22 23   BUN 36* 44*   CREATININE 2.0* 2.5*   CALCIUM 8.2* 8.4   GLUCOSE 261* 336*      ABG:  Recent Labs     07/03/21  1750 07/04/21  0450   PHART 7.404 7.371   IDR8MXB 35.1 36.1   PO2ART 284.0* 144.0*   JTC8WMC 21.9 20.9*   Q1MVWXRZ 98.8 97.7   BEART -2.3 -3.8*     Procalcitonin  No results for input(s): PROCAL in the last 72 hours. No results found for: BNP  Lab Results   Component Value Date    CKTOTAL 65 02/12/2010    CKMB 0.78 01/15/2010    TROPONINI 0.11 (H) 07/04/2021           Radiology Review:  All pertinent images / reports were reviewed as a part of this visit. Assessment:     1. Acute hypoxemic respiratory failure  2. Status post cardiac arrest/VT  3. Cardiomyopathy  4. Acute kidney injury      Plan:     I have reviewed laboratories, medical records and images for this visit  Chest x-ray is clear. Endotracheal tube is at the main julia. ET tube has now been retracted 3 cm. Breath sounds are symmetric. ABG is 7.3 1/36/144 on AC/, respiratory rate 16, FiO2 0.4 and PEEP 5. Continue present ventilator settings  He has adequate urine output but creatinine is increasing reflecting an element of YENNIFER probably from ATN. He is on vancomycin and cefepime over concern for sepsis and aspiration pneumonia  He does not have a leukocytosis or fever  Check pro calcitonin  Cultures are pending  Plan to de-escalate antibiotics as his condition stabilizes. No further VT in the hospital  Amiodarone is off  He is requiring some dobutamine  At one point was on epinephrine but that is now off.   Remains on fentanyl and propofol for sedation  Also on heparin drip  Cardiology is following    Total critical care time caring for this patient with life threatening illness, including direct patient contact, management of life support systems, review of data including imaging and labs, discussions with other team members and physicians is at least 32 minutes so far today, excluding procedures.

## 2021-07-04 NOTE — PLAN OF CARE
Problem: Non-Violent Restraints  Goal: Removal from restraints as soon as assessed to be safe  Outcome: Ongoing     Problem: Skin Integrity:  Goal: Will show no infection signs and symptoms  Description: Will show no infection signs and symptoms  Outcome: Ongoing

## 2021-07-04 NOTE — PROGRESS NOTES
Physician aware of BMP results see new orders. Pt has had minimal urine output throughout shift. Hospitalist aware.

## 2021-07-04 NOTE — PROGRESS NOTES
Pt's wife Sandro Brooks updated on status and plan of care. Room number and visiting hours provided. She plans to visit in the morning.

## 2021-07-04 NOTE — PROGRESS NOTES
Pt arrived to unit on mobile monitor with RN and RT accompanying, defib pads applied to chest. Per report Pt's pacemaker has not been firing, no pacer spikes noted on tele. Fentanyl, versed, propofol, amiodarone, and epinephrine gtt's infusing. OG present at 55 cm, connected to low-intermittent suction. Awaiting chest XR results for confirmation of ETT and OG. Weak pulses found in both radials, pedals heard by doppler. Extremities are cold with slow capillary refill. Pt's skin assessed, see LDA, mepilex applied to sacrum and wounds. Pt's rings removed in advance of potential peripheral edema. Jewelry labeled with Pt's name and sent to security for safekeeping. Pt repositioned. Will monitor heart rhythm for changes.

## 2021-07-04 NOTE — PROGRESS NOTES
Cardiology notified of increased ectopy and starting lidocaine drip at this time. See MAR for details.

## 2021-07-05 NOTE — PROGRESS NOTES
Assessment complete, VSS. Pt tolerating vent with no signs of distress. ETT #8, 22 cm at lip. Vent settings: AC/VC rate 16, , PEEP 5, FIO2 40%. SPO2 >90%. Lung sounds clear in upper lobes, diminished in lower lobes. Pupils equal in size, but R pupil responds more sluggishly to light. Drainage from both eyes noted. Weak pulses present in all extremities. OG at 55 cm, connected to low intermittent suction, yellow output. Salmon showing low output. R fem line infusing, dressing c/d/i. Pt repositioned.

## 2021-07-05 NOTE — PROGRESS NOTES
07/04/21 2029   Vent Patient Data   Peak Inspiratory Pressure 16 cmH2O   Mean Airway Pressure 8.2 cmH20   Plateau Pressure 12 SUV54   Static Compliance 75.14 mL/cmH2O   Dynamic Compliance 47.82 mL/cmH2O

## 2021-07-05 NOTE — FLOWSHEET NOTE
Assessment complete. VSS, see flowsheet. Medications administered, see MAR. Pt sedated on vent, appears comfortable. Bed in lowest position, wheels locked, and bed alarm on. No visible needs at this time. Spoke with wife, Kimberly rBiones, via phone and she indicated she was headed to pt's bedside for morning rounds. Provided basic pt update.     Electronically signed by ALOK Hinds RN

## 2021-07-05 NOTE — PROGRESS NOTES
[facility] Pulmonary and Critical Care  Progress note      Reason for Consult: Status post cardiac arrest, respiratory failure  Requesting Physician: Dr. Ousmane Harrell:   [facility] / HPI:                The patient is a 77 y.o. male with significant past medical history of:      Diagnosis Date    Cardiomy[facility] ([facility]     Cirrhosis ([facility]     ETOH abuse    Diabetes mellitus ([facility]     History of abdominal paracentesis 03/2020    Hyperlipidemia     Hypertension     Lymphoma ([facility]     currently in remission    NSVT (nonsustained ventricular tachycardia) (HCC)     Sleep apnea     unable to tolerate CPAP    Syncope      Interval history patient remains sedated on mechanical ventilation. Developing worsening renal failure. Radiographic evidence of worsening congestive heart failure as well.   His family is at the bedside      Past Surgical History:        Procedure Laterality Date    CARDIAC DEFIBRILLATOR PLACEMENT  6/23/15    single chamber AICD, EPS inducible VT    CHOLECYSTECTOMY      CORONARY ANGIOPLASTY WITH STENT PLACEMENT      LAMINECTOMY      TOENAIL EXCISION  07/21/2017    TONSILLECTOMY AND ADENOIDECTOMY      UPPER GASTROINTESTINAL ENDOSCOPY N/A 8/4/2020    EGD performed by Ilana Shin MD at [address] [facility] [place] ENDOSCOPY     Current Medications:    Current Facility-Administered Medications: vancomycin (VANCOCIN) 750 mg in dextrose 5 % 250 mL IVPB, 750 mg, Intravenous, Once  cefepime (MAXIPIME) 1000 mg IVPB minibag, 1,000 mg, Intravenous, Q12H  DOBUTamine (DOBUTREX) 500 mg in dextrose 5 % 250 mL infusion, 2.5-40 mcg/kg/min, Intravenous, Continuous  lidocaine (CARDIAC INFUSION) 2 g in dextrose 5% 500 mL infusion, 1 mg/min, Intravenous, Continuous  0.9 % sodium chloride infusion, , Intravenous, Continuous  EPINEPHrine (EPINEPHrine HCL) 5 mg in dextrose 5 % 250 mL infusion, 1-30 mcg/min, Intravenous, Continuous  fentaNYL 10 mcg/mL infusion, 12.5-200 mcg/hr, Intravenous, Continuous  aspirin EC tablet 81 Percocet (oxycodone/APAP)    Metformin Other (See Comments)     Violent behavior    Metformin Hcl Other (See Comments)     Violent behavior    Lisinopril Other (See Comments)     cough       Social History:    TOBACCO:   reports that he quit smoking about 46 years ago. His smoking use included pipe. He has a 64.00 pack-year smoking history. He has never used smokeless tobacco.  ETOH:   reports previous alcohol use. Patient currently lives independently  Environmental/chemical exposure: None known    Family History:       Problem Relation Age of Onset    Heart Disease Mother     Heart Disease Father      REVIEW OF SYSTEMS:    ROS is unobtainable due to his critical illness. Objective:   PHYSICAL EXAM:      VITALS:  /61   Pulse 65   Temp 97.9 °F (36.6 °C) (Temporal)   Resp 16   Ht 5' 9\" (1.753 m)   Wt 194 lb 10.7 oz (88.3 kg)   SpO2 98%   BMI 28.75 kg/m²      24HR INTAKE/OUTPUT:      Intake/Output Summary (Last 24 hours) at 7/5/2021 1135  Last data filed at 7/5/2021 0539  Gross per 24 hour   Intake 3553.89 ml   Output 700 ml   Net 2853.89 ml     CONSTITUTIONAL: Sedated on mechanical ventilation  NECK:  Supple, symmetrical, trachea midline, no adenopathy, thyroid symmetric, not enlarged and no tenderness, skin normal  LUNGS:  no increased work of breathing and clear to auscultation. No accessory muscle use  CARDIOVASCULAR: S1 and S2, no edema and no JVD  ABDOMEN:  normal bowel sounds, non-distended and no masses palpated, and no tenderness to palpation. No hepatospleenomegaly  LYMPHADENOPATHY:  no axillary or supraclavicular adenopathy. No cervical adnenopathy  PSYCHIATRIC: Sedated on mechanical ventilation  MUSCULOSKELETAL: No obvious misalignment or effusion of the joints. No clubbing, cyanosis of the digits. SKIN:  normal skin color, texture, turgor and no redness, warmth, or swelling.  No palpable nodules    DATA:    Old records have been reviewed    CBC:  Recent Labs     07/03/21  6566 07/04/21  0425 07/05/21  0420   WBC 7.1 8.0 6.3   RBC 3.79* 4.21 3.59*   HGB 12.6* 13.9 12.0*   HCT 38.7* 42.6 35.7*   PLT 98* 130* 87*   .9* 101.2* 99.3   MCH 33.3 33.1 33.3   MCHC 32.7 32.7 33.6   RDW 15.8* 16.3* 15.5*      BMP:  Recent Labs     07/04/21  0425 07/04/21  1524 07/05/21  0420   * 131* 129*   K 4.8 5.0 4.3   CL 95* 96* 95*   CO2 23 25 23   BUN 44* 49* 52*   CREATININE 2.5* 3.0* 3.2*   CALCIUM 8.4 7.9* 7.6*   GLUCOSE 336* 185* 196*      ABG:  Recent Labs     07/03/21  1750 07/04/21  0450 07/05/21  0420   PHART 7.404 7.371 7.360   NLT0DHJ 35.1 36.1 39.5   PO2ART 284.0* 144.0* 84.2   XSJ7TTO 21.9 20.9* 22.3   A0XIPANC 98.8 97.7 96.4   BEART -2.3 -3.8* -2.9     Procalcitonin  Recent Labs     07/04/21  0425   PROCAL 1.57*       No results found for: BNP  Lab Results   Component Value Date    CKTOTAL 65 02/12/2010    CKMB 0.78 01/15/2010    TROPONINI 0.11 (H) 07/04/2021           Radiology Review:  All pertinent images / reports were reviewed as a part of this visit. Assessment:     1. Acute hypoxemic respiratory failure  2. Status post cardiac arrest/VT  3. Cardiomyopathy  4. Acute kidney injury      Plan:     I have reviewed laboratories, medical records and images for this visit  Chest x-ray today showing increasing vascular congestion  Also procalcitonin is elevated at 1.57. She remains on vancomycin and cefepime. Blood cultures now returning Enterococcus faecalis. Will await sensitivities and adjust therapy as indicated  Creatinine is now up to 3.2. Nephrology consult is requested  Remains on pressors, lidocaine, propofol and fentanyl. Prognosis is guarded      Total critical care time caring for this patient with life threatening illness, including direct patient contact, management of life support systems, review of data including imaging and labs, discussions with other team members and physicians is at least 32 minutes so far today, excluding procedures.

## 2021-07-05 NOTE — PROGRESS NOTES
Sedation paused for neuro assessment. Pt responded to sternal rub by lifting eyebrows and moving head but did not open eyes or follow commands. Sedation resumed.

## 2021-07-05 NOTE — CONSULTS
pipe. He has a 64.00 pack-year smoking history. He has never used smokeless tobacco. He reports previous alcohol use. He reports that he does not use drugs.     Allergies:  Cortisone, Morphine, Metformin, Metformin hcl, and Lisinopril    Current Medications:    vancomycin (VANCOCIN) 750 mg in dextrose 5 % 250 mL IVPB, Once  cefepime (MAXIPIME) 1000 mg IVPB minibag, Q12H  DOBUTamine (DOBUTREX) 500 mg in dextrose 5 % 250 mL infusion, Continuous  lidocaine (CARDIAC INFUSION) 2 g in dextrose 5% 500 mL infusion, Continuous  0.9 % sodium chloride infusion, Continuous  EPINEPHrine (EPINEPHrine HCL) 5 mg in dextrose 5 % 250 mL infusion, Continuous  fentaNYL 10 mcg/mL infusion, Continuous  aspirin EC tablet 81 mg, Daily  atorvastatin (LIPITOR) tablet 80 mg, Daily  clopidogrel (PLAVIX) tablet 75 mg, Daily  sodium chloride flush 0.9 % injection 5-40 mL, 2 times per day  sodium chloride flush 0.9 % injection 5-40 mL, PRN  0.9 % sodium chloride infusion, PRN  ondansetron (ZOFRAN-ODT) disintegrating tablet 4 mg, Q8H PRN   Or  ondansetron (ZOFRAN) injection 4 mg, Q6H PRN  polyethylene glycol (GLYCOLAX) packet 17 g, Daily PRN  acetaminophen (TYLENOL) tablet 650 mg, Q6H PRN   Or  acetaminophen (TYLENOL) suppository 650 mg, Q6H PRN  lactobacillus (CULTURELLE) capsule 1 capsule, BID WC  heparin (porcine) injection 4,000 Units, PRN  heparin (porcine) injection 2,000 Units, PRN  heparin 25,000 units in dextrose 5% 250 mL (premix) infusion, Continuous  perflutren lipid microspheres (DEFINITY) injection 1.65 mg, ONCE PRN  famotidine (PEPCID) injection 20 mg, Daily  albuterol sulfate  (90 Base) MCG/ACT inhaler 2 puff, Q4H PRN  insulin lispro (1 Unit Dial) 0-12 Units, Q4H  glucose (GLUTOSE) 40 % oral gel 15 g, PRN  dextrose 50 % IV solution, PRN  glucagon (rDNA) injection 1 mg, PRN  dextrose 5 % solution, PRN  propofol injection, Titrated  vancomycin (VANCOCIN) intermittent dosing (placeholder), RX Placeholder        Review of Systems:   14 point ROS obtained but were negative except mentioned in HPI      Physical exam:     Vitals:  /61   Pulse 67   Temp 97.9 °F (36.6 °C) (Temporal)   Resp 15   Ht 5' 9\" (1.753 m)   Wt 194 lb 10.7 oz (88.3 kg)   SpO2 98%   BMI 28.75 kg/m²   Constitutional:    Intubated   Skin: no rash, turgor wnl  Heent:  eomi, mmm  Neck: no bruits or jvd noted  Cardiovascular:  S1, S2 without m/r/g  Respiratory: CTA B without w/r/r  Abdomen:  +bs, soft, nt, nd  Ext: no lower extremity edema  Psychiatric: deferred   Musculoskeletal:  Rom, muscular strength intact    Data:   Labs:  CBC:   Recent Labs     07/03/21 1746 07/04/21 0425 07/05/21 0420   WBC 7.1 8.0 6.3   HGB 12.6* 13.9 12.0*   PLT 98* 130* 87*     BMP:    Recent Labs     07/04/21 0425 07/04/21  1524 07/05/21  0420   * 131* 129*   K 4.8 5.0 4.3   CL 95* 96* 95*   CO2 23 25 23   BUN 44* 49* 52*   CREATININE 2.5* 3.0* 3.2*   GLUCOSE 336* 185* 196*     Ca/Mg/Phos:   Recent Labs     07/04/21 0425 07/04/21  1524 07/05/21  0420   CALCIUM 8.4 7.9* 7.6*   MG 2.60*  --  2.30   PHOS 5.8*  --  5.1*     Hepatic:   Recent Labs     07/03/21 1746 07/04/21 0425 07/05/21  0420   AST 64* 868* 872*   ALT 39 530* 815*   BILITOT 1.4* 1.3* 1.3*   ALKPHOS 148* 140* 109     Troponin:   Recent Labs     07/03/21 1746 07/03/21 2043 07/04/21  0029   TROPONINI 0.03* 0.07* 0.11*     BNP: No results for input(s): BNP in the last 72 hours. Lipids: No results for input(s): CHOL, TRIG, HDL, LDLCALC, LABVLDL in the last 72 hours.   ABGs:   Recent Labs     07/05/21  0420   PHART 7.360   PO2ART 84.2   PKM8VIY 39.5     INR:   Recent Labs     07/03/21  1746   INR 1.25*     UA:  Recent Labs     07/03/21  1750   COLORU YELLOW   CLARITYU Clear   GLUCOSEU Negative   BILIRUBINUR Negative   KETUA Negative   SPECGRAV 1.012   BLOODU SMALL*   PHUR 7.5   PROTEINU TRACE*   UROBILINOGEN 1.0   NITRU Negative   LEUKOCYTESUR TRACE*   LABMICR YES   URINETYPE NOT GIVEN UPV      Urine Microscopic:   Recent Labs     07/03/21  1750   COMU see below   HYALCAST 2   WBCUA 10*   RBCUA 13*   EPIU 10*     Urine Culture:   Recent Labs     07/03/21  1750   LABURIN No growth at 18 to 36 hours     Urine Chemistry: No results for input(s): CLUR, LABCREA, PROTEINUR, NAUR in the last 72 hours. IMAGING:  XR CHEST PORTABLE   Final Result   Cardiomegaly with increased vascular congestion and central edema. Basilar   atelectasis suspected greater on the left. Some of these findings are   confounded due to low lung volume. XR CHEST PORTABLE   Final Result   Endotracheal tube is low. Retraction of 3 cm is recommended. Additional   supportive devices are normal positions. Left basilar opacity, atelectasis versus pneumonia. Stable cardiomegaly. XR CHEST PORTABLE   Final Result   Supportive devices project in normal positions. Left retrocardiac area is under penetrated. Possible atelectasis or   consolidation. Stable enlargement of the cardiac silhouette. XR CHEST PORTABLE   Final Result   Interval retraction of endotracheal tube with satisfactory location. XR CHEST PORTABLE   Final Result   Addendum 1 of 1   ADDENDUM:   Critical results were called by Dr. Gabriella Torres, DO to Dr. Leonila De La Garza On    7/3/2021   at 20:53. Final   Right mainstem bronchus inhibition. This should be retracted by 3 cm. Other findings as described. XR CHEST PORTABLE   Final Result   Endotracheal tube with the tip in the right mainstem bronchus and this should   be pulled back 5.0 cm. Cardiomegaly without acute pulmonary process. The findings were sent to the Radiology Results [address] at 7:08   pm on 7/3/2021to be communicated to a licensed caregiver. XR CHEST PORTABLE    (Results Pending)       Assessment/Plan       1.   Acute renal failure      Etiology seems ATN  S/p cardiac arrest, hypotension      serum Cr trend : 2   ( 7/3 ) ----> 2.5 ----> 3 ---> 3.2     baseline serum cr : ~ 1 - 1.2     on pressor support      plan      No urgent indication for RRT for now    Discussed with family about current clinical condition , possible need for RRT   Family would like to discuss more if need for RRT arise in future         check Urine Na, Cr     cont IVF NS     Avoid volume overload     Keep MAP > 65 mmhg     cont pressor support to keep MAP > 65 mmhg     Avoid contrast     Avoid ACEI or ARB     Avoid NSAIDs     Accurate documentation of UOP         2. S/p cardiac arrest, ROSC          3 cardiogenic shock    On pressor support   On dobutamine     3.  Acute hypoxic respiratory failure    S/p intubation     4. Acid- base/ Electrolyte imbalance     moniter      5 CAD    6 s/p AICD        Discussed with ICU team            Thank you for allowing us to participate in care of Miladys Rodriguez MD  Feel free to contact me   [facility] [address]  Office : [phone]  Fax :[phone]

## 2021-07-05 NOTE — PROGRESS NOTES
Cardiovascular Progress Note      Chief Complaint:   Chief Complaint   Patient presents with    Cardiac Arrest     pt with vtach, shocked in field and arrested, cpr and defib done, rosc prior to arrival      Impression/Recommendations:    Mr. Sally Heath is a 77 y.o. male patient of Edgardo Verdin and Edgar Wagner:       OOH Arrest: VT/VF reported   Hypoxic respiratory failure, ventilator dependent   Shock, most consistent with cardiogenic   CAD: LCX/OM (2004)/ LAD (2006) drug eluting stents patent by 2012 [facility], RCA     Severe Ischemic Cardiomyopathy, with AICD 8968  Chronic systolic CHF (LVEF <94% 24/0909 TTE)  YENNIFER, worsening  Hyperlipidemia  Diabetes mellitus, insulin dependent   ETOH Cirrhosis   DNRCCA          Hospitalization for recurrent VT, cardiogenic shock with YENNIFER in 10/2020. AICD rate detection was adjusted and his Amiodarone was discontinued d/t cirrhosis. No further invasive recommendations or measures at that time (poor candidate for cardiac catheterization and/or VT ablation).      No ST elevations on post ROSC ECG or sustained ventricular arrhythmias on Lidocaine gtt. No immediate indications for cardiac catheterization. Will ask EP to see for further device and antiarrhythmic recs.      Discussed goals of care with Mr. Marcella Dee wife and primary hospitalist Dr. Maurizio Marx. Discussed end stage cardiomyopathy with baseline LVEF <20%, multisystem organ failure as well as low likelihood for meaningful recovery or quality of life. Bee Fu had reportedly stated he would not want life sustaining support. She has expressed a DNR-CCA status. Will monitor on inotrope, antiarrhythmic and Heparin.      Critical care time spent in direct management of this patient was 30 minutes, exclusive of separately documented procedures. Time spent includes but was not limited to directly managing the unstable patient, reviewing diagnostics, speaking with medical staff, and developing a treatment plan.     Interval History: Pt. S/E. Discussed with bedside RN and Dr. Carly Cadena. No family present at the time of visit. Intubated, sedated. No events overnight. Worsening renal function.    Gtts  Dobutamine 5 mcg/kg/hr  Epinephrine 11 mcg/min  Lidocaine 1 mg/min started yesterday evening for NSVT  Heparin  Fentanyl   Propofol       Medications:  vancomycin (VANCOCIN) 750 mg in dextrose 5 % 250 mL IVPB, Once  cefepime (MAXIPIME) 1000 mg IVPB minibag, Q12H  DOBUTamine (DOBUTREX) 500 mg in dextrose 5 % 250 mL infusion, Continuous  lidocaine (CARDIAC INFUSION) 2 g in dextrose 5% 500 mL infusion, Continuous  0.9 % sodium chloride infusion, Continuous  EPINEPHrine (EPINEPHrine HCL) 5 mg in dextrose 5 % 250 mL infusion, Continuous  fentaNYL 10 mcg/mL infusion, Continuous  aspirin EC tablet 81 mg, Daily  atorvastatin (LIPITOR) tablet 80 mg, Daily  clopidogrel (PLAVIX) tablet 75 mg, Daily  sodium chloride flush 0.9 % injection 5-40 mL, 2 times per day  sodium chloride flush 0.9 % injection 5-40 mL, PRN  0.9 % sodium chloride infusion, PRN  ondansetron (ZOFRAN-ODT) disintegrating tablet 4 mg, Q8H PRN   Or  ondansetron (ZOFRAN) injection 4 mg, Q6H PRN  polyethylene glycol (GLYCOLAX) packet 17 g, Daily PRN  acetaminophen (TYLENOL) tablet 650 mg, Q6H PRN   Or  acetaminophen (TYLENOL) suppository 650 mg, Q6H PRN  lactobacillus (CULTURELLE) capsule 1 capsule, BID WC  heparin (porcine) injection 4,000 Units, PRN  heparin (porcine) injection 2,000 Units, PRN  heparin 25,000 units in dextrose 5% 250 mL (premix) infusion, Continuous  perflutren lipid microspheres (DEFINITY) injection 1.65 mg, ONCE PRN  famotidine (PEPCID) injection 20 mg, Daily  albuterol sulfate  (90 Base) MCG/ACT inhaler 2 puff, Q4H PRN  insulin lispro (1 Unit Dial) 0-12 Units, Q4H  glucose (GLUTOSE) 40 % oral gel 15 g, PRN  dextrose 50 % IV solution, PRN  glucagon (rDNA) injection 1 mg, PRN  dextrose 5 % solution, PRN  propofol injection, Titrated  vancomycin (VANCOCIN) intermittent dosing (placeholder), RX Placeholder        I/O:     Intake/Output Summary (Last 24 hours) at 7/5/2021 1014  Last data filed at 7/5/2021 0539  Gross per 24 hour   Intake 3553.89 ml   Output 700 ml   Net 2853.89 ml       Physical Exam:    /61   Pulse 68   Temp 97.9 °F (36.6 °C) (Temporal)   Resp 16   Ht 5' 9\" (1.753 m)   Wt 194 lb 10.7 oz (88.3 kg)   SpO2 98%   BMI 28.75 kg/m²   Wt Readings from Last 3 Encounters:   07/05/21 194 lb 10.7 oz (88.3 kg)   05/24/21 174 lb 12.8 oz (79.3 kg)   05/17/21 171 lb 1.6 oz (77.6 kg)       GENERAL: intubated, sedated    SKIN: Warm and dry, without lesions  HEENT: Normocephalic, atraumatic, Sclera non-icteric, mucous membranes moist  NECK: supple, JVP normal, thyroid not enlarged   CARDIAC: Normal PMI, bradycardic, regular rhythm, normal S1S2, no murmur, rub  RESPIRATORY: mechanically ventilated, clear to auscultation bilaterally  EXTREMITIES: No cyanosis, clubbing or edema, palpable pulses bilaterally   GASTROINTESTINAL:  soft, non-distended     Data Review:    CBC:   Recent Labs     07/03/21 1746 07/04/21 0425 07/05/21 0420   WBC 7.1 8.0 6.3   HGB 12.6* 13.9 12.0*   HCT 38.7* 42.6 35.7*   .9* 101.2* 99.3   PLT 98* 130* 87*     BMP:   Recent Labs     07/04/21 0425 07/04/21  1524 07/05/21  0420   * 131* 129*   K 4.8 5.0 4.3   CL 95* 96* 95*   CO2 23 25 23   PHOS 5.8*  --  5.1*   BUN 44* 49* 52*   CREATININE 2.5* 3.0* 3.2*   GFRAA 31* 25* 23*   MG 2.60*  --  2.30     LFTS:   Recent Labs     07/03/21 1746 07/04/21  0425 07/05/21  0420   ALT 39 530* 815*   AST 64* 868* 872*   ALKPHOS 148* 140* 109   PROT 5.7* 6.2* 5.2*   AGRATIO 1.0*  --  1.1   BILITOT 1.4* 1.3* 1.3*     Cardiac Enzymes:   Recent Labs     07/03/21  1746 07/03/21  2043 07/04/21  0029   TROPONINI 0.03* 0.07* 0.11*     PT/INR:   Recent Labs     07/03/21 1746   PROTIME 14.3*   INR 1.25*     APTT:   Recent Labs     07/04/21  1708 07/04/21  2258 07/05/21  0420   APTT 75.3* 70.0* 74.0*

## 2021-07-05 NOTE — PROGRESS NOTES
Hospitalist Progress Note      PCP: [facility]    Date of Admission: 7/3/2021    Chief Complaint: Cardiac arrest    Hospital Course: This is a 77 y.o. WM with PMHx sig for CAD, cardiomyopathy s/p AICD, ACD with stents, HTN, hyperlipidemia, DMII on insulin with diffuse large B cell lypmhoma. He does drink at least 12 drinks per week. Pt apparently was in [facility] on arrival to house, then was shocked once, then asystole, but responded quickly with epi and had CPR and was intubated in the field. Now in SR. Does not follow commands, but moves legs violently when not sedated. According to EMTs, his AICD did not fire. Interrogation of AICD detected one run of NSVT, then SVT with rate in low 200s. Both times were not indicated to fire. Pt intubated and central line placed.       Subjective:   Not able to assess as intubated and sedated.         Medications:  Reviewed    Infusion Medications    DOBUTamine 5 mcg/kg/min (07/05/21 0803)    lidocaine 1 mg/min (07/05/21 0539)    sodium chloride 100 mL/hr at 07/05/21 0539    EPINEPHrine infusion 11 mcg/min (07/05/21 0539)    fentaNYL 50 mcg/hr (07/05/21 0553)    sodium chloride 10 mL/hr at 07/05/21 0539    heparin (PORCINE) Infusion 14 Units/kg/hr (07/05/21 0539)    dextrose      propofol 20 mcg/kg/min (07/05/21 0910)     Scheduled Medications    vancomycin  750 mg Intravenous Once    cefepime  1,000 mg Intravenous Q12H    aspirin  81 mg Oral Daily    atorvastatin  80 mg Oral Daily    clopidogrel  75 mg Oral Daily    sodium chloride flush  5-40 mL Intravenous 2 times per day    lactobacillus  1 capsule Oral BID WC    famotidine (PEPCID) injection  20 mg Intravenous Daily    insulin lispro  0-12 Units Subcutaneous Q4H    vancomycin (VANCOCIN) intermittent dosing (placeholder)   Other RX Placeholder     PRN Meds: sodium chloride flush, sodium chloride, ondansetron **OR** ondansetron, polyethylene glycol, acetaminophen **OR** acetaminophen, heparin (porcine), heparin (porcine), perflutren lipid microspheres, albuterol sulfate HFA, glucose, dextrose, glucagon (rDNA), dextrose      Intake/Output Summary (Last 24 hours) at 7/5/2021 1319  Last data filed at 7/5/2021 0539  Gross per 24 hour   Intake 3553.89 ml   Output 685 ml   Net 2868.89 ml       Physical Exam Performed:    /61   Pulse 67   Temp 97.9 °F (36.6 °C) (Temporal)   Resp 15   Ht 5' 9\" (1.753 m)   Wt 194 lb 10.7 oz (88.3 kg)   SpO2 98%   BMI 28.75 kg/m²        General:  Critically ill appearing, intubated and sedated. HEENT:  Normocephalic, atraumatic. Pupils equal, round, reactive to light. No scleral icterus. No conjunctival injection. Normal lips, teeth, and gums. No nasal discharge. Neck:  Supple  Heart:  Normal s1/s2, RRR, no murmurs, gallops, or rubs. no leg edema  Lungs:  rhonchi bilaterally, no wheeze, no rales, bilat rhonchi, no use of accessory muscles  Abd: bowel sounds present, soft, nontender, nondistended, no masses  Extrem:  No clubbing, cyanosis,  no edema, 1+ pedal pulses, sluggish capillary refill  Skin:  Warm and dry, no open lesions or rash,  normal color/perfusion  Psych:  Unable to assess due to Ventilated and sedated. Neuro: responds to pain, but   Unable to fully assess due to Ventilated and sedated - pupils reactive.       Labs:   Recent Labs     07/03/21  1746 07/04/21 0425 07/05/21  0420   WBC 7.1 8.0 6.3   HGB 12.6* 13.9 12.0*   HCT 38.7* 42.6 35.7*   PLT 98* 130* 87*     Recent Labs     07/04/21  0425 07/04/21  1524 07/05/21  0420   * 131* 129*   K 4.8 5.0 4.3   CL 95* 96* 95*   CO2 23 25 23   BUN 44* 49* 52*   CREATININE 2.5* 3.0* 3.2*   CALCIUM 8.4 7.9* 7.6*   PHOS 5.8*  --  5.1*     Recent Labs     07/03/21  1746 07/04/21  0425 07/05/21  0420   AST 64* 868* 872*   ALT 39 530* 815*   BILIDIR  --  0.5*  --    BILITOT 1.4* 1.3* 1.3*   ALKPHOS 148* 140* 109     Recent Labs     07/03/21 1746   INR 1.25*     Recent Labs     07/03/21  1746 07/03/21 2043 07/04/21  0029   TROPONINI 0.03* 0.07* 0.11*       Urinalysis:      Lab Results   Component Value Date    NITRU Negative 07/03/2021    WBCUA 10 07/03/2021    BACTERIA 3+ 10/18/2020    RBCUA 13 07/03/2021    BLOODU SMALL 07/03/2021    SPECGRAV 1.012 07/03/2021    GLUCOSEU Negative 07/03/2021    GLUCOSEU NEGATIVE 02/12/2010       Radiology:  XR CHEST PORTABLE   Final Result   Cardiomegaly with increased vascular congestion and central edema. Basilar   atelectasis suspected greater on the left. Some of these findings are   confounded due to low lung volume. XR CHEST PORTABLE   Final Result   Endotracheal tube is low. Retraction of 3 cm is recommended. Additional   supportive devices are normal positions. Left basilar opacity, atelectasis versus pneumonia. Stable cardiomegaly. XR CHEST PORTABLE   Final Result   Supportive devices project in normal positions. Left retrocardiac area is under penetrated. Possible atelectasis or   consolidation. Stable enlargement of the cardiac silhouette. XR CHEST PORTABLE   Final Result   Interval retraction of endotracheal tube with satisfactory location. XR CHEST PORTABLE   Final Result   Addendum 1 of 1   ADDENDUM:   Critical results were called by Dr. Laly Acevedo DO to Dr. Laxmi Zavala On    7/3/2021   at 20:53. Final   Right mainstem bronchus inhibition. This should be retracted by 3 cm. Other findings as described. XR CHEST PORTABLE   Final Result   Endotracheal tube with the tip in the right mainstem bronchus and this should   be pulled back 5.0 cm. Cardiomegaly without acute pulmonary process. The findings were sent to the Radiology Results [address] at 7:08   pm on 7/3/2021to be communicated to a licensed caregiver.          XR CHEST PORTABLE    (Results Pending)           Assessment/Plan:    Active Hospital Problems    Diagnosis     Coronary atherosclerosis of native coronary artery [I25.10]      Priority: High    Mixed hyperlipidemia [E78.2]      Priority: High    Cardiac arrest ([facility] [I46.9]     Acute renal failure (ARF) (HCC) [N17.9]     Diffuse large B-cell lymphoma of solid organ excluding spleen (HCC) [C83.39]     [facility] ([facility] [I47.2]     AICD (automatic cardioverter/defibrillator) present [Z95.810]     Ischemic cardiomyopathy [I25.5]     DMII (diabetes mellitus, type 2) ([facility] [E11.9]        1. Cardiac arrest with David Silk - AICD sensed NSVT and then SVT so did not fire - will need to locate the strips from EMS - heparin drip, ASA, track troponin, cards consulted and no plan for intervention at this time. 2.  Hypotension - suspect more due to sedation, of levophed and on dobuatime   3. Acute renal failure - after arrest - monitor closely - gentle IVF - he has EF of <20% cr35 today. Consult to Nephro placed for likely ATN. If continues to progress I would not rec dialysis and would rec palliative care. 4. SIRS POA based on tachycardia, tachypnea, hypotension, acute renal failure, lactic acidosis - due to cardiac arrest - suspect aspiration as well. Enterococcus faecalis DNA Detected he remains of cefepime and vanc. Cultures and sensitivities are pending. 5.  DMII - med dose SSI, accuchecks - pt usually on oral meds and insulin, currently on medium sliding scale. lantus is not currently ordered. He takes 16 units at night. May need to start once we initiate tube feeds. 6.  Aspiration suspected - cefepime and vanco and lactobacillus  7. Diffuse large B cell lymphoma - had mass in naresh hepatis 10/18 - stage III, had 3 cycles of RCHOP with decrease in size  8. Acute metabolic encephalopathy - s/p cardiac arrest - did have ROSC - moving legs so no hypothermia protocol  9.   Alcohol abuse - will give thiamine, folate and MVI        DVT Prophylaxis: heparin gtt  Diet: Diet NPO  Code Status: DNR-CCA    PT/OT Eval Status: when appropriate     Dispo - patient remains in ICU  He is vented and sedated. He is very ill and might not make it out of the hospital.  35 minutes of critical care time spent.      Eugene Hernandez MD

## 2021-07-05 NOTE — PROGRESS NOTES
Comprehensive Nutrition Assessment    Type and Reason for Visit:  Initial (RD triggered d/t pt on vent)    Nutrition Recommendations/Plan:   RD remains available to provide EN recommendations as appropriate with clinical status and plan of care     Nutrition Assessment:  Pt is at risk for nutrition compromise as evidenced by NPO on mechanical ventilation. Propofol infusing at 9.5 mL per hour to provide 251 calories from fat daily. Levophed at 11 mcg/min. Na+ 129. Phos 5.1. Nephrology has been consulted for worsening kidney function. MD indicated poor prognosis during IPOC critical care rounds and discussed this with pt's family. RD remains available to provide EN recommendations as appropriate based on clinical status and desired level of care. Malnutrition Assessment:  Malnutrition Status:  No malnutrition      Estimated Daily Nutrient Needs:  Energy (kcal):  1277-4883; Weight Used for Energy Requirements:  Admission (84 kg)     Protein (g):  101-168 grams; Weight Used for Protein Requirements:  Admission (84 kg; 1.2-2 grams per kg)        Fluid (ml/day):   ; Method Used for Fluid Requirements:  1 ml/kcal      Nutrition Related Findings:  +3 perineal edema. +3.7 liters. Wounds:  None       Current Nutrition Therapies:    Diet NPO    Anthropometric Measures:  · Height: 5' 9\" (175.3 cm)  · Current Body Weight: 194 lb 10.7 oz (88.3 kg)   · Admission Body Weight: 185 lb 13.6 oz (84.3 kg)    · Ideal Body Weight: 160 lbs; % Ideal Body Weight 121.7 %   · BMI: 28.7  · BMI Categories: Overweight (BMI 25.0-29. 9)       Nutrition Diagnosis:   · Inadequate oral intake related to impaired respiratory function as evidenced by intubation      Nutrition Interventions:   Food and/or Nutrient Delivery:  Continue NPO  Nutrition Education/Counseling:  Education not indicated   Coordination of Nutrition Care:  Continue to monitor while inpatient    Goals:  Diet advancement vs nutrition support       Nutrition Monitoring and Evaluation:   Behavioral-Environmental Outcomes:  None Identified   Food/Nutrient Intake Outcomes:  None Identified  Physical Signs/Symptoms Outcomes:  Biochemical Data, Hemodynamic Status, Weight     Discharge Planning:     Too soon to determine     Electronically signed by Sidney Avila RD, HAILEE on 7/5/21 at 11:28 AM EDT    Contact: [phone]

## 2021-07-05 NOTE — PROGRESS NOTES
Clinical Pharmacy Note: Pharmacy to Dose Vancomcyin    Vancomycin Day: 3  Current Dosing: Intermittent      Recent Labs     07/04/21  1524 07/05/21  0420   BUN 49* 52*       Recent Labs     07/04/21  1524 07/05/21  0420   CREATININE 3.0* 3.2*       Recent Labs     07/04/21  0425 07/05/21  0420   WBC 8.0 6.3         Intake/Output Summary (Last 24 hours) at 7/5/2021 0725  Last data filed at 7/5/2021 0539  Gross per 24 hour   Intake 3553.89 ml   Output 700 ml   Net 2853.89 ml         Ht Readings from Last 1 Encounters:   07/03/21 5' 9\" (1.753 m)        Wt Readings from Last 1 Encounters:   07/05/21 194 lb 10.7 oz (88.3 kg)         Body mass index is 28.75 kg/m². Estimated Creatinine Clearance: 22 mL/min (A) (based on SCr of 3.2 mg/dL (H)). Random: 17.7 mcg/mL    Assessment/Plan:  Vancomycin level is therapeutic. Level was drawn appropriately in respect to last dose given. A vancomycin random level has been ordered for tomorrow AM.  Will Re-dose vancomycin dose 750mg once today   Changes in regimen will be determined based on culture results, renal function, and clinical response. Pharmacy will continue to monitor and adjust regimen as necessary.     Thank you for the consult,    Filemon Musa, PharmD  7/5/2021 7:26 AM

## 2021-07-06 NOTE — PROGRESS NOTES
Printed device interrogation from day of admission and additionally interrogation from today.   Dr. Binta Robles has reviewed and will provide consultation today as the attending cardiologist.     KIN Matthew-CNP

## 2021-07-06 NOTE — CONSULTS
Patient admitted post cardiac arrest, sedated on ventilator. Unable to see for HF education at this time. Will see when appropriate.

## 2021-07-06 NOTE — PROGRESS NOTES
Nephrology Consult Note                                                                                                                                                                                                                                                                                                                                                               Office : [phone]     Fax :[phone]              Patient's Name: Brandi Frye  9:14 AM  7/6/2021    Reason for Consult: YENNIFER     Chief Complaint:  Cardiac arrest    History of Present Ilness:    Brandi Frye is a 77 y.o. male with  PMH sig for CAD, cardiomyopathy s/p AICD, ACD with stents, HTN, hyperlipidemia, DMII on insulin with diffuse large B cell lypmhoma. Per report , AICD did not fire   Patient had [facility]ne D[place]ne on arrival ---- S/p shock ----> aystole briefly , got epi + CPR + intubation   Low UOP   No diarrhea, bleeding     Interval hx : Intubated   On vent support   On pressors   UOP low     I/O last 3 completed shifts: In: 4980.8 [I.V.:4636.7; IV Piggyback:344.1]  Out: 725 [Urine:725]  No intake/output data recorded.           Past Medical History:   Diagnosis Date    Cardiomy[facility]     Cirrhosis ([facility]     ETOH abuse    Diabetes mellitus ([facility]     History of abdominal paracentesis 03/2020    Hyperlipidemia     Hypertension     Lymphoma ([facility]     currently in remission    NSVT (nonsustained ventricular tachycardia) (HCC)     Sleep apnea     unable to tolerate CPAP    Syncope        Past Surgical History:   Procedure Laterality Date    CARDIAC DEFIBRILLATOR PLACEMENT  6/23/15    single chamber AICD, EPS inducible VT    CHOLECYSTECTOMY      CORONARY ANGIOPLASTY WITH STENT PLACEMENT      LAMINECTOMY      TOENAIL EXCISION  07/21/2017    TONSILLECTOMY AND ADENOIDECTOMY      UPPER GASTROINTESTINAL ENDOSCOPY N/A 8/4/2020    EGD performed by Zak Bishop MD at [address] [address] [facility] ENDOSCOPY         Current Medications: vancomycin (VANCOCIN) 750 mg in dextrose 5 % 250 mL IVPB, Q24H  cefepime (MAXIPIME) 1000 mg IVPB minibag, Q12H  DOBUTamine (DOBUTREX) 500 mg in dextrose 5 % 250 mL infusion, Continuous  lidocaine (CARDIAC INFUSION) 2 g in dextrose 5% 500 mL infusion, Continuous  0.9 % sodium chloride infusion, Continuous  EPINEPHrine (EPINEPHrine HCL) 5 mg in dextrose 5 % 250 mL infusion, Continuous  fentaNYL 10 mcg/mL infusion, Continuous  aspirin EC tablet 81 mg, Daily  atorvastatin (LIPITOR) tablet 80 mg, Daily  clopidogrel (PLAVIX) tablet 75 mg, Daily  sodium chloride flush 0.9 % injection 5-40 mL, 2 times per day  sodium chloride flush 0.9 % injection 5-40 mL, PRN  0.9 % sodium chloride infusion, PRN  ondansetron (ZOFRAN-ODT) disintegrating tablet 4 mg, Q8H PRN   Or  ondansetron (ZOFRAN) injection 4 mg, Q6H PRN  polyethylene glycol (GLYCOLAX) packet 17 g, Daily PRN  acetaminophen (TYLENOL) tablet 650 mg, Q6H PRN   Or  acetaminophen (TYLENOL) suppository 650 mg, Q6H PRN  lactobacillus (CULTURELLE) capsule 1 capsule, BID WC  heparin (porcine) injection 4,000 Units, PRN  heparin (porcine) injection 2,000 Units, PRN  heparin 25,000 units in dextrose 5% 250 mL (premix) infusion, Continuous  perflutren lipid microspheres (DEFINITY) injection 1.65 mg, ONCE PRN  famotidine (PEPCID) injection 20 mg, Daily  albuterol sulfate  (90 Base) MCG/ACT inhaler 2 puff, Q4H PRN  insulin lispro (1 Unit Dial) 0-12 Units, Q4H  glucose (GLUTOSE) 40 % oral gel 15 g, PRN  dextrose 50 % IV solution, PRN  glucagon (rDNA) injection 1 mg, PRN  dextrose 5 % solution, PRN  propofol injection, Titrated        Physical exam:     Vitals:  BP (!) 98/53   Pulse 63   Temp 99 °F (37.2 °C) (Temporal)   Resp 16   Ht 5' 9\" (1.753 m)   Wt 206 lb 2.1 oz (93.5 kg)   SpO2 99%   BMI 30.44 kg/m²   Constitutional:    Intubated   Skin: no rash, turgor wnl  Heent:  eomi, mmm  Neck: no bruits or jvd noted  Cardiovascular:  S1, S2 without m/r/g  Respiratory: CTA B without w/r/r  Abdomen:  +bs, soft, nt, nd  Ext: mild  lower extremity edema  Scrotal edema +     Data:   Labs:  CBC:   Recent Labs     07/04/21 0425 07/05/21 0420 07/06/21 0427   WBC 8.0 6.3 5.6   HGB 13.9 12.0* 11.6*   * 87* 81*     BMP:    Recent Labs     07/04/21  1524 07/05/21 0420 07/06/21 0427   * 129* 129*   K 5.0 4.3 4.1   CL 96* 95* 97*   CO2 25 23 21   BUN 49* 52* 50*   CREATININE 3.0* 3.2* 2.5*   GLUCOSE 185* 196* 206*     Ca/Mg/Phos:   Recent Labs     07/04/21 0425 07/04/21 0425 07/04/21  1524 07/05/21 0420 07/06/21 0427   CALCIUM 8.4   < > 7.9* 7.6* 7.2*   MG 2.60*  --   --  2.30 2.50*   PHOS 5.8*  --   --  5.1* 4.4    < > = values in this interval not displayed. Hepatic:   Recent Labs     07/04/21 0425 07/05/21 0420 07/06/21 0427   * 872* 383*   * 815* 603*   BILITOT 1.3* 1.3* 1.0   ALKPHOS 140* 109 104     Troponin:   Recent Labs     07/03/21  1746 07/03/21  2043 07/04/21  0029   TROPONINI 0.03* 0.07* 0.11*     BNP: No results for input(s): BNP in the last 72 hours. Lipids: No results for input(s): CHOL, TRIG, HDL, LDLCALC, LABVLDL in the last 72 hours. ABGs:   Recent Labs     07/06/21 0427   PHART 7.395   PO2ART 101.0   GTH8LSY 35.1     INR:   Recent Labs     07/03/21 1746   INR 1.25*     UA:  Recent Labs     07/03/21  1750   COLORU YELLOW   CLARITYU Clear   GLUCOSEU Negative   BILIRUBINUR Negative   KETUA Negative   SPECGRAV 1.012   BLOODU SMALL*   PHUR 7.5   PROTEINU TRACE*   UROBILINOGEN 1.0   NITRU Negative   LEUKOCYTESUR TRACE*   LABMICR YES   URINETYPE NOT GIVEN UPV      Urine Microscopic:   Recent Labs     07/03/21  1750   COMU see below   HYALCAST 2   WBCUA 10*   RBCUA 13*   EPIU 10*     Urine Culture:   Recent Labs     07/03/21  1750   LABURIN No growth at 18 to 36 hours     Urine Chemistry: No results for input(s): CLUR, LABCREA, PROTEINUR, NAUR in the last 72 hours.           IMAGING:  XR CHEST PORTABLE   Final Result   Increased keep MAP > 65 mmhg   Avoid contrast   Avoid ACEI or ARB   Avoid NSAIDs   Accurate documentation of UOP         2. S/p cardiac arrest, ROSC    3 cardiogenic shock  On pressor support   On dobutamine     3. Acute hypoxic respiratory failure  intubation     4. Acid- base/ Electrolyte imbalance   monitor    5. CAD    6.  H/o  AICD          Thank you for allowing us to participate in care of Evens Hurtado MD  Feel free to contact me   [facility] [address]  Office : [phone]  Fax :[phone]

## 2021-07-06 NOTE — FLOWSHEET NOTE
Assessment complete. VSS, see flowsheet. Medications administered, see MAR. Pt sedated on vent, appears comfortable. Bed in lowest position, wheels locked, and bed alarm on. No visible needs at this time.     Electronically signed by ALOK Stern RN

## 2021-07-06 NOTE — PROGRESS NOTES
Assessment complete, VSS. Pt tolerating vent with no signs of distress. ETT # 8.0, 22 cm at lip. Vent settings: AC/VC rate 16, , PEEP 5, FIO2 35%. Lung sounds clear/diminished. PERRL. Weak pulses noted to all extremities. Bowel sounds active. OG measures 55 cm at lip, connected to low intermittent suction. Pt repositioned with pillow support.

## 2021-07-06 NOTE — PROGRESS NOTES
Clinical Pharmacy Note: Pharmacy to Dose Vancomcyin    Vancomycin Day: 4  Current Dosing: intermittent      Recent Labs     07/05/21  0420 07/06/21  0427   BUN 52* 50*       Recent Labs     07/05/21  0420 07/06/21  0427   CREATININE 3.2* 2.5*       Recent Labs     07/05/21  0420 07/06/21  0427   WBC 6.3 5.6         Intake/Output Summary (Last 24 hours) at 7/6/2021 0264  Last data filed at 7/6/2021 7004  Gross per 24 hour   Intake 4980.81 ml   Output 725 ml   Net 4255.81 ml         Ht Readings from Last 1 Encounters:   07/05/21 5' 9\" (1.753 m)        Wt Readings from Last 1 Encounters:   07/06/21 206 lb 2.1 oz (93.5 kg)         Body mass index is 30.44 kg/m². Estimated Creatinine Clearance: 29 mL/min (A) (based on SCr of 2.5 mg/dL (H)). Random: 17.6 mcg/mL    Assessment/Plan:  Vancomycin level is therapeutic. Level was drawn appropriately in respect to last dose given. Patient's renal function is stable and improving. Will adjust from intermittent dosing to scheduled dosing  A vancomycin trough has been ordered for 7/9 @ 1100. Will Continue vancomycin dose 750mg IVPB Q24H  Changes in regimen will be determined based on culture results, renal function, and clinical response. Pharmacy will continue to monitor and adjust regimen as necessary.     Thank you for the consult,    Vicente Aguirre PharmD  7/6/2021 7:14 AM

## 2021-07-06 NOTE — PROGRESS NOTES
07/05/21 2034   Vent Patient Data   Plateau Pressure 14 DTO70   Static Compliance 58 mL/cmH2O   Dynamic Compliance 52 mL/cmH2O

## 2021-07-06 NOTE — PROGRESS NOTES
[address]   Progress Note  Cardiology      Sean Montero [facility]   Admission date:  7/3/2021  CC-f/up cardiorespiratory arrest  Subjective:  He presented from home after collapse at home. Review of pacer/ICD shows he had SVT.  Longstanding ischemic cardiomyopathy patient with refractory CHF    Objective:  Medications/Labs all Reviewed     vancomycin  750 mg Intravenous Q24H    heparin (porcine)  5,000 Units Subcutaneous 3 times per day    aspirin  81 mg Oral Daily    atorvastatin  80 mg Oral Daily    clopidogrel  75 mg Oral Daily    sodium chloride flush  5-40 mL Intravenous 2 times per day    lactobacillus  1 capsule Oral BID WC    famotidine (PEPCID) injection  20 mg Intravenous Daily    insulin lispro  0-12 Units Subcutaneous Q4H       BMP:   Lab Results   Component Value Date     07/06/2021    K 4.1 07/06/2021    K 4.1 07/03/2021    CL 97 07/06/2021    CO2 21 07/06/2021    BUN 50 07/06/2021    CREATININE 2.5 07/06/2021    MG 2.50 07/06/2021     CBC:    Lab Results   Component Value Date    WBC 5.6 07/06/2021    RBC 3.52 07/06/2021    HGB 11.6 07/06/2021    HCT 34.9 07/06/2021    MCV 99.1 07/06/2021    RDW 15.7 07/06/2021    PLT 81 07/06/2021      PT/INR:    Lab Results   Component Value Date    INR 1.25 (H) 07/03/2021    PROTIME 14.3 (H) 07/03/2021     Cardiac Enzymes:    Lab Results   Component Value Date    CKTOTAL 65 02/12/2010    CKMB 0.78 01/15/2010     Lab Results   Component Value Date    TROPONINI 0.11 (H) 07/04/2021    TROPONINI 0.07 (H) 07/03/2021    TROPONINI 0.03 (H) 07/03/2021     BNP:  No results found for: BNP  FASTING LIPID PANEL:    Lab Results   Component Value Date    CHOL 105 03/20/2021    HDL 38 03/20/2021    HDL 35 01/15/2010    TRIG 92 03/20/2021       Physical Examination:    /71   Pulse 75   Temp 99.2 °F (37.3 °C) (Temporal)   Resp 16   Ht 5' 9\" (1.753 m)   Wt 206 lb 2.1 oz (93.5 kg)   SpO2 98%   BMI 30.44 kg/m²      Respiratory:  · Sedated on vent

## 2021-07-06 NOTE — FLOWSHEET NOTE
No acute changes noted on reassessment, see flowsheets. VSS and afebrile. Denies additional needs. Will continue to monitor.      Electronically signed by ALOK Clinton RN

## 2021-07-06 NOTE — PROGRESS NOTES
glucose, dextrose, glucagon (rDNA), dextrose      Intake/Output Summary (Last 24 hours) at 7/6/2021 1443  Last data filed at 7/6/2021 0930  Gross per 24 hour   Intake 5030.81 ml   Output 725 ml   Net 4305.81 ml       Physical Exam Performed:    /71   Pulse 75   Temp 99.2 °F (37.3 °C) (Temporal)   Resp 16   Ht 5' 9\" (1.753 m)   Wt 206 lb 2.1 oz (93.5 kg)   SpO2 98%   BMI 30.44 kg/m²        General:  Critically ill appearing, intubated and sedated. HEENT:  Normocephalic, atraumatic. Pupils equal, round, reactive to light. No scleral icterus. No conjunctival injection. Normal lips, teeth, and gums. No nasal discharge. Neck:  Supple  Heart:  Normal s1/s2, RRR, no murmurs, gallops, or rubs. no leg edema  Lungs:  rhonchi bilaterally, no wheeze, no rales, bilat rhonchi, no use of accessory muscles  Abd: bowel sounds present, soft, nontender, nondistended, no masses  Extrem:  No clubbing, cyanosis,  no edema, 1+ pedal pulses, sluggish capillary refill  Skin:  Warm and dry, no open lesions or rash,  normal color/perfusion  Psych:  Unable to assess due to Ventilated and sedated. Neuro: responds to pain, but   Unable to fully assess due to Ventilated and sedated - pupils reactive. Labs:   Recent Labs     07/04/21 0425 07/05/21 0420 07/06/21 0427   WBC 8.0 6.3 5.6   HGB 13.9 12.0* 11.6*   HCT 42.6 35.7* 34.9*   * 87* 81*     Recent Labs     07/04/21  0425 07/04/21  0425 07/04/21  1524 07/05/21  0420 07/06/21 0427   *   < > 131* 129* 129*   K 4.8   < > 5.0 4.3 4.1   CL 95*   < > 96* 95* 97*   CO2 23   < > 25 23 21   BUN 44*   < > 49* 52* 50*   CREATININE 2.5*   < > 3.0* 3.2* 2.5*   CALCIUM 8.4   < > 7.9* 7.6* 7.2*   PHOS 5.8*  --   --  5.1* 4.4    < > = values in this interval not displayed.      Recent Labs     07/04/21  0425 07/05/21  0420 07/06/21 0427   * 872* 383*   * 815* 603*   BILIDIR 0.5*  --   --    BILITOT 1.3* 1.3* 1.0   ALKPHOS 140* 109 104     Recent Labs 07/03/21  1746   INR 1.25*     Recent Labs     07/03/21  1746 07/03/21  2043 07/04/21  0029   TROPONINI 0.03* 0.07* 0.11*       Urinalysis:      Lab Results   Component Value Date    NITRU Negative 07/03/2021    WBCUA 10 07/03/2021    BACTERIA 3+ 10/18/2020    RBCUA 13 07/03/2021    BLOODU SMALL 07/03/2021    SPECGRAV 1.012 07/03/2021    GLUCOSEU Negative 07/03/2021    GLUCOSEU NEGATIVE 02/12/2010       Radiology:  XR CHEST PORTABLE   Final Result   Increased pleural-parenchymal disease at the lung bases. XR CHEST PORTABLE   Final Result   Cardiomegaly with increased vascular congestion and central edema. Basilar   atelectasis suspected greater on the left. Some of these findings are   confounded due to low lung volume. XR CHEST PORTABLE   Final Result   Endotracheal tube is low. Retraction of 3 cm is recommended. Additional   supportive devices are normal positions. Left basilar opacity, atelectasis versus pneumonia. Stable cardiomegaly. XR CHEST PORTABLE   Final Result   Supportive devices project in normal positions. Left retrocardiac area is under penetrated. Possible atelectasis or   consolidation. Stable enlargement of the cardiac silhouette. XR CHEST PORTABLE   Final Result   Interval retraction of endotracheal tube with satisfactory location. XR CHEST PORTABLE   Final Result   Addendum 1 of 1   ADDENDUM:   Critical results were called by Dr. Tati Elias DO to Dr. Destinee Lew On    7/3/2021   at 20:53. Final   Right mainstem bronchus inhibition. This should be retracted by 3 cm. Other findings as described. XR CHEST PORTABLE   Final Result   Endotracheal tube with the tip in the right mainstem bronchus and this should   be pulled back 5.0 cm. Cardiomegaly without acute pulmonary process.       The findings were sent to the Radiology Results [address] at 7:08   pm on 7/3/2021to be communicated to a licensed caregiver. 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) [C83.39]     V-tach ([facility] [address] [I47.2]     AICD (automatic cardioverter/defibrillator) present [Z95.810]     Ischemic cardiomyopathy [I25.5]     DMII (diabetes mellitus, type 2) ([facility] [address] [E11.9]        Cardiac arrest with Rebecca Camargo - AICD sensed NSVT and then SVT  Cardiology on board  Currently patient is DNR CCA  On dobutamine and lidocaine and epinephrine and heparin drip  Critical care following  Prognosis guarded    Shock, probably cardiogenic  Most recent EF is less than 20%  Has been started on Levophed and dobutamine, now on epinephrine lidocaine and dobutamine drip  Cardiology on board    Acute renal failure - after arrest - monitor closely - gentle IVF    EF of <20% cr 2.5 today. Consult to Nephro placed for likely ATN. If continues to progress I would not rec dialysis and would rec palliative care. SIRS POA based on tachycardia, tachypnea, hypotension, acute renal failure, lactic acidosis - due to cardiac arrest - suspect aspiration as well. Enterococcus faecalis DNA Detected he remains of cefepime and vanc. Cultures and sensitivities are pending. DMII - med dose SSI, accuchecks - pt usually on oral meds and insulin, currently on medium sliding scale. lantus is not currently ordered. He takes 16 units at night. May need to start once we initiate tube feeds.      Aspiration suspected - cefepime and vanco and lactobacillus    Diffuse large B cell lymphoma - had mass in naresh hepatis 10/18 - stage III, had 3 cycles of RCHOP with decrease in size    Acute metabolic encephalopathy - s/p cardiac arrest - did have ROSC - moving legs so no hypothermia protocol    Alcohol abuse - will give thiamine, folate and MVI     DVT Prophylaxis: heparin gtt  Diet: Diet NPO  Code Status: DNR-CCA    PT/OT Eval Status: when appropriate     Dispo - patient remains in ICU    Due to the immediate potential for life-threatening deterioration due to acute hypoxic respiratory failure, status post cardiac arrest, I spent 35 minutes providing critical care. This time is excluding time spent performing procedures. Prognosis: Very guarded    35 minutes of critical care time spent.      Ivy Zepeda MD

## 2021-07-06 NOTE — PLAN OF CARE
Problem: Skin Integrity:  Goal: Will show no infection signs and symptoms  Description: Will show no infection signs and symptoms  Outcome: Ongoing  Goal: Absence of new skin breakdown  Description: Absence of new skin breakdown  Outcome: Ongoing     Problem: Falls - Risk of:  Goal: Will remain free from falls  Description: Will remain free from falls  Outcome: Ongoing  Goal: Absence of physical injury  Description: Absence of physical injury  Outcome: Ongoing     Problem: Discharge Planning:  Goal: Discharged to appropriate level of care  Description: Discharged to appropriate level of care  Outcome: Ongoing     Problem: Serum Glucose Level - Abnormal:  Goal: Ability to maintain appropriate glucose levels will improve  Description: Ability to maintain appropriate glucose levels will improve  Outcome: Ongoing     Problem: Nutrition  Goal: Optimal nutrition therapy  Outcome: Ongoing

## 2021-07-07 NOTE — PROGRESS NOTES
Assessment complete. See flowsheet for details. Remains intubated. ETT size 8 22 cm at the lip. AC/VC, rate 16, , FiO2 30%, PEEP 5. Sedated with propofol at 40 mcg/kg/hr and fentanyl at 50 mcg/hr. Dobutamine infusing at 5mcg/kg/hr and epinephrine at 1 mcg/min. Right fem line site clean, dry, intact and infusing. Salmon patent and draining. Medications given per MAR. Repositioned for comfort. Redness noted to sacrum, scab on right knee, and skin tear on left elbow. NSR/SB per monitor. Restraints in place for patient safety. Wife at bedside and updated regarding plan of care.

## 2021-07-07 NOTE — PROGRESS NOTES
microspheres, albuterol sulfate HFA, glucose, dextrose, glucagon (rDNA), dextrose      Intake/Output Summary (Last 24 hours) at 7/7/2021 1205  Last data filed at 7/7/2021 0640  Gross per 24 hour   Intake 2032.73 ml   Output 6150 ml   Net -4117.27 ml       Physical Exam Performed:    BP (!) 106/52   Pulse 52   Temp 97.1 °F (36.2 °C) (Temporal)   Resp 16   Ht 5' 9\" (1.753 m)   Wt 201 lb 15.1 oz (91.6 kg)   SpO2 98%   BMI 29.82 kg/m²        General:  Critically ill appearing, intubated and sedated. HEENT:  Normocephalic, atraumatic. Pupils equal, round, reactive to light. No scleral icterus. No conjunctival injection. Normal lips, teeth, and gums. No nasal discharge. Neck:  Supple  Heart:  Normal s1/s2, RRR, no murmurs, gallops, or rubs. no leg edema  Lungs:  rhonchi bilaterally, no wheeze, no rales, bilat rhonchi, no use of accessory muscles  Abd: bowel sounds present, soft, nontender, nondistended, no masses  Extrem:  No clubbing, cyanosis,  no edema, 1+ pedal pulses, sluggish capillary refill  Skin:  Warm and dry, no open lesions or rash,  normal color/perfusion  Psych:  Unable to assess due to Ventilated and sedated. Neuro: responds to pain, but   Unable to fully assess due to Ventilated and sedated - pupils reactive. Labs:   Recent Labs     07/05/21  0420 07/06/21  0427 07/07/21  0510   WBC 6.3 5.6 4.9   HGB 12.0* 11.6* 11.9*   HCT 35.7* 34.9* 35.5*   PLT 87* 81* 79*     Recent Labs     07/05/21  0420 07/06/21  0427 07/07/21  0510   * 129* 132*   K 4.3 4.1 4.0   CL 95* 97* 100   CO2 23 21 22   BUN 52* 50* 42*   CREATININE 3.2* 2.5* 2.0*   CALCIUM 7.6* 7.2* 8.1*   PHOS 5.1* 4.4 3.1     Recent Labs     07/05/21  0420 07/06/21  0427 07/07/21  0510   * 383* 275*   * 603* 575*   BILITOT 1.3* 1.0 1.0   ALKPHOS 109 104 105     No results for input(s): INR in the last 72 hours. No results for input(s): Destiny Alaniz in the last 72 hours.     Urinalysis:      Lab Results Component Value Date    NITRU Negative 07/03/2021    WBCUA 10 07/03/2021    BACTERIA 3+ 10/18/2020    RBCUA 13 07/03/2021    BLOODU SMALL 07/03/2021    SPECGRAV 1.012 07/03/2021    GLUCOSEU Negative 07/03/2021    GLUCOSEU NEGATIVE 02/12/2010       Radiology:  XR CHEST PORTABLE   Final Result   Stable chest over the past 24 hours. Unchanged bibasilar airspace disease   versus atelectasis. XR CHEST PORTABLE   Final Result   Increased pleural-parenchymal disease at the lung bases. XR CHEST PORTABLE   Final Result   Cardiomegaly with increased vascular congestion and central edema. Basilar   atelectasis suspected greater on the left. Some of these findings are   confounded due to low lung volume. XR CHEST PORTABLE   Final Result   Endotracheal tube is low. Retraction of 3 cm is recommended. Additional   supportive devices are normal positions. Left basilar opacity, atelectasis versus pneumonia. Stable cardiomegaly. XR CHEST PORTABLE   Final Result   Supportive devices project in normal positions. Left retrocardiac area is under penetrated. Possible atelectasis or   consolidation. Stable enlargement of the cardiac silhouette. XR CHEST PORTABLE   Final Result   Interval retraction of endotracheal tube with satisfactory location. XR CHEST PORTABLE   Final Result   Addendum 1 of 1   ADDENDUM:   Critical results were called by Dr. Laly Acevedo DO to Dr. Laxmi Zavala On    7/3/2021   at 20:53. Final   Right mainstem bronchus inhibition. This should be retracted by 3 cm. Other findings as described. XR CHEST PORTABLE   Final Result   Endotracheal tube with the tip in the right mainstem bronchus and this should   be pulled back 5.0 cm. Cardiomegaly without acute pulmonary process. The findings were sent to the Radiology Results [address] at 7:08   pm on 7/3/2021to be communicated to a licensed caregiver.          XR encephalopathy - s/p cardiac arrest - did have ROSC - moving legs so no hypothermia protocol    Alcohol abuse -on thiamine, folate and MVI     DVT Prophylaxis: Heparin subcu  Diet: Diet NPO  Code Status: DNR-CCA    PT/OT Eval Status: when appropriate     Dispo - patient remains in ICU    Due to the immediate potential for life-threatening deterioration due to acute hypoxic respiratory failure, status post cardiac arrest, I spent 35 minutes providing critical care. This time is excluding time spent performing procedures. Prognosis: Very guarded    35 minutes of critical care time spent.      Soha Jansen MD

## 2021-07-07 NOTE — PROGRESS NOTES
BMI 29.82 kg/m²      Respiratory:  · Sedated on vent with lungs clear  Cardiovascular:  · Auscultation: regular rate and rhythm, normal S1S2, no murmur, rub or gallop  · Palpation:  Nl PMI  · JVP:  normal  · Extremities: No Edema  Abdomen:  · Soft, non-tender  · Normal bowel sounds  Extremities:  · 3 plus edema  Neurological/Psychiatric:  · Sedated on vent  Skin Warm and dry    Assessment:    Principal Problem:    Cardiac arrest (HCC)    Active Problems:    Mixed hyperlipidemia      Coronary atherosclerosis of native coronary artery      DMII (diabetes mellitus, type 2) (HCC)      Ischemic cardiomyopathy      V-tach (HCC)      AICD (automatic cardioverter/defibrillator) present  Plan: normal function of device    Diffuse large B-cell lymphoma of solid organ excluding spleen (HCC)      Acute renal failure (ARF) (HCC) improving        Plan:  1.  he does withdraw to noxious stimuli but overall prognosis very bleak  2. For now continue supportive care. He is DNR-CC  3. Discussed with wife and brother about turning off defibrillator and they want this done.  Will continue supportive care  I have spent  35  minutes of face to face time with the patient with more than 50%  spent  counseling and coordinating care for Thomas Thrasher

## 2021-07-07 NOTE — PROGRESS NOTES
[facility] Pulmonary/CCM Progress note      Admit Date: 7/3/2021    Chief Complaint: Cardiac arrest    Subjective: Interval History: Still remains on epinephrine drip and dobutamine, no arrhythmias noted overnight. Creatinine improved to 2.0, polyuria with urine output >5L. Scheduled Meds:   vancomycin  750 mg Intravenous Q24H    heparin (porcine)  5,000 Units Subcutaneous 3 times per day    aspirin  81 mg Oral Daily    atorvastatin  80 mg Oral Daily    clopidogrel  75 mg Oral Daily    sodium chloride flush  5-40 mL Intravenous 2 times per day    lactobacillus  1 capsule Oral BID WC    famotidine (PEPCID) injection  20 mg Intravenous Daily    insulin lispro  0-12 Units Subcutaneous Q4H     Continuous Infusions:   DOBUTamine 5 mcg/kg/min (07/07/21 1353)    EPINEPHrine infusion 1 mcg/min (07/07/21 1353)    fentaNYL 50 mcg/hr (07/07/21 1514)    sodium chloride 10 mL/hr at 07/07/21 1353    dextrose      propofol 35 mcg/kg/min (07/07/21 1621)     PRN Meds:sodium chloride flush, sodium chloride, ondansetron **OR** ondansetron, polyethylene glycol, acetaminophen **OR** acetaminophen, perflutren lipid microspheres, albuterol sulfate HFA, glucose, dextrose, glucagon (rDNA), dextrose    Review of Systems  Unable to obtain since the patient is intubated and sedated    Objective:     Patient Vitals for the past 8 hrs:   BP Temp Temp src Pulse Resp SpO2   07/07/21 1400 114/60 97.1 °F (36.2 °C) Temporal 65 16 98 %   07/07/21 1200 (!) 101/52 96.9 °F (36.1 °C) Temporal 57 10 98 %   07/07/21 1015 (!) 106/52 -- -- -- -- --   07/07/21 1000 (!) 102/53 97.1 °F (36.2 °C) Temporal 52 16 98 %   07/07/21 0945 (!) 107/54 -- -- -- -- --   07/07/21 0930 (!) 104/54 -- -- -- -- --   07/07/21 0915 (!) 105/53 -- -- -- -- --   07/07/21 0900 113/60 -- -- -- -- --   07/07/21 0830 120/66 -- -- -- -- --     I/O last 3 completed shifts:   In: 2611.3 [I.V.:2118.6; IV Piggyback:492.7]  Out: 6700 [Urine:6350; Emesis/NG output:350]  No intake/output data recorded. General Appearance: Poorly responsive, in no acute distress  Skin: warm and dry, no rash or erythema  Head: normocephalic and atraumatic  Eyes: pupils equal, round, and reactive to light, extraocular eye movements intact, conjunctivae normal  ENT: external ear and ear canal normal bilaterally, nose without deformity, nasal mucosa and turbinates normal  Neck: supple and non-tender without mass, no cervical lymphadenopathy  Pulmonary/Chest: clear to auscultation bilaterally- no wheezes, rales or rhonchi, normal air movement, no respiratory distress  Cardiovascular: normal rate, regular rhythm,  no murmurs, rubs, distal pulses intact, no carotid bruits  Abdomen: soft, non-tender, non-distended, normal bowel sounds, no masses or organomegaly  Lymph Nodes: Cervical, supraclavicular normal  Extremities: no cyanosis, clubbing or edema  Musculoskeletal: normal range of motion, no joint swelling, deformity or tenderness  Neurologic: Poorly responsive, no focal neurologic deficits    Data Review:  CBC:   Lab Results   Component Value Date    WBC 4.9 07/07/2021    RBC 3.59 07/07/2021     BMP:   Lab Results   Component Value Date    GLUCOSE 149 07/07/2021    CO2 22 07/07/2021    BUN 42 07/07/2021    CREATININE 2.0 07/07/2021    CALCIUM 8.1 07/07/2021     ABG:   Lab Results   Component Value Date    RCS6KFQ 22.1 07/07/2021    BEART -1.7 07/07/2021    F5HXTEQJ 98.0 07/07/2021    PHART 7.427 07/07/2021    UGK5ZYS 33.5 07/07/2021    PO2ART 135.0 07/07/2021    RRX2HRJ 51.8 07/07/2021       Radiology: All pertinent images / reports were reviewed as a part of this visit.     Narrative   EXAMINATION:   ONE XRAY VIEW OF THE CHEST       7/6/2021 7:02 am       COMPARISON:   07/05/2021       HISTORY:   ORDERING SYSTEM PROVIDED HISTORY: mechanical ventilation   TECHNOLOGIST PROVIDED HISTORY:   Reason for exam:->mechanical ventilation   Reason for Exam: Mechanical ventilation   Acuity: Unknown   Type of Exam: Unknown       FINDINGS:   Lung volumes are low accentuating heart size and bronchovascular markings at   the lung bases.       Lines and tubes appear unchanged.  Left-sided pacer is seen       There is hazy opacity at the right lung base, slightly increased.  There is   hazy opacity at the left lung base, which also appears increased.  Heart is   enlarged.           Impression   Increased pleural-parenchymal disease at the lung bases. Problem List:   Acute hypoxic respiratory failure  SVT/cardiogenic shock  Cardiac arrest  YENNIFER  Enterococcus bacteremia    Assessment/Plan:     Acute hypoxic respiratory failure-intubated post cardiac arrest, chest x-ray shows mild pulmonary edema. On volume assist control mode of ventilation. SVT/cardiogenic shock. Still requiring epinephrine drip and dobutamine. Improved epinephrine requirements, titrate down as tolerated. Stop IV fluids. History of severe ischemic cardiomyopathy, EF of 10% on recent echo. No further episodes of SVT. AICD has been deactivated upon wife's request.    YENNIFER as a result of cardiac arrest, renal function improving, now with polyuria-creatinine 2.0. Enterococcus bacteremia ? Source. 1 of 2 blood cultures positive. Pansensitive, switch vancomycin to Rocephin. Pepcid and Heparin subcutaneous prophylaxis. Critical care team will follow. Discussed with wife again today, leaning towards comfort care measures. Would make a final decision in the next 24 to 48 hours. Fredrick Fiore MD     Critical care time of 35 minutes excluding procedures.

## 2021-07-07 NOTE — PLAN OF CARE
Problem: Non-Violent Restraints  Goal: Removal from restraints as soon as assessed to be safe  Outcome: Ongoing  Goal: No harm/injury to patient while restraints in use  Outcome: Ongoing  Goal: Patient's dignity will be maintained  Outcome: Ongoing     Problem: Skin Integrity:  Goal: Will show no infection signs and symptoms  Description: Will show no infection signs and symptoms  7/6/2021 2130 by Shyann Mcdonald RN  Outcome: Ongoing  7/6/2021 1230 by Gina Barahona RN  Outcome: Ongoing  Goal: Absence of new skin breakdown  Description: Absence of new skin breakdown  7/6/2021 2130 by Shyann Mcdonald RN  Outcome: Ongoing  7/6/2021 1230 by Gina Barahona RN  Outcome: Ongoing     Problem: Falls - Risk of:  Goal: Will remain free from falls  Description: Will remain free from falls  7/6/2021 2130 by Shyann Mcdonald RN  Outcome: Ongoing  7/6/2021 1230 by Gina Barahona RN  Outcome: Ongoing  Goal: Absence of physical injury  Description: Absence of physical injury  7/6/2021 2130 by Shyann Mcdonald RN  Outcome: Ongoing  7/6/2021 1230 by Gina Barahona RN  Outcome: Ongoing     Problem: Discharge Planning:  Goal: Discharged to appropriate level of care  Description: Discharged to appropriate level of care  7/6/2021 2130 by Shyann Mcdonald RN  Outcome: Ongoing  7/6/2021 1230 by Gina Barahona RN  Outcome: Ongoing     Problem: Serum Glucose Level - Abnormal:  Goal: Ability to maintain appropriate glucose levels will improve  Description: Ability to maintain appropriate glucose levels will improve  7/6/2021 2130 by Shyann Mcdonald RN  Outcome: Ongoing  7/6/2021 1230 by Gina Barahona RN  Outcome: Ongoing     Problem: Nutrition  Goal: Optimal nutrition therapy  7/6/2021 2130 by Shyann Mcdonald RN  Outcome: Ongoing  7/6/2021 1230 by Gina Barahona RN  Outcome: Ongoing

## 2021-07-07 NOTE — PLAN OF CARE
Problem: Non-Violent Restraints  Goal: Removal from restraints as soon as assessed to be safe  7/7/2021 1029 by Johnathan Posada RN  Outcome: Ongoing  7/6/2021 2130 by Melanie Rhoades RN  Outcome: Ongoing  Goal: No harm/injury to patient while restraints in use  7/7/2021 1029 by Johnathan Posada RN  Outcome: Ongoing  7/6/2021 2130 by Melanie Rhoades RN  Outcome: Ongoing  Goal: Patient's dignity will be maintained  7/7/2021 1029 by Johnathan Posada RN  Outcome: Ongoing  7/6/2021 2130 by Melanie Rhoades RN  Outcome: Ongoing     Problem: Skin Integrity:  Goal: Will show no infection signs and symptoms  Description: Will show no infection signs and symptoms  7/7/2021 1029 by Johnathan Posada RN  Outcome: Ongoing  7/6/2021 2130 by Melanie Rhoades RN  Outcome: Ongoing  Goal: Absence of new skin breakdown  Description: Absence of new skin breakdown  7/7/2021 1029 by Johnathan Posada RN  Outcome: Ongoing  7/6/2021 2130 by Melanie Rhoades RN  Outcome: Ongoing     Problem: Falls - Risk of:  Goal: Will remain free from falls  Description: Will remain free from falls  7/7/2021 1029 by Johnathan Posada RN  Outcome: Ongoing  7/6/2021 2130 by Melanie Rhoades RN  Outcome: Ongoing  Goal: Absence of physical injury  Description: Absence of physical injury  7/7/2021 1029 by Johnathan Posada RN  Outcome: Ongoing  7/6/2021 2130 by Melanie Rhoades RN  Outcome: Ongoing     Problem: Discharge Planning:  Goal: Discharged to appropriate level of care  Description: Discharged to appropriate level of care  7/7/2021 1029 by Johnathan Posada RN  Outcome: Ongoing  7/6/2021 2130 by Melanie Rhoades RN  Outcome: Ongoing     Problem: Serum Glucose Level - Abnormal:  Goal: Ability to maintain appropriate glucose levels will improve  Description: Ability to maintain appropriate glucose levels will improve  7/7/2021 1029 by Johnathan Posada RN  Outcome: Ongoing  7/6/2021 2130 by Melanie Rhoades RN  Outcome: Ongoing

## 2021-07-07 NOTE — PROGRESS NOTES
07/06/21 2027   Vent Patient Data   Peak Inspiratory Pressure 17 cmH2O   Mean Airway Pressure 8 cmH20   Plateau Pressure 12 XBS13   Static Compliance 78 mL/cmH2O   Dynamic Compliance 45.5 mL/cmH2O

## 2021-07-07 NOTE — PROGRESS NOTES
Office : [phone]     Fax :[phone]              Patient's Name: Ugo Lobato  8:37 AM  7/7/2021    Reason for Consult:  YENNIFER       History of Present Ilness:    Ugo Lobato is a 77 y.o. male with  PMH sig for CAD, cardiomyopathy s/p AICD, ACD with stents, HTN, hyperlipidemia, DMII on insulin with diffuse large B cell lypmhoma. Per report , AICD did not fire   Patient had Pema León on arrival ---- S/p shock ----> aystole briefly , got epi + CPR + intubation   Low UOP   No diarrhea, bleeding     Interval hx : Intubated   On vent support   On pressors   UOP significantly improved   Creatinine trending down     I/O last 3 completed shifts: In: 2082.7 [I.V.:1790; IV Piggyback:292.7]  Out: 6150 [Urine:5800; Emesis/NG output:350]  No intake/output data recorded.           Past Medical History:   Diagnosis Date    [facility]     Cirrhosis ([facility]     ETOH abuse    Diabetes mellitus ([facility]     History of abdominal paracentesis 03/2020    Hyperlipidemia     Hypertension     Lymphoma ([facility]     currently in remission    NSVT (nonsustained ventricular tachycardia) (HCC)     Sleep apnea     unable to tolerate CPAP    Syncope        Past Surgical History:   Procedure Laterality Date    CARDIAC DEFIBRILLATOR PLACEMENT  6/23/15    single chamber AICD, EPS inducible VT    CHOLECYSTECTOMY      CORONARY ANGIOPLASTY WITH STENT PLACEMENT      LAMINECTOMY      TOENAIL EXCISION  07/21/2017    TONSILLECTOMY AND ADENOIDECTOMY      UPPER GASTROINTESTINAL ENDOSCOPY N/A 8/4/2020    EGD performed by Joannie Osler, MD at [address] [place] [address]         Current Medications:    vancomycin (VANCOCIN) 750 mg in dextrose 5 % 250 mL IVPB, Q24H  heparin (porcine) injection 5,000 Units, 3 times per day  DOBUTamine (DOBUTREX) 500 mg in dextrose 5 % 250 mL infusion, Continuous  EPINEPHrine (EPINEPHrine HCL) 5 mg in dextrose 5 % 250 mL infusion, Continuous  fentaNYL 10 mcg/mL infusion, Continuous  aspirin EC tablet 81 mg, Daily  atorvastatin (LIPITOR) tablet 80 mg, Daily  clopidogrel (PLAVIX) tablet 75 mg, Daily  sodium chloride flush 0.9 % injection 5-40 mL, 2 times per day  sodium chloride flush 0.9 % injection 5-40 mL, PRN  0.9 % sodium chloride infusion, PRN  ondansetron (ZOFRAN-ODT) disintegrating tablet 4 mg, Q8H PRN   Or  ondansetron (ZOFRAN) injection 4 mg, Q6H PRN  polyethylene glycol (GLYCOLAX) packet 17 g, Daily PRN  acetaminophen (TYLENOL) tablet 650 mg, Q6H PRN   Or  acetaminophen (TYLENOL) suppository 650 mg, Q6H PRN  lactobacillus (CULTURELLE) capsule 1 capsule, BID WC  perflutren lipid microspheres (DEFINITY) injection 1.65 mg, ONCE PRN  famotidine (PEPCID) injection 20 mg, Daily  albuterol sulfate  (90 Base) MCG/ACT inhaler 2 puff, Q4H PRN  insulin lispro (1 Unit Dial) 0-12 Units, Q4H  glucose (GLUTOSE) 40 % oral gel 15 g, PRN  dextrose 50 % IV solution, PRN  glucagon (rDNA) injection 1 mg, PRN  dextrose 5 % solution, PRN  propofol injection, Titrated        Physical exam:     Vitals:  BP (!) 93/44   Pulse 65   Temp 97.8 °F (36.6 °C) (Temporal)   Resp 18   Ht 5' 9\" (1.753 m)   Wt 201 lb 15.1 oz (91.6 kg)   SpO2 97%   BMI 29.82 kg/m²   Constitutional:    Intubated   Skin: no rash, turgor wnl  Heent:  eomi, mmm  Neck: no bruits or jvd noted  Cardiovascular:  S1, S2 without m/r/g  Respiratory: CTA B without w/r/r  Abdomen:  +bs, soft, nt, nd  Ext: mild  lower extremity edema  Scrotal edema +     Data:   Labs:  CBC:   Recent Labs     07/05/21  0420 07/06/21  0427 07/07/21  0510   WBC 6.3 5.6 4.9   HGB 12.0* 11.6* 11.9*   PLT 87* 81* 79*     BMP:    Recent devices are normal positions. Left basilar opacity, atelectasis versus pneumonia. Stable cardiomegaly. XR CHEST PORTABLE   Final Result   Supportive devices project in normal positions. Left retrocardiac area is under penetrated. Possible atelectasis or   consolidation. Stable enlargement of the cardiac silhouette. XR CHEST PORTABLE   Final Result   Interval retraction of endotracheal tube with satisfactory location. XR CHEST PORTABLE   Final Result   Addendum 1 of 1   ADDENDUM:   Critical results were called by Dr. Dora Gonsalves DO to Dr. Kandi Green On    7/3/2021   at 20:53. Final   Right mainstem bronchus inhibition. This should be retracted by 3 cm. Other findings as described. XR CHEST PORTABLE   Final Result   Endotracheal tube with the tip in the right mainstem bronchus and this should   be pulled back 5.0 cm. Cardiomegaly without acute pulmonary process. The findings were sent to the Radiology Results [address] at 7:08   pm on 7/3/2021to be communicated to a licensed caregiver. XR CHEST PORTABLE    (Results Pending)       Assessment/Plan       1. Acute renal failure   Improving    Etiology seems ATN  S/p cardiac arrest, hypotension   Has HFrEF   serum Creatinine level started to trend down    baseline serum cr : ~ 1 - 1.2   on pressor support     Has mild edema   Will start low dose diuretic once off pressors  Continue dobutamine    Keep MAP > 65 mmhg   Avoid contrast   Avoid ACEI or ARB   Avoid NSAIDs           2. S/p cardiac arrest, ROSC    3 cardiogenic shock  On pressor support   On dobutamine     3. Acute hypoxic respiratory failure  intubation     4. Acid- base/ Electrolyte imbalance   monitor    5. CAD    6.  H/o  AICD    Recommend to dose adjust all medications  based on renal functions  Maintain SBP> 90 mmHg   Daily weights   AVOID NSAIDs  Avoid Nephrotoxins  Monitor Intake/Output  Call if significant decrease in urine output         Thank you for allowing us to participate in care of Philip Pederson MD  Feel free to contact me   [facility] [address]  Office : [phone]  Fax :[phone]

## 2021-07-07 NOTE — PROGRESS NOTES
[facility] Admissions Device Check  Rep Checked Device   All Therapies turned off.

## 2021-07-07 NOTE — PROGRESS NOTES
Femoral line day 5. Discussed plan for removal and IV access change with Dr. Sang Franco. Order or plan includes continued need for CVC access due to multiple vasopressors in use and plans to withdraw care within the next 24-48 hours per wife at bedside. Dara Cardenas, RN, BSN, CCRN.

## 2021-07-08 NOTE — PROGRESS NOTES
Pt continues to demonstrate signs of discomfort, medication changed by cardiology see MAR for details. Atropine drops ordered and were successful in managing oral secretions at this time.

## 2021-07-08 NOTE — PROGRESS NOTES
07/07/21 2049   Vent Patient Data   Plateau Pressure 59 YNO34   Static Compliance 14 mL/cmH2O   Dynamic Compliance 45 mL/cmH2O

## 2021-07-08 NOTE — PROGRESS NOTES
[facility] Pulmonary/CCM Progress note      Admit Date: 7/3/2021    Chief Complaint: Cardiac arrest    Subjective: Interval History: No real change, though creatinine has improved with good urine output. Still on a small dose of epinephrine and dobutamine drips.     Scheduled Meds:   vancomycin  750 mg Intravenous Q24H    heparin (porcine)  5,000 Units Subcutaneous 3 times per day    aspirin  81 mg Oral Daily    atorvastatin  80 mg Oral Daily    clopidogrel  75 mg Oral Daily    sodium chloride flush  5-40 mL Intravenous 2 times per day    lactobacillus  1 capsule Oral BID WC    famotidine (PEPCID) injection  20 mg Intravenous Daily    insulin lispro  0-12 Units Subcutaneous Q4H     Continuous Infusions:   DOBUTamine Stopped (07/08/21 0940)    EPINEPHrine infusion Stopped (07/08/21 0940)    fentaNYL Stopped (07/08/21 0940)    sodium chloride Stopped (07/08/21 0941)    dextrose      propofol Stopped (07/08/21 0940)     PRN Meds:fentanNYL, LORazepam, sodium chloride flush, sodium chloride, ondansetron **OR** ondansetron, polyethylene glycol, acetaminophen **OR** acetaminophen, perflutren lipid microspheres, albuterol sulfate HFA, glucose, dextrose, glucagon (rDNA), dextrose    Review of Systems  Unable to obtain since the patient is intubated and sedated    Objective:     Patient Vitals for the past 8 hrs:   BP Temp Temp src Pulse Resp SpO2 Weight   07/08/21 0758 116/61 97 °F (36.1 °C) Temporal 65 16 100 % --   07/08/21 0715 (!) 108/54 -- -- 55 16 100 % --   07/08/21 0700 108/66 -- -- 59 16 100 % --   07/08/21 0645 101/64 -- -- 75 18 100 % --   07/08/21 0630 (!) 105/57 -- -- 62 16 98 % --   07/08/21 0615 (!) 114/58 -- -- 61 16 99 % --   07/08/21 0600 (!) 105/54 -- -- 66 16 99 % --   07/08/21 0545 (!) 101/56 -- -- 59 16 99 % --   07/08/21 0530 (!) 102/59 -- -- 64 16 99 % --   07/08/21 0515 (!) 111/53 -- -- 68 16 98 % --   07/08/21 0500 (!) 113/52 -- -- 72 16 99 % 201 lb 8 oz (91.4 kg)   07/08/21 0445 105/66 -- -- 75 16 99 % --     I/O last 3 completed shifts: In: 1318.7 [I.V.:1068.7; IV Piggyback:250]  Out: 1600 [Urine:1450; Emesis/NG output:150]  No intake/output data recorded. General Appearance: Poorly responsive, in no acute distress  Skin: warm and dry, no rash or erythema  Head: normocephalic and atraumatic  Eyes: pupils equal, round, and reactive to light, extraocular eye movements intact, conjunctivae normal  ENT: external ear and ear canal normal bilaterally, nose without deformity, nasal mucosa and turbinates normal  Neck: supple and non-tender without mass, no cervical lymphadenopathy  Pulmonary/Chest: clear to auscultation bilaterally- no wheezes, rales or rhonchi, normal air movement, no respiratory distress  Cardiovascular: normal rate, regular rhythm,  no murmurs, rubs, distal pulses intact, no carotid bruits  Abdomen: soft, non-tender, non-distended, normal bowel sounds, no masses or organomegaly  Lymph Nodes: Cervical, supraclavicular normal  Extremities: no cyanosis, clubbing or edema  Musculoskeletal: normal range of motion, no joint swelling, deformity or tenderness  Neurologic: Poorly responsive, no focal neurologic deficits    Data Review:  CBC:   Lab Results   Component Value Date    WBC 3.6 07/08/2021    RBC 3.56 07/08/2021     BMP:   Lab Results   Component Value Date    GLUCOSE 151 07/08/2021    CO2 21 07/08/2021    BUN 32 07/08/2021    CREATININE 1.4 07/08/2021    CALCIUM 7.6 07/08/2021     ABG:   Lab Results   Component Value Date    DMM6BXJ 22.1 07/07/2021    BEART -1.7 07/07/2021    Y0OWPCUE 98.0 07/07/2021    PHART 7.427 07/07/2021    DWO3SKI 33.5 07/07/2021    PO2ART 135.0 07/07/2021    FWZ2XJJ 51.8 07/07/2021       Radiology: All pertinent images / reports were reviewed as a part of this visit.     Narrative   EXAMINATION:   ONE XRAY VIEW OF THE CHEST       7/6/2021 7:02 am       COMPARISON:   07/05/2021       HISTORY:   ORDERING SYSTEM PROVIDED HISTORY: mechanical ventilation

## 2021-07-08 NOTE — PROGRESS NOTES
Family had questions about hospice and the process of partnering with. This RN discussed hospice and inpatient units and referred questions to social work. After discussion with family they stated they do not want to move to an hospice inpatient unit and want to remain in the hospital for duration.

## 2021-07-08 NOTE — PROGRESS NOTES
Office : [phone]     Fax :[phone]              Patient's Name: Ina Kimble  8:37 AM  7/8/2021    Reason for Consult:  YENNIFER       History of Present Ilness:    Ian Kimble is a 77 y.o. male with  PMH sig for CAD, cardiomyopathy s/p AICD, ACD with stents, HTN, hyperlipidemia, DMII on insulin with diffuse large B cell lypmhoma. Per report , AICD did not fire   Patient had Ladell Searing on arrival ---- S/p shock ----> aystole briefly , got epi + CPR + intubation   Low UOP   No diarrhea, bleeding     Interval hx : Intubated   On vent support   UOP significantly improved   Creatinine trending down     I/O last 3 completed shifts: In: 1318.7 [I.V.:1068.7; IV Piggyback:250]  Out: 1600 [Urine:1450; Emesis/NG output:150]  No intake/output data recorded.           Past Medical History:   Diagnosis Date    [facility]     Cirrhosis ([facility]     ETOH abuse    Diabetes mellitus ([facility]     History of abdominal paracentesis 03/2020    Hyperlipidemia     Hypertension     Lymphoma ([facility]     currently in remission    NSVT (nonsustained ventricular tachycardia) (HCC)     Sleep apnea     unable to tolerate CPAP    Syncope        Past Surgical History:   Procedure Laterality Date    CARDIAC DEFIBRILLATOR PLACEMENT  6/23/15    single chamber AICD, EPS inducible VT    CHOLECYSTECTOMY      CORONARY ANGIOPLASTY WITH STENT PLACEMENT      LAMINECTOMY      TOENAIL EXCISION  07/21/2017    TONSILLECTOMY AND ADENOIDECTOMY      UPPER GASTROINTESTINAL ENDOSCOPY N/A 8/4/2020    EGD performed by Anna High MD at [id number] [place] [address] ENDOSCOPY         Current Medications: vancomycin (VANCOCIN) 750 mg in dextrose 5 % 250 mL IVPB, Q24H  heparin (porcine) injection 5,000 Units, 3 times per day  DOBUTamine (DOBUTREX) 500 mg in dextrose 5 % 250 mL infusion, Continuous  EPINEPHrine (EPINEPHrine HCL) 5 mg in dextrose 5 % 250 mL infusion, Continuous  fentaNYL 10 mcg/mL infusion, Continuous  aspirin EC tablet 81 mg, Daily  atorvastatin (LIPITOR) tablet 80 mg, Daily  clopidogrel (PLAVIX) tablet 75 mg, Daily  sodium chloride flush 0.9 % injection 5-40 mL, 2 times per day  sodium chloride flush 0.9 % injection 5-40 mL, PRN  0.9 % sodium chloride infusion, PRN  ondansetron (ZOFRAN-ODT) disintegrating tablet 4 mg, Q8H PRN   Or  ondansetron (ZOFRAN) injection 4 mg, Q6H PRN  polyethylene glycol (GLYCOLAX) packet 17 g, Daily PRN  acetaminophen (TYLENOL) tablet 650 mg, Q6H PRN   Or  acetaminophen (TYLENOL) suppository 650 mg, Q6H PRN  lactobacillus (CULTURELLE) capsule 1 capsule, BID WC  perflutren lipid microspheres (DEFINITY) injection 1.65 mg, ONCE PRN  famotidine (PEPCID) injection 20 mg, Daily  albuterol sulfate  (90 Base) MCG/ACT inhaler 2 puff, Q4H PRN  insulin lispro (1 Unit Dial) 0-12 Units, Q4H  glucose (GLUTOSE) 40 % oral gel 15 g, PRN  dextrose 50 % IV solution, PRN  glucagon (rDNA) injection 1 mg, PRN  dextrose 5 % solution, PRN  propofol injection, Titrated        Physical exam:     Vitals:  /61   Pulse 65   Temp 97 °F (36.1 °C) (Temporal)   Resp 16   Ht 5' 9\" (1.753 m)   Wt 201 lb 8 oz (91.4 kg)   SpO2 100%   BMI 29.76 kg/m²   Constitutional:    Intubated   Skin: no rash, turgor wnl  Heent:  eomi, mmm  Neck: no bruits or jvd noted  Cardiovascular:  S1, S2 without m/r/g  Respiratory: CTA B without w/r/r  Abdomen:  +bs, soft, nt, nd  Ext: mild  lower extremity edema  Scrotal edema +     Data:   Labs:  CBC:   Recent Labs     07/06/21  0427 07/07/21  0510 07/08/21  0437   WBC 5.6 4.9 3.6*   HGB 11.6* 11.9* 11.8*   PLT 81* 79* 66*     BMP:    Recent Labs 07/06/21 0427 07/07/21  0510 07/08/21  0437   * 132* 135*   K 4.1 4.0 3.8   CL 97* 100 104   CO2 21 22 21   BUN 50* 42* 32*   CREATININE 2.5* 2.0* 1.4*   GLUCOSE 206* 149* 151*     Ca/Mg/Phos:   Recent Labs     07/06/21  0427 07/07/21  0510 07/08/21  0437   CALCIUM 7.2* 8.1* 7.6*   MG 2.50* 2.40 2.20   PHOS 4.4 3.1 2.6     Hepatic:   Recent Labs     07/06/21 0427 07/07/21  0510 07/08/21  0437   * 275* 216*   * 575* 441*   BILITOT 1.0 1.0 0.8   ALKPHOS 104 105 94     Troponin:   No results for input(s): TROPONINI in the last 72 hours. BNP: No results for input(s): BNP in the last 72 hours. Lipids: No results for input(s): CHOL, TRIG, HDL, LDLCALC, LABVLDL in the last 72 hours. ABGs:   Recent Labs     07/07/21  0515   PHART 7.427   PO2ART 135.0*   [id number] 33.5*     INR:   No results for input(s): INR in the last 72 hours. UA:  No results for input(s): Roxine Nunnery, GLUCOSEU, BILIRUBINUR, KETUA, SPECGRAV, BLOODU, PHUR, PROTEINU, UROBILINOGEN, NITRU, LEUKOCYTESUR, Rocio Saini in the last 72 hours. Urine Microscopic:   No results for input(s): LABCAST, BACTERIA, COMU, HYALCAST, WBCUA, RBCUA, EPIU in the last 72 hours. Urine Culture:   No results for input(s): LABURIN in the last 72 hours. Urine Chemistry:   Recent Labs     07/06/21  0747   LABCREA 59.5   NAUR <20             IMAGING:  XR CHEST PORTABLE   Final Result   Stable chest.  No change in bilateral pleuroparenchymal disease         XR CHEST PORTABLE   Final Result   Stable chest over the past 24 hours. Unchanged bibasilar airspace disease   versus atelectasis. XR CHEST PORTABLE   Final Result   Increased pleural-parenchymal disease at the lung bases. XR CHEST PORTABLE   Final Result   Cardiomegaly with increased vascular congestion and central edema. Basilar   atelectasis suspected greater on the left. Some of these findings are   confounded due to low lung volume.          XR CHEST PORTABLE   Final Result   Endotracheal tube is low. Retraction of 3 cm is recommended. Additional   supportive devices are normal positions. Left basilar opacity, atelectasis versus pneumonia. Stable cardiomegaly. XR CHEST PORTABLE   Final Result   Supportive devices project in normal positions. Left retrocardiac area is under penetrated. Possible atelectasis or   consolidation. Stable enlargement of the cardiac silhouette. XR CHEST PORTABLE   Final Result   Interval retraction of endotracheal tube with satisfactory location. XR CHEST PORTABLE   Final Result   Addendum 1 of 1   ADDENDUM:   Critical results were called by Dr. Jerie Denver, DO to Dr. Natalie Cardona On    7/3/2021   at 20:53. Final   Right mainstem bronchus inhibition. This should be retracted by 3 cm. Other findings as described. XR CHEST PORTABLE   Final Result   Endotracheal tube with the tip in the right mainstem bronchus and this should   be pulled back 5.0 cm. Cardiomegaly without acute pulmonary process. The findings were sent to the Radiology Results [address] at 7:08   pm on 7/3/2021to be communicated to a licensed caregiver. XR CHEST PORTABLE    (Results Pending)       Assessment/Plan       1. Acute renal failure   Has HFrEF   serum Creatinine level started to trend down    baseline serum cr : ~ 1 - 1.2   on pressor support     Has mild edema   Start lasix 20 mg iv daily   Continue dobutamine    Keep MAP > 65 mmhg   Avoid contrast   Avoid ACEI or ARB   Avoid NSAIDs           2. S/p cardiac arrest, ROSC    3 cardiogenic shock  On pressor support   On dobutamine   Improving     3. Acute hypoxic respiratory failure  intubation     4. Acid- base/ Electrolyte imbalance   monitor    5. CAD    6.  H/o  AICD    Recommend to dose adjust all medications  based on renal functions  Maintain SBP> 90 mmHg   Daily weights   AVOID NSAIDs  Avoid Nephrotoxins  Monitor Intake/Output  Call if significant decrease in urine output         Thank you for allowing us to participate in care of Inés Pappas MD  Feel free to contact me   [facility] [address]  Office : [phone]  Fax :[phone]

## 2021-07-08 NOTE — PROGRESS NOTES
Hospitalist Progress Note      PCP: [facility] [place]    Date of Admission: 7/3/2021    Chief Complaint: Cardiac arrest    Hospital Course: This is a 77 y.o. WM with PMHx sig for CAD, cardiomyopathy s/p AICD, ACD with stents, HTN, hyperlipidemia, DMII on insulin with diffuse large B cell lypmhoma. He does drink at least 12 drinks per week. Pt apparently was in [facility] on arrival to house, then was shocked once, then asystole, but responded quickly with epi and had CPR and was intubated in the field. Now in SR. Does not follow commands, but moves legs violently when not sedated. According to EMTs, his AICD did not fire. Interrogation of AICD detected one run of NSVT, then SVT with rate in low 200s. Both times were not indicated to fire. Pt intubated and central line placed.       Subjective:     No acute event overnight, AICD has been deactivated anticipating comfort care measures today, patient is allergic to morphine, fentanyl and Ativan initiated.   Family and wife at bedside    Medications:  Reviewed    Infusion Medications    DOBUTamine Stopped (07/08/21 0940)    EPINEPHrine infusion Stopped (07/08/21 0940)    fentaNYL Stopped (07/08/21 0940)    sodium chloride Stopped (07/08/21 0941)    dextrose      propofol Stopped (07/08/21 0940)     Scheduled Medications    vancomycin  750 mg Intravenous Q24H    heparin (porcine)  5,000 Units Subcutaneous 3 times per day    aspirin  81 mg Oral Daily    atorvastatin  80 mg Oral Daily    clopidogrel  75 mg Oral Daily    sodium chloride flush  5-40 mL Intravenous 2 times per day    lactobacillus  1 capsule Oral BID     famotidine (PEPCID) injection  20 mg Intravenous Daily    insulin lispro  0-12 Units Subcutaneous Q4H     PRN Meds: fentanNYL, LORazepam, atropine, sodium chloride flush, sodium chloride, ondansetron **OR** ondansetron, polyethylene glycol, acetaminophen **OR** acetaminophen, perflutren lipid microspheres, albuterol sulfate HFA, glucose, dextrose, glucagon (rDNA), dextrose      Intake/Output Summary (Last 24 hours) at 7/8/2021 1309  Last data filed at 7/8/2021 0600  Gross per 24 hour   Intake 1318.67 ml   Output 1600 ml   Net -281.33 ml       Physical Exam Performed:    /61   Pulse 65   Temp 97 °F (36.1 °C) (Temporal)   Resp 16   Ht 5' 9\" (1.753 m)   Wt 201 lb 8 oz (91.4 kg)   SpO2 100%   BMI 29.76 kg/m²        General:  Critically ill appearing, intubated and sedated. HEENT:  Normocephalic, atraumatic. Pupils equal, round, reactive to light. No scleral icterus. No conjunctival injection. Normal lips, teeth, and gums. No nasal discharge. Neck:  Supple  Heart:  Normal s1/s2, RRR, no murmurs, gallops, or rubs. no leg edema  Lungs:  rhonchi bilaterally, no wheeze, no rales, bilat rhonchi, no use of accessory muscles  Abd: bowel sounds present, soft, nontender, nondistended, no masses  Extrem:  No clubbing, cyanosis,  no edema, 1+ pedal pulses, sluggish capillary refill  Skin:  Warm and dry, no open lesions or rash,  normal color/perfusion  Psych:  Unable to assess due to Ventilated and sedated. Neuro: responds to pain, but   Unable to fully assess due to Ventilated and sedated - pupils reactive. Labs:   Recent Labs     07/06/21 0427 07/07/21 0510 07/08/21 0437   WBC 5.6 4.9 3.6*   HGB 11.6* 11.9* 11.8*   HCT 34.9* 35.5* 35.0*   PLT 81* 79* 66*     Recent Labs     07/06/21 0427 07/07/21  0510 07/08/21  0437   * 132* 135*   K 4.1 4.0 3.8   CL 97* 100 104   CO2 21 22 21   BUN 50* 42* 32*   CREATININE 2.5* 2.0* 1.4*   CALCIUM 7.2* 8.1* 7.6*   PHOS 4.4 3.1 2.6     Recent Labs     07/06/21 0427 07/07/21  0510 07/08/21  0437   * 275* 216*   * 575* 441*   BILITOT 1.0 1.0 0.8   ALKPHOS 104 105 94     No results for input(s): INR in the last 72 hours. No results for input(s): Eleazar Hug in the last 72 hours.     Urinalysis:      Lab Results   Component Value Date    NITRU Negative 07/03/2021 WBCUA 10 07/03/2021    BACTERIA 3+ 10/18/2020    RBCUA 13 07/03/2021    BLOODU SMALL 07/03/2021    SPECGRAV 1.012 07/03/2021    GLUCOSEU Negative 07/03/2021    GLUCOSEU NEGATIVE 02/12/2010       Radiology:  XR CHEST PORTABLE   Final Result   Stable chest.  No change in bilateral pleuroparenchymal disease         XR CHEST PORTABLE   Final Result   Stable chest over the past 24 hours. Unchanged bibasilar airspace disease   versus atelectasis. XR CHEST PORTABLE   Final Result   Increased pleural-parenchymal disease at the lung bases. XR CHEST PORTABLE   Final Result   Cardiomegaly with increased vascular congestion and central edema. Basilar   atelectasis suspected greater on the left. Some of these findings are   confounded due to low lung volume. XR CHEST PORTABLE   Final Result   Endotracheal tube is low. Retraction of 3 cm is recommended. Additional   supportive devices are normal positions. Left basilar opacity, atelectasis versus pneumonia. Stable cardiomegaly. XR CHEST PORTABLE   Final Result   Supportive devices project in normal positions. Left retrocardiac area is under penetrated. Possible atelectasis or   consolidation. Stable enlargement of the cardiac silhouette. XR CHEST PORTABLE   Final Result   Interval retraction of endotracheal tube with satisfactory location. XR CHEST PORTABLE   Final Result   Addendum 1 of 1   ADDENDUM:   Critical results were called by Dr. Mariluz York DO to Dr. Romero Wilson On    7/3/2021   at 20:53. Final   Right mainstem bronchus inhibition. This should be retracted by 3 cm. Other findings as described. XR CHEST PORTABLE   Final Result   Endotracheal tube with the tip in the right mainstem bronchus and this should   be pulled back 5.0 cm. Cardiomegaly without acute pulmonary process.       The findings were sent to the Radiology Results [address] at 7:08   pm on 7/3/2021to be communicated to a licensed caregiver. XR CHEST PORTABLE    (Results Pending)           Assessment/Plan:    Active Hospital Problems    Diagnosis     Coronary atherosclerosis of native coronary artery [I25.10]      Priority: High    Mixed hyperlipidemia [E78.2]      Priority: High    Cardiac arrest ([facility] [address] [I46.9]     Acute renal failure (ARF) (HCC) [N17.9]     Diffuse large B-cell lymphoma of solid organ excluding spleen (HCC) [C83.39]     V-tach ([facility] [address] [I47.2]     AICD (automatic cardioverter/defibrillator) present [Z95.810]     Ischemic cardiomyopathy [I25.5]     DMII (diabetes mellitus, type 2) ([facility] [address] [E11.9]        Cardiac arrest -SVT/cardiogenic shock  Cardiology on board   Currently patient is DNR CCA  On dobutamine and lidocaine and epinephrine and heparin drip  Critical care following  Prognosis guarded  AICD has been deactivated anticipating comfort care today    Shock, probably cardiogenic  Most recent EF is less than 20%> now 10%   Only on dobutamine and epinephrine drip, Levophed and lidocaine has been stopped  Cardiology on board   Prognosis guarded/terminal    Acute renal failure - after arrest - monitor closely - gentle IVF    EF of <20% cr 2.5>2 today. Consult to Nephro placed for likely ATN. If continues to progress I would not rec dialysis and would rec palliative care. SIRS POA based on tachycardia, tachypnea, hypotension, acute renal failure, lactic acidosis - due to cardiac arrest - suspect aspiration as well. Enterococcus faecalis DNA Detected he remains of cefepime and vanc. Cultures and sensitivities are pending. DMII - med dose SSI, accuchecks - pt usually on oral meds and insulin, currently on medium sliding scale. lantus is not currently ordered. He takes 16 units at night. May need to start once we initiate tube feeds.      Enterococcus bacteremia-we will continue vancomycin    Diffuse large B cell lymphoma - had mass in naresh hepatis 10/18 - stage III, had 3 cycles of RCHOP with decrease in size    Acute metabolic encephalopathy - s/p cardiac arrest - did have ROSC - moving legs so no hypothermia protocol    Alcohol abuse -on thiamine, folate and MVI     DVT Prophylaxis: Heparin subcu  Diet: Diet NPO  Code Status: DNR-CC    PT/OT Eval Status: when appropriate     Dispo - patient remains in ICU, patient CODE STATUS changed to [facility], comfort care only, Ativan and fentanyl initiated since he is allergic to morphine, wife and family at bedside. Due to the immediate potential for life-threatening deterioration due to acute hypoxic respiratory failure, status post cardiac arrest, I spent 35 minutes providing critical care. This time is excluding time spent performing procedures. Prognosis: Very guarded/terminal    35 minutes of critical care time spent.      Ramona Johnson MD

## 2021-07-08 NOTE — FLOWSHEET NOTE
Reason for Visit: [address] [facility] for EOL Care/Grief Support     Nondenominational/Spiritual/Philosophical belief: Moravian, Non-Specific    Summary:  self-initiated this visit at the family's request for a . Pt unable to engage at this time. Pt's family engaged easily w/ and presented as tearful/grieving at this time. Family appears to be coping well/appropriately at this time and engaged in storytelling about the pt's life. Family requested prayer for pt's, \"peaceful transition into the next life. \"  Annell Ayaka prayed in accordance w/the family's request.  At this time no other needs were expressed. Family remains open to spiritual support. Recommendations: Should any needs arise, please contact spiritual care services for follow-up. Electronically signed by Valentino Romero on 7/8/2021 at 2:49 PM       07/08/21 1449   Encounter Summary   Services provided to: Patient and family together   Referral/Consult From: Nurse   Support System Spouse; Family members   Place of [address] [place] [address] Visiting   (Prayer, support, follow-up PRN.  7/8)   Complexity of Encounter Moderate   Length of Encounter 30 minutes   Spiritual/Congregational   Type Spiritual support   Assessment Approachable;Tearful;Grieving   Intervention Active listening;Explored feelings, thoughts, concerns;Prayer;Sustaining presence/ Ministry of presence;Grief care; End of life care; Discussed belief system/Oriental orthodox practices/lakeshia;Discussed illness/injury and it's impact   Outcome Connection/belonging;Comfort;Engaged in conversation;Expressed feelings/needs/concerns;Coping;Tearful;Grieving;Receptive

## 2021-07-08 NOTE — PROGRESS NOTES
100%   BMI 29.76 kg/m²      Respiratory:  · Comatose,clear lungs  Cardiovascular:  · Auscultation: regular rate and rhythm, normal S1S2, no murmur, rub or gallop  · Palpation:  Nl PMI  · JVP:  normal  · Extremities: No Edema  Abdomen:  · Soft, non-tender  · Normal bowel sounds  Extremities:  · 3 plus edema  Neurological/Psychiatric:  · Responsive to noxious stimuli,spontaneous resp  Skin Warm and dry    Assessment:    Principal Problem:    Cardiac arrest (HCC)    Active Problems:    Mixed hyperlipidemia      Coronary atherosclerosis of native coronary artery      DMII (diabetes mellitus, type 2) (HCC)      Ischemic cardiomyopathy      V-tach (HCC)      AICD (automatic cardioverter/defibrillator) present  Plan: normal function of device    Diffuse large B-cell lymphoma of solid organ excluding spleen (HCC)      Acute renal failure (ARF) (HCC) improving        Plan:  1.  he does withdraw to noxious stimuli but overall prognosis very bleak  2. For now continue supportive care. He is DNR-CC  3. Discussed with wife and brother about turning off defibrillator and they want this done. Will continue supportive care  4. Now extubated with no chance of functional recovery  I have spent  35  minutes of face to face time with the patient with more than 50%  spent  counseling and coordinating care for Daniel Luu     Morphine caused combativeness in the past so not a true allergy.  Will give for comfort instead of fentanyl

## 2021-07-08 NOTE — PROGRESS NOTES
Shift assessment complete. Family at bedside verbalized readiness for withdraw of care. [facility] contacted referral previously made but to call with cardiac time of death.

## 2021-07-08 NOTE — PLAN OF CARE
Problem: Non-Violent Restraints  Goal: Removal from restraints as soon as assessed to be safe  7/7/2021 2024 by Chinedu Ortega RN  Outcome: Ongoing  7/7/2021 1029 by Vasquez Aguilar RN  Outcome: Ongoing  Goal: No harm/injury to patient while restraints in use  7/7/2021 2024 by Chinedu Ortega RN  Outcome: Ongoing  7/7/2021 1029 by Vasquez Aguilar RN  Outcome: Ongoing  Goal: Patient's dignity will be maintained  7/7/2021 2024 by Chinedu Ortega RN  Outcome: Ongoing  7/7/2021 1029 by Vasquez Aguilar RN  Outcome: Ongoing     Problem: Skin Integrity:  Goal: Will show no infection signs and symptoms  Description: Will show no infection signs and symptoms  7/7/2021 2024 by Chinedu Ortega RN  Outcome: Ongoing  7/7/2021 1029 by Vasquez Aguilar RN  Outcome: Ongoing  Goal: Absence of new skin breakdown  Description: Absence of new skin breakdown  7/7/2021 2024 by Chinedu Ortega RN  Outcome: Ongoing  7/7/2021 1029 by Vasquez Aguilar RN  Outcome: Ongoing     Problem: Falls - Risk of:  Goal: Will remain free from falls  Description: Will remain free from falls  7/7/2021 2024 by Chinedu Ortega RN  Outcome: Ongoing  7/7/2021 1029 by Vasquez Aguilar RN  Outcome: Ongoing  Goal: Absence of physical injury  Description: Absence of physical injury  7/7/2021 2024 by Chinedu Ortega RN  Outcome: Ongoing  7/7/2021 1029 by Vasquez Aguilar RN  Outcome: Ongoing     Problem: Discharge Planning:  Goal: Discharged to appropriate level of care  Description: Discharged to appropriate level of care  7/7/2021 2024 by Chinedu Ortega RN  Outcome: Ongoing  7/7/2021 1029 by Vasquez Aguilar RN  Outcome: Ongoing     Problem: Serum Glucose Level - Abnormal:  Goal: Ability to maintain appropriate glucose levels will improve  Description: Ability to maintain appropriate glucose levels will improve  7/7/2021 2024 by Chinedu Ortega RN  Outcome: Ongoing  7/7/2021 1029 by Vasquez Aguilar RN  Outcome: Ongoing     Problem: Nutrition  Goal: Optimal nutrition therapy  7/7/2021 2024 by Vennie Ortega, RN  Outcome: Ongoing  7/7/2021 1029 by

## 2021-07-08 NOTE — PROGRESS NOTES
07/08/21 0806   Vent Patient Data   Peak Inspiratory Pressure 19 cmH2O   Mean Airway Pressure 8.2 cmH20   Rate Measured 16 br/min   Vt Exhaled 503 mL   Minute Volume 8.02 Liters   I:E Ratio 1:2.4   Plateau Pressure 18 HDO02   Static Compliance 38.69 mL/cmH2O   Dynamic Compliance 35.92 mL/cmH2O   Total PEEP 5 cmH20   Auto PEEP 0 cmH20

## 2021-07-08 NOTE — PROGRESS NOTES
Sedation stopped, pt moving around and appearing uncomfortable. Medications given see MAR for details. Pt extubated without complication. Pt appears comfortable at this time. Family at bedside.  at bedside.

## 2021-07-08 NOTE — FLOWSHEET NOTE
07/08/21 1651   Encounter Summary   Services provided to: Patient and family together   Referral/Consult From: Nurse   Support System Spouse; Family members   Continue Visiting   (Follow-up visit, support provided. GB 7/8)   Complexity of Encounter Moderate   Length of Encounter 30 minutes   Spiritual/Worship   Type Spiritual support   Assessment Approachable;Grieving   Intervention Active listening;Explored feelings, thoughts, concerns;Explored coping resources;Sustaining presence/ Ministry of presence; Discussed illness/injury and it's impact   Outcome Connection/belonging;Comfort;Engaged in conversation; Shared life review;Expressed feelings/needs/concerns;Coping;Tearful;Receptive     Family appears to be coping appropriately at this time. Family engaged easily w/ in on-going storytelling and life review. No other needs were expressed at this time.

## 2021-07-09 NOTE — PROGRESS NOTES
Hospitalist Progress Note      PCP: [facility] [place]    Date of Admission: 7/3/2021    Chief Complaint: Cardiac arrest    Hospital Course: This is a 77 y.o. WM with PMHx sig for CAD, cardiomyopathy s/p AICD, ACD with stents, HTN, hyperlipidemia, DMII on insulin with diffuse large B cell lypmhoma. He does drink at least 12 drinks per week. Pt apparently was in [facility] on arrival to house, then was shocked once, then asystole, but responded quickly with epi and had CPR and was intubated in the field. Now in SR. Does not follow commands, but moves legs violently when not sedated. According to EMTs, his AICD did not fire. Interrogation of AICD detected one run of NSVT, then SVT with rate in low 200s. Both times were not indicated to fire. Pt intubated and central line placed.       Subjective:     Patient was terminally extubated, AICD has been deactivated, currently comfort care, on Ativan, had morphine as needed, family at bedside    Medications:  Reviewed    Infusion Medications    sodium chloride Stopped (07/08/21 0941)    dextrose       Scheduled Medications    sodium chloride flush  5-40 mL Intravenous 2 times per day    insulin lispro  0-12 Units Subcutaneous Q4H     PRN Meds: LORazepam, atropine, morphine, sodium chloride flush, sodium chloride, ondansetron **OR** ondansetron, polyethylene glycol, acetaminophen **OR** acetaminophen, perflutren lipid microspheres, albuterol sulfate HFA, glucose, dextrose, glucagon (rDNA), dextrose      Intake/Output Summary (Last 24 hours) at 7/9/2021 1339  Last data filed at 7/9/2021 0507  Gross per 24 hour   Intake --   Output 675 ml   Net -675 ml       Physical Exam Performed:    BP (!) 62/40 Comment: Manual  Pulse 72   Temp 97.3 °F (36.3 °C) (Temporal)   Resp 20   Ht 5' 9\" (1.753 m)   Wt 198 lb 6.6 oz (90 kg)   SpO2 90%   BMI 29.30 kg/m²        General:  Critically ill appearing, terminally extubated 7/8  HEENT:  Normocephalic, atraumatic.   Pupils equal, round, reactive to light. No scleral icterus. No conjunctival injection. Normal lips, teeth, and gums. No nasal discharge. Neck:  Supple  Heart:  Normal s1/s2, RRR, no murmurs, gallops, or rubs. no leg edema  Lungs:  rhonchi bilaterally, no wheeze, no rales, bilat rhonchi, no use of accessory muscles  Abd: bowel sounds present, soft, nontender, nondistended, no masses  Extrem:  No clubbing, cyanosis,  no edema, 1+ pedal pulses, sluggish capillary refill  Skin:  Warm and dry, no open lesions or rash,  normal color/perfusion  Psych:   Not agitated  Neuro: responds to pain, pupils reactive. Labs:   Recent Labs     07/07/21  0510 07/08/21  0437   WBC 4.9 3.6*   HGB 11.9* 11.8*   HCT 35.5* 35.0*   PLT 79* 66*     Recent Labs     07/07/21  0510 07/08/21  0437   * 135*   K 4.0 3.8    104   CO2 22 21   BUN 42* 32*   CREATININE 2.0* 1.4*   CALCIUM 8.1* 7.6*   PHOS 3.1 2.6     Recent Labs     07/07/21  0510 07/08/21  0437   * 216*   * 441*   BILITOT 1.0 0.8   ALKPHOS 105 94     No results for input(s): INR in the last 72 hours. No results for input(s): Earlis [place] in the last 72 hours. Urinalysis:      Lab Results   Component Value Date    NITRU Negative 07/03/2021    WBCUA 10 07/03/2021    BACTERIA 3+ 10/18/2020    RBCUA 13 07/03/2021    BLOODU SMALL 07/03/2021    SPECGRAV 1.012 07/03/2021    GLUCOSEU Negative 07/03/2021    GLUCOSEU NEGATIVE 02/12/2010       Radiology:  XR CHEST PORTABLE   Final Result   Improved aeration with decreased but persistent right lower lobe airspace   disease. Unchanged disease in the left lower lobe. XR CHEST PORTABLE   Final Result   Stable chest.  No change in bilateral pleuroparenchymal disease         XR CHEST PORTABLE   Final Result   Stable chest over the past 24 hours. Unchanged bibasilar airspace disease   versus atelectasis. XR CHEST PORTABLE   Final Result   Increased pleural-parenchymal disease at the lung bases. XR CHEST PORTABLE   Final Result   Cardiomegaly with increased vascular congestion and central edema. Basilar   atelectasis suspected greater on the left. Some of these findings are   confounded due to low lung volume. XR CHEST PORTABLE   Final Result   Endotracheal tube is low. Retraction of 3 cm is recommended. Additional   supportive devices are normal positions. Left basilar opacity, atelectasis versus pneumonia. Stable cardiomegaly. XR CHEST PORTABLE   Final Result   Supportive devices project in normal positions. Left retrocardiac area is under penetrated. Possible atelectasis or   consolidation. Stable enlargement of the cardiac silhouette. XR CHEST PORTABLE   Final Result   Interval retraction of endotracheal tube with satisfactory location. XR CHEST PORTABLE   Final Result   Addendum 1 of 1   ADDENDUM:   Critical results were called by Dr. Kirill Arellano DO to Dr. Katie Barnes On    7/3/2021   at 20:53. Final   Right mainstem bronchus inhibition. This should be retracted by 3 cm. Other findings as described. XR CHEST PORTABLE   Final Result   Endotracheal tube with the tip in the right mainstem bronchus and this should   be pulled back 5.0 cm. Cardiomegaly without acute pulmonary process. The findings were sent to the Radiology Results [id number] at 7:08   pm on 7/3/2021to be communicated to a licensed caregiver.          XR CHEST PORTABLE    (Results Pending)           Assessment/Plan:    Active Hospital Problems    Diagnosis     Coronary atherosclerosis of native coronary artery [I25.10]      Priority: High    Mixed hyperlipidemia [E78.2]      Priority: High    Cardiac arrest ([facility] [address] [I46.9]     Acute renal failure (ARF) (HCC) [N17.9]     Diffuse large B-cell lymphoma of solid organ excluding spleen (HCC) [C83.39]     V-tach (HCC) [I47.2]     AICD (automatic cardioverter/defibrillator) present [Z95.810]     Ischemic cardiomyopathy [I25.5]     DMII (diabetes mellitus, type 2) ([facility] [address] [E11.9]        Cardiac arrest -SVT/cardiogenic shock  Cardiology on board   Currently patient is DNR CCA  On dobutamine and lidocaine and epinephrine and heparin drip  Critical care following  Prognosis guarded  AICD has been deactivated anticipating comfort care today    Shock, probably cardiogenic  Most recent EF is less than 20%> now 10%   Only on dobutamine and epinephrine drip, Levophed and lidocaine has been stopped  Cardiology on board   Prognosis guarded/terminal    Acute renal failure - after arrest - monitor closely - gentle IVF    EF of <20% cr 2.5>2 today. Consult to Nephro placed for likely ATN. If continues to progress I would not rec dialysis and would rec palliative care. SIRS POA based on tachycardia, tachypnea, hypotension, acute renal failure, lactic acidosis - due to cardiac arrest - suspect aspiration as well. Enterococcus faecalis DNA Detected he remains of cefepime and vanc. Cultures and sensitivities are pending. DMII - med dose SSI, accuchecks - pt usually on oral meds and insulin, currently on medium sliding scale. lantus is not currently ordered. He takes 16 units at night. May need to start once we initiate tube feeds.      Enterococcus bacteremia-stop antibiotic, patient is for comfort care    Diffuse large B cell lymphoma - had mass in naresh hepatis 10/18 - stage III, had 3 cycles of RCHOP with decrease in size    Acute metabolic encephalopathy - s/p cardiac arrest - did have ROSC - moving legs so no hypothermia protocol    Alcohol abuse -on thiamine, folate and MVI     DVT Prophylaxis: None, patient for comfort care  Diet: Diet NPO  Code Status: DNR-CC    PT/OT Eval Status: when appropriate     Dispo - patient remains in ICU, patient CODE STATUS changed to [facility], comfort care only, Ativan and fentanyl, had morphine as needed    Prognosis: terminal, currently on comfort care measure, family

## 2021-07-09 NOTE — PLAN OF CARE
Problem: Skin Integrity:  Goal: Will show no infection signs and symptoms  Description: Will show no infection signs and symptoms  7/9/2021 0253 by Avni Pineda RN  Outcome: Ongoing  7/9/2021 0252 by Avni Pineda RN  Outcome: Ongoing  Goal: Absence of new skin breakdown  Description: Absence of new skin breakdown  7/9/2021 0253 by Avni Pineda RN  Outcome: Ongoing  7/9/2021 0252 by Avni Pineda RN  Outcome: Ongoing     Problem: Falls - Risk of:  Goal: Will remain free from falls  Description: Will remain free from falls  7/9/2021 0253 by Avni Pineda RN  Outcome: Ongoing  7/9/2021 0252 by Avni Pineda RN  Outcome: Ongoing  Goal: Absence of physical injury  Description: Absence of physical injury  7/9/2021 0253 by Avni Pineda RN  Outcome: Ongoing  7/9/2021 0252 by Avni Pineda RN  Outcome: Ongoing     Problem: Nutrition  Goal: Optimal nutrition therapy  Outcome: Ongoing

## 2021-07-09 NOTE — PROGRESS NOTES
[address]   Progress Note  Cardiology      [facility]   Admission date:  7/3/2021  CC-f/up cardiorespiratory arrest  Subjective:  He presented from home after collapse at home. Review of pacer/ICD shows he had a rhythm that was indeterminate so he was not shocked. Longstanding ischemic cardiomyopathy patient with refractory CHF  Now extubated  Remains comatose.  Tolerates morphine    Objective:  Medications/Labs all Reviewed     sodium chloride flush  5-40 mL Intravenous 2 times per day    insulin lispro  0-12 Units Subcutaneous Q4H       BMP:   Lab Results   Component Value Date     07/08/2021    K 3.8 07/08/2021    K 4.1 07/03/2021     07/08/2021    CO2 21 07/08/2021    BUN 32 07/08/2021    CREATININE 1.4 07/08/2021    MG 2.20 07/08/2021     CBC:    Lab Results   Component Value Date    WBC 3.6 07/08/2021    RBC 3.56 07/08/2021    HGB 11.8 07/08/2021    HCT 35.0 07/08/2021    MCV 98.2 07/08/2021    RDW 15.7 07/08/2021    PLT 66 07/08/2021      PT/INR:    Lab Results   Component Value Date    INR 1.25 (H) 07/03/2021    PROTIME 14.3 (H) 07/03/2021     Cardiac Enzymes:    Lab Results   Component Value Date    CKTOTAL 65 02/12/2010    CKMB 0.78 01/15/2010     Lab Results   Component Value Date    TROPONINI 0.11 (H) 07/04/2021    TROPONINI 0.07 (H) 07/03/2021    TROPONINI 0.03 (H) 07/03/2021     BNP:  No results found for: BNP  FASTING LIPID PANEL:    Lab Results   Component Value Date    CHOL 105 03/20/2021    HDL 38 03/20/2021    HDL 35 01/15/2010    TRIG 92 03/20/2021       Physical Examination:    BP (!) 62/42 Comment: Manual  Pulse 76   Temp 97.3 °F (36.3 °C) (Temporal)   Resp 10   Ht 5' 9\" (1.753 m)   Wt 198 lb 6.6 oz (90 kg)   SpO2 (!) 87%   BMI 29.30 kg/m²      Respiratory:  · Comatose,clear lungs  Cardiovascular:  · Auscultation: regular rate and rhythm, normal S1S2, no murmur, rub or gallop  · Palpation:  Nl PMI  · JVP:  normal  · Extremities: No Edema  Abdomen:  · Soft, non-tender  · Normal bowel sounds  Extremities:  · 3 plus edema  Neurological/Psychiatric:  · Responsive to noxious stimuli,spontaneous resp  Skin Warm and dry    Assessment:    Principal Problem:    Cardiac arrest (HCC)    Active Problems:    Mixed hyperlipidemia      Coronary atherosclerosis of native coronary artery      DMII (diabetes mellitus, type 2) (HCC)      Ischemic cardiomyopathy      V-tach (HCC)      AICD (automatic cardioverter/defibrillator) present  Plan: normal function of device    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

## 2021-07-09 NOTE — PROGRESS NOTES
BRIEF NUTRITION NOTE    Note family opted for withdrawal of care yesterday. Thus, pt will be followed at low nutrition risk. Dietitian will sign off. If plan of care changes and nutrition intervention is required, please submit a consult to the dietitian.     Electronically signed by Shanti Jain RD, HAILEE on 7/9/2021 at 8:23 AM   Contact: [phone]

## 2021-07-09 NOTE — PROGRESS NOTES
Report received from ICU RN Mohinder for pt Owen Pagan, transferring from ICU to 1049. Wife and family at bedside.

## 2021-07-10 NOTE — PROGRESS NOTES
Shift assessment complete. Patient is end of life care. Wife and sister at bedside. Educated on end of life signs and symptoms and pain management. Verbalized understanding. Support provided. Will continue to monitor and assess.

## 2021-07-10 NOTE — PROGRESS NOTES
Patient provided with scheduled morphine for increased comfort, and PRN ativan for agitation and restlessness. Family continues at bedside. Plan of care discussed with patient's family. Support provided. Wife and family declined wanting vital signs checked at this time. States she has contacted [employer] on [address] [place] [address],[address]. Will continue to monitor and assess.  Electronically signed by Criselda Pink RN on 7/10/2021 at 7:53 PM

## 2021-07-10 NOTE — PROGRESS NOTES
Hospitalist Progress Note      [place] [facility] [place]    Date of Admission: 7/3/2021    Chief Complaint: Cardiac arrest    Hospital Course: This is a 77 y.o. WM with PMHx sig for CAD, cardiomyopathy s/p AICD, ACD with stents, HTN, hyperlipidemia, DMII on insulin with diffuse large B cell lypmhoma. He does drink at least 12 drinks per week. Pt apparently was in [facility] on arrival to house, then was shocked once, then asystole, but responded quickly with epi and had CPR and was intubated in the field. Now in [place]. Does not follow commands, but moves legs violently when not sedated. According to EMTs, his AICD did not fire. Interrogation of AICD detected one run of NSVT, then SVT with rate in low 200s. Both times were not indicated to fire.   Pt intubated and central line placed.       Subjective:     Patient was terminally extubated, AICD has been deactivated, currently comfort care, on Ativan, had morphine as needed, family at bedside, patient can be moved out of ICU to medical floor    Medications:  Reviewed    Infusion Medications    sodium chloride Stopped (07/08/21 0941)    dextrose       Scheduled Medications    morphine  5 mg Intravenous Q3H (SCHEDULED)    sodium chloride flush  5-40 mL Intravenous 2 times per day    insulin lispro  0-12 Units Subcutaneous Q4H     PRN Meds: LORazepam, atropine, morphine, sodium chloride flush, sodium chloride, ondansetron **OR** ondansetron, polyethylene glycol, acetaminophen **OR** acetaminophen, perflutren lipid microspheres, albuterol sulfate HFA, glucose, dextrose, glucagon (rDNA), dextrose      Intake/Output Summary (Last 24 hours) at 7/10/2021 1309  Last data filed at 7/10/2021 0800  Gross per 24 hour   Intake --   Output 250 ml   Net -250 ml       Physical Exam Performed:    BP (!) 70/50   Pulse 78   Temp 97.9 °F (36.6 °C) (Temporal)   Resp 15   Ht 5' 9\" (1.753 m)   Wt 196 lb 3.4 oz (89 kg)   SpO2 (!) 87%   BMI 28.98 kg/m²        General:  Critically ill appearing, terminally extubated 7/8  HEENT:  Normocephalic, atraumatic. Pupils equal, round, reactive to light. No scleral icterus. No conjunctival injection. Normal lips, teeth, and gums. No nasal discharge. Neck:  Supple  Heart:  Normal s1/s2, RRR, no murmurs, gallops, or rubs. no leg edema  Lungs:  rhonchi bilaterally, no wheeze, no rales, bilat rhonchi, no use of accessory muscles  Abd: bowel sounds present, soft, nontender, nondistended, no masses  Extrem:  No clubbing, cyanosis,  no edema, 1+ pedal pulses, sluggish capillary refill  Skin:  Warm and dry, no open lesions or rash,  normal color/perfusion  Psych:   Not agitated  Neuro: responds to pain, pupils reactive. Labs:   Recent Labs     07/08/21 0437   WBC 3.6*   HGB 11.8*   HCT 35.0*   PLT 66*     Recent Labs     07/08/21 0437   *   K 3.8      CO2 21   BUN 32*   CREATININE 1.4*   CALCIUM 7.6*   PHOS 2.6     Recent Labs     07/08/21 0437   *   *   BILITOT 0.8   ALKPHOS 94     No results for input(s): INR in the last 72 hours. No results for input(s): [place] in the last 72 hours. Urinalysis:      Lab Results   Component Value Date    NITRU Negative 07/03/2021    WBCUA 10 07/03/2021    BACTERIA 3+ 10/18/2020    RBCUA 13 07/03/2021    BLOODU SMALL 07/03/2021    SPECGRAV 1.012 07/03/2021    GLUCOSEU Negative 07/03/2021    GLUCOSEU NEGATIVE 02/12/2010       Radiology:  XR CHEST PORTABLE   Final Result   Improved aeration with decreased but persistent right lower lobe airspace   disease. Unchanged disease in the left lower lobe. XR CHEST PORTABLE   Final Result   Stable chest.  No change in bilateral pleuroparenchymal disease         XR CHEST PORTABLE   Final Result   Stable chest over the past 24 hours. Unchanged bibasilar airspace disease   versus atelectasis. XR CHEST PORTABLE   Final Result   Increased pleural-parenchymal disease at the lung bases.          XR CHEST PORTABLE   Final Result   Cardiomegaly with increased vascular congestion and central edema. Basilar   atelectasis suspected greater on the left. Some of these findings are   confounded due to low lung volume. XR CHEST PORTABLE   Final Result   Endotracheal tube is low. Retraction of 3 cm is recommended. Additional   supportive devices are normal positions. Left basilar opacity, atelectasis versus pneumonia. Stable cardiomegaly. XR CHEST PORTABLE   Final Result   Supportive devices project in normal positions. Left retrocardiac area is under penetrated. Possible atelectasis or   consolidation. Stable enlargement of the cardiac silhouette. XR CHEST PORTABLE   Final Result   Interval retraction of endotracheal tube with satisfactory location. XR CHEST PORTABLE   Final Result   Addendum 1 of 1   ADDENDUM:   Critical results were called by Dr. Henny Ribeiro DO to Dr. Robyn Green On    7/3/2021   at 20:53. Final   Right mainstem bronchus inhibition. This should be retracted by 3 cm. Other findings as described. XR CHEST PORTABLE   Final Result   Endotracheal tube with the tip in the right mainstem bronchus and this should   be pulled back 5.0 cm. Cardiomegaly without acute pulmonary process. The findings were sent to the Radiology Results [address] at 7:08   pm on 7/3/2021to be communicated to a licensed caregiver.                  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) [C83.39]     V-tach ([address] [I47.2]     AICD (automatic cardioverter/defibrillator) present [Z95.810]     Ischemic cardiomyopathy [I25.5]     DMII (diabetes mellitus, type 2) ([facility] [address] [E11.9]        Cardiac arrest -SVT/cardiogenic shock  Cardiology on board   Currently patient is DNR CCA  On dobutamine and lidocaine and epinephrine and heparin drip  Critical care following  Prognosis guarded  AICD has been deactivated anticipating comfort care today    Shock, probably cardiogenic  Most recent EF is less than 20%> now 10%   Only on dobutamine and epinephrine drip, Levophed and lidocaine has been stopped  Cardiology on board   Prognosis guarded/terminal    Acute renal failure - after arrest - monitor closely - gentle IVF    EF of <20% cr 2.5>2 today. Consult to Nephro placed for likely ATN. If continues to progress I would not rec dialysis and would rec palliative care. SIRS POA based on tachycardia, tachypnea, hypotension, acute renal failure, lactic acidosis - due to cardiac arrest - suspect aspiration as well. Enterococcus faecalis DNA Detected he remains of cefepime and vanc. Cultures and sensitivities are pending. DMII - med dose SSI, accuchecks - pt usually on oral meds and insulin, currently on medium sliding scale. lantus is not currently ordered. He takes 16 units at night. May need to start once we initiate tube feeds.      Enterococcus bacteremia-stop antibiotic, patient is for comfort care    Diffuse large B cell lymphoma - had mass in naresh hepatis 10/18 - stage III, had 3 cycles of RCHOP with decrease in size    Acute metabolic encephalopathy - s/p cardiac arrest - did have ROSC - moving legs so no hypothermia protocol    Alcohol abuse -on thiamine, folate and MVI     DVT Prophylaxis: None, patient for comfort care  Diet: Diet NPO  Code Status: DNR-CC    PT/OT Eval Status: Not needed    Dispo - patient remains in ICU, patient CODE STATUS changed to [facility], comfort care only, Ativan and fentanyl, had morphine as needed    Prognosis: terminal, currently on comfort care measure, family at bedside    Continue inpatient care, transfer patient out of ICU to medical floor Vasyl Marmolejo MD

## 2021-07-11 NOTE — DISCHARGE SUMMARY
Hospital Medicine Discharge Summary  Patient     Patient ID: Wilson Knapp      [facility] Date: 7/3/2021     Discharge Date: 2021      Admitting Physician: No admitting provider for patient encounter. 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 WM with PMHx sig for CAD, cardiomyopathy s/p AICD, ACD with stents, HTN, hyperlipidemia, DMII on insulin with diffuse large B cell [place] C[place] does drink at least 12 drinks per week.  Pt apparently was in [facility] on arrival to house, then was shocked once, then asystole, but responded quickly with epi and had CPR and was intubated in the field.  Now in [place]  Does not follow commands, but moves legs violently when not sedated.  According to EMTs, his AICD did not fire.  Interrogation of AICD detected one run of NSVT, then SVT with rate in low 200s.  Both times were not indicated to fire.  Pt intubated and central line placed.     Active medical conditions     Cardiac arrest -SVT/cardiogenic shock  Cardiology on board   Currently patient is DNR CCA  On dobutamine and lidocaine and epinephrine and heparin drip  Critical care following  Prognosis guarded  AICD has been deactivated anticipating comfort care today     Shock, probably cardiogenic  Most recent EF is less than 20%> now 10%   Only on dobutamine and epinephrine drip, Levophed and lidocaine has been stopped  Cardiology on board   Prognosis guarded/terminal     Acute renal failure - after arrest - monitor closely - gentle IVF    EF of <20% cr 2.5>2 today. Consult to Nephro placed for likely ATN. If continues to progress I would not rec dialysis and would rec palliative care.      SIRS POA based on tachycardia, tachypnea, hypotension, acute renal failure, lactic acidosis - due to cardiac arrest - suspect aspiration as well. Enterococcus faecalis DNA Detected he remains of cefepime and vanc. Cultures and sensitivities are pending.     DMII - med dose SSI, accuchecks - pt usually on oral meds and insulin, currently on medium sliding scale. lantus is not currently ordered. He takes 16 units at night. May need to start once we initiate tube feeds.      Enterococcus bacteremia-stop antibiotic, patient is for comfort care     Diffuse large B cell lymphoma - had mass in naresh hepatis 10/18 - stage III, had 3 cycles of RCHOP with decrease in size     Acute metabolic encephalopathy - s/p cardiac arrest - did have ROSC - moving legs so no hypothermia protocol     Alcohol abuse -on thiamine, folate and MVI    Terminal weaning, comfort care: Per family, patient wife, patient was extubated 2 days ago, comfort measures only, morphine and Ativan, patient was  at 3:33 AM       Physical Exam Performed:     BP (!) 70/50   Pulse (!) 0   Temp 97.9 °F (36.6 °C) (Temporal)   Resp (!) 0   Ht 5' 9\" (1.753 m)   Wt 196 lb 3.4 oz (89 kg)   SpO2 (!) 87%   BMI 28.98 kg/m²       Refer to this note for physical exam per Dr. Raza Monae:  For convenience and continuity at follow-up the following most recent labs are provided:      CBC:    Lab Results   Component Value Date    WBC 3.6 2021    HGB 11.8 2021    HCT 35.0 2021    PLT 66 2021       Renal:    Lab Results   Component Value Date     2021    K 3.8 2021    K 4.1 2021     2021    CO2 21 2021    BUN 32 07/08/2021    CREATININE 1.4 07/08/2021    CALCIUM 7.6 07/08/2021    PHOS 2.6 07/08/2021         Significant Diagnostic Studies    Radiology:   XR CHEST PORTABLE   Final Result   Improved aeration with decreased but persistent right lower lobe airspace   disease. Unchanged disease in the left lower lobe. XR CHEST PORTABLE   Final Result   Stable chest.  No change in bilateral pleuroparenchymal disease         XR CHEST PORTABLE   Final Result   Stable chest over the past 24 hours. Unchanged bibasilar airspace disease   versus atelectasis. XR CHEST PORTABLE   Final Result   Increased pleural-parenchymal disease at the lung bases. XR CHEST PORTABLE   Final Result   Cardiomegaly with increased vascular congestion and central edema. Basilar   atelectasis suspected greater on the left. Some of these findings are   confounded due to low lung volume. XR CHEST PORTABLE   Final Result   Endotracheal tube is low. Retraction of 3 cm is recommended. Additional   supportive devices are normal positions. Left basilar opacity, atelectasis versus pneumonia. Stable cardiomegaly. XR CHEST PORTABLE   Final Result   Supportive devices project in normal positions. Left retrocardiac area is under penetrated. Possible atelectasis or   consolidation. Stable enlargement of the cardiac silhouette. XR CHEST PORTABLE   Final Result   Interval retraction of endotracheal tube with satisfactory location. XR CHEST PORTABLE   Final Result   Addendum 1 of 1   ADDENDUM:   Critical results were called by Dr. Sarai Rivera DO to Dr. Nany Mares On    7/3/2021   at 20:53. Final   Right mainstem bronchus inhibition. This should be retracted by 3 cm. Other findings as described. XR CHEST PORTABLE   Final Result   Endotracheal tube with the tip in the right mainstem bronchus and this should   be pulled back 5.0 cm.       Cardiomegaly without acute pulmonary process. The findings were sent to the Radiology Results [address] at 7:08   pm on 7/3/2021to be communicated to a licensed caregiver.                 Consults:     IP CONSULT TO CARDIOLOGY  IP CONSULT TO HOSPITALIST  IP CONSULT TO PULMONOLOGY  IP CONSULT TO CARDIOLOGY  IP CONSULT TO CRITICAL CARE  IP CONSULT TO DIETITIAN  PHARMACY TO DOSE VANCOMYCIN  IP CONSULT TO CASE MANAGEMENT  IP CONSULT TO HEART FAILURE NURSE/COORDINATOR  IP CONSULT TO NEPHROLOGY    Disposition: Patient     Condition at Discharge: Terminal    Discharge Instructions/Follow-up: Patient     Code Status:  Prior               Discharge Medications:     Discharge Medication List as of 2021  7:36 AM           Details   torsemide (DEMADEX) 20 MG tablet Take 1 tablet by mouth 2 times daily, Disp-180 tablet, R-2Print      insulin glargine (LANTUS;BASAGLAR) 100 UNIT/ML injection pen INJECT 16 UNITS UNDER THE SKIN AT BEDTIMEHistorical Med      midodrine (PROAMATINE) 10 MG tablet Take 1 tablet by mouth 3 times daily (with meals), Disp-90 tablet, R-3Print      allopurinol (ZYLOPRIM) 300 MG tablet TAKE ONE-HALF TABLET BY MOUTH ONCE DAILY FOR GOUTHistorical Med      traZODone (DESYREL) 50 MG tablet TAKE ONE-HALF TABLET BY MOUTH BEDTIMEHistorical Med      vitamin B-12 (CYANOCOBALAMIN) 1000 MCG tablet Take 1,000 mcg by mouth dailyHistorical Med      Multiple Vitamins-Minerals (ONE-A-DAY MENS 50+ PO) Take 1 tablet by mouth dailyHistorical Med      spironolactone (ALDACTONE) 25 MG tablet Take 0.5 tablets by mouth daily, Disp-15 tablet, R-1Print      clopidogrel (PLAVIX) 75 MG tablet Take 1 tablet by mouth daily, Disp-90 tablet,R-1NO PRINT      carvedilol (COREG) 3.125 MG tablet Take 1 tablet by mouth 2 times daily, Disp-180 tablet,R-3Print      tamsulosin (FLOMAX) 0.4 MG capsule Take 0.4 mg by mouth nightlyHistorical Med      MAGNESIUM OXIDE 400 PO Take 400 mg by mouth 2 times daily Historical Med      Calcium Carb-Cholecalciferol (CALCIUM 1000 + D PO) Take 1,000 mg by mouth 2 times dailyHistorical Med      famotidine (PEPCID) 20 MG tablet Take 20 mg by mouth 2 times daily Historical Med      aspirin 81 MG tablet Take 81 mg by mouth dailyHistorical Med      vitamin D (CHOLECALCIFEROL) 1000 UNITS TABS tablet Take 1,000 Units by mouth dailyHistorical Med      loperamide (IMODIUM) 2 MG capsule Take 2 mg by mouth 4 times daily as needed for DiarrheaHistorical Med      nitroGLYCERIN (NITROSTAT) 0.4 MG SL tablet Place 0.4 mg under the tongue every 5 minutes as needed for Chest pain up to max of 3 total doses. If no relief after 1 dose, call 911. Historical Med      glipiZIDE (GLUCOTROL) 5 MG tablet Take 5 mg by mouth 2 times daily (before meals) Historical Med      atorvastatin (LIPITOR) 80 MG tablet Take 80 mg by mouth daily. Time Spent on discharge is more than 30 minutes in the examination, evaluation, counseling and review of medications and discharge plan. France Erickson MD    7/11/2021      Thank you [facility] for the opportunity to be involved in this patient's care. If you have any questions or concerns please feel free to contact me at [phone].

## 2021-07-11 NOTE — PROGRESS NOTES
Lesa Gonzalez called to update on time of death of patient. States that body will placed on hold and to call back when patient's wife, Federico Miss bedside.  Electronically signed by René Kathleen RN on 7/11/2021 at 3:52 AM

## 2021-07-11 NOTE — PROGRESS NOTES
Bambi Vick called and stated that they were signing off and body could be released to the [facility]. Referral Number: [id number].

## 2021-07-11 NOTE — PROGRESS NOTES
0408: Patient noted to be asystole via telemetry monitor. Assessment revealed no breath sounds or heart sounds to auscultations. Pupils dilated and fixed. Dr. Patricia Gomez paged to make aware.

## 2021-07-12 ENCOUNTER — TELEPHONE (OUTPATIENT)
Dept: CARDIOLOGY CLINIC | Age: 76
End: 2021-07-12

## 2021-09-29 NOTE — PROGRESS NOTES
currently receiving Chemo. He is to follow up with is oncologist in April. Denies leg swelling. He has mild SOB but no worse. He denies exertional chest pain, PND, palpitations, light-headedness. Does not smoke. His wife is with him for the visit. Past Medical History:   has a past medical history of Cardiomyopathy ([facility] [address] Diabetes mellitus ([facility] [address] Hyperlipidemia, Hypertension, NSVT (nonsustained ventricular tachycardia) ([facility] [address] and Syncope. Surgical History:   has a past surgical history that includes Coronary angioplasty with stent; Cholecystectomy; laminectomy; Tonsillectomy and adenoidectomy; Cardiac defibrillator placement (6/23/15); and toenail excision (07/21/2017). Social History:   reports that he quit smoking about 45 years ago. He has never used smokeless tobacco. He reports current alcohol use. He reports that he does not use drugs. Family History:  family history includes Heart Disease in his father and mother. Current Outpatient Medications   Medication Sig Dispense Refill    carvedilol (COREG) 12.5 MG tablet Take 1 tablet by mouth Daily with supper 60 tablet 3    furosemide (LASIX) 40 MG tablet Take 1 tablet by mouth 2 times daily 60 tablet 3    spironolactone (ALDACTONE) 50 MG tablet Take 1 tablet by mouth daily 30 tablet 3    polyethylene glycol (GLYCOLAX) packet Take 17 g by mouth daily as needed for Constipation 527 g 1    famotidine (PEPCID) 20 MG tablet Take 20 mg by mouth daily      tamsulosin (FLOMAX) 0.4 MG capsule Take 0.4 mg by mouth daily      aspirin 81 MG tablet Take 81 mg by mouth daily      vitamin D (CHOLECALCIFEROL) 1000 UNITS TABS tablet Take 1,000 Units by mouth daily      loperamide (IMODIUM) 2 MG capsule Take 2 mg by mouth 4 times daily as needed for Diarrhea      nitroGLYCERIN (NITROSTAT) 0.4 MG SL tablet Place 0.4 mg under the tongue every 5 minutes as needed for Chest pain up to max of 3 total doses. If no relief after 1 dose, call 911.       glipiZIDE (GLUCOTROL) 5 MG tablet Take 5 mg by mouth 2 times daily (before meals)      atorvastatin (LIPITOR) 80 MG tablet Take 80 mg by mouth daily.  clopidogrel (PLAVIX) 75 MG tablet Take 75 mg by mouth daily. No current facility-administered medications for this visit. Allergies:  Penicillins; Cortisone; Metformin; Metformin hcl; and Morphine     Review of Systems:   · Constitutional: there has been no unanticipated weight loss. There's been no change in energy level, sleep pattern, or activity level. · Eyes: No visual changes or diplopia. No scleral icterus. · ENT: No Headaches, hearing loss or vertigo. No mouth sores or sore throat. · Cardiovascular: Reviewed in HPI  · Respiratory: No cough or wheezing, no sputum production. No hematemesis. · Gastrointestinal: No abdominal pain, appetite loss, blood in stools. No change in bowel or bladder habits. · Genitourinary: No dysuria, trouble voiding, or hematuria. · Musculoskeletal:  No gait disturbance, weakness or joint complaints. · Integumentary: No rash or pruritis. · Neurological: No headache, diplopia, change in muscle strength, numbness or tingling. No change in gait, balance, coordination, mood, affect, memory, mentation, behavior. · Psychiatric: No anxiety, no depression. · Endocrine: No malaise, fatigue or temperature intolerance. No excessive thirst, fluid intake, or urination. No tremor. · Hematologic/Lymphatic: No abnormal bruising or bleeding, blood clots or swollen lymph nodes. · Allergic/Immunologic: No nasal congestion or hives. Physical Examination:       Blood pressure 100/70, height 5' 9\" (1.753 m), weight 186 lb 11.2 oz (84.7 kg).     Constitutional and General Appearance:  Appears stated age, NAD  Respiratory:  · Normal excursion and expansion without use of accessory muscles  · Resp Auscultation: Normal breath sounds without dullness  Cardiovascular:  · The apical impulses not displaced  · Heart tones are crisp and None size. There is mild concentric left ventricular hypertrophy. Left ventricular function is difficult to estimate due to arrhythmia but appears to be 50-55%. There is reversal of E/A inflow velocities across the mitral valve suggesting impaired left ventricular relaxation. The left atrium is dilated. There is trivial tricuspid regurgitation with RVSP estimated at 27 mmHg. Trivial mitral regurgitation is present. ECHO 3/4/19> EF of 40%, global hypokinesis, more severe hypokinesis of inferior wall. Echo 1/9/20> EF 15 to 20%  Lasix ,Aldactone, Coreg patient not on ACE inhibitor    3. AICD: Receives routine device checks every three months. 4. Essential hypertension: Stable. Blood pressure 100/70, height 5' 9\" (1.753 m), weight 186 lb 11.2 oz (84.7 kg). 5.   Hyperlipidemia: Usually drawn thru the [place]. 6. Diabetes mellitus: Followed by PCP. 7. 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 Plan:   Kinjal Edwards has a stable cardiac status. Cardiac test and lab results personally reviewed by me during this office visit and discussed. No med changes. Continue risk factor modifications. Call for any change in symptoms. Return for regular follow up in 3 months with renal panel prior. I appreciate the opportunity of cooperating in the care of this individual.    Lockie Severin, MD., [facility] [place]      Patient's problem list, medications, allergies, past medical, surgical, social and family histories were reviewed and updated as appropriate. Scribe's attestation: This note was scribed in the presence of Dr. Gerardo Obando MD, by Stephanie Weaver RN. The scribe's documentation has been prepared under my direction and personally reviewed by me in its entirety. I confirm that the note above accurately reflects all work, treatment, procedures, and medical decision making performed by me. 118

## 2023-06-02 NOTE — PROGRESS NOTES
[facility] Pulmonary/CCM Progress note      Admit Date: 7/3/2021    Chief Complaint: Cardiac arrest    Subjective: Interval History: No further episodes of arrhythmias/SVT. Still remains on epinephrine drip. Creatinine improved to 2.5, urine output 725 cc. Blood cultures growing Enterococcus faecalis.     Scheduled Meds:   vancomycin  750 mg Intravenous Q24H    heparin (porcine)  5,000 Units Subcutaneous 3 times per day    aspirin  81 mg Oral Daily    atorvastatin  80 mg Oral Daily    clopidogrel  75 mg Oral Daily    sodium chloride flush  5-40 mL Intravenous 2 times per day    lactobacillus  1 capsule Oral BID WC    famotidine (PEPCID) injection  20 mg Intravenous Daily    insulin lispro  0-12 Units Subcutaneous Q4H     Continuous Infusions:   DOBUTamine 5 mcg/kg/min (07/06/21 1600)    EPINEPHrine infusion 5 mcg/min (07/06/21 1600)    fentaNYL 50 mcg/hr (07/06/21 0524)    sodium chloride 10 mL/hr at 07/06/21 1600    dextrose      propofol 25 mcg/kg/min (07/06/21 1600)     PRN Meds:sodium chloride flush, sodium chloride, ondansetron **OR** ondansetron, polyethylene glycol, acetaminophen **OR** acetaminophen, perflutren lipid microspheres, albuterol sulfate HFA, glucose, dextrose, glucagon (rDNA), dextrose    Review of Systems  Unable to obtain since the patient is intubated and sedated    Objective:     Patient Vitals for the past 8 hrs:   BP Temp Temp src Pulse Resp SpO2   07/06/21 1645 110/60 -- -- 67 16 --   07/06/21 1630 (!) 110/59 -- -- 63 16 --   07/06/21 1600 (!) 107/57 99 °F (37.2 °C) Temporal 59 16 --   07/06/21 1530 111/75 -- -- 73 16 --   07/06/21 1515 113/62 -- -- 90 16 --   07/06/21 1500 129/78 -- -- 93 17 --   07/06/21 1445 112/69 -- -- 73 16 --   07/06/21 1200 122/71 99.2 °F (37.3 °C) Temporal 75 -- 98 %   07/06/21 1150 -- -- -- -- 16 --   07/06/21 1000 -- -- -- 71 -- 98 %   07/06/21 0930 102/67 -- -- -- -- --   07/06/21 0915 (!) 100/55 -- -- -- -- --   07/06/21 0900 (!) 98/53 -- -- -- Patient was called and updated, she will call Monday with update   HISTORY:   Reason for exam:->mechanical ventilation   Reason for Exam: Mechanical ventilation   Acuity: Unknown   Type of Exam: Unknown       FINDINGS:   Lung volumes are low accentuating heart size and bronchovascular markings at   the lung bases.       Lines and tubes appear unchanged.  Left-sided pacer is seen       There is hazy opacity at the right lung base, slightly increased.  There is   hazy opacity at the left lung base, which also appears increased.  Heart is   enlarged.           Impression   Increased pleural-parenchymal disease at the lung bases. Problem List:   Acute hypoxic respiratory failure  SVT/cardiogenic shock  Cardiac arrest  YENNIFER  Enterococcus bacteremia    Assessment/Plan:     Acute hypoxic respiratory failure-intubated post cardiac arrest, chest x-ray shows mild pulmonary edema. Stop IV fluids. On volume assist control mode of ventilation. SVT/cardiogenic shock. Still requiring epinephrine drip and dobutamine. Improved epinephrine requirements, titrate down as tolerated. Stop IV fluids. History of severe ischemic cardiomyopathy, EF of 10%. No further episodes of SVT. DC lidocaine and heparin drip. YENNIFER as a result of cardiac arrest, creatinine 2.5, improved. Urine output is also improved to 725 cc / 24 hours. Monitor. No indication for hemodialysis-patient is not a good candidate for HD either way. Enterococcus bacteremia ? Source. 1 of 2 blood cultures positive. Continue on vancomycin, await sensitivities. Pepcid and Heparin subcutaneous prophylaxis. Not on tube feeds due to vasopressor needs, will consider tomorrow. Critical care team will follow. Discussed with patient's wife at bedside-wants to continue all care, realizes that the prognosis is poor but wants to give patient a chance. Also discussed with Dr. Criss Gibson, patient's cardiologist.    Luis Manuel Tse MD     Critical care time of 35 minutes excluding procedures.

## 2023-07-20 NOTE — PROGRESS NOTES
Admission assessment completed and documented. Fall precautions in place, q2h rounding, call light and belongings in reach, bed in lowest position, wheels locked in place, side rails up x 2, walkways free of clutter and bed alarm activated. Denies pain at this time. A&O x4, ambulates independently and able to make needs known. Will continue to monitor. Impression: Type 2 diabetes mellitus w/o complication: B81.3 Bilateral. Plan: Discussed diagnosis in detail with patient. No treatment is required at this time. Emphasized blood sugar control. Call if [address] worsens. Will continue to observe condition and or symptoms, keep follow ups with primary care for glycemic management. Recommend yearly exams. Letter sent to PCP.

## (undated) DEVICE — SOLUTION IV IRRIG WATER 500ML POUR BRL ST 2F7113

## (undated) DEVICE — PROCEDURE KIT ENDOSCP CUST

## (undated) DEVICE — BW-412T DISP COMBO CLEANING BRUSH: Brand: SINGLE USE COMBINATION CLEANING BRUSH

## (undated) DEVICE — MOUTHPIECE ENDOSCP L CTRL OPN AND SIDE PORTS DISP

## (undated) DEVICE — SET VLV 3 PC AWS DISPOSABLE GRDIAN SCOPEVALET